# Patient Record
Sex: FEMALE | Race: WHITE | NOT HISPANIC OR LATINO | URBAN - METROPOLITAN AREA
[De-identification: names, ages, dates, MRNs, and addresses within clinical notes are randomized per-mention and may not be internally consistent; named-entity substitution may affect disease eponyms.]

---

## 2018-06-05 ENCOUNTER — OUTPATIENT (OUTPATIENT)
Dept: OUTPATIENT SERVICES | Facility: HOSPITAL | Age: 33
LOS: 1 days | Discharge: HOME | End: 2018-06-05

## 2018-06-05 ENCOUNTER — RESULT REVIEW (OUTPATIENT)
Age: 33
End: 2018-06-05

## 2018-06-05 DIAGNOSIS — A74.9 CHLAMYDIAL INFECTION, UNSPECIFIED: ICD-10-CM

## 2018-06-05 DIAGNOSIS — Z01.419 ENCOUNTER FOR GYNECOLOGICAL EXAMINATION (GENERAL) (ROUTINE) WITHOUT ABNORMAL FINDINGS: ICD-10-CM

## 2018-10-11 ENCOUNTER — OUTPATIENT (OUTPATIENT)
Dept: OUTPATIENT SERVICES | Facility: HOSPITAL | Age: 33
LOS: 1 days | Discharge: HOME | End: 2018-10-11

## 2018-10-11 DIAGNOSIS — Z34.00 ENCOUNTER FOR SUPERVISION OF NORMAL FIRST PREGNANCY, UNSPECIFIED TRIMESTER: ICD-10-CM

## 2018-10-19 ENCOUNTER — INPATIENT (INPATIENT)
Facility: HOSPITAL | Age: 33
LOS: 1 days | Discharge: HOME | End: 2018-10-21
Attending: OBSTETRICS & GYNECOLOGY | Admitting: OBSTETRICS & GYNECOLOGY

## 2018-10-19 VITALS
OXYGEN SATURATION: 97 % | TEMPERATURE: 98 F | RESPIRATION RATE: 16 BRPM | DIASTOLIC BLOOD PRESSURE: 76 MMHG | HEART RATE: 95 BPM | SYSTOLIC BLOOD PRESSURE: 126 MMHG

## 2018-10-19 LAB
ALBUMIN SERPL ELPH-MCNC: 4.6 G/DL — SIGNIFICANT CHANGE UP (ref 3.5–5.2)
ALP SERPL-CCNC: 64 U/L — SIGNIFICANT CHANGE UP (ref 30–115)
ALT FLD-CCNC: 26 U/L — SIGNIFICANT CHANGE UP (ref 0–41)
ANION GAP SERPL CALC-SCNC: 22 MMOL/L — HIGH (ref 7–14)
APPEARANCE UR: CLEAR — SIGNIFICANT CHANGE UP
AST SERPL-CCNC: 32 U/L — SIGNIFICANT CHANGE UP (ref 0–41)
BASOPHILS # BLD AUTO: 0.03 K/UL — SIGNIFICANT CHANGE UP (ref 0–0.2)
BASOPHILS NFR BLD AUTO: 0.2 % — SIGNIFICANT CHANGE UP (ref 0–1)
BILIRUB SERPL-MCNC: 0.4 MG/DL — SIGNIFICANT CHANGE UP (ref 0.2–1.2)
BILIRUB UR-MCNC: NEGATIVE — SIGNIFICANT CHANGE UP
BUN SERPL-MCNC: 12 MG/DL — SIGNIFICANT CHANGE UP (ref 10–20)
CALCIUM SERPL-MCNC: 10 MG/DL — SIGNIFICANT CHANGE UP (ref 8.5–10.1)
CHLORIDE SERPL-SCNC: 94 MMOL/L — LOW (ref 98–110)
CO2 SERPL-SCNC: 19 MMOL/L — SIGNIFICANT CHANGE UP (ref 17–32)
COLOR SPEC: YELLOW — SIGNIFICANT CHANGE UP
CREAT SERPL-MCNC: 0.6 MG/DL — LOW (ref 0.7–1.5)
DIFF PNL FLD: NEGATIVE — SIGNIFICANT CHANGE UP
EOSINOPHIL # BLD AUTO: 0.08 K/UL — SIGNIFICANT CHANGE UP (ref 0–0.7)
EOSINOPHIL NFR BLD AUTO: 0.6 % — SIGNIFICANT CHANGE UP (ref 0–8)
GLUCOSE SERPL-MCNC: 85 MG/DL — SIGNIFICANT CHANGE UP (ref 70–99)
GLUCOSE UR QL: >=1000
HCG SERPL-ACNC: HIGH MIU/ML
HCT VFR BLD CALC: 41.1 % — SIGNIFICANT CHANGE UP (ref 37–47)
HGB BLD-MCNC: 14.4 G/DL — SIGNIFICANT CHANGE UP (ref 12–16)
IMM GRANULOCYTES NFR BLD AUTO: 0.3 % — SIGNIFICANT CHANGE UP (ref 0.1–0.3)
KETONES UR-MCNC: 15
LACTATE SERPL-SCNC: 1.2 MMOL/L — SIGNIFICANT CHANGE UP (ref 0.5–2.2)
LEUKOCYTE ESTERASE UR-ACNC: NEGATIVE — SIGNIFICANT CHANGE UP
LYMPHOCYTES # BLD AUTO: 1.85 K/UL — SIGNIFICANT CHANGE UP (ref 1.2–3.4)
LYMPHOCYTES # BLD AUTO: 14.2 % — LOW (ref 20.5–51.1)
MCHC RBC-ENTMCNC: 31.6 PG — HIGH (ref 27–31)
MCHC RBC-ENTMCNC: 35 G/DL — SIGNIFICANT CHANGE UP (ref 32–37)
MCV RBC AUTO: 90.1 FL — SIGNIFICANT CHANGE UP (ref 81–99)
MONOCYTES # BLD AUTO: 0.67 K/UL — HIGH (ref 0.1–0.6)
MONOCYTES NFR BLD AUTO: 5.1 % — SIGNIFICANT CHANGE UP (ref 1.7–9.3)
NEUTROPHILS # BLD AUTO: 10.34 K/UL — HIGH (ref 1.4–6.5)
NEUTROPHILS NFR BLD AUTO: 79.6 % — HIGH (ref 42.2–75.2)
NITRITE UR-MCNC: NEGATIVE — SIGNIFICANT CHANGE UP
NRBC # BLD: 0 /100 WBCS — SIGNIFICANT CHANGE UP (ref 0–0)
PH UR: 6.5 — SIGNIFICANT CHANGE UP (ref 5–8)
PLATELET # BLD AUTO: 225 K/UL — SIGNIFICANT CHANGE UP (ref 130–400)
POTASSIUM SERPL-MCNC: 4.3 MMOL/L — SIGNIFICANT CHANGE UP (ref 3.5–5)
POTASSIUM SERPL-SCNC: 4.3 MMOL/L — SIGNIFICANT CHANGE UP (ref 3.5–5)
PROT SERPL-MCNC: 7.3 G/DL — SIGNIFICANT CHANGE UP (ref 6–8)
PROT UR-MCNC: NEGATIVE — SIGNIFICANT CHANGE UP
RBC # BLD: 4.56 M/UL — SIGNIFICANT CHANGE UP (ref 4.2–5.4)
RBC # FLD: 12.4 % — SIGNIFICANT CHANGE UP (ref 11.5–14.5)
SODIUM SERPL-SCNC: 135 MMOL/L — SIGNIFICANT CHANGE UP (ref 135–146)
SP GR SPEC: >=1.03 — SIGNIFICANT CHANGE UP (ref 1.01–1.03)
UROBILINOGEN FLD QL: 0.2 — SIGNIFICANT CHANGE UP (ref 0.2–0.2)
WBC # BLD: 13.01 K/UL — HIGH (ref 4.8–10.8)
WBC # FLD AUTO: 13.01 K/UL — HIGH (ref 4.8–10.8)

## 2018-10-19 RX ORDER — FAMOTIDINE 10 MG/ML
20 INJECTION INTRAVENOUS ONCE
Refills: 0 | Status: COMPLETED | OUTPATIENT
Start: 2018-10-19 | End: 2018-10-19

## 2018-10-19 RX ORDER — SODIUM CHLORIDE 9 MG/ML
1000 INJECTION INTRAMUSCULAR; INTRAVENOUS; SUBCUTANEOUS ONCE
Refills: 0 | Status: DISCONTINUED | OUTPATIENT
Start: 2018-10-19 | End: 2018-10-19

## 2018-10-19 RX ORDER — SODIUM CHLORIDE 9 MG/ML
1000 INJECTION, SOLUTION INTRAVENOUS
Refills: 0 | Status: DISCONTINUED | OUTPATIENT
Start: 2018-10-19 | End: 2018-10-21

## 2018-10-19 RX ORDER — SODIUM CHLORIDE 9 MG/ML
1000 INJECTION INTRAMUSCULAR; INTRAVENOUS; SUBCUTANEOUS ONCE
Refills: 0 | Status: COMPLETED | OUTPATIENT
Start: 2018-10-19 | End: 2018-10-19

## 2018-10-19 RX ORDER — FAMOTIDINE 10 MG/ML
20 INJECTION INTRAVENOUS DAILY
Refills: 0 | Status: DISCONTINUED | OUTPATIENT
Start: 2018-10-19 | End: 2018-10-21

## 2018-10-19 RX ADMIN — SODIUM CHLORIDE 1000 MILLILITER(S): 9 INJECTION INTRAMUSCULAR; INTRAVENOUS; SUBCUTANEOUS at 23:06

## 2018-10-19 RX ADMIN — SODIUM CHLORIDE 1000 MILLILITER(S): 9 INJECTION INTRAMUSCULAR; INTRAVENOUS; SUBCUTANEOUS at 17:09

## 2018-10-19 RX ADMIN — FAMOTIDINE 20 MILLIGRAM(S): 10 INJECTION INTRAVENOUS at 23:06

## 2018-10-19 RX ADMIN — SODIUM CHLORIDE 2000 MILLILITER(S): 9 INJECTION, SOLUTION INTRAVENOUS at 20:43

## 2018-10-19 NOTE — ED PROVIDER NOTE - ATTENDING CONTRIBUTION TO CARE
I personally evaluated the patient. I reviewed the Resident’s or Physician Assistant’s note (as assigned above), and agree with the findings and plan except as documented in my note.     33 female here for eval of persistent nausea and vomiting. Is  at 10 weeks by dates and has prior hyperemesis gravidarum. Reluctant to take antiemetics from prior experience. No other symptoms. Follows with Dr. Martinez and he is aware she is here.     PE: female in no distress. HEENT: conjunctiva pink. CHEST: normal work fo breathing. CV: pulses intact. ABD: soft, non rigid, no guarding. SKIN: no pallor no diaphoresis.    Impression: hyperemesis gravidarum    Plan: IV labs imaging supportive care and reevaluation

## 2018-10-19 NOTE — ED ADULT TRIAGE NOTE - CHIEF COMPLAINT QUOTE
"I'm 10 weeks and 1 day pregnant, I've been vomiting since I was 6 weeks pregnant, not I feel weak and dehydrated."

## 2018-10-19 NOTE — ED PROVIDER NOTE - OBJECTIVE STATEMENT
32 yo F with no pmhx,  currently 10 weeks and 1 day, presents for evaluation of nausea and vomiting, ongoing for a month, associated with abdominal cramping from the vomiting. No fever, no chills, no dysuria, no vaginal bleeding, no vaginal discharge, no back pain, no chest pain, no SOB. Patient states she is followed by Dr. Villalpando JARAD, who has been treated her with OTC VitB6 and doxylamine. Patient will not take zofran or reglan, her first born was born with a defect after taking zofran for the nausea she experienced during that pregnancy. Patient denies any other symptoms

## 2018-10-19 NOTE — ED ADULT NURSE NOTE - NSIMPLEMENTINTERV_GEN_ALL_ED
Implemented All Universal Safety Interventions:  [place] to call system. Call bell, personal items and telephone within reach. Instruct patient to call for assistance. Room bathroom lighting operational. Non-slip footwear when patient is off stretcher. Physically safe environment: no spills, clutter or unnecessary equipment. Stretcher in lowest position, wheels locked, appropriate side rails in place.

## 2018-10-19 NOTE — ED PROVIDER NOTE - MEDICAL DECISION MAKING DETAILS
33 female here for hyperemesis gravidarum intolerant of PO after several rounds of crystalloid, will admit to OB service. Case d/w Dr. Villalpando and family with plan explained.

## 2018-10-20 LAB
CULTURE RESULTS: SIGNIFICANT CHANGE UP
GLUCOSE BLDC GLUCOMTR-MCNC: 88 MG/DL — SIGNIFICANT CHANGE UP (ref 70–99)
SPECIMEN SOURCE: SIGNIFICANT CHANGE UP

## 2018-10-20 RX ORDER — METOCLOPRAMIDE HCL 10 MG
10 TABLET ORAL EVERY 6 HOURS
Refills: 0 | Status: DISCONTINUED | OUTPATIENT
Start: 2018-10-20 | End: 2018-10-20

## 2018-10-20 RX ORDER — SODIUM CHLORIDE 9 MG/ML
1000 INJECTION INTRAMUSCULAR; INTRAVENOUS; SUBCUTANEOUS
Refills: 0 | Status: DISCONTINUED | OUTPATIENT
Start: 2018-10-20 | End: 2018-10-21

## 2018-10-20 RX ORDER — DOXYLAMINE SUCCINATE AND PYRIDOXINE HYDROCHLORIDE, DELAYED RELEASE TABLETS 10 MG/10 MG 10; 10 MG/1; MG/1
1 TABLET, DELAYED RELEASE ORAL
Qty: 90 | Refills: 0
Start: 2018-10-20 | End: 2018-11-18

## 2018-10-20 RX ORDER — DOXYLAMINE SUCCINATE AND PYRIDOXINE HYDROCHLORIDE, DELAYED RELEASE TABLETS 10 MG/10 MG 10; 10 MG/1; MG/1
1 TABLET, DELAYED RELEASE ORAL
Refills: 0 | Status: DISCONTINUED | OUTPATIENT
Start: 2018-10-20 | End: 2018-10-21

## 2018-10-20 RX ORDER — SODIUM CHLORIDE 9 MG/ML
1000 INJECTION, SOLUTION INTRAVENOUS
Refills: 0 | Status: DISCONTINUED | OUTPATIENT
Start: 2018-10-20 | End: 2018-10-21

## 2018-10-20 RX ORDER — METOCLOPRAMIDE HCL 10 MG
10 TABLET ORAL EVERY 6 HOURS
Refills: 0 | Status: DISCONTINUED | OUTPATIENT
Start: 2018-10-20 | End: 2018-10-21

## 2018-10-20 RX ADMIN — FAMOTIDINE 104 MILLIGRAM(S): 10 INJECTION INTRAVENOUS at 12:35

## 2018-10-20 RX ADMIN — Medication 10 MILLIGRAM(S): at 18:30

## 2018-10-20 RX ADMIN — SODIUM CHLORIDE 125 MILLILITER(S): 9 INJECTION INTRAMUSCULAR; INTRAVENOUS; SUBCUTANEOUS at 15:50

## 2018-10-20 RX ADMIN — SODIUM CHLORIDE 125 MILLILITER(S): 9 INJECTION INTRAMUSCULAR; INTRAVENOUS; SUBCUTANEOUS at 14:23

## 2018-10-20 RX ADMIN — SODIUM CHLORIDE 125 MILLILITER(S): 9 INJECTION, SOLUTION INTRAVENOUS at 18:31

## 2018-10-20 RX ADMIN — SODIUM CHLORIDE 125 MILLILITER(S): 9 INJECTION INTRAMUSCULAR; INTRAVENOUS; SUBCUTANEOUS at 18:14

## 2018-10-20 RX ADMIN — DOXYLAMINE SUCCINATE AND PYRIDOXINE HYDROCHLORIDE, DELAYED RELEASE TABLETS 10 MG/10 MG 1 TABLET(S): 10; 10 TABLET, DELAYED RELEASE ORAL at 21:57

## 2018-10-20 RX ADMIN — SODIUM CHLORIDE 125 MILLILITER(S): 9 INJECTION, SOLUTION INTRAVENOUS at 02:05

## 2018-10-20 NOTE — H&P ADULT - ASSESSMENT
32 yo  at 10w1d GA by LMP of 8/10/18 with EDC of 18 with hyperemesis gravidarum  -Offered multiple antiemetics, pt declined  -IV fluids with pyridoxine and multivitamin  -IV pepcid QD  -regular diet (clear liquid diet ordered to prevent nausea triggers from smelling food), pt may have regular food on demand  -will re-evaluate in AM    DIscussed with Dr. Villalpando and Dr. Woods

## 2018-10-20 NOTE — H&P ADULT - NSHPPHYSICALEXAM_GEN_ALL_CORE
General Appearance - AAOx3, NAD  Heart - S1S2 regular rate and rhythm  Lung - CTA Bilaterally  Abdomen - Soft, nontender, nondistended, no rebound, no rigidity, no guarding, bowel sounds present Vital Signs Last 24 Hrs  T(F): 97.7 (19 Oct 2018 23:50), Max: 97.7 (19 Oct 2018 19:22)  HR: 79 (19 Oct 2018 23:50) (72 - 95)  BP: 120/56 (19 Oct 2018 23:50) (117/68 - 126/83)  RR: 18 (19 Oct 2018 23:50) (16 - 18)    General Appearance - AAOx3, NAD  Heart - S1S2 regular rate and rhythm  Lung - CTA Bilaterally  Abdomen - Soft, nontender, nondistended, no rebound, no rigidity, no guarding, bowel sounds present

## 2018-10-20 NOTE — H&P ADULT - NSHPLABSRESULTS_GEN_ALL_CORE
14.4   13.01 )-----------( 225      ( 19 Oct 2018 16:09 )             41.1     10-19    135  |  94<L>  |  12  ----------------------------<  85  4.3   |  19  |  0.6<L>    Ca    10.0      19 Oct 2018 16:09    TPro  7.3  /  Alb  4.6  /  TBili  0.4  /  DBili  x   /  AST  32  /  ALT  26  /  AlkPhos  64  10-19    Lactate, Blood (10.19.18 @ 16:09)    Lactate, Blood: 1.2 mmol/L    Urinalysis Basic - ( 19 Oct 2018 16:09 )    Color: Yellow / Appearance: Clear / SG: >=1.030 / pH: x  Gluc: x / Ketone: 15  / Bili: Negative / Urobili: 0.2   Blood: x / Protein: Negative / Nitrite: Negative   Leuk Esterase: Negative / RBC: x / WBC x   Sq Epi: x / Non Sq Epi: x / Bacteria: x    CAPILLARY BLOOD GLUCOSE      POCT Blood Glucose.: 88 mg/dL (20 Oct 2018 00:49) 14.4   13.01 )-----------( 225      ( 19 Oct 2018 16:09 )             41.1     10-19    135  |  94<L>  |  12  ----------------------------<  85  4.3   |  19  |  0.6<L>    Ca    10.0      19 Oct 2018 16:09    TPro  7.3  /  Alb  4.6  /  TBili  0.4  /  DBili  x   /  AST  32  /  ALT  26  /  AlkPhos  64  10-19    Lactate, Blood (10.19.18 @ 16:09)    Lactate, Blood: 1.2 mmol/L    Urinalysis Basic - ( 19 Oct 2018 16:09 )    Color: Yellow / Appearance: Clear / SG: >=1.030 / pH: x  Gluc: x / Ketone: 15  / Bili: Negative / Urobili: 0.2   Blood: x / Protein: Negative / Nitrite: Negative   Leuk Esterase: Negative / RBC: x / WBC x   Sq Epi: x / Non Sq Epi: x / Bacteria: x    CAPILLARY BLOOD GLUCOSE      POCT Blood Glucose.: 88 mg/dL (20 Oct 2018 00:49)    Bedside sono: FHR visualized

## 2018-10-20 NOTE — H&P ADULT - HISTORY OF PRESENT ILLNESS
Chief Complaint: Vomiting    HPI: 32 yo  at 10w1d GA by LMP of 8/10/18 with EDC of 18 with known hyperemesis gravidarum presents with nausea and vomiting many times today. She has been taking diclegis for the past week with partial relief. She last held down a full meal yesterday (10/18) afternoon. The only liquid that she tolerates is milk, she vomits even with water. She has persistent heartburn that is relived by antacids or milk. 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 She is worried that her use of antinausea medication in her previous pregnancy caused this congenital defect, so she refuses most antinausea medications. She is comfortable taking diclegis and jaime only. She felt some mild weakness that improved with fluids given by ED team. She currently denies dizziness, weakness, fevers, chills, shortness of breath, chest pain, nausea, vomiting, dysuria, hematuria, diarrhea, or constipation.    Ob/Gyn History:  ,  c/s for craniosynostosis also complicated by hyperemesis gravidarum requiring home zofran pump, SAB x2               LMP -   8/10/18                Cycle Length - regular, monthly  Denies history of ovarian cysts, uterine fibroids, abnormal paps, or STIs  Last Pap Smear - several months ago, normal per pt

## 2018-10-20 NOTE — ED ADULT NURSE REASSESSMENT NOTE - NS ED NURSE REASSESS COMMENT FT1
Patient requesting food - order states patient is on clear liquid diet only. Patient sent  to get food regardless of order. Called resident at [phone] and he changed order and gave verbal that patient can eat. Patient has cookies at bedside. Awaiting IVF from pharmacy. Will monitor for comfort and safety.

## 2018-10-21 ENCOUNTER — TRANSCRIPTION ENCOUNTER (OUTPATIENT)
Age: 33
End: 2018-10-21

## 2018-10-21 VITALS
RESPIRATION RATE: 18 BRPM | TEMPERATURE: 98 F | SYSTOLIC BLOOD PRESSURE: 111 MMHG | DIASTOLIC BLOOD PRESSURE: 51 MMHG | HEART RATE: 87 BPM

## 2018-10-21 RX ORDER — FAMOTIDINE 10 MG/ML
20 INJECTION INTRAVENOUS DAILY
Refills: 0 | Status: DISCONTINUED | OUTPATIENT
Start: 2018-10-21 | End: 2018-10-21

## 2018-10-21 RX ADMIN — DOXYLAMINE SUCCINATE AND PYRIDOXINE HYDROCHLORIDE, DELAYED RELEASE TABLETS 10 MG/10 MG 1 TABLET(S): 10; 10 TABLET, DELAYED RELEASE ORAL at 07:45

## 2018-10-21 RX ADMIN — DOXYLAMINE SUCCINATE AND PYRIDOXINE HYDROCHLORIDE, DELAYED RELEASE TABLETS 10 MG/10 MG 1 TABLET(S): 10; 10 TABLET, DELAYED RELEASE ORAL at 14:32

## 2018-10-21 RX ADMIN — FAMOTIDINE 20 MILLIGRAM(S): 10 INJECTION INTRAVENOUS at 14:32

## 2018-10-21 RX ADMIN — SODIUM CHLORIDE 125 MILLILITER(S): 9 INJECTION INTRAMUSCULAR; INTRAVENOUS; SUBCUTANEOUS at 11:27

## 2018-10-21 NOTE — DISCHARGE NOTE ANTEPARTUM - CARE PLAN
Principal Discharge DX:	Hyperemesis gravidarum  Goal:	healthy patient  Assessment and plan of treatment:	follow up with your doctor as scheduled, take all medications at prescribed

## 2018-10-21 NOTE — DISCHARGE NOTE ANTEPARTUM - HOSPITAL COURSE
uncomplicated, patient d/c'd on hospital day one, patient requesting discharge, tolerating regular diet and PO fluids.

## 2018-10-21 NOTE — DISCHARGE NOTE ANTEPARTUM - CARE PROVIDER_API CALL
Juan Manuel To), Obstetrics and Gynecology  [address] [place] [address]  Phone: [phone]  Fax: [phone]

## 2018-10-21 NOTE — PROGRESS NOTE ADULT - SUBJECTIVE AND OBJECTIVE BOX
PGY2 progress note    Patient seen and examined at bedside. Reports only 1 episode of vomiting yesterday evening at 2000 after getting reglan IV. Currently feels nauseous but no other events of vomiting overnight and this morning. Tolerating small sips of water. Ate jello and chocolate yesterday which she did not vomit. Has some epigastric pain from acid reflux but otherwise no abdominal pain. Denies fever, chest pain, SOB, dysuria, diarrhea, vaginal bleeding/discharge.  Patient reports having a severe anxiety attack after receiving reglan yesterday. Does not want to get it again. Only agreeing to take diclegis at this time.    PE:  Vital Signs Last 24 Hrs  T(C): 36.7 (21 Oct 2018 05:24), Max: 37.1 (20 Oct 2018 18:52)  T(F): 98 (21 Oct 2018 05:24), Max: 98.7 (20 Oct 2018 18:52)  HR: 83 (21 Oct 2018 05:24) (71 - 104)  BP: 111/64 (21 Oct 2018 05:24) (104/75 - 113/68)  RR: 18 (21 Oct 2018 05:24) (18 - 20)  SpO2: 99% (20 Oct 2018 12:17) (99% - 99%)    GEN: NAD, AAOX3  CVS: RRR, normal S1/S2  lungs: CTAB  abd: soft, nontender, no r/g/r  ext: no calf tenderness or edema  SVE: deferred    meds:  MEDICATIONS  (STANDING):  dextrose 5% + sodium chloride 0.9%. 1000 milliLiter(s) (2000 mL/Hr) IV Continuous <Continuous>  doxylamine 10 mG/pyridoxine 10 mG DR Tablet 1 Tablet(s) Oral <User Schedule>  famotidine  IVPB 20 milliGRAM(s) IV Intermittent daily  sodium chloride 0.9% 1000 milliLiter(s) (125 mL/Hr) IV Continuous <Continuous>  sodium chloride 0.9%. 1000 milliLiter(s) (125 mL/Hr) IV Continuous <Continuous>    labs:                        14.4   13.01 )-----------( 225      ( 19 Oct 2018 16:09 )             41.1   10-19    135  |  94<L>  |  12  ----------------------------<  85  4.3   |  19  |  0.6<L>    Ca    10.0      19 Oct 2018 16:09    TPro  7.3  /  Alb  4.6  /  TBili  0.4  /  DBili  x   /  AST  32  /  ALT  26  /  AlkPhos  64  10-19    Ucx (final) [id number] Coagulase negative staph    TVUS:  Single live intrauterine pregnancy, with an estimated gestational age of   10 weeks and 3 days by crown-rump length. Fetal cardiac activity   demonstrated at 169 bpm. Small subchorionic hematoma measuring 1.5 x 1 x   0.7 cm, covering less than 20% of gestational sac circumference.
PGY 2 Note    Pt seen and examined at bedside, resting comfortably at this time. Denies N/V at this time, tolerated cookies overnight. Denies fevers, chills, headache, CP, SON, abd pain, N/V/D, dysuria, vaginal bleeding/discharge or cramping. Refusing anti-emetics.     PE  T(F): 97.6 (20 Oct 2018 08:13), Max: 97.7 (19 Oct 2018 19:22)  HR: 79 (20 Oct 2018 08:13) (72 - 95)  BP: 106/51 (20 Oct 2018 08:13) (106/51 - 126/83)  RR: 19 (20 Oct 2018 08:13) (16 - 19)  SpO2: 97% (19 Oct 2018 23:50) (97% - 98%)    Gen: NAD, AAOx3  Abd: soft, non tender, no R/G/R, no CVA or suprapubic tenderness  Pelvic - deferred  LE: no edema or tenderness    Labs:  none new    Meds:  2306: Pepcid 20mg

## 2018-10-21 NOTE — DISCHARGE NOTE ANTEPARTUM - PATIENT PORTAL LINK FT
You can access the [facility] Patient Portal, offered by [facility], by registering with the following website: [email or web address]

## 2018-10-21 NOTE — PROGRESS NOTE ADULT - ASSESSMENT
34 yo  at 10w1d GA, with hyperemesis gravidarum, refusing anti-emetics  -Offered multiple antiemetics, pt declined  -c/w IV fluids with pyridoxine and multivitamin  -IV pepcid QD  -regular diet   -If tolerating PO will plan for discharge later today.    Dr. Menon aware, Dr. Villalpando at bedside.
34 y/o  at 10w2d GA, with hyperemesis gravidarum, vomiting improved after reglan but refusing further antiemetics other than diclegis clinically and hemodynamically stable, HD2, doing well.   -c/w diclegis TID  - patient still refusing further antiemetics  -IV fluids  -IV pepcid QD  -regular diet     Dr. Montemayor aware. Dr. Villalpando to be made aware.

## 2018-10-27 DIAGNOSIS — O21.0 MILD HYPEREMESIS GRAVIDARUM: ICD-10-CM

## 2018-10-27 DIAGNOSIS — Z33.1 PREGNANT STATE, INCIDENTAL: ICD-10-CM

## 2018-10-27 DIAGNOSIS — Z3A.10 10 WEEKS GESTATION OF PREGNANCY: ICD-10-CM

## 2019-01-03 PROBLEM — Z00.00 ENCOUNTER FOR PREVENTIVE HEALTH EXAMINATION: Status: ACTIVE | Noted: 2019-01-03

## 2019-01-08 ENCOUNTER — APPOINTMENT (OUTPATIENT)
Dept: OBGYN | Facility: CLINIC | Age: 34
End: 2019-01-08

## 2019-01-08 VITALS
BODY MASS INDEX: 28.84 KG/M2 | RESPIRATION RATE: 20 BRPM | HEART RATE: 96 BPM | SYSTOLIC BLOOD PRESSURE: 103 MMHG | HEIGHT: 71 IN | DIASTOLIC BLOOD PRESSURE: 74 MMHG | WEIGHT: 206 LBS

## 2019-01-08 LAB
BILIRUB UR QL STRIP: NORMAL
GLUCOSE UR-MCNC: NORMAL
HGB UR QL STRIP.AUTO: NORMAL
KETONES UR-MCNC: NORMAL
NITRITE UR QL STRIP: NORMAL
PROT UR STRIP-MCNC: NORMAL

## 2019-02-19 ENCOUNTER — APPOINTMENT (OUTPATIENT)
Dept: OBGYN | Facility: CLINIC | Age: 34
End: 2019-02-19

## 2019-03-19 ENCOUNTER — APPOINTMENT (OUTPATIENT)
Dept: OBGYN | Facility: CLINIC | Age: 34
End: 2019-03-19

## 2019-03-25 ENCOUNTER — OUTPATIENT (OUTPATIENT)
Dept: OUTPATIENT SERVICES | Facility: HOSPITAL | Age: 34
LOS: 1 days | Discharge: HOME | End: 2019-03-25

## 2019-03-25 ENCOUNTER — LABORATORY RESULT (OUTPATIENT)
Age: 34
End: 2019-03-25

## 2019-03-25 ENCOUNTER — APPOINTMENT (OUTPATIENT)
Dept: ANTEPARTUM | Facility: CLINIC | Age: 34
End: 2019-03-25

## 2019-03-25 DIAGNOSIS — Z34.90 ENCOUNTER FOR SUPERVISION OF NORMAL PREGNANCY, UNSPECIFIED, UNSPECIFIED TRIMESTER: ICD-10-CM

## 2019-03-25 RX ORDER — HUMAN RHO(D) IMMUNE GLOBULIN 300 UG/1
1500 INJECTION, SOLUTION INTRAMUSCULAR
Refills: 0 | Status: COMPLETED | OUTPATIENT
Start: 2019-03-25

## 2019-03-25 RX ADMIN — HUMAN RHO(D) IMMUNE GLOBULIN 0 UNIT: 300 INJECTION, SOLUTION INTRAMUSCULAR at 00:00

## 2019-04-02 ENCOUNTER — APPOINTMENT (OUTPATIENT)
Dept: OBGYN | Facility: CLINIC | Age: 34
End: 2019-04-02

## 2019-04-10 ENCOUNTER — INPATIENT (INPATIENT)
Facility: HOSPITAL | Age: 34
LOS: 0 days | Discharge: ANOTHER HOSPITAL | End: 2019-04-11
Attending: THORACIC SURGERY (CARDIOTHORACIC VASCULAR SURGERY) | Admitting: THORACIC SURGERY (CARDIOTHORACIC VASCULAR SURGERY)
Payer: COMMERCIAL

## 2019-04-10 ENCOUNTER — RESULT REVIEW (OUTPATIENT)
Age: 34
End: 2019-04-10

## 2019-04-10 VITALS
SYSTOLIC BLOOD PRESSURE: 121 MMHG | DIASTOLIC BLOOD PRESSURE: 68 MMHG | TEMPERATURE: 96 F | OXYGEN SATURATION: 100 % | HEART RATE: 228 BPM | RESPIRATION RATE: 24 BRPM

## 2019-04-10 DIAGNOSIS — Z98.891 HISTORY OF UTERINE SCAR FROM PREVIOUS SURGERY: Chronic | ICD-10-CM

## 2019-04-10 LAB
ALBUMIN SERPL ELPH-MCNC: 3.1 G/DL — LOW (ref 3.5–5.2)
ALBUMIN SERPL ELPH-MCNC: 3.8 G/DL — SIGNIFICANT CHANGE UP (ref 3.5–5.2)
ALLERGY+IMMUNOLOGY DIAG STUDY NOTE: SIGNIFICANT CHANGE UP
ALP SERPL-CCNC: 124 U/L — HIGH (ref 30–115)
ALP SERPL-CCNC: 146 U/L — HIGH (ref 30–115)
ALT FLD-CCNC: 39 U/L — SIGNIFICANT CHANGE UP (ref 0–41)
ALT FLD-CCNC: 43 U/L — HIGH (ref 0–41)
ANION GAP SERPL CALC-SCNC: 15 MMOL/L — HIGH (ref 7–14)
ANION GAP SERPL CALC-SCNC: 16 MMOL/L — HIGH (ref 7–14)
ANION GAP SERPL CALC-SCNC: 16 MMOL/L — HIGH (ref 7–14)
APTT BLD: 18.8 SEC — CRITICAL LOW (ref 27–39.2)
AST SERPL-CCNC: 47 U/L — HIGH (ref 0–41)
AST SERPL-CCNC: 47 U/L — HIGH (ref 0–41)
BASOPHILS # BLD AUTO: 0.02 K/UL — SIGNIFICANT CHANGE UP (ref 0–0.2)
BASOPHILS NFR BLD AUTO: 0.1 % — SIGNIFICANT CHANGE UP (ref 0–1)
BILIRUB DIRECT SERPL-MCNC: 0.2 MG/DL — SIGNIFICANT CHANGE UP (ref 0–0.2)
BILIRUB INDIRECT FLD-MCNC: 0.2 MG/DL — SIGNIFICANT CHANGE UP (ref 0.2–1.2)
BILIRUB SERPL-MCNC: 0.3 MG/DL — SIGNIFICANT CHANGE UP (ref 0.2–1.2)
BILIRUB SERPL-MCNC: 0.4 MG/DL — SIGNIFICANT CHANGE UP (ref 0.2–1.2)
BLD GP AB SCN SERPL QL: ABNORMAL
BUN SERPL-MCNC: 10 MG/DL — SIGNIFICANT CHANGE UP (ref 10–20)
BUN SERPL-MCNC: 11 MG/DL — SIGNIFICANT CHANGE UP (ref 10–20)
BUN SERPL-MCNC: 11 MG/DL — SIGNIFICANT CHANGE UP (ref 10–20)
CALCIUM SERPL-MCNC: 7.5 MG/DL — LOW (ref 8.5–10.1)
CALCIUM SERPL-MCNC: 7.8 MG/DL — LOW (ref 8.5–10.1)
CALCIUM SERPL-MCNC: 9.4 MG/DL — SIGNIFICANT CHANGE UP (ref 8.5–10.1)
CHLORIDE SERPL-SCNC: 103 MMOL/L — SIGNIFICANT CHANGE UP (ref 98–110)
CHLORIDE SERPL-SCNC: 103 MMOL/L — SIGNIFICANT CHANGE UP (ref 98–110)
CHLORIDE SERPL-SCNC: 108 MMOL/L — SIGNIFICANT CHANGE UP (ref 98–110)
CK MB CFR SERPL CALC: 3.4 NG/ML — SIGNIFICANT CHANGE UP (ref 0.6–6.3)
CK SERPL-CCNC: 148 U/L — SIGNIFICANT CHANGE UP (ref 0–225)
CO2 SERPL-SCNC: 13 MMOL/L — LOW (ref 17–32)
CO2 SERPL-SCNC: 14 MMOL/L — LOW (ref 17–32)
CO2 SERPL-SCNC: 18 MMOL/L — SIGNIFICANT CHANGE UP (ref 17–32)
CREAT SERPL-MCNC: 0.5 MG/DL — LOW (ref 0.7–1.5)
CREAT SERPL-MCNC: 0.6 MG/DL — LOW (ref 0.7–1.5)
CREAT SERPL-MCNC: 0.7 MG/DL — SIGNIFICANT CHANGE UP (ref 0.7–1.5)
DIR ANTIGLOB POLYSPECIFIC INTERPRETATION: SIGNIFICANT CHANGE UP
EOSINOPHIL # BLD AUTO: 0.05 K/UL — SIGNIFICANT CHANGE UP (ref 0–0.7)
EOSINOPHIL NFR BLD AUTO: 0.4 % — SIGNIFICANT CHANGE UP (ref 0–8)
GAS PNL BLDA: SIGNIFICANT CHANGE UP
GAS PNL BLDA: SIGNIFICANT CHANGE UP
GLUCOSE SERPL-MCNC: 163 MG/DL — HIGH (ref 70–99)
GLUCOSE SERPL-MCNC: 175 MG/DL — HIGH (ref 70–99)
GLUCOSE SERPL-MCNC: 87 MG/DL — SIGNIFICANT CHANGE UP (ref 70–99)
HCG SERPL-ACNC: 4348 MIU/ML — HIGH
HCT VFR BLD CALC: 29.5 % — LOW (ref 37–47)
HCT VFR BLD CALC: 34.1 % — LOW (ref 37–47)
HCT VFR BLD CALC: 36.1 % — LOW (ref 37–47)
HGB BLD-MCNC: 10.4 G/DL — LOW (ref 12–16)
HGB BLD-MCNC: 10.6 G/DL — LOW (ref 12–16)
HGB BLD-MCNC: 8.5 G/DL — LOW (ref 12–16)
HIV 1 & 2 AB SERPL IA.RAPID: SIGNIFICANT CHANGE UP
IMM GRANULOCYTES NFR BLD AUTO: 0.8 % — HIGH (ref 0.1–0.3)
INR BLD: 1.03 RATIO — SIGNIFICANT CHANGE UP (ref 0.65–1.3)
LACTATE SERPL-SCNC: 2.2 MMOL/L — SIGNIFICANT CHANGE UP (ref 0.5–2.2)
LDH SERPL L TO P-CCNC: 263 — HIGH (ref 50–242)
LDH SERPL L TO P-CCNC: 279 — HIGH (ref 50–242)
LYMPHOCYTES # BLD AUTO: 17 % — LOW (ref 20.5–51.1)
LYMPHOCYTES # BLD AUTO: 2.34 K/UL — SIGNIFICANT CHANGE UP (ref 1.2–3.4)
MAGNESIUM SERPL-MCNC: 1.7 MG/DL — LOW (ref 1.8–2.4)
MAGNESIUM SERPL-MCNC: 1.9 MG/DL — SIGNIFICANT CHANGE UP (ref 1.8–2.4)
MCHC RBC-ENTMCNC: 27.4 PG — SIGNIFICANT CHANGE UP (ref 27–31)
MCHC RBC-ENTMCNC: 27.9 PG — SIGNIFICANT CHANGE UP (ref 27–31)
MCHC RBC-ENTMCNC: 28 PG — SIGNIFICANT CHANGE UP (ref 27–31)
MCHC RBC-ENTMCNC: 28.8 G/DL — LOW (ref 32–37)
MCHC RBC-ENTMCNC: 29.4 G/DL — LOW (ref 32–37)
MCHC RBC-ENTMCNC: 30.5 G/DL — LOW (ref 32–37)
MCV RBC AUTO: 91.7 FL — SIGNIFICANT CHANGE UP (ref 81–99)
MCV RBC AUTO: 95 FL — SIGNIFICANT CHANGE UP (ref 81–99)
MCV RBC AUTO: 95.2 FL — SIGNIFICANT CHANGE UP (ref 81–99)
MONOCYTES # BLD AUTO: 1.14 K/UL — HIGH (ref 0.1–0.6)
MONOCYTES NFR BLD AUTO: 8.3 % — SIGNIFICANT CHANGE UP (ref 1.7–9.3)
NEUTROPHILS # BLD AUTO: 10.1 K/UL — HIGH (ref 1.4–6.5)
NEUTROPHILS NFR BLD AUTO: 73.4 % — SIGNIFICANT CHANGE UP (ref 42.2–75.2)
NRBC # BLD: 0 /100 WBCS — SIGNIFICANT CHANGE UP (ref 0–0)
NRBC # BLD: 0 /100 WBCS — SIGNIFICANT CHANGE UP (ref 0–0)
NRBC # BLD: 1 /100 WBCS — HIGH (ref 0–0)
PHOSPHATE SERPL-MCNC: 5.2 MG/DL — HIGH (ref 2.1–4.9)
PLATELET # BLD AUTO: 190 K/UL — SIGNIFICANT CHANGE UP (ref 130–400)
PLATELET # BLD AUTO: 266 K/UL — SIGNIFICANT CHANGE UP (ref 130–400)
PLATELET # BLD AUTO: 268 K/UL — SIGNIFICANT CHANGE UP (ref 130–400)
POTASSIUM SERPL-MCNC: 4.3 MMOL/L — SIGNIFICANT CHANGE UP (ref 3.5–5)
POTASSIUM SERPL-MCNC: 5.6 MMOL/L — HIGH (ref 3.5–5)
POTASSIUM SERPL-MCNC: 6.8 MMOL/L — CRITICAL HIGH (ref 3.5–5)
POTASSIUM SERPL-SCNC: 4.3 MMOL/L — SIGNIFICANT CHANGE UP (ref 3.5–5)
POTASSIUM SERPL-SCNC: 5.6 MMOL/L — HIGH (ref 3.5–5)
POTASSIUM SERPL-SCNC: 6.8 MMOL/L — CRITICAL HIGH (ref 3.5–5)
PRENATAL SYPHILIS TEST: SIGNIFICANT CHANGE UP
PROT SERPL-MCNC: 5.1 G/DL — LOW (ref 6–8)
PROT SERPL-MCNC: 6.2 G/DL — SIGNIFICANT CHANGE UP (ref 6–8)
PROTHROM AB SERPL-ACNC: 11.8 SEC — SIGNIFICANT CHANGE UP (ref 9.95–12.87)
RBC # BLD: 3.1 M/UL — LOW (ref 4.2–5.4)
RBC # BLD: 3.72 M/UL — LOW (ref 4.2–5.4)
RBC # BLD: 3.8 M/UL — LOW (ref 4.2–5.4)
RBC # FLD: 14.6 % — HIGH (ref 11.5–14.5)
RBC # FLD: 14.6 % — HIGH (ref 11.5–14.5)
RBC # FLD: 14.7 % — HIGH (ref 11.5–14.5)
SODIUM SERPL-SCNC: 132 MMOL/L — LOW (ref 135–146)
SODIUM SERPL-SCNC: 137 MMOL/L — SIGNIFICANT CHANGE UP (ref 135–146)
SODIUM SERPL-SCNC: 137 MMOL/L — SIGNIFICANT CHANGE UP (ref 135–146)
TROPONIN T SERPL-MCNC: <0.01 NG/ML — SIGNIFICANT CHANGE UP
TROPONIN T SERPL-MCNC: <0.01 NG/ML — SIGNIFICANT CHANGE UP
TYPE + AB SCN PNL BLD: SIGNIFICANT CHANGE UP
URATE SERPL-MCNC: 4 MG/DL — SIGNIFICANT CHANGE UP (ref 2.5–7)
URATE SERPL-MCNC: 4.4 MG/DL — SIGNIFICANT CHANGE UP (ref 2.5–7)
WBC # BLD: 13.76 K/UL — HIGH (ref 4.8–10.8)
WBC # BLD: 16.2 K/UL — HIGH (ref 4.8–10.8)
WBC # BLD: 21.89 K/UL — HIGH (ref 4.8–10.8)
WBC # FLD AUTO: 13.76 K/UL — HIGH (ref 4.8–10.8)
WBC # FLD AUTO: 16.2 K/UL — HIGH (ref 4.8–10.8)
WBC # FLD AUTO: 21.89 K/UL — HIGH (ref 4.8–10.8)

## 2019-04-10 PROCEDURE — 99223 1ST HOSP IP/OBS HIGH 75: CPT

## 2019-04-10 PROCEDURE — 99292 CRITICAL CARE ADDL 30 MIN: CPT

## 2019-04-10 PROCEDURE — 93010 ELECTROCARDIOGRAM REPORT: CPT | Mod: 76

## 2019-04-10 PROCEDURE — 93970 EXTREMITY STUDY: CPT | Mod: 26

## 2019-04-10 PROCEDURE — 99291 CRITICAL CARE FIRST HOUR: CPT

## 2019-04-10 PROCEDURE — 88307 TISSUE EXAM BY PATHOLOGIST: CPT | Mod: 26

## 2019-04-10 RX ORDER — METOPROLOL TARTRATE 50 MG
5 TABLET ORAL ONCE
Refills: 0 | Status: COMPLETED | OUTPATIENT
Start: 2019-04-10 | End: 2019-04-10

## 2019-04-10 RX ORDER — CALCIUM GLUCONATE 100 MG/ML
1 VIAL (ML) INTRAVENOUS ONCE
Refills: 0 | Status: COMPLETED | OUTPATIENT
Start: 2019-04-10 | End: 2019-04-10

## 2019-04-10 RX ORDER — FENTANYL CITRATE 50 UG/ML
25 INJECTION INTRAVENOUS ONCE
Refills: 0 | Status: DISCONTINUED | OUTPATIENT
Start: 2019-04-10 | End: 2019-04-10

## 2019-04-10 RX ORDER — PROPOFOL 10 MG/ML
10 INJECTION, EMULSION INTRAVENOUS ONCE
Refills: 0 | Status: COMPLETED | OUTPATIENT
Start: 2019-04-10 | End: 2019-04-10

## 2019-04-10 RX ORDER — PHENYLEPHRINE HYDROCHLORIDE 10 MG/ML
0.5 INJECTION INTRAVENOUS
Qty: 40 | Refills: 0 | Status: DISCONTINUED | OUTPATIENT
Start: 2019-04-10 | End: 2019-04-10

## 2019-04-10 RX ORDER — ADENOSINE 3 MG/ML
18 INJECTION INTRAVENOUS ONCE
Refills: 0 | Status: COMPLETED | OUTPATIENT
Start: 2019-04-10 | End: 2019-04-10

## 2019-04-10 RX ORDER — DEXTROSE 50 % IN WATER 50 %
50 SYRINGE (ML) INTRAVENOUS ONCE
Refills: 0 | Status: COMPLETED | OUTPATIENT
Start: 2019-04-10 | End: 2019-04-10

## 2019-04-10 RX ORDER — PROPOFOL 10 MG/ML
5 INJECTION, EMULSION INTRAVENOUS ONCE
Refills: 0 | Status: COMPLETED | OUTPATIENT
Start: 2019-04-10 | End: 2019-04-10

## 2019-04-10 RX ORDER — PROPOFOL 10 MG/ML
200 INJECTION, EMULSION INTRAVENOUS ONCE
Refills: 0 | Status: COMPLETED | OUTPATIENT
Start: 2019-04-10 | End: 2019-04-10

## 2019-04-10 RX ORDER — SODIUM CHLORIDE 9 MG/ML
1000 INJECTION INTRAMUSCULAR; INTRAVENOUS; SUBCUTANEOUS ONCE
Refills: 0 | Status: COMPLETED | OUTPATIENT
Start: 2019-04-10 | End: 2019-04-10

## 2019-04-10 RX ORDER — PROPOFOL 10 MG/ML
1 INJECTION, EMULSION INTRAVENOUS ONCE
Refills: 0 | Status: DISCONTINUED | OUTPATIENT
Start: 2019-04-10 | End: 2019-04-10

## 2019-04-10 RX ORDER — SUCCINYLCHOLINE CHLORIDE 100 MG/5ML
100 SYRINGE (ML) INTRAVENOUS ONCE
Refills: 0 | Status: COMPLETED | OUTPATIENT
Start: 2019-04-10 | End: 2019-04-10

## 2019-04-10 RX ORDER — FENTANYL CITRATE 50 UG/ML
0.5 INJECTION INTRAVENOUS
Qty: 2500 | Refills: 0 | Status: DISCONTINUED | OUTPATIENT
Start: 2019-04-10 | End: 2019-04-11

## 2019-04-10 RX ORDER — FENTANYL CITRATE 50 UG/ML
50 INJECTION INTRAVENOUS ONCE
Refills: 0 | Status: DISCONTINUED | OUTPATIENT
Start: 2019-04-10 | End: 2019-04-10

## 2019-04-10 RX ORDER — PROPOFOL 10 MG/ML
40 INJECTION, EMULSION INTRAVENOUS ONCE
Refills: 0 | Status: COMPLETED | OUTPATIENT
Start: 2019-04-10 | End: 2019-04-10

## 2019-04-10 RX ORDER — FUROSEMIDE 40 MG
40 TABLET ORAL ONCE
Refills: 0 | Status: DISCONTINUED | OUTPATIENT
Start: 2019-04-10 | End: 2019-04-11

## 2019-04-10 RX ORDER — PHENYLEPHRINE HYDROCHLORIDE 10 MG/ML
0.1 INJECTION INTRAVENOUS
Qty: 40 | Refills: 0 | Status: DISCONTINUED | OUTPATIENT
Start: 2019-04-10 | End: 2019-04-10

## 2019-04-10 RX ORDER — PROPOFOL 10 MG/ML
50 INJECTION, EMULSION INTRAVENOUS ONCE
Refills: 0 | Status: COMPLETED | OUTPATIENT
Start: 2019-04-10 | End: 2019-04-10

## 2019-04-10 RX ORDER — INSULIN HUMAN 100 [IU]/ML
10 INJECTION, SOLUTION SUBCUTANEOUS ONCE
Refills: 0 | Status: COMPLETED | OUTPATIENT
Start: 2019-04-10 | End: 2019-04-10

## 2019-04-10 RX ORDER — DEXMEDETOMIDINE HYDROCHLORIDE IN 0.9% SODIUM CHLORIDE 4 UG/ML
0.5 INJECTION INTRAVENOUS
Qty: 200 | Refills: 0 | Status: DISCONTINUED | OUTPATIENT
Start: 2019-04-10 | End: 2019-04-11

## 2019-04-10 RX ORDER — SODIUM CHLORIDE 9 MG/ML
1000 INJECTION, SOLUTION INTRAVENOUS
Refills: 0 | Status: DISCONTINUED | OUTPATIENT
Start: 2019-04-10 | End: 2019-04-11

## 2019-04-10 RX ORDER — ADENOSINE 3 MG/ML
12 INJECTION INTRAVENOUS ONCE
Refills: 0 | Status: COMPLETED | OUTPATIENT
Start: 2019-04-10 | End: 2019-04-10

## 2019-04-10 RX ORDER — ADENOSINE 3 MG/ML
6 INJECTION INTRAVENOUS ONCE
Refills: 0 | Status: COMPLETED | OUTPATIENT
Start: 2019-04-10 | End: 2019-04-10

## 2019-04-10 RX ORDER — ETOMIDATE 2 MG/ML
15 INJECTION INTRAVENOUS ONCE
Refills: 0 | Status: COMPLETED | OUTPATIENT
Start: 2019-04-10 | End: 2019-04-10

## 2019-04-10 RX ORDER — PROPOFOL 10 MG/ML
30 INJECTION, EMULSION INTRAVENOUS ONCE
Refills: 0 | Status: COMPLETED | OUTPATIENT
Start: 2019-04-10 | End: 2019-04-10

## 2019-04-10 RX ORDER — PROPOFOL 10 MG/ML
700 INJECTION, EMULSION INTRAVENOUS ONCE
Refills: 0 | Status: DISCONTINUED | OUTPATIENT
Start: 2019-04-10 | End: 2019-04-10

## 2019-04-10 RX ORDER — PROPRANOLOL HCL 160 MG
1 CAPSULE, EXTENDED RELEASE 24HR ORAL
Refills: 0 | Status: DISCONTINUED | OUTPATIENT
Start: 2019-04-10 | End: 2019-04-10

## 2019-04-10 RX ORDER — CEFAZOLIN SODIUM 1 G
2000 VIAL (EA) INJECTION ONCE
Refills: 0 | Status: COMPLETED | OUTPATIENT
Start: 2019-04-10 | End: 2019-04-10

## 2019-04-10 RX ORDER — SODIUM CHLORIDE 9 MG/ML
500 INJECTION, SOLUTION INTRAVENOUS ONCE
Refills: 0 | Status: COMPLETED | OUTPATIENT
Start: 2019-04-10 | End: 2019-04-10

## 2019-04-10 RX ADMIN — Medication 1 MILLIGRAM(S): at 20:14

## 2019-04-10 RX ADMIN — ADENOSINE 6 MILLIGRAM(S): 3 INJECTION INTRAVENOUS at 15:47

## 2019-04-10 RX ADMIN — Medication 100 MILLIGRAM(S): at 17:52

## 2019-04-10 RX ADMIN — PROPOFOL 50 MILLIGRAM(S): 10 INJECTION, EMULSION INTRAVENOUS at 17:18

## 2019-04-10 RX ADMIN — SODIUM CHLORIDE 125 MILLILITER(S): 9 INJECTION, SOLUTION INTRAVENOUS at 22:00

## 2019-04-10 RX ADMIN — Medication 5 MILLIGRAM(S): at 23:00

## 2019-04-10 RX ADMIN — FENTANYL CITRATE 25 MICROGRAM(S): 50 INJECTION INTRAVENOUS at 22:20

## 2019-04-10 RX ADMIN — PROPOFOL 10 MILLIGRAM(S): 10 INJECTION, EMULSION INTRAVENOUS at 20:30

## 2019-04-10 RX ADMIN — ADENOSINE 12 MILLIGRAM(S): 3 INJECTION INTRAVENOUS at 15:58

## 2019-04-10 RX ADMIN — ADENOSINE 12 MILLIGRAM(S): 3 INJECTION INTRAVENOUS at 15:50

## 2019-04-10 RX ADMIN — PROPOFOL 40 MILLIGRAM(S): 10 INJECTION, EMULSION INTRAVENOUS at 17:10

## 2019-04-10 RX ADMIN — PROPOFOL 30 MILLIGRAM(S): 10 INJECTION, EMULSION INTRAVENOUS at 17:15

## 2019-04-10 RX ADMIN — Medication 200 GRAM(S): at 22:00

## 2019-04-10 RX ADMIN — ETOMIDATE 15 MILLIGRAM(S): 2 INJECTION INTRAVENOUS at 17:43

## 2019-04-10 RX ADMIN — Medication 5 MILLIGRAM(S): at 21:40

## 2019-04-10 RX ADMIN — SODIUM CHLORIDE 1000 MILLILITER(S): 9 INJECTION, SOLUTION INTRAVENOUS at 21:45

## 2019-04-10 RX ADMIN — ADENOSINE 18 MILLIGRAM(S): 3 INJECTION INTRAVENOUS at 16:30

## 2019-04-10 RX ADMIN — FENTANYL CITRATE 4 MICROGRAM(S)/KG/HR: 50 INJECTION INTRAVENOUS at 22:27

## 2019-04-10 RX ADMIN — DEXMEDETOMIDINE HYDROCHLORIDE IN 0.9% SODIUM CHLORIDE 10 MICROGRAM(S)/KG/HR: 4 INJECTION INTRAVENOUS at 20:15

## 2019-04-10 RX ADMIN — PROPOFOL 5 MILLIGRAM(S): 10 INJECTION, EMULSION INTRAVENOUS at 21:40

## 2019-04-10 RX ADMIN — PROPOFOL 40 MILLIGRAM(S): 10 INJECTION, EMULSION INTRAVENOUS at 17:14

## 2019-04-10 RX ADMIN — INSULIN HUMAN 10 UNIT(S): 100 INJECTION, SOLUTION SUBCUTANEOUS at 22:19

## 2019-04-10 RX ADMIN — Medication 5 MILLIGRAM(S): at 20:36

## 2019-04-10 RX ADMIN — Medication 50 MILLILITER(S): at 22:00

## 2019-04-10 RX ADMIN — Medication 100 MILLIGRAM(S): at 18:10

## 2019-04-10 RX ADMIN — SODIUM CHLORIDE 2000 MILLILITER(S): 9 INJECTION INTRAMUSCULAR; INTRAVENOUS; SUBCUTANEOUS at 16:00

## 2019-04-10 RX ADMIN — FENTANYL CITRATE 50 MICROGRAM(S): 50 INJECTION INTRAVENOUS at 23:30

## 2019-04-10 RX ADMIN — PROPOFOL 200 MILLIGRAM(S): 10 INJECTION, EMULSION INTRAVENOUS at 17:52

## 2019-04-10 NOTE — ED PROVIDER NOTE - PHYSICAL EXAMINATION
CONSTITUTIONAL: in mild distress  HEAD: Normocephalic; atraumatic.   EYES: PERRL; EOM intact. Conjunctiva normal B/L.   ENT: Normal pharynx with no tonsillar hypertrophy. MMM.  NECK: Supple; non-tender; no cervical lymphadenopathy.   CHEST: Normal chest excursion with respiration.   CARDIOVASCULAR: extremelty tachycardic, s1 s2 no murmurs noted   RESPIRATORY: Normal chest excursion with respiration; breath sounds clear and equal bilaterally; no wheezes, rhonchi, or rales.  GI/: gravid abdomen   EXT: no calf tenderness or swelling   SKIN: Normal for age and race; warm; dry; good turgor.  NEURO: A & O x 3; CN 2-12 intact. Grossly unremarkable.

## 2019-04-10 NOTE — CONSULT NOTE ADULT - ASSESSMENT
ASSESSMENT/PLAN: 33yFemale      Neurologic: Monitor for changes in mental status. Precedex. Pain control with Fentanyl pushes PRN.     Respiratory: Intubated on AC 40/5. ABG Q 2 hours. Last ABG 7.34/36/123. Lactate downtrending 1.9 from 3.6.     Cardiovascular: No dx. Patient became hypotensive SBP 60's, not responsive to fluid. Placed central line and a line. SBP came back up to 100's. Went into Afib 's. Not responsive to Lopressor or Cardizem. Bolused amio and started gtt. Ordered 3 sets of cardiac enzymes, expected to be elevated. Looking for a downward trend.     Gastrointestinal/Nutrition:    Genitourinary/Renal:    Hematologic:    Infectious Disease:    Endocrine:    Disposition: ASSESSMENT/PLAN: 33yFemale      Neurologic: Monitor for changes in mental status. Precedex. Pain control with Fentanyl pushes PRN.     Respiratory: Intubated on AC 40/5. ABG Q 2 hours. Last ABG 7.34/36/123. Lactate downtrending 1.9 from 3.6.     Cardiovascular: No dx. Patient became hypotensive SBP 60's, not responsive to fluid. Placed central line and a line. SBP came back up to 100's. Went into Afib 's. Not responsive to Lopressor or Cardizem. Bolused amio and started gtt. Ordered 3 sets of cardiac enzymes, expected to be elevated. Looking for a downward trend.     Gastrointestinal/Nutrition: NPO. OGT in place. LR @ 125. PPI.     Genitourinary/Renal: Jluis. Monitor I&Os. K 6.9 gave calcium, insulin, d50. Repeat k 4.2. Monitor lytes replete as needed.     Hematologic: SCD in place. Holding hep sub q for now. H/H stable.     Infectious Disease: No dx. Afebrile. WBC WNL    Endocrine: No dx. FS Q 6     Disposition: SICU ASSESSMENT/PLAN: 33yFemale      Neurologic: Monitor for changes in mental status. Precedex. Pain control with Fentanyl pushes PRN.      Respiratory: Intubated on AC 0/5. ABG Q 2 hours. Last ABG 7.34/36/123. Lactate downtrending 1.9 from 3.6.     Cardiovascular: No dx. Patient became hypotensive SBP 60's, not responsive to fluid. Placed central line and a line. SBP came back up to 100's. Went into Afib 's. Not responsive to Lopressor or Cardizem. Bolused amio and started gtt. Ordered 3 sets of cardiac enzymes, expected to be elevated. Looking for a downward trend.     Gastrointestinal/Nutrition: NPO. OGT in place. LR @ 125. PPI.     Genitourinary/Renal: Jluis. Monitor I&Os. K 6.9 gave calcium, insulin, d50. Repeat k 4.2. Monitor lytes replete as needed.     Hematologic: SCD in place. Holding hep sub q for now. H/H stable.     Infectious Disease: No dx. Afebrile. WBC WNL    Endocrine: No dx. FS Q 6     Disposition: SICU

## 2019-04-10 NOTE — PROGRESS NOTE ADULT - SUBJECTIVE AND OBJECTIVE BOX
Patient came to er c/o tachycardia   34 weeks gestation   No prior history of medical problems  Patient known to me from 2017 this pregnancy was following with another OB and transferred to me 33 weeks   reported uncomplicated pregnancy  upon admission svt up to 240 heart rate   no response to adenosine or cardioversion   Patient became hypotensive and unresponsive taken to OR   Upon my arrival uterine incision was made I scrubbed in after baby delivery who was crying and handed to peds   The remainder of the section was uneventful qbl 800ml uterus well contracted

## 2019-04-10 NOTE — BRIEF OPERATIVE NOTE - NSICDXBRIEFPROCEDURE_GEN_ALL_CORE_FT
PROCEDURES:  Repeat  section 10-Apr-2019 20:02:21  Jey Cuevas PROCEDURES:  Repeat low transverse  section 10-Apr-2019 20:30:11 emergent Jey Cuevas

## 2019-04-10 NOTE — CONSULT NOTE ADULT - SUBJECTIVE AND OBJECTIVE BOX
Date of Admission: 4/10/19    CHIEF COMPLAINT: shortness of breath    HISTORY OF PRESENT ILLNESS: 33yFemalhelga with PMH below presented to the hospital for shortness of breath for the last two days with her usual activity. She felt that her heart was racing. In the Ed prior to our arrival, patient had received 6,12,12 mg pushes of adenosine without relief and was given another 12 mg push of adenosine with EP present at the bedside. She was also given 1 mg IVP of propanolol while the patient was on tocometer x2 without relief in rhythm. The decision was made to attempt DCCV of her tachyarrythmia which subsequently failed x3 (monophasic 200J, biphasic 260J x2). She later became hypoxic and the patient was intubated and brought to the OB-OR for emergent . Cardiology was present in the room for intubation.     PAST MEDICAL & SURGICAL HISTORY:  Asthma: as child, no inhaler use&gt;10 years. Denies hospitalizations or intubations. No current inhaler.  History of low transverse  section    HEALTH ISSUES - PROBLEM Dx:        FAMILY HISTORY:  No pertinent family history in first degree relatives    None [ x]  Mother:   Father:   Siblings:     SOCIAL HISTORY:    [x ] Non-smoker  [ ] Smoker  [ ] Alcohol    Allergies    Allergy Status Unknown    Intolerances    	    REVIEW OF SYSTEMS:  CONSTITUTIONAL: No fever, weight loss, or fatigue  CARDIOLOGY: see HPI   RESPIRATORY: see HPI   NEUROLOGICAL: NO weakness, no focal deficits to report.  ENDOCRINOLOGICAL: no recent change in diabetic medications.   GI: no BRBPR, no N,V,diarrhea.    PSYCHIATRY: normal mood and affect  HEENT: no nasal discharge, no ecchymosis  SKIN: no ecchymosis, no breakdown  MUSCULOSKELETAL: Full range of motion x4.      PHYSICAL EXAM:  T(C): 35.6 (04-10-19 @ 18:46), Max: 35.6 (04-10-19 @ 15:27)  HR: 124 (04-10-19 @ 19:50) (124 - 228)  BP: 119/93 (04-10-19 @ 18:46) (74/56 - 121/94)  RR: 21 (04-10-19 @ 18:46) (20 - 24)  SpO2: 98% (04-10-19 @ 19:50) (98% - 100%)  Wt(kg): --  I&O's Summary    Daily     Daily Baby A: Weight (gm) Delivery: 3070 (10 Apr 2019 18:14)    General Appearance: normal for age gender and gestational age	  Cardiovascular: rapid and regular S1 S2, No JVD, No murmurs, No edema  Respiratory: Lungs clear to auscultation	  Psychiatry: A & O x 3, Mood & affect appropriate  Gastrointestinal: abdomen distended c/w 35th week of pregnancy. former c section scar.   Skin: No rashes, No ecchymoses, No cyanosis	  Neurologic: Non-focal  Musculoskeletal/ extremities: Normal range of motion, No clubbing, cyanosis or edema  Vascular: Peripheral pulses palpable 2+ bilaterally    LABS:	 	                          8.5    16.20 )-----------( 266      ( 10 Apr 2019 18:51 )             29.5     04-10    137  |  108  |  10  ----------------------------<  163<H>  5.6<H>   |  13<L>  |  0.5<L>    Ca    7.8<L>      10 Apr 2019 18:51  Mg     1.9     -10    TPro  5.1<L>  /  Alb  3.1<L>  /  TBili  0.3  /  DBili  x   /  AST  47<H>  /  ALT  39  /  AlkPhos  124<H>  04-10    CARDIAC MARKERS ( 10 Apr 2019 16:31 )  x     / <0.01 ng/mL / x     / x     / x              CARDIAC MARKERS:            TELEMETRY EVENTS: now in sinus tachycardia in 120s	    ECG: SVT at 240bpm. when given adenosine, suspected atrial tachycardia conducting at 1:1.  	  RADIOLOGY:   OTHER: 	    PREVIOUS DIAGNOSTIC TESTING:    [x ] Echocardiogram: bedside echo performed by myself in parasternal long, short, apical 4CH, 2CH and subcostal views. The following findings were made: EF roughly 50%by visual estimation, no regional wall motion abnormalities exist. The MItral, tricuspid and aortic valves were well visualized and were normal in structure. There was trace tricuspid regurgitation. The right ventricle is moderately enlarged. There is trivial pericardial effusion with no evidence that it is causing hemodynamic compromise.      [ ]  Catheterization:  [ ] Stress Test:  	  	    Home Medications:    MEDICATIONS  (STANDING):  calcium gluconate IVPB 1 Gram(s) IV Intermittent once  dexmedetomidine Infusion 0.5 MICROgram(s)/kG/Hr (10 mL/Hr) IV Continuous <Continuous>  furosemide   Injectable 40 milliGRAM(s) IV Push once  metoprolol tartrate Injectable 5 milliGRAM(s) IV Push once  propofol Injectable 10 milliGRAM(s) IV Push once    MEDICATIONS  (PRN): Date of Admission: 4/10/19    CHIEF COMPLAINT: shortness of breath    HISTORY OF PRESENT ILLNESS: 33yFemalhelga with PMH below presented to the hospital for shortness of breath for the last two days with her usual activity. She felt that her heart was racing. In the Ed prior to our arrival, patient had received 6,12,12 mg pushes of adenosine without relief and was given another 12 mg push of adenosine with EP present at the bedside. She was also given 1 mg IVP of propanolol while the patient was on tocometer x2 without relief in rhythm. The decision was made to attempt DCCV of her tachyarrythmia which subsequently failed x3 (monophasic 200J, biphasic 360J x2). She later became hypoxic and the patient was intubated and brought to the OB-OR for emergent . Cardiology was present in the room for intubation and in the OB-OR in the event of need for emergent management.     PAST MEDICAL & SURGICAL HISTORY:  Asthma: as child, no inhaler use&gt;10 years. Denies hospitalizations or intubations. No current inhaler.  History of low transverse  section    HEALTH ISSUES - PROBLEM Dx:        FAMILY HISTORY:  No pertinent family history in first degree relatives    None [ x]  Mother:   Father:   Siblings:     SOCIAL HISTORY:    [x ] Non-smoker  [ ] Smoker  [ ] Alcohol    Allergies    Allergy Status Unknown    Intolerances    	    REVIEW OF SYSTEMS:  CONSTITUTIONAL: No fever, weight loss, or fatigue  CARDIOLOGY: see HPI   RESPIRATORY: see HPI   NEUROLOGICAL: NO weakness, no focal deficits to report.  ENDOCRINOLOGICAL: no recent change in diabetic medications.   GI: no BRBPR, no N,V,diarrhea.    PSYCHIATRY: normal mood and affect  HEENT: no nasal discharge, no ecchymosis  SKIN: no ecchymosis, no breakdown  MUSCULOSKELETAL: Full range of motion x4.      PHYSICAL EXAM:  T(C): 35.6 (04-10-19 @ 18:46), Max: 35.6 (04-10-19 @ 15:27)  HR: 124 (04-10-19 @ 19:50) (124 - 228)  BP: 119/93 (04-10-19 @ 18:46) (74/56 - 121/94)  RR: 21 (04-10-19 @ 18:46) (20 - 24)  SpO2: 98% (04-10-19 @ 19:50) (98% - 100%)  Wt(kg): --  I&O's Summary    Daily     Daily Baby A: Weight (gm) Delivery: 3070 (10 Apr 2019 18:14)    General Appearance: normal for age gender and gestational age	  Cardiovascular: rapid and regular S1 S2, No JVD, No murmurs, No edema  Respiratory: Lungs clear to auscultation	  Psychiatry: A & O x 3, Mood & affect appropriate  Gastrointestinal: abdomen distended c/w 35th week of pregnancy. former c section scar.   Skin: No rashes, No ecchymoses, No cyanosis	  Neurologic: Non-focal  Musculoskeletal/ extremities: Normal range of motion, No clubbing, cyanosis or edema  Vascular: Peripheral pulses palpable 2+ bilaterally    LABS:	 	                          8.5    16.20 )-----------( 266      ( 10 Apr 2019 18:51 )             29.5     04-10    137  |  108  |  10  ----------------------------<  163<H>  5.6<H>   |  13<L>  |  0.5<L>    Ca    7.8<L>      10 Apr 2019 18:51  Mg     1.9     04-10    TPro  5.1<L>  /  Alb  3.1<L>  /  TBili  0.3  /  DBili  x   /  AST  47<H>  /  ALT  39  /  AlkPhos  124<H>  04-10    CARDIAC MARKERS ( 10 Apr 2019 16:31 )  x     / <0.01 ng/mL / x     / x     / x              CARDIAC MARKERS:            TELEMETRY EVENTS: now in sinus tachycardia in 120s	    ECG: SVT at 240bpm. when given adenosine, suspected atrial tachycardia conducting at 1:1.  	  RADIOLOGY:   OTHER: 	    PREVIOUS DIAGNOSTIC TESTING:    [x ] Echocardiogram: bedside echo performed by myself in parasternal long, short, apical 4CH, 2CH and subcostal views. The following findings were made: EF roughly 50%by visual estimation, no regional wall motion abnormalities exist. The MItral, tricuspid and aortic valves were well visualized and were normal in structure. There was trace tricuspid regurgitation. The right ventricle is moderately enlarged. There is trivial pericardial effusion with no evidence that it is causing hemodynamic compromise.      [ ]  Catheterization:  [ ] Stress Test:  	  	    Home Medications:    MEDICATIONS  (STANDING):  calcium gluconate IVPB 1 Gram(s) IV Intermittent once  dexmedetomidine Infusion 0.5 MICROgram(s)/kG/Hr (10 mL/Hr) IV Continuous <Continuous>  furosemide   Injectable 40 milliGRAM(s) IV Push once  metoprolol tartrate Injectable 5 milliGRAM(s) IV Push once  propofol Injectable 10 milliGRAM(s) IV Push once    MEDICATIONS  (PRN):

## 2019-04-10 NOTE — BRIEF OPERATIVE NOTE - COMMENTS
Patient presented to the emergency room c/o shortness of breath, found to be in SVT and was being managed by ED and cardiology teams, failed medical management of SVT with adenosine x4 (//18 mg) and propanolol x2, was administered propofol for sedation and failed cardioversion, was then administered etamidate and failed again cardioversion x2, became hypoxic and was intubated and transferred to labor and delivery for emergent  section. Fetal heart tracing demonstrated category 1 tracing with contractions every 2 minutes prior to cardioversion, but after third cardioversion attempt, fetus demonstrated category 2 tracing (minimal variability),   patient transferred to SICU Patient presented to the emergency room c/o shortness of breath, found to be in SVT and was being managed by ED and cardiology teams, failed medical management of SVT with adenosine x4 (//18 mg) and propanolol x2, was administered propofol for sedation and failed cardioversion, was then administered etamidate and failed again cardioversion x2, became hypoxic and was intubated and transferred to labor and delivery for emergent  section. Fetal heart tracing demonstrated category 1 tracing with contractions every 2 minutes prior to cardioversion, but after third cardioversion attempt, fetus demonstrated category 2 tracing (minimal variability),   patient transferred to SICU  dictation #[id number]

## 2019-04-10 NOTE — ED PROVIDER NOTE - ATTENDING CONTRIBUTION TO CARE
I personally evaluated the patient. I reviewed the Resident’s or Physician Assistant’s note (as assigned above), and agree with the findings and plan except as documented in my note.     33 female here for shortness of breath for the past 2-3 days, persistent and not improving, At 35 weeks by dates with normal pregnancy being followed by an OB Gyn at [facility], second pregnancy and other child is 8 years old without issues. This pregnancy complicated by hyperemesis gravidarum for which she was admitted here several weeks ago for. Denies other symptoms, states the SOB is persistent, unresolved, worse with certain positions, and not radiating. His band bedside to assist care, no illicit abuse or stimulants.     ROS otherwise unremarkable    PE: female in no distress. HEENT: non icteric normal mucosa. PERRLA. CV: pulses intact tachycardic and regular at 220-230. CHEST: normal work of breathing, CTA bilateral. ABD: gravid, non tender. EXT: no edema. NEURO: AAO 3 no focal deficits.     Impression: AVNRT, pregnancy    Plan: IV labs imaging supportive care and reevaluation, rate control, cardio / OB consults

## 2019-04-10 NOTE — CONSULT NOTE ADULT - SUBJECTIVE AND OBJECTIVE BOX
SICU Consultation Note  =====================================================  HPI:  34yo  at 34w5d dated by first trimester sonogram, who presented to ED secondary to SOB for the past 2 days. She reports the SOB was associated with nausea and vomiting, about 5x per day. She reports mild palpitations but denies chest pain. History of hyperemesis in previous pregnancies. In the ED, patient was in sinus tachycardia 's. Cardiology fellow was at bedside. in the ED, patient was given Adenosine 6, 12, 12 and 12 without converting. The decision was made to cardiovert the patient with mild sedation. Propofol and Etomatadex was given, cardioverted (200J monophasic and biphasic 360J x2) patient was not converted. It was decided by OBGYN attending and residents to proceed with an emergent c section. Patient was intubated in the ED, after patient converted and HR came down to 160's.     Patient was transferred to L&D OR for emergent . 800cc EBL. Patient remained normotensive intra op. HR came down to 115, sinus tachycardia. Patient was kept intubated. Given 6mg of versed and Nimbex for transfer to SICU. Upon arrival to the SICU patient HR was 120's sinus tachycardia, /70. ABG was obtained showed that the patient was acidotic and retaining CO2. Adjusted her vent settings. Patient O2 sat was 95%. Once patient started waking up precedex was started. Patient was maxed out on Precedex. Propofol 5x3 was given, patient had no response. Fentanyl gtt was started, patient responded well. Patients HR increased 140's Lopressor 5x1 given with good response.     Patient has a questionable history of hyperthyroidism. Lab value on previous OBGYN visit shows TSH of 0.06 with no levels of T3/T4. Obtaining thyroid function panhel. Cardiology concerned for thyroid storm and advised no CT with contrast to r/o PE. Patient is not stable enough currently for V/Q scan currently. Dr Eugene has low index of suspicion for PE       Home Medications: PNV  Allergies: none   PAST MEDICAL & SURGICAL HISTORY:  Asthma in childhood- last inhaler use >10 years ago. No hospitalizations intubations.   LTCS (10 Apr 2019 19:27)   33y female      Surgery Information  OR time:      EBL:       UO:             IV Fluids:       Blood Products:             PAST MEDICAL & SURGICAL HISTORY:  Asthma: as child, no inhaler use&gt;10 years. Denies hospitlizations or intubations. No current inhaler.  History of low transverse  section      Allergies    Allergy Status Unknown    Intolerances      SH:  Home Medications:    Advanced Directives: Full Code     ROS:  [ ] A ten-point review of systems was otherwise negative except as noted.  [ ] Due to altered mental status/intubation, subjective information were not able to be obtained from the patient. History was obtained, to the extent possible, from review of the chart and collateral sources of information.      CURRENT MEDICATIONS:   --------------------------------------------------------------------------------------  Neurologic Medications  dexmedetomidine Infusion 0.5 MICROgram(s)/kG/Hr IV Continuous <Continuous>  fentaNYL    Injectable 25 MICROGram(s) IV Push once  fentaNYL   Infusion 0.5 MICROgram(s)/kG/Hr IV Continuous <Continuous>    Respiratory Medications    Cardiovascular Medications  furosemide   Injectable 40 milliGRAM(s) IV Push once  metoprolol tartrate Injectable 5 milliGRAM(s) IV Push once    Gastrointestinal Medications  lactated ringers. 1000 milliLiter(s) IV Continuous <Continuous>    Genitourinary Medications    Hematologic/Oncologic Medications    Antimicrobial/Immunologic Medications    Endocrine/Metabolic Medications    Topical/Other Medications    --------------------------------------------------------------------------------------    Vital Signs Last 24 Hrs  T(C): 35.6 (10 Apr 2019 18:46), Max: 35.6 (10 Apr 2019 15:27)  T(F): 96.1 (10 Apr 2019 15:27), Max: 96.1 (10 Apr 2019 15:27)  HR: 124 (10 Apr 2019 19:50) (124 - 228)  BP: 119/93 (10 Apr 2019 18:46) (74/56 - 121/94)  BP(mean): --  RR: 21 (10 Apr 2019 18:46) (20 - 24)  SpO2: 98% (10 Apr 2019 19:50) (98% - 100%)  I&O's Detail    I&O's Summary      LABS:  ABG - ( 10 Apr 2019 20:28 )  pH, Arterial: 7.15  pH, Blood: x     /  pCO2: 48    /  pO2: 129   / HCO3: 17    / Base Excess: -11.7 /  SaO2: 97                                      10.6   21.89 )-----------( 190      ( 10 Apr 2019 20:55 )             36.1     04-10    132<L>  |  103  |  11  ----------------------------<  175<H>  6.8<HH>   |  14<L>  |  0.7    Ca    7.5<L>      10 Apr 2019 20:55  Phos  5.2     04-10  Mg     1.7     04-10    TPro  5.1<L>  /  Alb  3.1<L>  /  TBili  0.3  /  DBili  x   /  AST  47<H>  /  ALT  39  /  AlkPhos  124<H>  04-10    LIVER FUNCTIONS - ( 10 Apr 2019 18:51 )  Alb: 3.1 g/dL / Pro: 5.1 g/dL / ALK PHOS: 124 U/L / ALT: 39 U/L / AST: 47 U/L / GGT: x           PT/INR - ( 10 Apr 2019 20:55 )   PT: 11.80 sec;   INR: 1.03 ratio         PTT - ( 10 Apr 2019 20:55 )  PTT:18.8 sec  CARDIAC MARKERS ( 10 Apr 2019 20:55 )  x     / <0.01 ng/mL / 148 U/L / x     / 3.4 ng/mL  CARDIAC MARKERS ( 10 Apr 2019 16:31 )  x     / <0.01 ng/mL / x     / x     / x            EXAM:  General/Neuro  GCS:   Exam: Normal, NAD, alert, oriented x 3, no focal deficits. PERRLA  ***    Respiratory  Exam: Lungs clear to auscultation, Normal expansion/effort.  ***  [] Tracheostomy   [] Intubated  Mechanical Ventilation: Mode: AC/ CMV (Assist Control/ Continuous Mandatory Ventilation), RR (machine): 14, TV (machine): 450, FiO2: 50, PEEP: 5    Cardiovascular  Exam: S1, S2.  Regular rate and rhythm.   Cardiac Rhythm: Normal Sinus Rhythm  ECHO:     GI  Exam: Abdomen soft, Non-tender, Non-distended.  Gastrostomy / Jejunostomy tube in place.  Nasogastric tube in place.  Colostomy / Ileostomy.  ***  Wound:   ***  Current Diet:  NPO***    Extremities  Exam: Extremities warm, pink, well-perfused.   Peripheral edema    Derm:  Exam: Good skin turgor, no skin breakdown.      :   Exam: Bazzi catheter in place.     Tubes/Lines/Drains  ***  [x] Peripheral IV  [] Central Venous Line     	[] R	[] L	[] IJ	[] Fem	[] SC        Type:	    Date Placed:   [] Arterial Line		[] R	[] L	[] Fem	[] Rad	[] Ax	Date Placed:   [] PICC:         	[] Midline		[] Mediport           [] Urinary Catheter		Date Placed: SICU Consultation Note  =====================================================  HPI:  32yo  at 34w5d dated by first trimester sonogram, who presented to ED secondary to SOB for the past 2 days. She reports the SOB was associated with nausea and vomiting, about 5x per day. She reports mild palpitations but denies chest pain. History of hyperemesis in previous pregnancies. In the ED, patient was in sinus tachycardia 's. Cardiology fellow was at bedside. in the ED, patient was given Adenosine 6, 12, 12 and 12 without converting. The decision was made to cardiovert the patient with mild sedation. Propofol and Etomatadex was given, cardioverted (200J monophasic and biphasic 360J x2) patient was not converted. It was decided by OBGYN attending and residents to proceed with an emergent c section. Patient was intubated in the ED, after patient converted and HR came down to 160's.     Patient was transferred to L&D OR for emergent . 800cc EBL. Patient remained normotensive intra op. HR came down to 115, sinus tachycardia. Patient was kept intubated. Given 6mg of versed and Nimbex for transfer to SICU. Upon arrival to the SICU patient HR was 120's sinus tachycardia, /70. ABG was obtained showed that the patient was acidotic and retaining CO2. Adjusted her vent settings. Patient O2 sat was 95%. Once patient started waking up precedex was started. Patient was maxed out on Precedex. Propofol 5x3 was given, patient had no response. Fentanyl gtt was started, patient responded well. Patients HR increased 140's Lopressor 5x1 given with good response.     Patient has a questionable history of hyperthyroidism. Lab value on previous OBGYN visit shows TSH of 0.06 with no levels of T3/T4. Obtaining thyroid function panhel. Cardiology concerned for thyroid storm and advised no CT with contrast to r/o PE. Patient is not stable enough currently for V/Q scan currently. Dr Eugene has low index of suspicion for PE.     Placing central line and A line. Patient will be admitted to SICU for hemodynamic monitoring.        Home Medications: PNV  Allergies: none   PAST MEDICAL & SURGICAL HISTORY:  Asthma in childhood- last inhaler use >10 years ago. No hospitalizations intubations.   LTCS (10 Apr 2019 19:27)   33y female      Surgery Information  OR time:      EBL:       UO:             IV Fluids:       Blood Products:             PAST MEDICAL & SURGICAL HISTORY:  Asthma: as child, no inhaler use&gt;10 years. Denies hospitalizations or intubations. No current inhaler.  History of low transverse  section      Allergies    Allergy Status Unknown    Intolerances      SH:  Home Medications:    Advanced Directives: Full Code     ROS:  [x] A ten-point review of systems was otherwise negative except as noted.  [ ] Due to altered mental status/intubation, subjective information were not able to be obtained from the patient. History was obtained, to the extent possible, from review of the chart and collateral sources of information.      CURRENT MEDICATIONS:   --------------------------------------------------------------------------------------  Neurologic Medications  dexmedetomidine Infusion 0.5 MICROgram(s)/kG/Hr IV Continuous <Continuous>  fentaNYL    Injectable 25 MICROGram(s) IV Push once  fentaNYL   Infusion 0.5 MICROgram(s)/kG/Hr IV Continuous <Continuous>    Respiratory Medications    Cardiovascular Medications  furosemide   Injectable 40 milliGRAM(s) IV Push once  metoprolol tartrate Injectable 5 milliGRAM(s) IV Push once    Gastrointestinal Medications  lactated ringers. 1000 milliLiter(s) IV Continuous <Continuous>    Genitourinary Medications    Hematologic/Oncologic Medications    Antimicrobial/Immunologic Medications    Endocrine/Metabolic Medications    Topical/Other Medications    --------------------------------------------------------------------------------------    Vital Signs Last 24 Hrs  T(C): 35.6 (10 Apr 2019 18:46), Max: 35.6 (10 Apr 2019 15:27)  T(F): 96.1 (10 Apr 2019 15:27), Max: 96.1 (10 Apr 2019 15:27)  HR: 124 (10 Apr 2019 19:50) (124 - 228)  BP: 119/93 (10 Apr 2019 18:46) (74/56 - 121/94)  BP(mean): --  RR: 21 (10 Apr 2019 18:46) (20 - 24)  SpO2: 98% (10 Apr 2019 19:50) (98% - 100%)  I&O's Detail    I&O's Summary      LABS:  ABG - ( 10 Apr 2019 20:28 )  pH, Arterial: 7.15  pH, Blood: x     /  pCO2: 48    /  pO2: 129   / HCO3: 17    / Base Excess: -11.7 /  SaO2: 97                                      10.6   21.89 )-----------( 190      ( 10 Apr 2019 20:55 )             36.1     04-10    132<L>  |  103  |  11  ----------------------------<  175<H>  6.8<HH>   |  14<L>  |  0.7    Ca    7.5<L>      10 Apr 2019 20:55  Phos  5.2     04-10  Mg     1.7     04-10    TPro  5.1<L>  /  Alb  3.1<L>  /  TBili  0.3  /  DBili  x   /  AST  47<H>  /  ALT  39  /  AlkPhos  124<H>  04-10    LIVER FUNCTIONS - ( 10 Apr 2019 18:51 )  Alb: 3.1 g/dL / Pro: 5.1 g/dL / ALK PHOS: 124 U/L / ALT: 39 U/L / AST: 47 U/L / GGT: x           PT/INR - ( 10 Apr 2019 20:55 )   PT: 11.80 sec;   INR: 1.03 ratio         PTT - ( 10 Apr 2019 20:55 )  PTT:18.8 sec  CARDIAC MARKERS ( 10 Apr 2019 20:55 )  x     / <0.01 ng/mL / 148 U/L / x     / 3.4 ng/mL  CARDIAC MARKERS ( 10 Apr 2019 16:31 )  x     / <0.01 ng/mL / x     / x     / x            EXAM:  General/Neuro  GCS:   Exam: Normal, NAD, alert, oriented x 3, no focal deficits. PERRLA  ***    Respiratory  Exam: Lungs clear to auscultation, Normal expansion/effort.  ***  [] Tracheostomy   [] Intubated  Mechanical Ventilation: Mode: AC/ CMV (Assist Control/ Continuous Mandatory Ventilation), RR (machine): 14, TV (machine): 450, FiO2: 50, PEEP: 5    Cardiovascular  Exam: S1, S2.  Regular rate and rhythm.   Cardiac Rhythm: Normal Sinus Rhythm  ECHO:     GI  Exam: Abdomen soft, Non-tender, Non-distended.  Gastrostomy / Jejunostomy tube in place.  Nasogastric tube in place.  Colostomy / Ileostomy.  ***  Wound:   ***  Current Diet:  NPO***    Extremities  Exam: Extremities warm, pink, well-perfused.   Peripheral edema    Derm:  Exam: Good skin turgor, no skin breakdown.      :   Exam: Bazzi catheter in place.     Tubes/Lines/Drains  ***  [x] Peripheral IV  [] Central Venous Line     	[] R	[] L	[] IJ	[] Fem	[] SC        Type:	    Date Placed:   [] Arterial Line		[] R	[] L	[] Fem	[] Rad	[] Ax	Date Placed:   [] PICC:         	[] Midline		[] Mediport           [] Urinary Catheter		Date Placed: SICU Consultation Note  =====================================================  HPI:  34yo  at 34w5d dated by first trimester sonogram, who presented to ED secondary to SOB for the past 2 days. She reports the SOB was associated with nausea and vomiting, about 5x per day. She reports mild palpitations but denies chest pain. History of hyperemesis in previous pregnancies. In the ED, patient was in sinus tachycardia 's. Cardiology fellow was at bedside. in the ED, patient was given Adenosine 6, 12, 12 and 12 without converting. The decision was made to cardiovert the patient with mild sedation. Propofol and Etomatadex was given, cardioverted (200J monophasic and biphasic 360J x2) patient was not converted. It was decided by OBGYN attending and residents to proceed with an emergent c section. Patient was intubated in the ED, after patient converted and HR came down to 160's.     Patient was transferred to L&D OR for emergent . 800cc EBL. Patient remained normotensive intra op. HR came down to 115, sinus tachycardia. Patient was kept intubated. Given 6mg of versed and Nimbex for transfer to SICU. Upon arrival to the SICU patient HR was 120's sinus tachycardia, /70. ABG was obtained showed that the patient was acidotic and retaining CO2. Adjusted her vent settings. Patient O2 sat was 95%. Once patient started waking up precedex was started. Patient was maxed out on Precedex. Propofol 5x3 was given, patient had no response. Fentanyl gtt was started, patient responded well. Patients HR increased 140's Lopressor 5x1 given with good response.     Patient has a questionable history of hyperthyroidism. Lab value on previous OBGYN visit shows TSH of 0.06 with no levels of T3/T4. Obtaining thyroid function panhel. Cardiology concerned for thyroid storm and advised no CT with contrast to r/o PE. Patient is not stable enough currently for V/Q scan currently. Dr Eugene has low index of suspicion for PE.     Placing central line and A line. Patient will be admitted to SICU for hemodynamic monitoring.        Home Medications: PNV  Allergies: none   PAST MEDICAL & SURGICAL HISTORY:  Asthma in childhood- last inhaler use >10 years ago. No hospitalizations intubations.   LTCS (10 Apr 2019 19:27)        PAST MEDICAL & SURGICAL HISTORY:  Asthma: as child, no inhaler use&gt;10 years. Denies hospitalizations or intubations. No current inhaler.  History of low transverse  section      Allergies    Allergy Status Unknown    Intolerances      SH:  Home Medications:    Advanced Directives: Full Code     ROS:  [x] A ten-point review of systems was otherwise negative except as noted.  [ ] Due to altered mental status/intubation, subjective information were not able to be obtained from the patient. History was obtained, to the extent possible, from review of the chart and collateral sources of information.      CURRENT MEDICATIONS:   --------------------------------------------------------------------------------------  Neurologic Medications  dexmedetomidine Infusion 0.5 MICROgram(s)/kG/Hr IV Continuous <Continuous>  fentaNYL    Injectable 25 MICROGram(s) IV Push once  fentaNYL   Infusion 0.5 MICROgram(s)/kG/Hr IV Continuous <Continuous>    Respiratory Medications    Cardiovascular Medications  furosemide   Injectable 40 milliGRAM(s) IV Push once  metoprolol tartrate Injectable 5 milliGRAM(s) IV Push once    Gastrointestinal Medications  lactated ringers. 1000 milliLiter(s) IV Continuous <Continuous>    Genitourinary Medications    Hematologic/Oncologic Medications    Antimicrobial/Immunologic Medications    Endocrine/Metabolic Medications    Topical/Other Medications    --------------------------------------------------------------------------------------    Vital Signs Last 24 Hrs  T(C): 35.6 (10 Apr 2019 18:46), Max: 35.6 (10 Apr 2019 15:27)  T(F): 96.1 (10 Apr 2019 15:27), Max: 96.1 (10 Apr 2019 15:27)  HR: 124 (10 Apr 2019 19:50) (124 - 228)  BP: 119/93 (10 Apr 2019 18:46) (74/56 - 121/94)  BP(mean): --  RR: 21 (10 Apr 2019 18:46) (20 - 24)  SpO2: 98% (10 Apr 2019 19:50) (98% - 100%)  I&O's Detail    I&O's Summary      LABS:  ABG - ( 10 Apr 2019 20:28 )  pH, Arterial: 7.15  pH, Blood: x     /  pCO2: 48    /  pO2: 129   / HCO3: 17    / Base Excess: -11.7 /  SaO2: 97                                      10.6   21.89 )-----------( 190      ( 10 Apr 2019 20:55 )             36.1     04-10    132<L>  |  103  |  11  ----------------------------<  175<H>  6.8<HH>   |  14<L>  |  0.7    Ca    7.5<L>      10 Apr 2019 20:55  Phos  5.2     04-10  Mg     1.7     04-10    TPro  5.1<L>  /  Alb  3.1<L>  /  TBili  0.3  /  DBili  x   /  AST  47<H>  /  ALT  39  /  AlkPhos  124<H>  04-10    LIVER FUNCTIONS - ( 10 Apr 2019 18:51 )  Alb: 3.1 g/dL / Pro: 5.1 g/dL / ALK PHOS: 124 U/L / ALT: 39 U/L / AST: 47 U/L / GGT: x           PT/INR - ( 10 Apr 2019 20:55 )   PT: 11.80 sec;   INR: 1.03 ratio         PTT - ( 10 Apr 2019 20:55 )  PTT:18.8 sec  CARDIAC MARKERS ( 10 Apr 2019 20:55 )  x     / <0.01 ng/mL / 148 U/L / x     / 3.4 ng/mL  CARDIAC MARKERS ( 10 Apr 2019 16:31 )  x     / <0.01 ng/mL / x     / x     / x            EXAM:  General/Neuro  GCS:   Exam: Normal, NAD, alert, oriented x 3, no focal deficits. PERRLA  ***    Respiratory  Exam: Lungs clear to auscultation, Normal expansion/effort.  ***  [] Tracheostomy   [] Intubated  Mechanical Ventilation: Mode: AC/ CMV (Assist Control/ Continuous Mandatory Ventilation), RR (machine): 14, TV (machine): 450, FiO2: 50, PEEP: 5    Cardiovascular  Exam: S1, S2.  Regular rate and rhythm.   Cardiac Rhythm: Normal Sinus Rhythm  ECHO:     GI  Exam: Abdomen soft, Non-tender, Non-distended.  Gastrostomy / Jejunostomy tube in place.  Nasogastric tube in place.  Colostomy / Ileostomy.  ***  Wound:   ***  Current Diet:  NPO***    Extremities  Exam: Extremities warm, pink, well-perfused.   Peripheral edema    Derm:  Exam: Good skin turgor, no skin breakdown.      :   Exam: Bazzi catheter in place.     Tubes/Lines/Drains  ***  [x] Peripheral IV  [] Central Venous Line     	[] R	[] L	[] IJ	[] Fem	[] SC        Type:	    Date Placed:   [] Arterial Line		[] R	[] L	[] Fem	[] Rad	[] Ax	Date Placed:   [] PICC:         	[] Midline		[] Mediport           [] Urinary Catheter		Date Placed: SICU Consultation Note  =====================================================  HPI:  32yo  at 34w5d dated by first trimester sonogram, who presented to ED secondary to SOB for the past 2 days. She reports the SOB was associated with nausea and vomiting, about 5x per day. She reports mild palpitations but denies chest pain. History of hyperemesis in previous pregnancies. In the ED, patient was in sinus tachycardia 's. Cardiology fellow was at bedside. in the ED, patient was given Adenosine 6, 12, 12 and 12 without converting. The decision was made to cardiovert the patient with mild sedation. Propofol and Etomatadex was given, cardioverted (200J monophasic and biphasic 360J x2) patient was not converted. It was decided by OBGYN attending and residents to proceed with an emergent c section. Patient was intubated in the ED, after patient converted and HR came down to 160's.     Patient was transferred to L&D OR for emergent . 800cc EBL. Patient remained normotensive intra op. HR came down to 115, sinus tachycardia. Patient was kept intubated. Given 6mg of versed and Nimbex for transfer to SICU. Upon arrival to the SICU patient HR was 120's sinus tachycardia, /70. ABG was obtained showed that the patient was acidotic and retaining CO2. Adjusted her vent settings. Patient O2 sat was 95%. Once patient started waking up precedex was started. Patient was maxed out on Precedex. Propofol 5x3 was given, patient had no response. Fentanyl gtt was started, patient responded well. Patients HR increased 140's Lopressor 5x1 given with good response.     Patient has a questionable history of hyperthyroidism. Lab value on previous OBGYN visit shows TSH of 0.06 with no levels of T3/T4. Obtaining thyroid function panhel. Cardiology concerned for thyroid storm and advised no CT with contrast to r/o PE. Patient is not stable enough currently for V/Q scan currently. Dr Eugene has low index of suspicion for PE.     Placing central line and A line. Patient will be admitted to SICU for hemodynamic monitoring.        Home Medications: PNV  Allergies: none   PAST MEDICAL & SURGICAL HISTORY:  Asthma in childhood- last inhaler use >10 years ago. No hospitalizations intubations.   LTCS (10 Apr 2019 19:27)        PAST MEDICAL & SURGICAL HISTORY:  Asthma: as child, no inhaler use&gt;10 years. Denies hospitalizations or intubations. No current inhaler.  History of low transverse  section      Allergies    Allergy Status Unknown    Intolerances      SH:  Home Medications:    Advanced Directives: Full Code     ROS:  [x] A ten-point review of systems was otherwise negative except as noted.  [ ] Due to altered mental status/intubation, subjective information were not able to be obtained from the patient. History was obtained, to the extent possible, from review of the chart and collateral sources of information.      CURRENT MEDICATIONS:   --------------------------------------------------------------------------------------  Neurologic Medications  dexmedetomidine Infusion 0.5 MICROgram(s)/kG/Hr IV Continuous <Continuous>  fentaNYL    Injectable 25 MICROGram(s) IV Push once  fentaNYL   Infusion 0.5 MICROgram(s)/kG/Hr IV Continuous <Continuous>    Respiratory Medications    Cardiovascular Medications  furosemide   Injectable 40 milliGRAM(s) IV Push once  metoprolol tartrate Injectable 5 milliGRAM(s) IV Push once    Gastrointestinal Medications  lactated ringers. 1000 milliLiter(s) IV Continuous <Continuous>    Genitourinary Medications    Hematologic/Oncologic Medications    Antimicrobial/Immunologic Medications    Endocrine/Metabolic Medications    Topical/Other Medications    --------------------------------------------------------------------------------------    Vital Signs Last 24 Hrs  T(C): 35.6 (10 Apr 2019 18:46), Max: 35.6 (10 Apr 2019 15:27)  T(F): 96.1 (10 Apr 2019 15:27), Max: 96.1 (10 Apr 2019 15:27)  HR: 124 (10 Apr 2019 19:50) (124 - 228)  BP: 119/93 (10 Apr 2019 18:46) (74/56 - 121/94)  BP(mean): --  RR: 21 (10 Apr 2019 18:46) (20 - 24)  SpO2: 98% (10 Apr 2019 19:50) (98% - 100%)  I&O's Detail    I&O's Summary      LABS:  ABG - ( 10 Apr 2019 20:28 )  pH, Arterial: 7.15  pH, Blood: x     /  pCO2: 48    /  pO2: 129   / HCO3: 17    / Base Excess: -11.7 /  SaO2: 97                                      10.6   21.89 )-----------( 190      ( 10 Apr 2019 20:55 )             36.1     04-10    132<L>  |  103  |  11  ----------------------------<  175<H>  6.8<HH>   |  14<L>  |  0.7    Ca    7.5<L>      10 Apr 2019 20:55  Phos  5.2     04-10  Mg     1.7     04-10    TPro  5.1<L>  /  Alb  3.1<L>  /  TBili  0.3  /  DBili  x   /  AST  47<H>  /  ALT  39  /  AlkPhos  124<H>  04-10    LIVER FUNCTIONS - ( 10 Apr 2019 18:51 )  Alb: 3.1 g/dL / Pro: 5.1 g/dL / ALK PHOS: 124 U/L / ALT: 39 U/L / AST: 47 U/L / GGT: x           PT/INR - ( 10 Apr 2019 20:55 )   PT: 11.80 sec;   INR: 1.03 ratio         PTT - ( 10 Apr 2019 20:55 )  PTT:18.8 sec  CARDIAC MARKERS ( 10 Apr 2019 20:55 )  x     / <0.01 ng/mL / 148 U/L / x     / 3.4 ng/mL  CARDIAC MARKERS ( 10 Apr 2019 16:31 )  x     / <0.01 ng/mL / x     / x     / x            EXAM:  General/Neuro: Intubated. A&Ox3. Follows commands.     Respiratory  Exam: Intubated 50/5. Lungs clear to auscultation, Normal expansion/effort.    Mechanical Ventilation: Mode: AC/ CMV (Assist Control/ Continuous Mandatory Ventilation), RR (machine): 14, TV (machine): 450, FiO2: 50, PEEP: 5    Cardiovascular  Exam: irregular   Cardiac Rhythm: A fib     GI  Exam: Abdomen soft, Non-tender, Non-distended.  OGT in place.   Wound:   c section scar clean dry and intact   Current Diet:  NPO***    Extremities  Exam: Extremities warm, pink, well-perfused.   Peripheral edema    Derm:  Exam: Good skin turgor, no skin breakdown.      :   Exam: Bazzi catheter in place.     Tubes/Lines/Drains  ***  [x] Peripheral IV  [x] Central Venous Line     	[] R	[x] L	[x] IJ	[] Fem	[] SC        Type:	    Date Placed: 4/10   [x] Arterial Line		[x] R	[] L	[] Fem	[x] Rad	[] Ax	Date Placed: 4/10   [x] Urinary Catheter		Date Placed:

## 2019-04-10 NOTE — ED PROCEDURE NOTE - PROCEDURE ADDITIONAL DETAILS
patient given sequential doses of propofol in 20-30 mg doses q 3-5 minutes for total 120 mg due to low BP at time of procedure, with benefits of sedation outweighing risks.  No efficacy with transthoracic cardioversion at multiple joule settings and another attempt was made using etomidate 15 mg. Pt did not tolerate cardioversion well and required emergent intubation for stat  section. Family aware of situation and plan in real time.

## 2019-04-10 NOTE — PROGRESS NOTE ADULT - SUBJECTIVE AND OBJECTIVE BOX
Patient s/p repeat  section  currently intubated   heart rate 120s  O2 sat 97%  Plan to transfer SICU  Plan VQ scan   cxr  tft pending   extubation once patient stable

## 2019-04-10 NOTE — CONSULT NOTE ADULT - SUBJECTIVE AND OBJECTIVE BOX
Chief Complaint:    HPI: 32yo , at 34w5d dated by first trimester sonogram, who presented to ED secondary to SOB for the past 2 days. She reports the SOB was associated with nausea and vomiting, about 5x per day. She reports mild palpitations but denies chest pain. She denies Ctx, LOF, vaginal bleeding and reports +FM. Pregnancy complicated by hyperemesis gravidarum, treated with Zofran and Reglan until about 1 month ago. Denies other complications this pregnancy. Denies RUQ pain/ Epigastric pain, LE pain/ swelling, diarrhea, pain/difficulty with urination, fevers, chills.   RH negative s/p Rhogam this pregnancy. In the ED, she was found to be in SVT with a HR of 248, resistant to multiple doses of adenosine (6,12,12,12) and propanolol. In the ED, palpable contractions felt and visualized on the monitor. Patient currently denies contractions. For repeat  section.     Ob/Gyn History:  P1, LTCSX1 in  for cranial abnormalities. SABX2, no D+C.   Denies history of ovarian cysts, uterine fibroids, abnormal paps, or STIs    Denies the following: constitutional symptoms, visual symptoms, cardiovascular symptoms, respiratory symptoms, GI symptoms, musculoskeletal symptoms, skin symptoms, neurologic symptoms, hematologic symptoms, allergic symptoms, psychiatric symptoms  Except any pertinent positives listed.     Home Medications: PNV    Allergies: none     PAST MEDICAL & SURGICAL HISTORY:  Asthma in childhood- last inhaler use >10 years ago. No hospitalizations intubations.   No significant past surgical history    FAMILY HISTORY:  No pertinent family history in first degree relatives    SOCIAL HISTORY: Denies cigarette use, alcohol use, or illicit drug use    Vital Signs Last 24 Hrs  T(F): 96.1 (10 Apr 2019 15:27), Max: 96.1 (10 Apr 2019 15:27)  HR: 221 (10 Apr 2019 16:30) (221 - 228)  BP: 121/94 (10 Apr 2019 16:30) (101/76 - 121/94)  RR: 22 (10 Apr 2019 16:30) (22 - 24)    General Appearance - AAOx3, NAD  Heart - S1S2 regular rate and rhythm  Lung - CTA Bilaterally  Abdomen - Soft, nontender, nondistended, no rebound, no rigidity, no guarding, bowel sounds present/ Gravid, palpable contractions, moderate present.     FHR: 150bpm/ mod doreen/ accels+/ cat 1  TOCO q3min   SVE 1-2/50/-3, intact     Speculum exam: No bleeding or discharge noted. No pooling noted.     GYN/Pelvis:  Labia Majora - Normal  Labia Minora - Normal  Clitoris - Normal  Urethra - Normal  Vagina - Normal  Cervix - Normal; 1-2cm dilated. No bleeding or cervical discharge noted.     Uterus:  Size - Normal  Tenderness - None  Mass - None  Freely mobile    Adnexa:  Masses - None  Tenderness - None    LABS:      RADIOLOGY & ADDITIONAL STUDIES:

## 2019-04-10 NOTE — OB PROVIDER H&P - HISTORY OF PRESENT ILLNESS
34yo , at 34w5d dated by first trimester sonogram, who presented to ED secondary to SOB for the past 2 days. She reports the SOB was associated with nausea and vomiting, about 5x per day. She reports mild palpitations but denies chest pain. She denies Ctx, LOF, vaginal bleeding and reports +FM. Pregnancy complicated by hyperemesis gravidarum, treated with Zofran and Reglan until about 1 month ago. Denies other complications this pregnancy. Denies RUQ pain/ Epigastric pain, LE pain/ swelling, diarrhea, pain/difficulty with urination, fevers, chills. RH negative, s/p Rhogam this pregnancy. In the ED, palpable contractions felt and visualized on the monitor. Patient currently denies contractions. For repeat  section.   In the ED, she was found to be in SVT with a HR of 248, resistant to multiple doses of adenosine (6,12,12,12) and propanolol. Patient sedated with propofolx6 and Etomatadex1, and electrical cardioversion attempted x3 without success. She continued to have an elevated heart rate, was persistently hypotensive and her mental status started to decline. The decision was made to proceed with intubation and transfer her to +D with ED, cardiology, OB anesthesia and ObGyn team at bedside.     Denies the following: constitutional symptoms, visual symptoms, cardiovascular symptoms, respiratory symptoms, GI symptoms, musculoskeletal symptoms, skin symptoms, neurologic symptoms, hematologic symptoms, allergic symptoms, psychiatric symptoms  Except any pertinent positives listed.     Home Medications: PNV  Allergies: none   PAST MEDICAL & SURGICAL HISTORY:  Asthma in childhood- last inhaler use >10 years ago. No hospitalizations intubations.   LTCS

## 2019-04-10 NOTE — ED PROVIDER NOTE - CLINICAL SUMMARY MEDICAL DECISION MAKING FREE TEXT BOX
33 female here for symptomatic narrow complex tachydysrhythmia without hemodynamic compromise. 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 At 35 weeks by dates with normal IUP and outpatient followup.  HR 220s narrow complex and regular.  Had screening labs done with rate control attempts by adenosine 6-12-12 mg, without sustained efficacy.   Consults made to Cardiology and Ob for optimal Rx management. Both services presented to bedside in ED for advice in management, also had L&D staff in ED to perform fetal monitoring.  Was given rounds of additional parenteral Rx for cardioversion by Cardio/EP without improvement.  Plan made for procedural sedation for electrical cardioversion which was done in the ED multiple times and also did not provide restoration of normal sinus rhythm.  OB Anesthesiology was called to bedside for additional sedation attempt for cardioversion which was again unsuccessful.  Pt became critically ill during a subsequent cardioversion attempt and was promptly intubated for loss of airway reflexes.  Pt had low systolic BP during the sedation and was given several liters of crystalloid to improve BP.  Was intubated in ED by Anesthesiology and taken for emergent  section for concern of fetal safety by Ob.  Cardiology accompanied patient throughout her stay in ED and into OR suite. 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

## 2019-04-10 NOTE — BRIEF OPERATIVE NOTE - OPERATION/FINDINGS
emergeny  section  APGARs 6/8, male infant in vertex position, clear amniotic fluid, nuchal cord x2, weighing 3070 grams  Normal uterus, tubes, and ovaries

## 2019-04-10 NOTE — CONSULT NOTE ADULT - ASSESSMENT
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 Would strongly suggest to do so with VQ scan as administering iodine containing contrast could severely worsen Thyroid storm. Refractory Atrial Tachycardia  Likely Thyroid storm   Possible PE  - A tach broke with intubation but was refractory to adenosine 6,12,12,12 DCCV x3,propanolol 1mgIVP x2  - patient had low TSH on bloodwork obtained from prior to admission, given extreme tachycardia, would strongly consider ThyroidStorm  -obtain official 2d echo  - metoprolol tartrate 25 mg po q8h  - Rule out PE: given RV dilated on bedside echo, PE should be ruled out. Would strongly suggest to do so with VQ scan as administering iodine containing contrast could severely worsen Thyroid storm. Refractory Atrial Tachycardia at 250 bpm    -A tach refractory to adenosine 6,12,12,12 DCCV x3,propanolol 1mgIVP x2  -A tach broke with intubation  -patient had low TSH on bloodwork obtained from prior to admission, given extreme tachycardia, need to r/o hyperthyrodism  -r/o PE  -obtain official 2d echo  -metoprolol tartrate 25 mg po q8h  -Rule out PE: given RV dilated on bedside echo, PE should be ruled out. Would strongly suggest to do so with VQ scan as administering iodine containing contrast could severely worsen Thyroid storm.

## 2019-04-10 NOTE — OB PROVIDER H&P - ASSESSMENT
32yo , at 34w5d, with unstable tachycardia refractory to medications and cardioversion, s/p intubation in the ED  - after intubated patient was transferred to L&D, due to patient's instability decision made to proceed with emergent  section  - Will perform CT angio to rule out PE  - f/u TFTs to r/o hyperthyroidism    - f/u preeclamptic labs for initially elevated blood pressure  - EPL team at bedside, cardiac management per cardiology      Dr. Cuevas and Dr. Carrillo aware

## 2019-04-10 NOTE — OB PROVIDER H&P - NSHPPHYSICALEXAM_GEN_ALL_CORE
Vital Signs Last 24 Hrs  T(C): 35.6 (10 Apr 2019 18:46), Max: 35.6 (10 Apr 2019 15:27)  T(F): 96.1 (10 Apr 2019 15:27), Max: 96.1 (10 Apr 2019 15:27)  HR: 209 (10 Apr 2019 18:46) (181 - 228)  BP: 119/93 (10 Apr 2019 18:46) (74/56 - 121/94)  RR: 21 (10 Apr 2019 18:46) (20 - 24)  SpO2: 100% (10 Apr 2019 18:46) (100% - 100%)    FHR 150bpm/ mod doreen/ accels+  TOCO q1-2min  SVE 1-2/50/-3, intact     Speculum exam: No bleeding or discharge noted. No pooling noted.     GYN/Pelvis:  Labia Majora - Normal  Labia Minora - Normal  Clitoris - Normal  Urethra - Normal  Vagina - Normal  Cervix - Normal; 1-2cm dilated. No bleeding or cervical discharge noted.     Uterus:  Size - Normal  Tenderness - None  Mass - None  Freely mobile    Adnexa:  Masses - None  Tenderness - None

## 2019-04-10 NOTE — ED ADULT NURSE REASSESSMENT NOTE - NS ED NURSE REASSESS COMMENT FT1
patient came in to ED with shortness of breath, in SVT. meds given to break rhythm as ordered with all safety/resuscitation equipment at bedside. SVT persisted. cardiology fellows/attendings at bedside. OB team at bedside. fetal heart monitoring on patient for duration of procedure, monitored by OB team.  decision made to cardiovert patient. conscious sedation initiated, see MAR for med admin.  cardiologist attempted cardioversion with 200 jules monophasic at 1715pm, was unsuccessful, attempted again 2 times at 1745 with 360 jules biphasic, was again unsuccessful. decision made by ED attending, cardiologists, and OB team to intubate patient. patient intubated with size 6.5 ET tube, lip line 22 and brought to OR accompanied by RN and MD on cardiac monitor with defib.

## 2019-04-10 NOTE — PROCEDURE NOTE - ADDITIONAL PROCEDURE DETAILS
Failed DC cardioversion x 3 shocks with 2 different vectors  Patient was intubated and transferred to OR for emergent  due to hemodynamic instability

## 2019-04-10 NOTE — CONSULT NOTE ADULT - ASSESSMENT
34yo , at 34w5d, in SVT, refractory to medications, for cardioversion, rule out  labor  -EFM/ TOCO continuous   -f/u G/C, GBS cultures   -management per cardiology / ED  -to be transferred to L+D after cleared by cardiology     Dr. Cuevas ands Dr. Sarkar

## 2019-04-10 NOTE — BRIEF OPERATIVE NOTE - NSICDXBRIEFPREOP_GEN_ALL_CORE_FT
PRE-OP DIAGNOSIS:  Non-reassuring fetal heart rate or rhythm affecting management of mother 10-Apr-2019 20:02:10  Jey Cuevas  Hypoxia 10-Apr-2019 20:01:27  Jey Cuevas PRE-OP DIAGNOSIS:  Supraventricular tachycardia 10-Apr-2019 20:11:45  Jey Cuevas  Non-reassuring fetal heart rate or rhythm affecting management of mother 10-Apr-2019 20:02:10  Jey Cuevas  Hypoxia 10-Apr-2019 20:01:27  Jey Cuevas PRE-OP DIAGNOSIS:  Sustained supraventricular tachycardia 10-Apr-2019 20:29:01 refractory to cardioversion, hemodynamically unstable Jey Cuevas  Hypoxia 10-Apr-2019 20:28:27  Jey Cuevas

## 2019-04-10 NOTE — PRE-ANESTHESIA EVALUATION ADULT - NSANTHPMHFT_GEN_ALL_CORE
34wd5 IUP with PMH childhood asthma presents to ER with SOB,  atrial tachycardia with 's, Adenosine 6mg, 12mg, 12 mg, 12mg administered by ER, Propanolol 1mg Iv administered x2 doses.  Patient Cardioverted monophasic 200j x1, Biphasic 360jx2.  Intubated with 6.5 ETT and brought directly to L&D OR.    PSH: denies

## 2019-04-10 NOTE — OB PROVIDER H&P - NS_OBGYNHISTORY_OBGYN_ALL_OB_FT
P1, LTCSX1 in 2010 for craniosynostosis. SABX2, no D+C.   Denies history of ovarian cysts, uterine fibroids, abnormal paps, or STIs

## 2019-04-10 NOTE — PROGRESS NOTE ADULT - SUBJECTIVE AND OBJECTIVE BOX
Called to the ED trauma area by residents. 32 yo P1 prior uncomplicated c/s due to craniosynostosis came to ED complaining of SOB and palpitations. In ED found to be HR 240s. Upon my arrival, EFM was in place, 150 baseline. ED team, cardiology and OB anesthesia were at bedside. They had given pt multiple doses of adenosine, propanolol, propofol, etomodate, and cardioversion x3 without response. At my arrival BP 70s/40s, HR 180s. Pulses present, pt agitated. Decision made to intubate pt to protect airway and deliver pt via c/s. I spoke with the  to update him on situation, he agreed for her to be taken to the OR for a c/s. Explained to him procedure, risks and risks of prematurity at this GA. He understood. Once pt intubated, was transfered to OR in labor and delivery. pfannesteil skin incision, lower uterine transverse incision, baby delivered without complications, nuchal cord x2. Baby handed to awaiting NICU team. Cord gases and cord blood given to RN. Live, male infant delivered, 3070gr, APGAR 6/8. After delivery of baby, her OB took over the remaining of the procedure upon her arrival.

## 2019-04-10 NOTE — ED PROVIDER NOTE - OBJECTIVE STATEMENT
Patient is a 32 y/o female currently 35 weeks pregnant  LMP 2018, who presents c/o SOB x a few days. Non-productive cough. No chest pain. No recent travel, lower extremity edema, or calf tenderness.

## 2019-04-10 NOTE — ED ADULT NURSE REASSESSMENT NOTE - NS ED NURSE REASSESS COMMENT FT1
1mg propranolol IV admin by MD Hansen at 1640pm and another 1mg propranolol IV admin by MD Hansen at 1655pm. ED DANIEL Singh is unable to order med in [place]. as per pharmacy, DANIEL Singh is to write a paper order and fill out a non-formulary request. 1mg propranolol IV admin by MD Hansen at 1640pm and another 1mg propranolol IV admin by MD Hansen at 1655pm. ED DANIEL Singh is unable to order med in [place]. as per pharmacy, DANIEL Singh is to fill out a non-formulary request. meds charted on paper MAR which was put in chart.

## 2019-04-10 NOTE — ED ADULT NURSE NOTE - OBJECTIVE STATEMENT
patient states she is 35 weeks pregnant and has been SOB for 3 days. states her LMP was in August and is scheduled for  on May 10th. denies chest pain, admits to dizziness

## 2019-04-11 ENCOUNTER — INPATIENT (INPATIENT)
Facility: HOSPITAL | Age: 34
LOS: 16 days | Discharge: HOME | DRG: 776 | End: 2019-04-28
Attending: THORACIC SURGERY (CARDIOTHORACIC VASCULAR SURGERY) | Admitting: THORACIC SURGERY (CARDIOTHORACIC VASCULAR SURGERY)
Payer: COMMERCIAL

## 2019-04-11 ENCOUNTER — APPOINTMENT (OUTPATIENT)
Dept: CARDIOTHORACIC SURGERY | Facility: CLINIC | Age: 34
End: 2019-04-11

## 2019-04-11 VITALS — OXYGEN SATURATION: 100 % | HEART RATE: 118 BPM

## 2019-04-11 VITALS — OXYGEN SATURATION: 100 % | HEART RATE: 109 BPM

## 2019-04-11 DIAGNOSIS — Z92.81 PERSONAL HISTORY OF EXTRACORPOREAL MEMBRANE OXYGENATION (ECMO): ICD-10-CM

## 2019-04-11 DIAGNOSIS — R09.89 OTHER SPECIFIED SYMPTOMS AND SIGNS INVOLVING THE CIRCULATORY AND RESPIRATORY SYSTEMS: ICD-10-CM

## 2019-04-11 DIAGNOSIS — Z98.891 HISTORY OF UTERINE SCAR FROM PREVIOUS SURGERY: Chronic | ICD-10-CM

## 2019-04-11 LAB
ALBUMIN SERPL ELPH-MCNC: 2.2 G/DL — LOW (ref 3.5–5.2)
ALBUMIN SERPL ELPH-MCNC: 2.8 G/DL — LOW (ref 3.5–5.2)
ALP SERPL-CCNC: 100 U/L — SIGNIFICANT CHANGE UP (ref 30–115)
ALP SERPL-CCNC: 119 U/L — HIGH (ref 30–115)
ALT FLD-CCNC: 2048 U/L — HIGH (ref 0–41)
ALT FLD-CCNC: 2407 U/L — HIGH (ref 0–41)
ANION GAP SERPL CALC-SCNC: 12 MMOL/L — SIGNIFICANT CHANGE UP (ref 7–14)
ANION GAP SERPL CALC-SCNC: 17 MMOL/L — HIGH (ref 7–14)
ANION GAP SERPL CALC-SCNC: 17 MMOL/L — HIGH (ref 7–14)
ANION GAP SERPL CALC-SCNC: 20 MMOL/L — HIGH (ref 7–14)
APTT BLD: 28.8 SEC — SIGNIFICANT CHANGE UP (ref 27–39.2)
APTT BLD: 33 SEC — SIGNIFICANT CHANGE UP (ref 27–39.2)
APTT BLD: 37.9 SEC — SIGNIFICANT CHANGE UP (ref 27–39.2)
APTT BLD: 38.5 SEC — SIGNIFICANT CHANGE UP (ref 27–39.2)
AST SERPL-CCNC: 3689 U/L — HIGH (ref 0–41)
AST SERPL-CCNC: 5945 U/L — HIGH (ref 0–41)
BASE EXCESS BLDA CALC-SCNC: -10.2 MMOL/L — LOW (ref -2–2)
BASE EXCESS BLDA CALC-SCNC: -6.4 MMOL/L — LOW (ref -2–2)
BASE EXCESS BLDMV CALC-SCNC: -6 MMOL/L — LOW (ref -3–3)
BASOPHILS # BLD AUTO: 0.03 K/UL — SIGNIFICANT CHANGE UP (ref 0–0.2)
BASOPHILS # BLD AUTO: 0.04 K/UL — SIGNIFICANT CHANGE UP (ref 0–0.2)
BASOPHILS NFR BLD AUTO: 0.1 % — SIGNIFICANT CHANGE UP (ref 0–1)
BASOPHILS NFR BLD AUTO: 0.1 % — SIGNIFICANT CHANGE UP (ref 0–1)
BILIRUB DIRECT SERPL-MCNC: 0.8 MG/DL — HIGH (ref 0–0.2)
BILIRUB INDIRECT FLD-MCNC: 0.3 MG/DL — SIGNIFICANT CHANGE UP (ref 0.2–1.2)
BILIRUB SERPL-MCNC: 0.9 MG/DL — SIGNIFICANT CHANGE UP (ref 0.2–1.2)
BILIRUB SERPL-MCNC: 1.1 MG/DL — SIGNIFICANT CHANGE UP (ref 0.2–1.2)
BUN SERPL-MCNC: 14 MG/DL — SIGNIFICANT CHANGE UP (ref 10–20)
BUN SERPL-MCNC: 17 MG/DL — SIGNIFICANT CHANGE UP (ref 10–20)
BUN SERPL-MCNC: 23 MG/DL — HIGH (ref 10–20)
BUN SERPL-MCNC: 28 MG/DL — HIGH (ref 10–20)
CALCIUM SERPL-MCNC: 7.2 MG/DL — LOW (ref 8.5–10.1)
CALCIUM SERPL-MCNC: 7.2 MG/DL — LOW (ref 8.5–10.1)
CALCIUM SERPL-MCNC: 7.6 MG/DL — LOW (ref 8.5–10.1)
CALCIUM SERPL-MCNC: 7.7 MG/DL — LOW (ref 8.5–10.1)
CHLORIDE SERPL-SCNC: 106 MMOL/L — SIGNIFICANT CHANGE UP (ref 98–110)
CHLORIDE SERPL-SCNC: 106 MMOL/L — SIGNIFICANT CHANGE UP (ref 98–110)
CHLORIDE SERPL-SCNC: 107 MMOL/L — SIGNIFICANT CHANGE UP (ref 98–110)
CHLORIDE SERPL-SCNC: 108 MMOL/L — SIGNIFICANT CHANGE UP (ref 98–110)
CK MB CFR SERPL CALC: 2.5 NG/ML — SIGNIFICANT CHANGE UP (ref 0.6–6.3)
CK MB CFR SERPL CALC: 6 NG/ML — SIGNIFICANT CHANGE UP (ref 0.6–6.3)
CK SERPL-CCNC: 801 U/L — HIGH (ref 0–225)
CO2 BLDMV-SCNC: 20 MMOL/L — LOW (ref 21–29)
CO2 SERPL-SCNC: 13 MMOL/L — LOW (ref 17–32)
CO2 SERPL-SCNC: 15 MMOL/L — LOW (ref 17–32)
CO2 SERPL-SCNC: 17 MMOL/L — SIGNIFICANT CHANGE UP (ref 17–32)
CO2 SERPL-SCNC: 17 MMOL/L — SIGNIFICANT CHANGE UP (ref 17–32)
CREAT ?TM UR-MCNC: 49 MG/DL — SIGNIFICANT CHANGE UP
CREAT SERPL-MCNC: 0.8 MG/DL — SIGNIFICANT CHANGE UP (ref 0.7–1.5)
CREAT SERPL-MCNC: 1.2 MG/DL — SIGNIFICANT CHANGE UP (ref 0.7–1.5)
CREAT SERPL-MCNC: 1.6 MG/DL — HIGH (ref 0.7–1.5)
CREAT SERPL-MCNC: 2 MG/DL — HIGH (ref 0.7–1.5)
EOSINOPHIL # BLD AUTO: 0 K/UL — SIGNIFICANT CHANGE UP (ref 0–0.7)
EOSINOPHIL # BLD AUTO: 0 K/UL — SIGNIFICANT CHANGE UP (ref 0–0.7)
EOSINOPHIL NFR BLD AUTO: 0 % — SIGNIFICANT CHANGE UP (ref 0–8)
EOSINOPHIL NFR BLD AUTO: 0 % — SIGNIFICANT CHANGE UP (ref 0–8)
GAS PNL BLDA: SIGNIFICANT CHANGE UP
GAS PNL BLDMV: SIGNIFICANT CHANGE UP
GLUCOSE BLDC GLUCOMTR-MCNC: 95 MG/DL — SIGNIFICANT CHANGE UP (ref 70–99)
GLUCOSE SERPL-MCNC: 114 MG/DL — HIGH (ref 70–99)
GLUCOSE SERPL-MCNC: 66 MG/DL — LOW (ref 70–99)
GLUCOSE SERPL-MCNC: 70 MG/DL — SIGNIFICANT CHANGE UP (ref 70–99)
GLUCOSE SERPL-MCNC: 89 MG/DL — SIGNIFICANT CHANGE UP (ref 70–99)
HCO3 BLDA-SCNC: 18 MMOL/L — LOW (ref 21–29)
HCO3 BLDA-SCNC: 18 MMOL/L — LOW (ref 23–27)
HCO3 BLDMV-SCNC: 19 MMOL/L — LOW (ref 20–28)
HCT VFR BLD CALC: 25.4 % — LOW (ref 37–47)
HCT VFR BLD CALC: 27 % — LOW (ref 34.5–45)
HCT VFR BLD CALC: 31.9 % — LOW (ref 37–47)
HCT VFR BLD CALC: 32.9 % — LOW (ref 37–47)
HGB BLD-MCNC: 8 G/DL — LOW (ref 12–16)
HGB BLD-MCNC: 8.6 G/DL — LOW (ref 11.5–15.5)
HGB BLD-MCNC: 9.7 G/DL — LOW (ref 12–16)
HGB BLD-MCNC: 9.8 G/DL — LOW (ref 12–16)
HOROWITZ INDEX BLDA+IHG-RTO: 100 — SIGNIFICANT CHANGE UP
HOROWITZ INDEX BLDMV+IHG-RTO: 100 — SIGNIFICANT CHANGE UP
IMM GRANULOCYTES NFR BLD AUTO: 2 % — HIGH (ref 0.1–0.3)
IMM GRANULOCYTES NFR BLD AUTO: 2.6 % — HIGH (ref 0.1–0.3)
INR BLD: 1.17 RATIO — SIGNIFICANT CHANGE UP (ref 0.65–1.3)
INR BLD: 2.28 RATIO — HIGH (ref 0.65–1.3)
INR BLD: 2.32 RATIO — HIGH (ref 0.65–1.3)
INR BLD: 2.33 RATIO — HIGH (ref 0.65–1.3)
LYMPHOCYTES # BLD AUTO: 10 % — LOW (ref 20.5–51.1)
LYMPHOCYTES # BLD AUTO: 10.5 % — LOW (ref 20.5–51.1)
LYMPHOCYTES # BLD AUTO: 2.53 K/UL — SIGNIFICANT CHANGE UP (ref 1.2–3.4)
LYMPHOCYTES # BLD AUTO: 2.72 K/UL — SIGNIFICANT CHANGE UP (ref 1.2–3.4)
MAGNESIUM SERPL-MCNC: 1.6 MG/DL — LOW (ref 1.8–2.4)
MAGNESIUM SERPL-MCNC: 1.7 MG/DL — LOW (ref 1.8–2.4)
MAGNESIUM SERPL-MCNC: 1.8 MG/DL — SIGNIFICANT CHANGE UP (ref 1.8–2.4)
MCHC RBC-ENTMCNC: 27.7 PG — SIGNIFICANT CHANGE UP (ref 27–31)
MCHC RBC-ENTMCNC: 27.9 PG — SIGNIFICANT CHANGE UP (ref 27–31)
MCHC RBC-ENTMCNC: 28 PG — SIGNIFICANT CHANGE UP (ref 27–31)
MCHC RBC-ENTMCNC: 28.5 PG — SIGNIFICANT CHANGE UP (ref 27–34)
MCHC RBC-ENTMCNC: 29.5 G/DL — LOW (ref 32–37)
MCHC RBC-ENTMCNC: 30.7 G/DL — LOW (ref 32–37)
MCHC RBC-ENTMCNC: 31.5 G/DL — LOW (ref 32–37)
MCHC RBC-ENTMCNC: 31.9 GM/DL — LOW (ref 32–36)
MCV RBC AUTO: 88.8 FL — SIGNIFICANT CHANGE UP (ref 81–99)
MCV RBC AUTO: 89.2 FL — SIGNIFICANT CHANGE UP (ref 80–100)
MCV RBC AUTO: 90.9 FL — SIGNIFICANT CHANGE UP (ref 81–99)
MCV RBC AUTO: 94 FL — SIGNIFICANT CHANGE UP (ref 81–99)
MONOCYTES # BLD AUTO: 2.29 K/UL — HIGH (ref 0.1–0.6)
MONOCYTES # BLD AUTO: 2.33 K/UL — HIGH (ref 0.1–0.6)
MONOCYTES NFR BLD AUTO: 8.6 % — SIGNIFICANT CHANGE UP (ref 1.7–9.3)
MONOCYTES NFR BLD AUTO: 9.5 % — HIGH (ref 1.7–9.3)
NEUTROPHILS # BLD AUTO: 18.76 K/UL — HIGH (ref 1.4–6.5)
NEUTROPHILS # BLD AUTO: 21.41 K/UL — HIGH (ref 1.4–6.5)
NEUTROPHILS NFR BLD AUTO: 77.9 % — HIGH (ref 42.2–75.2)
NEUTROPHILS NFR BLD AUTO: 78.7 % — HIGH (ref 42.2–75.2)
NRBC # BLD: 1 /100 WBCS — HIGH (ref 0–0)
NRBC # BLD: 1 /100 WBCS — HIGH (ref 0–0)
NRBC # BLD: 2 /100 WBCS — HIGH (ref 0–0)
O2 CT VFR BLD CALC: 42 MMHG — SIGNIFICANT CHANGE UP (ref 30–65)
PCO2 BLDA: 33 MMHG — LOW (ref 38–42)
PCO2 BLDA: 53 MMHG — HIGH (ref 38–42)
PCO2 BLDMV: 38 MMHG — SIGNIFICANT CHANGE UP (ref 30–65)
PH BLDA: 7.15 — CRITICAL LOW (ref 7.38–7.42)
PH BLDA: 7.35 — LOW (ref 7.38–7.42)
PH BLDMV: 7.32 — SIGNIFICANT CHANGE UP (ref 7.32–7.45)
PHOSPHATE SERPL-MCNC: 7 MG/DL — HIGH (ref 2.1–4.9)
PHOSPHATE SERPL-MCNC: 7.4 MG/DL — HIGH (ref 2.1–4.9)
PLATELET # BLD AUTO: 136 K/UL — LOW (ref 150–400)
PLATELET # BLD AUTO: 142 K/UL — SIGNIFICANT CHANGE UP (ref 130–400)
PLATELET # BLD AUTO: 154 K/UL — SIGNIFICANT CHANGE UP (ref 130–400)
PLATELET # BLD AUTO: 266 K/UL — SIGNIFICANT CHANGE UP (ref 130–400)
PO2 BLDA: 70 MMHG — LOW (ref 78–95)
PO2 BLDA: 75 MMHG — LOW (ref 78–95)
POTASSIUM SERPL-MCNC: 5.4 MMOL/L — HIGH (ref 3.5–5)
POTASSIUM SERPL-MCNC: 5.9 MMOL/L — HIGH (ref 3.5–5)
POTASSIUM SERPL-MCNC: 6.4 MMOL/L — CRITICAL HIGH (ref 3.5–5)
POTASSIUM SERPL-MCNC: 8.4 MMOL/L — CRITICAL HIGH (ref 3.5–5)
POTASSIUM SERPL-SCNC: 5.4 MMOL/L — HIGH (ref 3.5–5)
POTASSIUM SERPL-SCNC: 5.9 MMOL/L — HIGH (ref 3.5–5)
POTASSIUM SERPL-SCNC: 6.4 MMOL/L — CRITICAL HIGH (ref 3.5–5)
POTASSIUM SERPL-SCNC: 8.4 MMOL/L — CRITICAL HIGH (ref 3.5–5)
PROT SERPL-MCNC: 3.7 G/DL — LOW (ref 6–8)
PROT SERPL-MCNC: 4.5 G/DL — LOW (ref 6–8)
PROTHROM AB SERPL-ACNC: 13.4 SEC — HIGH (ref 9.95–12.87)
PROTHROM AB SERPL-ACNC: 26 SEC — HIGH (ref 9.95–12.87)
PROTHROM AB SERPL-ACNC: 26.5 SEC — HIGH (ref 9.95–12.87)
PROTHROM AB SERPL-ACNC: 26.6 SEC — HIGH (ref 9.95–12.87)
RBC # BLD: 2.86 M/UL — LOW (ref 4.2–5.4)
RBC # BLD: 3.02 M/UL — LOW (ref 3.8–5.2)
RBC # BLD: 3.5 M/UL — LOW (ref 4.2–5.4)
RBC # BLD: 3.51 M/UL — LOW (ref 4.2–5.4)
RBC # FLD: 14.1 % — SIGNIFICANT CHANGE UP (ref 10.3–14.5)
RBC # FLD: 14.5 % — SIGNIFICANT CHANGE UP (ref 11.5–14.5)
RBC # FLD: 14.6 % — HIGH (ref 11.5–14.5)
RBC # FLD: 14.7 % — HIGH (ref 11.5–14.5)
SAO2 % BLDA: 88 % — LOW (ref 94–98)
SAO2 % BLDA: 94 % — SIGNIFICANT CHANGE UP (ref 92–96)
SAO2 % BLDMV: 67 % — SIGNIFICANT CHANGE UP (ref 60–90)
SODIUM SERPL-SCNC: 136 MMOL/L — SIGNIFICANT CHANGE UP (ref 135–146)
SODIUM SERPL-SCNC: 137 MMOL/L — SIGNIFICANT CHANGE UP (ref 135–146)
SODIUM SERPL-SCNC: 139 MMOL/L — SIGNIFICANT CHANGE UP (ref 135–146)
SODIUM SERPL-SCNC: 143 MMOL/L — SIGNIFICANT CHANGE UP (ref 135–146)
SODIUM UR-SCNC: 99 MMOL/L — SIGNIFICANT CHANGE UP
T4 AB SER-ACNC: 6.9 UG/DL — SIGNIFICANT CHANGE UP (ref 4.6–12)
TROPONIN T SERPL-MCNC: 0.03 NG/ML — CRITICAL HIGH
TROPONIN T SERPL-MCNC: <0.01 NG/ML — SIGNIFICANT CHANGE UP
TSH SERPL-MCNC: 0.79 UIU/ML — SIGNIFICANT CHANGE UP (ref 0.27–4.2)
WBC # BLD: 24.09 K/UL — HIGH (ref 4.8–10.8)
WBC # BLD: 25 K/UL — HIGH (ref 3.8–10.5)
WBC # BLD: 26.37 K/UL — HIGH (ref 4.8–10.8)
WBC # BLD: 27.21 K/UL — HIGH (ref 4.8–10.8)
WBC # FLD AUTO: 24.09 K/UL — HIGH (ref 4.8–10.8)
WBC # FLD AUTO: 25 K/UL — HIGH (ref 3.8–10.5)
WBC # FLD AUTO: 26.37 K/UL — HIGH (ref 4.8–10.8)
WBC # FLD AUTO: 27.21 K/UL — HIGH (ref 4.8–10.8)

## 2019-04-11 PROCEDURE — 99291 CRITICAL CARE FIRST HOUR: CPT | Mod: 25

## 2019-04-11 PROCEDURE — 99292 CRITICAL CARE ADDL 30 MIN: CPT | Mod: 25

## 2019-04-11 PROCEDURE — 71045 X-RAY EXAM CHEST 1 VIEW: CPT | Mod: 26,77

## 2019-04-11 PROCEDURE — 36620 INSERTION CATHETER ARTERY: CPT

## 2019-04-11 PROCEDURE — 93306 TTE W/DOPPLER COMPLETE: CPT | Mod: 26

## 2019-04-11 PROCEDURE — 71045 X-RAY EXAM CHEST 1 VIEW: CPT | Mod: 26

## 2019-04-11 PROCEDURE — 86077 PHYS BLOOD BANK SERV XMATCH: CPT

## 2019-04-11 PROCEDURE — 99221 1ST HOSP IP/OBS SF/LOW 40: CPT

## 2019-04-11 PROCEDURE — 36556 INSERT NON-TUNNEL CV CATH: CPT

## 2019-04-11 RX ORDER — IPRATROPIUM/ALBUTEROL SULFATE 18-103MCG
3 AEROSOL WITH ADAPTER (GRAM) INHALATION EVERY 6 HOURS
Refills: 0 | Status: DISCONTINUED | OUTPATIENT
Start: 2019-04-11 | End: 2019-04-11

## 2019-04-11 RX ORDER — DEXMEDETOMIDINE HYDROCHLORIDE IN 0.9% SODIUM CHLORIDE 4 UG/ML
0.5 INJECTION INTRAVENOUS
Qty: 200 | Refills: 0 | Status: DISCONTINUED | OUTPATIENT
Start: 2019-04-11 | End: 2019-04-14

## 2019-04-11 RX ORDER — MEPERIDINE HYDROCHLORIDE 50 MG/ML
25 INJECTION INTRAMUSCULAR; INTRAVENOUS; SUBCUTANEOUS ONCE
Refills: 0 | Status: DISCONTINUED | OUTPATIENT
Start: 2019-04-11 | End: 2019-04-11

## 2019-04-11 RX ORDER — ESMOLOL HCL 100MG/10ML
50 VIAL (ML) INTRAVENOUS
Qty: 2500 | Refills: 0 | Status: DISCONTINUED | OUTPATIENT
Start: 2019-04-11 | End: 2019-04-11

## 2019-04-11 RX ORDER — FENTANYL CITRATE 50 UG/ML
50 INJECTION INTRAVENOUS ONCE
Refills: 0 | Status: DISCONTINUED | OUTPATIENT
Start: 2019-04-11 | End: 2019-04-12

## 2019-04-11 RX ORDER — EPINEPHRINE 0.3 MG/.3ML
1 INJECTION INTRAMUSCULAR; SUBCUTANEOUS ONCE
Refills: 0 | Status: COMPLETED | OUTPATIENT
Start: 2019-04-11 | End: 2019-04-11

## 2019-04-11 RX ORDER — DILTIAZEM HCL 120 MG
10 CAPSULE, EXT RELEASE 24 HR ORAL ONCE
Refills: 0 | Status: COMPLETED | OUTPATIENT
Start: 2019-04-11 | End: 2019-04-11

## 2019-04-11 RX ORDER — AMIODARONE HYDROCHLORIDE 400 MG/1
150 TABLET ORAL ONCE
Refills: 0 | Status: COMPLETED | OUTPATIENT
Start: 2019-04-11 | End: 2019-04-11

## 2019-04-11 RX ORDER — PANTOPRAZOLE SODIUM 20 MG/1
40 TABLET, DELAYED RELEASE ORAL DAILY
Refills: 0 | Status: DISCONTINUED | OUTPATIENT
Start: 2019-04-11 | End: 2019-04-14

## 2019-04-11 RX ORDER — ALBUTEROL 90 UG/1
4 AEROSOL, METERED ORAL EVERY 6 HOURS
Refills: 0 | Status: DISCONTINUED | OUTPATIENT
Start: 2019-04-11 | End: 2019-04-11

## 2019-04-11 RX ORDER — DEXTROSE 50 % IN WATER 50 %
50 SYRINGE (ML) INTRAVENOUS
Refills: 0 | Status: DISCONTINUED | OUTPATIENT
Start: 2019-04-11 | End: 2019-04-11

## 2019-04-11 RX ORDER — DEXTROSE 50 % IN WATER 50 %
25 SYRINGE (ML) INTRAVENOUS
Refills: 0 | Status: DISCONTINUED | OUTPATIENT
Start: 2019-04-11 | End: 2019-04-16

## 2019-04-11 RX ORDER — GENTAMICIN SULFATE 40 MG/ML
80 VIAL (ML) INJECTION ONCE
Refills: 0 | Status: DISCONTINUED | OUTPATIENT
Start: 2019-04-11 | End: 2019-04-11

## 2019-04-11 RX ORDER — CHLORHEXIDINE GLUCONATE 213 G/1000ML
5 SOLUTION TOPICAL EVERY 4 HOURS
Refills: 0 | Status: DISCONTINUED | OUTPATIENT
Start: 2019-04-11 | End: 2019-04-14

## 2019-04-11 RX ORDER — DEXTROSE 50 % IN WATER 50 %
50 SYRINGE (ML) INTRAVENOUS
Refills: 0 | Status: DISCONTINUED | OUTPATIENT
Start: 2019-04-11 | End: 2019-04-16

## 2019-04-11 RX ORDER — SODIUM CHLORIDE 9 MG/ML
1000 INJECTION, SOLUTION INTRAVENOUS ONCE
Refills: 0 | Status: COMPLETED | OUTPATIENT
Start: 2019-04-11 | End: 2019-04-11

## 2019-04-11 RX ORDER — CEFAZOLIN SODIUM 1 G
1000 VIAL (EA) INJECTION EVERY 8 HOURS
Refills: 0 | Status: DISCONTINUED | OUTPATIENT
Start: 2019-04-11 | End: 2019-04-11

## 2019-04-11 RX ORDER — FENTANYL CITRATE 50 UG/ML
50 INJECTION INTRAVENOUS
Refills: 0 | Status: DISCONTINUED | OUTPATIENT
Start: 2019-04-11 | End: 2019-04-11

## 2019-04-11 RX ORDER — SODIUM BICARBONATE 1 MEQ/ML
50 SYRINGE (ML) INTRAVENOUS ONCE
Refills: 0 | Status: COMPLETED | OUTPATIENT
Start: 2019-04-11 | End: 2019-04-11

## 2019-04-11 RX ORDER — FENTANYL CITRATE 50 UG/ML
50 INJECTION INTRAVENOUS ONCE
Refills: 0 | Status: DISCONTINUED | OUTPATIENT
Start: 2019-04-11 | End: 2019-04-11

## 2019-04-11 RX ORDER — PANTOPRAZOLE SODIUM 20 MG/1
40 TABLET, DELAYED RELEASE ORAL DAILY
Refills: 0 | Status: DISCONTINUED | OUTPATIENT
Start: 2019-04-11 | End: 2019-04-11

## 2019-04-11 RX ORDER — VASOPRESSIN 20 [USP'U]/ML
0.07 INJECTION INTRAVENOUS
Qty: 50 | Refills: 0 | Status: DISCONTINUED | OUTPATIENT
Start: 2019-04-11 | End: 2019-04-14

## 2019-04-11 RX ORDER — CISATRACURIUM BESYLATE 2 MG/ML
10 INJECTION INTRAVENOUS ONCE
Refills: 0 | Status: COMPLETED | OUTPATIENT
Start: 2019-04-11 | End: 2019-04-11

## 2019-04-11 RX ORDER — ALBUTEROL 90 UG/1
1 AEROSOL, METERED ORAL EVERY 4 HOURS
Refills: 0 | Status: DISCONTINUED | OUTPATIENT
Start: 2019-04-11 | End: 2019-04-11

## 2019-04-11 RX ORDER — SODIUM CHLORIDE 9 MG/ML
500 INJECTION, SOLUTION INTRAVENOUS ONCE
Refills: 0 | Status: COMPLETED | OUTPATIENT
Start: 2019-04-11 | End: 2019-04-11

## 2019-04-11 RX ORDER — FUROSEMIDE 40 MG
40 TABLET ORAL ONCE
Refills: 0 | Status: COMPLETED | OUTPATIENT
Start: 2019-04-11 | End: 2019-04-11

## 2019-04-11 RX ORDER — CHLORHEXIDINE GLUCONATE 213 G/1000ML
5 SOLUTION TOPICAL EVERY 4 HOURS
Refills: 0 | Status: DISCONTINUED | OUTPATIENT
Start: 2019-04-11 | End: 2019-04-11

## 2019-04-11 RX ORDER — METOPROLOL TARTRATE 50 MG
10 TABLET ORAL ONCE
Refills: 0 | Status: COMPLETED | OUTPATIENT
Start: 2019-04-11 | End: 2019-04-11

## 2019-04-11 RX ORDER — PANTOPRAZOLE SODIUM 20 MG/1
80 TABLET, DELAYED RELEASE ORAL ONCE
Refills: 0 | Status: COMPLETED | OUTPATIENT
Start: 2019-04-11 | End: 2019-04-11

## 2019-04-11 RX ORDER — FAMOTIDINE 10 MG/ML
20 INJECTION INTRAVENOUS EVERY 12 HOURS
Refills: 0 | Status: DISCONTINUED | OUTPATIENT
Start: 2019-04-11 | End: 2019-04-11

## 2019-04-11 RX ORDER — SODIUM CHLORIDE 9 MG/ML
1000 INJECTION INTRAMUSCULAR; INTRAVENOUS; SUBCUTANEOUS
Refills: 0 | Status: DISCONTINUED | OUTPATIENT
Start: 2019-04-11 | End: 2019-04-11

## 2019-04-11 RX ORDER — AMIODARONE HYDROCHLORIDE 400 MG/1
1 TABLET ORAL
Qty: 900 | Refills: 0 | Status: DISCONTINUED | OUTPATIENT
Start: 2019-04-11 | End: 2019-04-11

## 2019-04-11 RX ORDER — MEPERIDINE HYDROCHLORIDE 50 MG/ML
25 INJECTION INTRAMUSCULAR; INTRAVENOUS; SUBCUTANEOUS ONCE
Refills: 0 | Status: DISCONTINUED | OUTPATIENT
Start: 2019-04-11 | End: 2019-04-14

## 2019-04-11 RX ORDER — DEXTROSE 50 % IN WATER 50 %
50 SYRINGE (ML) INTRAVENOUS ONCE
Refills: 0 | Status: COMPLETED | OUTPATIENT
Start: 2019-04-11 | End: 2019-04-11

## 2019-04-11 RX ORDER — HEPARIN SODIUM 5000 [USP'U]/ML
1000 INJECTION INTRAVENOUS; SUBCUTANEOUS ONCE
Refills: 0 | Status: COMPLETED | OUTPATIENT
Start: 2019-04-11 | End: 2019-04-11

## 2019-04-11 RX ORDER — INSULIN HUMAN 100 [IU]/ML
1 INJECTION, SOLUTION SUBCUTANEOUS
Qty: 100 | Refills: 0 | Status: DISCONTINUED | OUTPATIENT
Start: 2019-04-11 | End: 2019-04-14

## 2019-04-11 RX ORDER — PANTOPRAZOLE SODIUM 20 MG/1
40 TABLET, DELAYED RELEASE ORAL
Refills: 0 | Status: DISCONTINUED | OUTPATIENT
Start: 2019-04-12 | End: 2019-04-11

## 2019-04-11 RX ORDER — CALCIUM GLUCONATE 100 MG/ML
0.5 VIAL (ML) INTRAVENOUS ONCE
Refills: 0 | Status: COMPLETED | OUTPATIENT
Start: 2019-04-11 | End: 2019-04-11

## 2019-04-11 RX ORDER — CALCIUM GLUCONATE 100 MG/ML
1 VIAL (ML) INTRAVENOUS ONCE
Refills: 0 | Status: COMPLETED | OUTPATIENT
Start: 2019-04-11 | End: 2019-04-11

## 2019-04-11 RX ORDER — DEXTROSE 50 % IN WATER 50 %
25 SYRINGE (ML) INTRAVENOUS
Refills: 0 | Status: DISCONTINUED | OUTPATIENT
Start: 2019-04-11 | End: 2019-04-11

## 2019-04-11 RX ORDER — INSULIN HUMAN 100 [IU]/ML
10 INJECTION, SOLUTION SUBCUTANEOUS ONCE
Refills: 0 | Status: COMPLETED | OUTPATIENT
Start: 2019-04-11 | End: 2019-04-11

## 2019-04-11 RX ORDER — PANTOPRAZOLE SODIUM 20 MG/1
40 TABLET, DELAYED RELEASE ORAL
Refills: 0 | Status: DISCONTINUED | OUTPATIENT
Start: 2019-04-11 | End: 2019-04-11

## 2019-04-11 RX ORDER — INSULIN HUMAN 100 [IU]/ML
1 INJECTION, SOLUTION SUBCUTANEOUS
Qty: 100 | Refills: 0 | Status: DISCONTINUED | OUTPATIENT
Start: 2019-04-11 | End: 2019-04-11

## 2019-04-11 RX ORDER — PHENYLEPHRINE HYDROCHLORIDE 10 MG/ML
0.49 INJECTION INTRAVENOUS
Qty: 40 | Refills: 0 | Status: DISCONTINUED | OUTPATIENT
Start: 2019-04-11 | End: 2019-04-14

## 2019-04-11 RX ORDER — FENTANYL CITRATE 50 UG/ML
0.35 INJECTION INTRAVENOUS
Qty: 2500 | Refills: 0 | Status: DISCONTINUED | OUTPATIENT
Start: 2019-04-11 | End: 2019-04-11

## 2019-04-11 RX ORDER — CEFAZOLIN SODIUM 1 G
2000 VIAL (EA) INJECTION EVERY 8 HOURS
Refills: 0 | Status: COMPLETED | OUTPATIENT
Start: 2019-04-11 | End: 2019-04-11

## 2019-04-11 RX ORDER — PHENYLEPHRINE HYDROCHLORIDE 10 MG/ML
0.5 INJECTION INTRAVENOUS
Qty: 160 | Refills: 0 | Status: DISCONTINUED | OUTPATIENT
Start: 2019-04-11 | End: 2019-04-11

## 2019-04-11 RX ORDER — NOREPINEPHRINE BITARTRATE/D5W 8 MG/250ML
0.23 PLASTIC BAG, INJECTION (ML) INTRAVENOUS
Qty: 8 | Refills: 0 | Status: DISCONTINUED | OUTPATIENT
Start: 2019-04-11 | End: 2019-04-14

## 2019-04-11 RX ORDER — MAGNESIUM SULFATE 500 MG/ML
2 VIAL (ML) INJECTION ONCE
Refills: 0 | Status: COMPLETED | OUTPATIENT
Start: 2019-04-11 | End: 2019-04-11

## 2019-04-11 RX ORDER — SODIUM CHLORIDE 9 MG/ML
1000 INJECTION INTRAMUSCULAR; INTRAVENOUS; SUBCUTANEOUS
Refills: 0 | Status: DISCONTINUED | OUTPATIENT
Start: 2019-04-11 | End: 2019-04-16

## 2019-04-11 RX ORDER — ALBUTEROL 90 UG/1
1 AEROSOL, METERED ORAL EVERY 4 HOURS
Refills: 0 | Status: COMPLETED | OUTPATIENT
Start: 2019-04-11 | End: 2020-03-09

## 2019-04-11 RX ORDER — AMPICILLIN TRIHYDRATE 250 MG
2 CAPSULE ORAL EVERY 8 HOURS
Refills: 0 | Status: DISCONTINUED | OUTPATIENT
Start: 2019-04-11 | End: 2019-04-11

## 2019-04-11 RX ORDER — AMIODARONE HYDROCHLORIDE 400 MG/1
0.5 TABLET ORAL
Qty: 900 | Refills: 0 | Status: DISCONTINUED | OUTPATIENT
Start: 2019-04-11 | End: 2019-04-11

## 2019-04-11 RX ORDER — PANTOPRAZOLE SODIUM 20 MG/1
TABLET, DELAYED RELEASE ORAL
Refills: 0 | Status: DISCONTINUED | OUTPATIENT
Start: 2019-04-11 | End: 2019-04-11

## 2019-04-11 RX ORDER — MIDAZOLAM HYDROCHLORIDE 1 MG/ML
2 INJECTION, SOLUTION INTRAMUSCULAR; INTRAVENOUS ONCE
Refills: 0 | Status: DISCONTINUED | OUTPATIENT
Start: 2019-04-11 | End: 2019-04-11

## 2019-04-11 RX ORDER — DEXMEDETOMIDINE HYDROCHLORIDE IN 0.9% SODIUM CHLORIDE 4 UG/ML
0.5 INJECTION INTRAVENOUS
Qty: 200 | Refills: 0 | Status: DISCONTINUED | OUTPATIENT
Start: 2019-04-11 | End: 2019-04-11

## 2019-04-11 RX ORDER — IPRATROPIUM BROMIDE 0.2 MG/ML
4 SOLUTION, NON-ORAL INHALATION EVERY 6 HOURS
Refills: 0 | Status: DISCONTINUED | OUTPATIENT
Start: 2019-04-11 | End: 2019-04-11

## 2019-04-11 RX ORDER — HEPARIN SODIUM 5000 [USP'U]/ML
1000 INJECTION INTRAVENOUS; SUBCUTANEOUS
Qty: 25000 | Refills: 0 | Status: DISCONTINUED | OUTPATIENT
Start: 2019-04-11 | End: 2019-04-11

## 2019-04-11 RX ORDER — PROPOFOL 10 MG/ML
20 INJECTION, EMULSION INTRAVENOUS
Qty: 1000 | Refills: 0 | Status: DISCONTINUED | OUTPATIENT
Start: 2019-04-11 | End: 2019-04-11

## 2019-04-11 RX ORDER — NOREPINEPHRINE BITARTRATE/D5W 8 MG/250ML
0.05 PLASTIC BAG, INJECTION (ML) INTRAVENOUS
Qty: 16 | Refills: 0 | Status: DISCONTINUED | OUTPATIENT
Start: 2019-04-11 | End: 2019-04-11

## 2019-04-11 RX ORDER — SODIUM BICARBONATE 1 MEQ/ML
50 SYRINGE (ML) INTRAVENOUS
Refills: 0 | Status: COMPLETED | OUTPATIENT
Start: 2019-04-11 | End: 2019-04-11

## 2019-04-11 RX ORDER — PROPOFOL 10 MG/ML
25 INJECTION, EMULSION INTRAVENOUS
Qty: 1000 | Refills: 0 | Status: DISCONTINUED | OUTPATIENT
Start: 2019-04-11 | End: 2019-04-11

## 2019-04-11 RX ORDER — MILRINONE LACTATE 1 MG/ML
0.12 INJECTION, SOLUTION INTRAVENOUS
Qty: 20 | Refills: 0 | Status: DISCONTINUED | OUTPATIENT
Start: 2019-04-11 | End: 2019-04-11

## 2019-04-11 RX ADMIN — Medication 200 GRAM(S): at 10:00

## 2019-04-11 RX ADMIN — CISATRACURIUM BESYLATE 10 MILLIGRAM(S): 2 INJECTION INTRAVENOUS at 21:15

## 2019-04-11 RX ADMIN — HEPARIN SODIUM 10 UNIT(S)/HR: 5000 INJECTION INTRAVENOUS; SUBCUTANEOUS at 03:00

## 2019-04-11 RX ADMIN — PANTOPRAZOLE SODIUM 80 MILLIGRAM(S): 20 TABLET, DELAYED RELEASE ORAL at 20:44

## 2019-04-11 RX ADMIN — EPINEPHRINE 1 MILLIGRAM(S): 0.3 INJECTION INTRAMUSCULAR; SUBCUTANEOUS at 03:38

## 2019-04-11 RX ADMIN — Medication 49.16 MICROGRAM(S)/KG/MIN: at 23:30

## 2019-04-11 RX ADMIN — Medication 50 MILLILITER(S): at 06:50

## 2019-04-11 RX ADMIN — Medication 40 MILLIGRAM(S): at 07:50

## 2019-04-11 RX ADMIN — Medication 50 MILLIEQUIVALENT(S): at 09:30

## 2019-04-11 RX ADMIN — SODIUM CHLORIDE 3000 MILLILITER(S): 9 INJECTION, SOLUTION INTRAVENOUS at 03:45

## 2019-04-11 RX ADMIN — Medication 100 MILLIGRAM(S): at 05:54

## 2019-04-11 RX ADMIN — CISATRACURIUM BESYLATE 10 MILLIGRAM(S): 2 INJECTION INTRAVENOUS at 03:00

## 2019-04-11 RX ADMIN — Medication 200 GRAM(S): at 04:30

## 2019-04-11 RX ADMIN — Medication 10 MILLIGRAM(S): at 03:00

## 2019-04-11 RX ADMIN — Medication 200 GRAM(S): at 06:54

## 2019-04-11 RX ADMIN — Medication 600 GRAM(S): at 20:44

## 2019-04-11 RX ADMIN — DEXMEDETOMIDINE HYDROCHLORIDE IN 0.9% SODIUM CHLORIDE 10 MICROGRAM(S)/KG/HR: 4 INJECTION INTRAVENOUS at 10:00

## 2019-04-11 RX ADMIN — PANTOPRAZOLE SODIUM 40 MILLIGRAM(S): 20 TABLET, DELAYED RELEASE ORAL at 12:40

## 2019-04-11 RX ADMIN — AMIODARONE HYDROCHLORIDE 618 MILLIGRAM(S): 400 TABLET ORAL at 00:30

## 2019-04-11 RX ADMIN — PROPOFOL 17.07 MICROGRAM(S)/KG/MIN: 10 INJECTION, EMULSION INTRAVENOUS at 19:28

## 2019-04-11 RX ADMIN — Medication 216 GRAM(S): at 13:26

## 2019-04-11 RX ADMIN — FENTANYL CITRATE 50 MICROGRAM(S): 50 INJECTION INTRAVENOUS at 01:30

## 2019-04-11 RX ADMIN — Medication 10 MILLIGRAM(S): at 03:10

## 2019-04-11 RX ADMIN — SODIUM CHLORIDE 2000 MILLILITER(S): 9 INJECTION, SOLUTION INTRAVENOUS at 03:00

## 2019-04-11 RX ADMIN — Medication 10 MILLIGRAM(S): at 09:35

## 2019-04-11 RX ADMIN — Medication 40 MILLIGRAM(S): at 11:00

## 2019-04-11 RX ADMIN — Medication 5.33 MICROGRAM(S)/KG/MIN: at 02:00

## 2019-04-11 RX ADMIN — INSULIN HUMAN 10 UNIT(S): 100 INJECTION, SOLUTION SUBCUTANEOUS at 06:50

## 2019-04-11 RX ADMIN — AMIODARONE HYDROCHLORIDE 33.33 MG/MIN: 400 TABLET ORAL at 01:00

## 2019-04-11 RX ADMIN — MIDAZOLAM HYDROCHLORIDE 2 MILLIGRAM(S): 1 INJECTION, SOLUTION INTRAMUSCULAR; INTRAVENOUS at 01:30

## 2019-04-11 RX ADMIN — SODIUM CHLORIDE 20 MILLILITER(S): 9 INJECTION INTRAMUSCULAR; INTRAVENOUS; SUBCUTANEOUS at 19:29

## 2019-04-11 RX ADMIN — HEPARIN SODIUM 1000 UNIT(S): 5000 INJECTION INTRAVENOUS; SUBCUTANEOUS at 20:44

## 2019-04-11 RX ADMIN — Medication 50 MILLIEQUIVALENT(S): at 07:50

## 2019-04-11 RX ADMIN — PHENYLEPHRINE HYDROCHLORIDE 10.67 MICROGRAM(S)/KG/MIN: 10 INJECTION INTRAVENOUS at 03:10

## 2019-04-11 RX ADMIN — Medication 40 MILLIGRAM(S): at 08:20

## 2019-04-11 RX ADMIN — Medication 100 MILLIGRAM(S): at 13:25

## 2019-04-11 RX ADMIN — Medication 100 MILLIGRAM(S): at 04:54

## 2019-04-11 RX ADMIN — Medication 50 GRAM(S): at 20:29

## 2019-04-11 RX ADMIN — Medication 5 MILLIGRAM(S): at 01:44

## 2019-04-11 RX ADMIN — SODIUM CHLORIDE 1000 MILLILITER(S): 9 INJECTION, SOLUTION INTRAVENOUS at 04:30

## 2019-04-11 RX ADMIN — Medication 10 MILLIGRAM(S): at 09:30

## 2019-04-11 RX ADMIN — Medication 50 MILLIEQUIVALENT(S): at 08:54

## 2019-04-11 RX ADMIN — DEXMEDETOMIDINE HYDROCHLORIDE IN 0.9% SODIUM CHLORIDE 14.22 MICROGRAM(S)/KG/HR: 4 INJECTION INTRAVENOUS at 19:27

## 2019-04-11 NOTE — BRIEF OPERATIVE NOTE - NSICDXBRIEFPROCEDURE_GEN_ALL_CORE_FT
PROCEDURES:  Percutaneous insertion of cannula for extracorporeal membrane oxygenation (ECMO) in patient 6 years of age or older 11-Apr-2019 18:20:35  Arnulfo Frazier

## 2019-04-11 NOTE — CONSULT NOTE ADULT - SUBJECTIVE AND OBJECTIVE BOX
NEPHROLOGY CONSULTATION NOTE    Patient is a 33yFemale with PMH below presented to the hospital for shortness of breath for the last two days with her usual activity. She felt that her heart was racing. In the Ed prior to our arrival, patient had received 6,12,12 mg pushes of adenosine without relief and was given another 12 mg push of adenosine with EP present at the bedside. She was also given 1 mg IVP of propanolol while the patient was on tocometer x2 without relief in rhythm. The decision was made to attempt DCCV of her tachyarrythmia which subsequently failed x3 (monophasic 200J, biphasic 360J x2). She later became hypoxic and the patient was intubated and brought to the OB-OR for emergent . Cardiology was present in the room for intubation and in the OB-OR in the event of need for emergent management. (4/10/19)    Now pt is intubated, on vent, on 2 pressors, s/p emergent .    PAST MEDICAL & SURGICAL HISTORY:  Asthma: as child, no inhaler use&gt;10 years. Denies hospitlizations or intubations. No current inhaler.  History of low transverse  section    Allergies:  Allergy Status Unknown    Home Medications Reviewed  Hospital Medications:   MEDICATIONS  (STANDING):  amiodarone Infusion 0.5 mG/Min (16.667 mL/Hr) IV Continuous <Continuous>  dexmedetomidine Infusion 0.5 MICROgram(s)/kG/Hr (10 mL/Hr) IV Continuous <Continuous>  fentaNYL   Infusion 0.351 MICROgram(s)/kG/Hr (4 mL/Hr) IV Continuous <Continuous>  heparin  Infusion 1000 Unit(s)/Hr (10 mL/Hr) IV Continuous <Continuous>  milrinone Infusion 0.125 MICROgram(s)/kG/Min (4.268 mL/Hr) IV Continuous <Continuous>  norepinephrine Infusion 0.05 MICROgram(s)/kG/Min (5.334 mL/Hr) IV Continuous <Continuous>  pantoprazole  Injectable 40 milliGRAM(s) IV Push daily  phenylephrine    Infusion 0.5 MICROgram(s)/kG/Min (10.669 mL/Hr) IV Continuous <Continuous>  sodium bicarbonate  Injectable 50 milliEquivalent(s) IV Push every 5 minutes  sodium chloride 0.9%. 1000 milliLiter(s) (125 mL/Hr) IV Continuous <Continuous>      SOCIAL HISTORY:  Denies ETOH,Smoking,   FAMILY HISTORY:  No pertinent family history in first degree relatives        REVIEW OF SYSTEMS:    All other review of systems is negative unless indicated above.    VITALS:  T(F): 97.6 (19 @ 04:00), Max: 97.8 (19 @ 00:00)  HR: 106 (19 @ 08:15)  BP: 108/72 (19 @ 08:15)  RR: 28 (19 08:15)  SpO2: 99% (19 08:15)    04-10 @ 07:  -   07:00  --------------------------------------------------------  IN: 5845.2 mL / OUT: 1220 mL / NET: 4625.2 mL    :  -   08:31  --------------------------------------------------------  IN: 166.3 mL / OUT: 0 mL / NET: 166.3 mL      Height (cm): 165.1 (04-10 @ 20:00)  Weight (kg): 113.8 (04-10 @ 20:00)  BMI (kg/m2): 41.7 (04-10 @ 20:00)  BSA (m2): 2.18 (04-10 @ 20:00)    04-10-19 @ 07:01  -  19 07:00  --------------------------------------------------------  IN: 0 mL / OUT: 1220 mL / NET: -1220 mL    19 @ 07:  -  19 08:31  --------------------------------------------------------  IN: 0 mL / OUT: 0 mL / NET: 0 mL      I&O's Detail    10 Apr 2019 07:  -  2019 07:00  --------------------------------------------------------  IN:    amiodarone Infusion: 216.7 mL    dexmedetomidine Infusion: 511.2 mL    fentaNYL  Infusion: 5.7 mL    fentaNYL  Infusion: 39.8 mL    heparin Infusion: 50 mL    IV PiggyBack: 500 mL    Lactated Ringers IV Bolus: 2500 mL    lactated ringers.: 1250 mL    norepinephrine Infusion: 644 mL    phenylephrine   Infusion: 127.8 mL  Total IN: 5845.2 mL    OUT:    Indwelling Catheter - Urethral: 1220 mL  Total OUT: 1220 mL    Total NET: 4625.2 mL      2019 07:  -  2019 08:31  --------------------------------------------------------  IN:    amiodarone Infusion: 16.7 mL    dexmedetomidine Infusion: 42.6 mL    fentaNYL  Infusion: 5.7 mL    norepinephrine Infusion: 80 mL    phenylephrine   Infusion: 21.3 mL  Total IN: 166.3 mL    OUT:  Total OUT: 0 mL    Total NET: 166.3 mL        Creatine Kinase, Serum: 254 U/L (19 @ 02:00)  Creatine Kinase, Serum: 148 U/L (04-10-19 @ 20:55)      PHYSICAL EXAM:  Constitutional: Intubated on vent  Respiratory: diffuse b/l rhonchi  Cardiovascular: S1, S2, RRR, tachycardia  Gastrointestinal: BS+, soft, NT/ND  Extremities: 1+ peripheral edema  : + rodney.     Vascular Access:    LABS:      136  |  106  |  17  ----------------------------<  70  8.4<HH>   |  13<L>  |  1.2    Ca    7.6<L>      2019 06:07  Phos  7.0       Mg     1.6         TPro  5.1<L>  /  Alb  3.1<L>  /  TBili  0.3  /  DBili      /  AST  47<H>  /  ALT  39  /  AlkPhos  124<H>  04-10    Blood Gas Arterial, Lactate: 4.7: 450/20/100/+5. dr chiu notified all results and read back in person at  bedside at 0740 mmoL/L (19 @ 07:41)      Blood Gas Profile - Arterial (19 @ 07:41)    pH, Arterial: 7.19: 450/20/100/+5. dr chiu notified all results and read back in person at  bedside at 0740    pCO2, Arterial: 36: 450/20/100/+5. dr chiu notified all results and read back in person at  bedside at 0740 mmHg    pO2, Arterial: 158: 450/20/100/+5. dr chiu notified all results and read back in person at  bedside at 0740 mmHg    HCO3, Arterial: 14: 450/20/100/+5. dr chiu notified all results and read back in person at  bedside at 0740 mmoL/L    Base Excess, Arterial: -13.3: 450/20/100/+5. dr chiu notified all results and read back in person at  bedside at 0740 mmoL/L    Oxygen Saturation, Arterial: 98: 450/20/100/+5. dr chiu notified all results and read back in person at  bedside at 0740 %      Creatinine Trend: 1.2 <--, 0.8 <--, 0.7 <--, 0.5 <--, 0.6 <--                        9.7    26.37 )-----------( 266      ( 2019 02:00 )             32.9       Urine Studies:    Sodium, Random Urine: 99.0 mmoL/L ( @ 06:07)  Creatinine, Random Urine: 49 mg/dL ( @ 06:07)            RADIOLOGY & ADDITIONAL STUDIES:    < from: [facility] Duplex Lower Ext Vein Scan, Bilat (04.10.19 @ 21:10) >  Impression:    No evidence of deep venous thrombosis or superficial thrombophlebitis in   the bilateral lower extremities.    < end of copied text > NEPHROLOGY CONSULTATION NOTE    Pt is intubated, on vent. Hx taken from chart.  Patient is a 33yFemale with PMH below presented to the hospital for shortness of breath for the last two days with her usual activity. She felt that her heart was racing. In the Ed prior to our arrival, patient had received 6,12,12 mg pushes of adenosine without relief and was given another 12 mg push of adenosine with EP present at the bedside. She was also given 1 mg IVP of propanolol while the patient was on tocometer x2 without relief in rhythm. The decision was made to attempt DCCV of her tachyarrythmia which subsequently failed x3 (monophasic 200J, biphasic 360J x2). She later became hypoxic and the patient was intubated and brought to the OB-OR for emergent . Cardiology was present in the room for intubation and in the OB-OR in the event of need for emergent management. (4/10/19)    Now pt is intubated, on vent, on 2 pressors, s/p emergent .    PAST MEDICAL & SURGICAL HISTORY:  Asthma: as child, no inhaler use&gt;10 years. Denies hospitlizations or intubations. No current inhaler.  History of low transverse  section    Allergies:  Allergy Status Unknown    Home Medications Reviewed  Hospital Medications:   MEDICATIONS  (STANDING):  amiodarone Infusion 0.5 mG/Min (16.667 mL/Hr) IV Continuous <Continuous>  dexmedetomidine Infusion 0.5 MICROgram(s)/kG/Hr (10 mL/Hr) IV Continuous <Continuous>  fentaNYL   Infusion 0.351 MICROgram(s)/kG/Hr (4 mL/Hr) IV Continuous <Continuous>  heparin  Infusion 1000 Unit(s)/Hr (10 mL/Hr) IV Continuous <Continuous>  milrinone Infusion 0.125 MICROgram(s)/kG/Min (4.268 mL/Hr) IV Continuous <Continuous>  norepinephrine Infusion 0.05 MICROgram(s)/kG/Min (5.334 mL/Hr) IV Continuous <Continuous>  pantoprazole  Injectable 40 milliGRAM(s) IV Push daily  phenylephrine    Infusion 0.5 MICROgram(s)/kG/Min (10.669 mL/Hr) IV Continuous <Continuous>  sodium bicarbonate  Injectable 50 milliEquivalent(s) IV Push every 5 minutes  sodium chloride 0.9%. 1000 milliLiter(s) (125 mL/Hr) IV Continuous <Continuous>      SOCIAL HISTORY:  Denies ETOH,Smoking,   FAMILY HISTORY:  No pertinent family history in first degree relatives        REVIEW OF SYSTEMS:    All other review of systems is negative unless indicated above.    VITALS:  T(F): 97.6 (19 @ 04:00), Max: 97.8 (19 @ 00:00)  HR: 106 (19 08:15)  BP: 108/72 (19 08:15)  RR: 28 (19 08:15)  SpO2: 99% (19 08:15)    04-10 @ :  -   07:00  --------------------------------------------------------  IN: 5845.2 mL / OUT: 1220 mL / NET: 4625.2 mL    :  -   08:31  --------------------------------------------------------  IN: 166.3 mL / OUT: 0 mL / NET: 166.3 mL      Height (cm): 165.1 (04-10 @ 20:00)  Weight (kg): 113.8 (04-10 @ 20:00)  BMI (kg/m2): 41.7 (04-10 @ 20:00)  BSA (m2): 2.18 (04-10 @ 20:00)    04-10-19 @ 07:01  -  19 07:00  --------------------------------------------------------  IN: 0 mL / OUT: 1220 mL / NET: -1220 mL    19 @ 07:19 @ 08:31  --------------------------------------------------------  IN: 0 mL / OUT: 0 mL / NET: 0 mL      I&O's Detail    10 Apr 2019 07: 07:00  --------------------------------------------------------  IN:    amiodarone Infusion: 216.7 mL    dexmedetomidine Infusion: 511.2 mL    fentaNYL  Infusion: 5.7 mL    fentaNYL  Infusion: 39.8 mL    heparin Infusion: 50 mL    IV PiggyBack: 500 mL    Lactated Ringers IV Bolus: 2500 mL    lactated ringers.: 1250 mL    norepinephrine Infusion: 644 mL    phenylephrine   Infusion: 127.8 mL  Total IN: 5845.2 mL    OUT:    Indwelling Catheter - Urethral: 1220 mL  Total OUT: 1220 mL    Total NET: 4625.2 mL      2019 07:  -  2019 08:31  --------------------------------------------------------  IN:    amiodarone Infusion: 16.7 mL    dexmedetomidine Infusion: 42.6 mL    fentaNYL  Infusion: 5.7 mL    norepinephrine Infusion: 80 mL    phenylephrine   Infusion: 21.3 mL  Total IN: 166.3 mL    OUT:  Total OUT: 0 mL    Total NET: 166.3 mL        Creatine Kinase, Serum: 254 U/L (19 @ 02:00)  Creatine Kinase, Serum: 148 U/L (04-10-19 @ 20:55)      PHYSICAL EXAM:  Constitutional: Intubated on vent, Awake and alert.  Respiratory: diffuse b/l rhonchi  Cardiovascular: S1, S2, RRR, tachycardia  Gastrointestinal: BS+, soft, NT/ND  Extremities: 1+ peripheral edema  : + rodney.     Vascular Access:    LABS:      136  |  106  |  17  ----------------------------<  70  8.4<HH>   |  13<L>  |  1.2    Ca    7.6<L>      2019 06:07  Phos  7.0       Mg     1.6         TPro  5.1<L>  /  Alb  3.1<L>  /  TBili  0.3  /  DBili      /  AST  47<H>  /  ALT  39  /  AlkPhos  124<H>  04-10    Creatinine Trend: 1.2 <--, 0.8 <--, 0.7 <--, 0.5 <--, 0.6 <--                        9.7    26.37 )-----------( 266      ( 2019 02:00 )             32.9     Blood Gas Arterial, Lactate: 4.7: 450/20/100/+5. dr chiu notified all results and read back in person at  bedside at 0740 mmoL/L (19 @ 07:41)      Blood Gas Profile - Arterial (19 @ 07:41)    pH, Arterial: 7.19: 450/20/100/+5. dr chiu notified all results and read back in person at  bedside at 0740    pCO2, Arterial: 36: 450/20/100/+5. dr chiu notified all results and read back in person at  bedside at 0740 mmHg    pO2, Arterial: 158: 450/20/100/+5. dr chiu notified all results and read back in person at  bedside at 0740 mmHg    HCO3, Arterial: 14: 450/20/100/+5. dr chiu notified all results and read back in person at  bedside at 0740 mmoL/L    Base Excess, Arterial: -13.3: 450/20/100/+5. dr chiu notified all results and read back in person at  bedside at 0740 mmoL/L    Oxygen Saturation, Arterial: 98: 450/20/100/+5. dr chiu notified all results and read back in person at  bedside at 0740 %      Urine Studies:    Sodium, Random Urine: 99.0 mmoL/L ( @ 06:07)  Creatinine, Random Urine: 49 mg/dL ( @ 06:07)            RADIOLOGY & ADDITIONAL STUDIES:    < from: [facility] Duplex Lower Ext Vein Scan, Jovon (04.10.19 @ 21:10) >  Impression:    No evidence of deep venous thrombosis or superficial thrombophlebitis in   the bilateral lower extremities.    < end of copied text >

## 2019-04-11 NOTE — PATIENT PROFILE ADULT - ARE SIGNIFICANT INDICATORS COMPLETE.
17f no pmh here c/o 1.5 years dysuria and frequency. Also concerned that "something is coming out" from vaginal area for same time frame. Denies hematuria, fever, difficulty urinating. Has watery vaginal discharge at times. LMP 12/20, gets monthly. Denies intermenstrual bleeding. Never sexually active, denies insertion of any  foreign bodies. Has not sought medical attn for this issue at any point. Has not taken pain meds, last night took a [place] herbal supplement from her mother. No abd pain, n/v. No surg hx.   HEADSS-Feels safe at home; attends school regularly; enjoys going to c amp w friends; no drug use; never sexually active; no Si/HI or depression, though states ~1yr ago at onset of puberty cut herself, no thoughts since then. No

## 2019-04-11 NOTE — H&P ADULT - PROBLEM SELECTOR PLAN 2
--Renal consulted given hyperkalemia and requiring CVVHD at OSH, plan to start patient on CVVHD with 0 to 50ml fluid removal as BP tolerates.  --renally dose medications  --monitor electrolytes

## 2019-04-11 NOTE — CONSULT NOTE ADULT - ASSESSMENT
Patient with 34 week pregnancy, presented with SOB  and racing of heart. Found to have Patient with 34 week pregnancy, presented with SOB  and racing of heart. Found to have unstable SVT in ED. s/p emergent  and failed cardioversion. Now developed YAJAIRA and severe hyperkalemia  PMH Asthma    # YAJAIRA / severe hyperkalemia / Acidosis   - likely ATN due to hypoperfusion   - oliguric   - s/p Lasix 80 mg today   - severe hyperK noted. s/p IV calcium gluconate and Insulin plus dextrose   - on 2 pressors   - will start RRT immediately, will try CVVHD and repeat ABG with lytes in 2 hours, if no improvement with CVVHD will switch to HD   - ABG noted for high AG metabolic acidosis plus respiratory acidosis.   - Repeat ABG with lytes in 2 hours   - UDall is placed, consent taken for RRT. CVVHD orders placed.   - Strict I/O   - check UA. urine creatinine, urea   - BP noted, on pressors. kepp MAP > 65   - Tachycardia noted, followed by EP   - will follow Patient with 34 week pregnancy, presented with SOB  and racing of heart. Found to have unstable SVT in ED. s/p emergent  and failed cardioversion. Now developed YAJAIRA and severe hyperkalemia  PMH Asthma    # YAJAIRA / severe hyperkalemia / Acidosis   - likely ATN due to hypoperfusion   - oliguric   - s/p Lasix 80 mg today   - severe hyperK noted. s/p IV calcium gluconate and Insulin plus dextrose   - on 2 pressors   - will Hold on RRT for now / sp lasix non oliguric and K trending down    - ABG noted for high AG metabolic acidosis plus respiratory acidosis.   - Repeat ABG with lytes in 2 hours   - UDall is placed, consent taken for RRT. CVVHD orders placed.   - Strict I/O   - check UA. urine creatinine, urea   - BP noted, on pressors. kepp MAP > 65   - Tachycardia noted, followed by EP   - will follow closely around the clock and decide on HD as needed

## 2019-04-11 NOTE — H&P ADULT - NSICDXPASTMEDICALHX_GEN_ALL_CORE_FT
PAST MEDICAL HISTORY:  Asthma as child, no inhaler use>10 years. Denies hospitlizations or intubations. No current inhaler.

## 2019-04-11 NOTE — H&P ADULT - PROBLEM SELECTOR PLAN 3
--post-partum nurses to help with post  checks  --continue antibiotics ampicillin and clindamycin, will clarify with GYN for abx post

## 2019-04-11 NOTE — PROCEDURE NOTE - NSPOSTCAREGUIDE_GEN_A_CORE
Verbal/written post procedure instructions were given to patient/caregiver/Instructed patient/caregiver to follow-up with primary care physician/Instructed patient/caregiver regarding signs and symptoms of infection/Keep the cast/splint/dressing clean and dry/Care for catheter as per unit/ICU protocols

## 2019-04-11 NOTE — CONSULT NOTE ADULT - SUBJECTIVE AND OBJECTIVE BOX
HPI:  34yo , at 34w5d dated by first trimester sonogram, who presented to ED secondary to SOB for the past 2 days. She reports the SOB was associated with nausea and vomiting, about 5x per day. She reports mild palpitations but denies chest pain. She denies Ctx, LOF, vaginal bleeding and reports +FM. Pregnancy complicated by hyperemesis gravidarum, treated with Zofran and Reglan until about 1 month ago. Denies other complications this pregnancy. Denies RUQ pain/ Epigastric pain, LE pain/ swelling, diarrhea, pain/difficulty with urination, fevers, chills. RH negative, s/p Rhogam this pregnancy. In the ED, palpable contractions felt and visualized on the monitor. Patient currently denies contractions. For repeat  section.   In the ED, she was found to be in SVT with a HR of 248, resistant to multiple doses of adenosine (6,12,12,12) and propanolol. Patient sedated with propofolx6 and Etomatadex1, and electrical cardioversion attempted x3 without success. She continued to have an elevated heart rate, was persistently hypotensive and her mental status started to decline. The decision was made to proceed with intubation and transfer her to +D with ED, cardiology, OB anesthesia and ObGyn team at bedside.     Denies the following: constitutional symptoms, visual symptoms, cardiovascular symptoms, respiratory symptoms, GI symptoms, musculoskeletal symptoms, skin symptoms, neurologic symptoms, hematologic symptoms, allergic symptoms, psychiatric symptoms  Except any pertinent positives listed.     cardiology note:  pt is 33 yrs old at 34w5d , presented for tachycardia/palpitation  found to be tachycardic , ECG showed Atrial tachycardia vs atypical flutter with 1 to 1 conduction , failed adenosine and propranolol and DC cardioversion , pt was intubated and was taken emergently to OR for delivery. arrhythmia broke after delivery. pt was admitted to SICU for monitoring. remains intubated . over night pt was hypotensive , started on pressors , anuric , s/p IV lasix , hyperkalemic ,  with decrease Po2 , FIO2 and peep increased.      PAST MEDICAL & SURGICAL HISTORY  Asthma: as child, no inhaler use&gt;10 years. Denies hospitlizations or intubations. No current inhaler.  History of low transverse  section      FAMILY HISTORY:  FAMILY HISTORY:  No pertinent family history in first degree relatives      SOCIAL HISTORY:  []smoker  []Alcohol  []Drug    ALLERGIES:  Allergy Status Unknown      MEDICATIONS:  MEDICATIONS  (STANDING):  ALBUTerol/ipratropium for Nebulization 3 milliLiter(s) Nebulizer every 6 hours  ampicillin  IVPB 2 Gram(s) IV Intermittent every 8 hours  calcium gluconate IVPB 1 Gram(s) IV Intermittent once  clindamycin IVPB 600 milliGRAM(s) IV Intermittent every 8 hours  CRRT Treatment    <Continuous>  dexmedetomidine Infusion 0.5 MICROgram(s)/kG/Hr (10 mL/Hr) IV Continuous <Continuous>  esMOLOL  Infusion 50 MICROgram(s)/kG/Min (34.14 mL/Hr) IV Continuous <Continuous>  furosemide   Injectable 40 milliGRAM(s) IV Push once  heparin  Infusion 1000 Unit(s)/Hr (10 mL/Hr) IV Continuous <Continuous>  metoprolol tartrate Injectable 10 milliGRAM(s) IV Push once  metoprolol tartrate Injectable 10 milliGRAM(s) IV Push once  milrinone Infusion 0.125 MICROgram(s)/kG/Min (4.268 mL/Hr) IV Continuous <Continuous>  norepinephrine Infusion 0.05 MICROgram(s)/kG/Min (5.334 mL/Hr) IV Continuous <Continuous>  pantoprazole  Injectable 40 milliGRAM(s) IV Push daily  phenylephrine    Infusion 0.5 MICROgram(s)/kG/Min (10.669 mL/Hr) IV Continuous <Continuous>  propofol Infusion 20 MICROgram(s)/kG/Min (13.656 mL/Hr) IV Continuous <Continuous>  PureFlow Dialysate RFP-400 (K 2 / Ca 3) 5000 milliLiter(s) (2000 mL/Hr) CRRT <Continuous>  sodium bicarbonate  Injectable 50 milliEquivalent(s) IV Push every 5 minutes  sodium bicarbonate  Injectable 50 milliEquivalent(s) IV Push once    MEDICATIONS  (PRN):  fentaNYL    Injectable 50 MICROGram(s) IV Push every 2 hours PRN Severe Pain (7 - 10)      HOME MEDICATIONS:  Home Medications:      VITALS:   T(F): 97.6 ( @ 04:00), Max: 97.8 ( @ 00:00)  HR: 128 ( @ 09:00) (80 - 228)  BP: 87/57 ( @ 02:15) (43/23 - 136/84)  BP(mean): 48 ( @ 03:15) (23 - 111)  RR: 32 ( @ 09:00) (14 - 48)  SpO2: 100% ( @ 09:00) (89% - 100%)    I&O's Summary    10 Apr 2019 07:  -  2019 07:00  --------------------------------------------------------  IN: 5845.2 mL / OUT: 1220 mL / NET: 4625.2 mL    2019 07:  -  2019 10:03  --------------------------------------------------------  IN: 166.3 mL / OUT: 0 mL / NET: 166.3 mL        REVIEW OF SYSTEMS:  pt is intubated      PHYSICAL EXAM:  NEURO: patient is intubated , sedated  NECK:, + JVD  LUNGS: bilateral crackles  CARDIOVASCULAR: tachycardic , no obvious murmur  ABD: Soft, non-tender, non-distended, +BS  EXT: No CALI  cold extremities    LABS:                        9.7    26.37 )-----------( 266      ( 2019 02:00 )             32.9     04-11    136  |  106  |  17  ----------------------------<  70  8.4<HH>   |  13<L>  |  1.2    Ca    7.6<L>      2019 06:07  Phos  7.0     11  Mg     1.6     11    TPro  5.1<L>  /  Alb  3.1<L>  /  TBili  0.3  /  DBili  x   /  AST  47<H>  /  ALT  39  /  AlkPhos  124<H>  04-10    PT/INR - ( 2019 09:15 )   PT: 26.50 sec;   INR: 2.32 ratio         PTT - ( 2019 09:15 )  PTT:38.5 sec  Troponin T, Serum: <0.01 ng/mL (19 @ 02:00)  Creatine Kinase, Serum: 254 U/L <H> (19 @ 02:00)  Creatine Kinase, Serum: 148 U/L (04-10-19 @ 20:55)  Troponin T, Serum: <0.01 ng/mL (04-10-19 @ 20:55)  Troponin T, Serum: <0.01 ng/mL (04-10-19 @ 16:31)  Lactate, Blood: 2.2 mmol/L (04-10-19 @ 16:31)    CARDIAC MARKERS ( 2019 02:00 )  x     / <0.01 ng/mL / 254 U/L / x     / 2.5 ng/mL  CARDIAC MARKERS ( 10 Apr 2019 20:55 )  x     / <0.01 ng/mL / 148 U/L / x     / 3.4 ng/mL  CARDIAC MARKERS ( 10 Apr 2019 16:31 )  x     / <0.01 ng/mL / x     / x     / x          RADIOLOGY:  -CXR: < from: Xray Chest 1 View- PORTABLE-Urgent (19 @ 00:14) >  Impression:      Retrocardiac and left lower lobe opacity.    Bilateral diffuse interstitial opacities.     < end of copied text >    -TTE:    ECG:  atrial tachycardia vs atypical flutter at Atrial rate of 160 with 1 to 1 conduction

## 2019-04-11 NOTE — PROGRESS NOTE ADULT - ASSESSMENT
34yo P2 s/p uncomplicated c/s for atrial tachycardia, non responsive to adenosine x 4, Beta blockers, cardioversion x 2, responsive only to sedation and intubation with general anesthesia, now with new onset atrial fibrillation, on amio drip, hypotension on levophed and phenylephrine, rising lactate, hemodynamically unstable    r/o PE  unstable to do PE study at this time  agree with starting heparin for presumed diagnosis    r/o Thyroid storm  f/u TFTs    r/o cardiomyopathy  dilated right ventricle on bedside echo  recommend repeat    less likely that symptoms/signs of hypotension and tachycardia are due to acute blood loss, or complications of  section  -continue fluid resuscitation  -strict I/O    Postop care:  -ancef x 2 doses post op  -pain management  -obgyn to follow    Dr. Carrillo aware

## 2019-04-11 NOTE — PROGRESS NOTE ADULT - ASSESSMENT
32 yo P2 s/p attempted cardioversion followed by repeat emergent  section, now under ICU care, critical condition, on pressors, intubated, with minimal urine output and developing lactic acidosis, normal heart rate, POD1  - management per ICU  - appreciate cardiology recommendations, pending pulmonary recommendations  - rhogram to be adminsitered  - GYN team to follow    will inform Dr. Byrd 32 yo P2 s/p attempted cardioversion followed by repeat emergent  section, now under ICU care, critical condition, on pressors, intubated, with minimal urine output and developing lactic acidosis, normal heart rate, POD1  - management per ICU  - c/w heparin  - f/u placental pathology  - appreciate cardiology recommendations, pending pulmonary recommendations  - rhogram to be adminsitered  - GYN team to follow    will inform Dr. Byrd 32 yo P2 s/p attempted cardioversion followed by repeat emergent  section, now under ICU care, critical condition, new onset afib resolved with amiodarone, 	on pressors, intubated, with minimal urine output and developing lactic acidosis, normal heart rate, POD1  - management per ICU  - recommend postoperative antibiotics ancef for 24h due to emergent nature of c/section  - c/w heparin  - f/u placental pathology  - appreciate cardiology recommendations, pending pulmonary recommendations  - rhogram to be adminsitered  - GYN team to follow    will inform Dr. Byrd

## 2019-04-11 NOTE — PATIENT PROFILE ADULT - FLU SEASON?
"Pt notified Dr. Guillen recommend Sandostatin 20mg injections monthly.  Pt notified upon approval by insurance, Infusion staff will contact to schedule.  Pt states "I'll see if Dr. Ferreira may be able to do it in [place]".  Pt to contact clinic at later date.   "
----- Message from Nelida Nielsen sent at 7/19/2018  1:43 PM CDT -----  Contact: patient  GI- Patient called returning nurse's call. Call back number is [phone]  
Yes...

## 2019-04-11 NOTE — CONSULT NOTE ADULT - CONSULT REQUESTED DATE/TIME
11-Apr-2019 10:03
11-Apr-2019 08:30
10-Apr-2019 16:38
11-Apr-2019 11:06
10-Apr-2019 20:53
10-Apr-2019

## 2019-04-11 NOTE — PROGRESS NOTE ADULT - SUBJECTIVE AND OBJECTIVE BOX
Patient s/p rep  section secondary to maternal tachycardia and fetal distress   pos # 1    Patient remains intubated   heart rate fluctuating currently 100s bp 85/45 on pressors  urine output was decreased currently 15 cc last hour  dressing dry wound no signs of bleeding and fundus firm   on heparin for possible PE   Continue management per SICU protocol  no obstetrical intervention for now  Possible dx cardiomyopathy for close f/u with cardiology and ICU team

## 2019-04-11 NOTE — PROCEDURE NOTE - NSINFORMCONSENT_GEN_A_CORE
verbal consent/This was an emergent procedure.
This was an emergent procedure.
Benefits, risks, and possible complications of procedure explained to patient/caregiver who verbalized understanding and gave written consent.

## 2019-04-11 NOTE — H&P ADULT - ASSESSMENT
33y female HD#1,  34w5d dated by 1st trimester sonogram, presents to the ED on 4/10 with CC of SOB for past 2 days. Patient was sinus tachy to 200s, adenosine administered, no response, cardioverted with no resolution. OBGYN team proceeded with emergent . Patient intubated in ED and taken to L&D. EBL 800cc, no acute events intraop. SICU consult placed for hemodynamic monitoring and ventilator management. Course complicated by 's depsite medical therapy, hyperkalemia requiring CVVHD, cardiogenic shock with EF decreaseing from 50% to 30% down to 10% requiring [facility] ECMO placement and transferred to [facility] for further workup and management.

## 2019-04-11 NOTE — PHARMACOTHERAPY INTERVENTION NOTE - COMMENTS
d/w surgical team:  -amiodarone 0.5 mg/min x6 hrs-evaluate, usual dose 0.5 mg/min x18 hrs-adjusted for 18 hrs  -recommended ampicillin 2g IV q8h & clindamycin 600mg IV q8h  -Albuterol & Atrovent nebulizer-pt intubated, recommended change MDI 4 puffs q6h
Non-Formulary form was filled out

## 2019-04-11 NOTE — CONSULT NOTE ADULT - SUBJECTIVE AND OBJECTIVE BOX
Surgeon: /Toñito/ Massimo    Consult requesting by:    HISTORY OF PRESENT ILLNESS:  34yo , at 34w5d dated by first trimester sonogram, who presented to ED secondary to SOB for the past 2 days. She reports the SOB was associated with nausea and vomiting, about 5x per day. She reports mild palpitations but denies chest pain. She denies Ctx, LOF, vaginal bleeding and reports +FM. Pregnancy complicated by hyperemesis gravidarum, treated with Zofran and Reglan until about 1 month ago. Denies other complications this pregnancy. Denies RUQ pain/ Epigastric pain, LE pain/ swelling, diarrhea, pain/difficulty with urination, fevers, chills. RH negative, s/p Rhogam this pregnancy. In the ED, palpable contractions felt and visualized on the monitor. Patient currently denies contractions. For repeat  section.   In the ED, she was found to be in SVT with a HR of 248, resistant to multiple doses of adenosine (6,12,12,12) and propanolol. Patient sedated with propofolx6 and Etomatadex1, and electrical cardioversion attempted x3 without success. She continued to have an elevated heart rate, was persistently hypotensive and her mental status started to decline. The decision was made to proceed with intubation and transfer her to L+D with ED, cardiology, OB anesthesia and ObGyn team at bedside.   Denies the following: constitutional symptoms, visual symptoms, cardiovascular symptoms, respiratory symptoms, GI symptoms, musculoskeletal symptoms, skin symptoms, neurologic symptoms, hematologic symptoms, allergic symptoms, psychiatric symptoms  Except any pertinent positives listed.     CT surgery called for ECMO evaluation. Pt presented with atrial tachycardia failed multiple doses of adenosine, propanolol and cardioversion. Was intubated and brought to OR for emergency c section. After delievery, arrthymia broke. Pt was transferred to SICU intubated. She became hypotensive and was started on pressors. Bedside echo in ED showed EF50%, now EF 30%, milrinone started.     Home Medications: PNV  Allergies: none   PAST MEDICAL & SURGICAL HISTORY:  Asthma in childhood- last inhaler use >10 years ago. No hospitalizations intubations.   LTCS (10 Apr 2019 19:27)  33y Female    NYHA functional class    [ ] Class I (no limitation) [ ] Class II (slight limitation) [ ] Class III (marked limitation) [ ] Class IV (symptoms at rest)    PAST MEDICAL & SURGICAL HISTORY:  Asthma: as child, no inhaler use&gt;10 years. Denies hospitlizations or intubations. No current inhaler.  History of low transverse  section      MEDICATIONS  (STANDING):  ALBUTerol/ipratropium for Nebulization 3 milliLiter(s) Nebulizer every 6 hours  ampicillin  IVPB 2 Gram(s) IV Intermittent every 8 hours  clindamycin IVPB 600 milliGRAM(s) IV Intermittent every 8 hours  CRRT Treatment    <Continuous>  dexmedetomidine Infusion 0.5 MICROgram(s)/kG/Hr (10 mL/Hr) IV Continuous <Continuous>  esMOLOL  Infusion 50 MICROgram(s)/kG/Min (34.14 mL/Hr) IV Continuous <Continuous>  furosemide   Injectable 40 milliGRAM(s) IV Push once  heparin  Infusion 1000 Unit(s)/Hr (10 mL/Hr) IV Continuous <Continuous>  milrinone Infusion 0.125 MICROgram(s)/kG/Min (4.268 mL/Hr) IV Continuous <Continuous>  norepinephrine Infusion 0.05 MICROgram(s)/kG/Min (5.334 mL/Hr) IV Continuous <Continuous>  pantoprazole  Injectable 40 milliGRAM(s) IV Push daily  phenylephrine    Infusion 0.5 MICROgram(s)/kG/Min (10.669 mL/Hr) IV Continuous <Continuous>  propofol Infusion 20 MICROgram(s)/kG/Min (13.656 mL/Hr) IV Continuous <Continuous>  PureFlow Dialysate RFP-400 (K 2 / Ca 3) 5000 milliLiter(s) (2000 mL/Hr) CRRT <Continuous>  sodium bicarbonate  Injectable 50 milliEquivalent(s) IV Push every 5 minutes    MEDICATIONS  (PRN):  fentaNYL    Injectable 50 MICROGram(s) IV Push every 2 hours PRN Severe Pain (7 - 10)    Antiplatelet therapy:                           Last dose/amt:    Allergies    Allergy Status Unknown    Intolerances        SOCIAL HISTORY:  Smoker: [ ] Yes  [x ] No        PACK YEARS:                         WHEN QUIT?  ETOH use: [ ] Yes  [x ] No              FREQUENCY / QUANTITY:  Ilicit Drug use:  [ ] Yes  [x ] No  FAMILY HISTORY:  No pertinent family history in first degree relatives      Review of Systems  CONSTITUTIONAL:  Fevers[ ] chills[ ] sweats[ ] fatigue[ ] weight loss[ ] weight gain [ ]                                     NEGATIVE [X ]   NEURO:  parathesias[ ] seizures [ ]  syncope [ ]  confusion [ ]                                                                                NEGATIVE[ X]   EYES: glasses[ ]  blurry vision[ ]  discharge[ ] pain[ ] glaucoma [ ]                                                                          NEGATIVE[X ]   ENMT:  difficulty hearing [ ]  vertigo[ ]  dysphagia[ ] epistaxis[ ] recent dental work [ ]                                    NEGATIVE[ X]   CV:  chest pain[ ] palpitations[x ] MENDEZ [ ] diaphoresis [ ]                                                                                           NEGATIVE[ ]   RESPIRATORY:  wheezing[ ] SOB[x ] cough [ ] sputum[ ] hemoptysis[ ]                                                                  NEGATIVE[ ]   GI:  nausea[ ]  vommiting [ ]  diarrhea[ ] constipation [ ] melena [ ]                                                                      NEGATIVE[ X]   : hematuria[ ]  dysuria[ ] urgency[ ] incontinence[ ]                                                                                            NEGATIVE[ X]   MUSKULOSKELETAL:  arthritis[ ]  joint swelling [ ] muscle weakness [ ] Hx vein stripping [ ]                             NEGATIVE[X ]   SKIN/BREAST:  rash[ ] itching [ ]  hair loss[ ] masses[ ]                                                                                              NEGATIVE[ X]   PSYCH:  dementia [ ] depresion [ ] anxiety[ ]                                                                                                               NEGATIVE[X ]   HEME/LYMPH:  bruises easily[ ] enlarged lymph nodes[ ] tender lymph nodes[ ]                                               NEGATIVE[ X]   ENDOCRINE:  cold intolerance[ ] heat intolerance[ ] polydipsia[ ]                                                                          NEGATIVE[ X]     PHYSICAL EXAM  Vital Signs Last 24 Hrs  T(C): 36.4 (2019 04:00), Max: 36.6 (2019 00:00)  T(F): 97.6 (2019 04:00), Max: 97.8 (2019 00:00)  HR: 136 (2019 10:30) (80 - 228)  BP: 87/57 (2019 02:15) (43/23 - 136/84)  BP(mean): 48 (2019 03:15) (23 - 111)  RR: 25 (2019 10:30) (14 - 48)  SpO2: 97% (2019 10:30) (86% - 100%)    CONSTITUTIONAL:                                                                          WNL[x ]   Neuro: WNL[x ] Normal exam oriented to person/place & time with no focal motor or sensory  deficits. Other                     Eyes: WNL[ x]   Normal exam of conjunctiva & lids, pupils equally reactive. Other     ENT: WNL[x ]    Normal exam of nasal/oral mucosa with absence of cyanosis. Other  Neck: WNL[x ]  Normal exam of jugular veins, trachea & thyroid. Other  Chest: WNL[x ] Normal lung exam with good air movement absence of wheezes, rales, or rhonchi: Other                                                                                CV:  Auscultation: normal [x ] S3[ ] S4[ ] Irregular [ ] Rub[ ] Clicks[ ] + tachycardic  Murmurs none:[x ]systolic [ ]  diastolic [ ] holosystolic [ ]  Carotids: No Bruits[x ] Other                 Abdominal Aorta: normal [ ] nonpalpable[ ]Other                                                                                      GI:           WNL[ x] Normal exam of abdomen, liver & spleen with no noted masses or tenderness. Other                                                                                                        Extremities: WNL[x ] Normal no evidence of cyanosis or deformity Edema: none[ ]trace[ ]1+[ ]2+[ ]3+[ ]4+[ ]  Lower Extremity Pulses: Right[ ] Left[ ]Varicosities[ ]  SKIN :WNL[x ] Normal exam to inspection & palation. Other: c section dressing in place, c/d/i                                                          LABS:                        9.7    26.37 )-----------( 266      ( 2019 02:00 )             32.9     04-11    136  |  106  |  17  ----------------------------<  70  8.4<HH>   |  13<L>  |  1.2    Ca    7.6<L>      2019 06:07  Phos  7.0     04-11  Mg     1.6     04-11    TPro  5.1<L>  /  Alb  3.1<L>  /  TBili  0.3  /  DBili  x   /  AST  47<H>  /  ALT  39  /  AlkPhos  124<H>  04-10    PT/INR - ( 2019 09:15 )   PT: 26.50 sec;   INR: 2.32 ratio         PTT - ( 2019 09:15 )  PTT:38.5 sec    CARDIAC MARKERS ( 2019 02:00 )  x     / <0.01 ng/mL / 254 U/L / x     / 2.5 ng/mL  CARDIAC MARKERS ( 10 Apr 2019 20:55 )  x     / <0.01 ng/mL / 148 U/L / x     / 3.4 ng/mL  CARDIAC MARKERS ( 10 Apr 2019 16:31 )  x     / <0.01 ng/mL / x     / x     / x              Cardiac Cath: not done    TTE / BETTY: pending final report     Recommendation: (Procedures/Evaluations)  CT HEAD Nonn-Contrast:[  ]  CT Chest with /without contrast [ ]  Carotid Duplex :[ ]  KENTON/PVR: [ ]  PFT : Simple PFT [ ]  Full [ ]  Renal Consult [ ]  Pulmonary Consult: [ ]   Vascular Consult [ ]    Dental Consult [ ]   Hem-Onc Consult [ ]   GI Consult [ ]   Other Consultations :        Impression:    CAD [ ]  Valvular  disease [ ]   Aortic Disease [ ]   YAJAIRA: Yes[ ] No [ ]   CKD Stage I [ ] , Stage II [ ] , Stage III [ ], Stage IV [ ]   Anemia: Yes [ ], No [ ]  Diabetes :Yes [ ], No [ ]  Acute MI: Yes [ ], No [ ]   Heart Failure: Yes [x ] , No [ ] HFpEF [ ], HFrEF [x ]        Assessment/ Plan:    CT surgery called for ECMO evaluation. Pt presented with atrial tachycardia failed multiple doses of adenosine, propanolol and cardioversion. Was intubated and brought to OR for emergency c section. After delievery, arrthymia broke. Pt was transferred to SICU intubated. She became hypotensive and was started on pressors. Bedside echo in ED showed EF50%, now EF 30%, milrinone started. Surgeon: /Toñito/ Massimo    Consult requesting by: Talita Santos    HISTORY OF PRESENT ILLNESS:  34yo , at 34w5d dated by first trimester sonogram, who presented to ED secondary to SOB for the past 2 days. She reports the SOB was associated with nausea and vomiting, about 5x per day. She reports mild palpitations but denies chest pain. She denies Ctx, LOF, vaginal bleeding and reports +FM. Pregnancy complicated by hyperemesis gravidarum, treated with Zofran and Reglan until about 1 month ago. Denies other complications this pregnancy. Denies RUQ pain/ Epigastric pain, LE pain/ swelling, diarrhea, pain/difficulty with urination, fevers, chills. RH negative, s/p Rhogam this pregnancy. In the ED, palpable contractions felt and visualized on the monitor. Patient currently denies contractions. For repeat  section.   In the ED, she was found to be in SVT with a HR of 248, resistant to multiple doses of adenosine (6,12,12,12) and propanolol. Patient sedated with propofolx6 and Etomatadex1, and electrical cardioversion attempted x3 without success. She continued to have an elevated heart rate, was persistently hypotensive and her mental status started to decline. The decision was made to proceed with intubation and transfer her to L+D with ED, cardiology, OB anesthesia and ObGyn team at bedside.   Denies the following: constitutional symptoms, visual symptoms, cardiovascular symptoms, respiratory symptoms, GI symptoms, musculoskeletal symptoms, skin symptoms, neurologic symptoms, hematologic symptoms, allergic symptoms, psychiatric symptoms  Except any pertinent positives listed.     CT surgery called for ECMO evaluation. Pt presented with atrial tachycardia failed multiple doses of adenosine, propanolol and cardioversion. Was intubated and brought to OR for emergency c section. After delievery, arrthymia broke. Pt was transferred to SICU intubated. She became hypotensive and was started on pressors. Bedside echo in ED showed EF50%, now EF 30%, milrinone started.     Home Medications: PNV  Allergies: none   PAST MEDICAL & SURGICAL HISTORY:  Asthma in childhood- last inhaler use >10 years ago. No hospitalizations intubations.   LTCS (10 Apr 2019 19:27)  33y Female    NYHA functional class    [ ] Class I (no limitation) [ ] Class II (slight limitation) [ ] Class III (marked limitation) [ ] Class IV (symptoms at rest)    PAST MEDICAL & SURGICAL HISTORY:  Asthma: as child, no inhaler use&gt;10 years. Denies hospitlizations or intubations. No current inhaler.  History of low transverse  section      MEDICATIONS  (STANDING):  ALBUTerol/ipratropium for Nebulization 3 milliLiter(s) Nebulizer every 6 hours  ampicillin  IVPB 2 Gram(s) IV Intermittent every 8 hours  clindamycin IVPB 600 milliGRAM(s) IV Intermittent every 8 hours  CRRT Treatment    <Continuous>  dexmedetomidine Infusion 0.5 MICROgram(s)/kG/Hr (10 mL/Hr) IV Continuous <Continuous>  esMOLOL  Infusion 50 MICROgram(s)/kG/Min (34.14 mL/Hr) IV Continuous <Continuous>  furosemide   Injectable 40 milliGRAM(s) IV Push once  heparin  Infusion 1000 Unit(s)/Hr (10 mL/Hr) IV Continuous <Continuous>  milrinone Infusion 0.125 MICROgram(s)/kG/Min (4.268 mL/Hr) IV Continuous <Continuous>  norepinephrine Infusion 0.05 MICROgram(s)/kG/Min (5.334 mL/Hr) IV Continuous <Continuous>  pantoprazole  Injectable 40 milliGRAM(s) IV Push daily  phenylephrine    Infusion 0.5 MICROgram(s)/kG/Min (10.669 mL/Hr) IV Continuous <Continuous>  propofol Infusion 20 MICROgram(s)/kG/Min (13.656 mL/Hr) IV Continuous <Continuous>  PureFlow Dialysate RFP-400 (K 2 / Ca 3) 5000 milliLiter(s) (2000 mL/Hr) CRRT <Continuous>  sodium bicarbonate  Injectable 50 milliEquivalent(s) IV Push every 5 minutes    MEDICATIONS  (PRN):  fentaNYL    Injectable 50 MICROGram(s) IV Push every 2 hours PRN Severe Pain (7 - 10)    Antiplatelet therapy:                           Last dose/amt:    Allergies    Allergy Status Unknown    Intolerances        SOCIAL HISTORY:  Smoker: [ ] Yes  [x ] No        PACK YEARS:                         WHEN QUIT?  ETOH use: [ ] Yes  [x ] No              FREQUENCY / QUANTITY:  Ilicit Drug use:  [ ] Yes  [x ] No  FAMILY HISTORY:  No pertinent family history in first degree relatives      Review of Systems  CONSTITUTIONAL:  Fevers[ ] chills[ ] sweats[ ] fatigue[ ] weight loss[ ] weight gain [ ]                                     NEGATIVE [X ]   NEURO:  parathesias[ ] seizures [ ]  syncope [ ]  confusion [ ]                                                                                NEGATIVE[ X]   EYES: glasses[ ]  blurry vision[ ]  discharge[ ] pain[ ] glaucoma [ ]                                                                          NEGATIVE[X ]   ENMT:  difficulty hearing [ ]  vertigo[ ]  dysphagia[ ] epistaxis[ ] recent dental work [ ]                                    NEGATIVE[ X]   CV:  chest pain[ ] palpitations[x ] MENDEZ [ ] diaphoresis [ ]                                                                                           NEGATIVE[ ]   RESPIRATORY:  wheezing[ ] SOB[x ] cough [ ] sputum[ ] hemoptysis[ ]                                                                  NEGATIVE[ ]   GI:  nausea[ ]  vommiting [ ]  diarrhea[ ] constipation [ ] melena [ ]                                                                      NEGATIVE[ X]   : hematuria[ ]  dysuria[ ] urgency[ ] incontinence[ ]                                                                                            NEGATIVE[ X]   MUSKULOSKELETAL:  arthritis[ ]  joint swelling [ ] muscle weakness [ ] Hx vein stripping [ ]                             NEGATIVE[X ]   SKIN/BREAST:  rash[ ] itching [ ]  hair loss[ ] masses[ ]                                                                                              NEGATIVE[ X]   PSYCH:  dementia [ ] depresion [ ] anxiety[ ]                                                                                                               NEGATIVE[X ]   HEME/LYMPH:  bruises easily[ ] enlarged lymph nodes[ ] tender lymph nodes[ ]                                               NEGATIVE[ X]   ENDOCRINE:  cold intolerance[ ] heat intolerance[ ] polydipsia[ ]                                                                          NEGATIVE[ X]     PHYSICAL EXAM  Vital Signs Last 24 Hrs  T(C): 36.4 (2019 04:00), Max: 36.6 (2019 00:00)  T(F): 97.6 (2019 04:00), Max: 97.8 (2019 00:00)  HR: 136 (2019 10:30) (80 - 228)  BP: 87/57 (2019 02:15) (43/23 - 136/84)  BP(mean): 48 (2019 03:15) (23 - 111)  RR: 25 (2019 10:30) (14 - 48)  SpO2: 97% (2019 10:30) (86% - 100%)    CONSTITUTIONAL:                                                                          WNL[x ]   Neuro: WNL[x ] Normal exam oriented to person/place & time with no focal motor or sensory  deficits. Other                     Eyes: WNL[ x]   Normal exam of conjunctiva & lids, pupils equally reactive. Other     ENT: WNL[x ]    Normal exam of nasal/oral mucosa with absence of cyanosis. Other  Neck: WNL[x ]  Normal exam of jugular veins, trachea & thyroid. Other  Chest: WNL[x ] Normal lung exam with good air movement absence of wheezes, rales, or rhonchi: Other                                                                                CV:  Auscultation: normal [x ] S3[ ] S4[ ] Irregular [ ] Rub[ ] Clicks[ ] + tachycardic  Murmurs none:[x ]systolic [ ]  diastolic [ ] holosystolic [ ]  Carotids: No Bruits[x ] Other                 Abdominal Aorta: normal [ ] nonpalpable[ ]Other                                                                                      GI:           WNL[ x] Normal exam of abdomen, liver & spleen with no noted masses or tenderness. Other                                                                                                        Extremities: WNL[x ] Normal no evidence of cyanosis or deformity Edema: none[ ]trace[ ]1+[ ]2+[ ]3+[ ]4+[ ]  Lower Extremity Pulses: Right[ ] Left[ ]Varicosities[ ]  SKIN :WNL[x ] Normal exam to inspection & palation. Other: c section dressing in place, c/d/i                                                          LABS:                        9.7    26.37 )-----------( 266      ( 2019 02:00 )             32.9     04-11    136  |  106  |  17  ----------------------------<  70  8.4<HH>   |  13<L>  |  1.2    Ca    7.6<L>      2019 06:07  Phos  7.0     04-11  Mg     1.6     04-11    TPro  5.1<L>  /  Alb  3.1<L>  /  TBili  0.3  /  DBili  x   /  AST  47<H>  /  ALT  39  /  AlkPhos  124<H>  04-10    PT/INR - ( 2019 09:15 )   PT: 26.50 sec;   INR: 2.32 ratio         PTT - ( 2019 09:15 )  PTT:38.5 sec    CARDIAC MARKERS ( 2019 02:00 )  x     / <0.01 ng/mL / 254 U/L / x     / 2.5 ng/mL  CARDIAC MARKERS ( 10 Apr 2019 20:55 )  x     / <0.01 ng/mL / 148 U/L / x     / 3.4 ng/mL  CARDIAC MARKERS ( 10 Apr 2019 16:31 )  x     / <0.01 ng/mL / x     / x     / x              Cardiac Cath: not done    TTE / BETTY: pending final report     Recommendation: (Procedures/Evaluations)  CT HEAD Nonn-Contrast:[  ]  CT Chest with /without contrast [ ]  Carotid Duplex :[ ]  KENTON/PVR: [ ]  PFT : Simple PFT [ ]  Full [ ]  Renal Consult [ ]  Pulmonary Consult: [ ]   Vascular Consult [ ]    Dental Consult [ ]   Hem-Onc Consult [ ]   GI Consult [ ]   Other Consultations :        Impression:    CAD [ ]  Valvular  disease [ ]   Aortic Disease [ ]   YAJAIRA: Yes[ ] No [ ]   CKD Stage I [ ] , Stage II [ ] , Stage III [ ], Stage IV [ ]   Anemia: Yes [ ], No [ ]  Diabetes :Yes [ ], No [ ]  Acute MI: Yes [ ], No [ ]   Heart Failure: Yes [x ] , No [ ] HFpEF [ ], HFrEF [x ]        Assessment/ Plan:    CT surgery called for ECMO evaluation. Pt presented with atrial tachycardia failed multiple doses of adenosine, propanolol and cardioversion. Was intubated and brought to OR for emergency c section. After delievery, arrthymia broke. Pt was transferred to SICU intubated. She became hypotensive and was started on pressors. Bedside echo in ED showed EF50%, now EF 30%, milrinone started. Surgeon: /Toñito/ Massimo    Consult requesting by: Talita Santos    HISTORY OF PRESENT ILLNESS:  34yo , at 34w5d dated by first trimester sonogram, who presented to ED secondary to SOB for the past 2 days. She reports the SOB was associated with nausea and vomiting, about 5x per day. She reports mild palpitations but denies chest pain. She denies Ctx, LOF, vaginal bleeding and reports +FM. Pregnancy complicated by hyperemesis gravidarum, treated with Zofran and Reglan until about 1 month ago. Denies other complications this pregnancy. Denies RUQ pain/ Epigastric pain, LE pain/ swelling, diarrhea, pain/difficulty with urination, fevers, chills. RH negative, s/p Rhogam this pregnancy. In the ED, palpable contractions felt and visualized on the monitor. Patient currently denies contractions. For repeat  section.   In the ED, she was found to be in SVT with a HR of 248, resistant to multiple doses of adenosine (6,12,12,12) and propanolol. Patient sedated with propofolx6 and Etomatadex1, and electrical cardioversion attempted x3 without success. She continued to have an elevated heart rate, was persistently hypotensive and her mental status started to decline. The decision was made to proceed with intubation and transfer her to L+D with ED, cardiology, OB anesthesia and ObGyn team at bedside.   Denies the following: constitutional symptoms, visual symptoms, cardiovascular symptoms, respiratory symptoms, GI symptoms, musculoskeletal symptoms, skin symptoms, neurologic symptoms, hematologic symptoms, allergic symptoms, psychiatric symptoms  Except any pertinent positives listed.     CT surgery called for ECMO evaluation. Pt presented with atrial tachycardia failed multiple doses of adenosine, propanolol and cardioversion. Was intubated and brought to OR for emergency c section. After delievery, arrthymia broke. Pt was transferred to SICU intubated. She became hypotensive and was started on pressors. Bedside echo in ED showed EF50%, now EF 30%, milrinone started.     Home Medications: PNV  Allergies: none   PAST MEDICAL & SURGICAL HISTORY:  Asthma in childhood- last inhaler use >10 years ago. No hospitalizations intubations.   LTCS (10 Apr 2019 19:27)  33y Female    NYHA functional class    [ ] Class I (no limitation) [ ] Class II (slight limitation) [ ] Class III (marked limitation) [ ] Class IV (symptoms at rest)    PAST MEDICAL & SURGICAL HISTORY:  Asthma: as child, no inhaler use&gt;10 years. Denies hospitlizations or intubations. No current inhaler.  History of low transverse  section      MEDICATIONS  (STANDING):  ALBUTerol/ipratropium for Nebulization 3 milliLiter(s) Nebulizer every 6 hours  ampicillin  IVPB 2 Gram(s) IV Intermittent every 8 hours  clindamycin IVPB 600 milliGRAM(s) IV Intermittent every 8 hours  CRRT Treatment    <Continuous>  dexmedetomidine Infusion 0.5 MICROgram(s)/kG/Hr (10 mL/Hr) IV Continuous <Continuous>  esMOLOL  Infusion 50 MICROgram(s)/kG/Min (34.14 mL/Hr) IV Continuous <Continuous>  furosemide   Injectable 40 milliGRAM(s) IV Push once  heparin  Infusion 1000 Unit(s)/Hr (10 mL/Hr) IV Continuous <Continuous>  milrinone Infusion 0.125 MICROgram(s)/kG/Min (4.268 mL/Hr) IV Continuous <Continuous>  norepinephrine Infusion 0.05 MICROgram(s)/kG/Min (5.334 mL/Hr) IV Continuous <Continuous>  pantoprazole  Injectable 40 milliGRAM(s) IV Push daily  phenylephrine    Infusion 0.5 MICROgram(s)/kG/Min (10.669 mL/Hr) IV Continuous <Continuous>  propofol Infusion 20 MICROgram(s)/kG/Min (13.656 mL/Hr) IV Continuous <Continuous>  PureFlow Dialysate RFP-400 (K 2 / Ca 3) 5000 milliLiter(s) (2000 mL/Hr) CRRT <Continuous>  sodium bicarbonate  Injectable 50 milliEquivalent(s) IV Push every 5 minutes    MEDICATIONS  (PRN):  fentaNYL    Injectable 50 MICROGram(s) IV Push every 2 hours PRN Severe Pain (7 - 10)    Antiplatelet therapy:                           Last dose/amt:    Allergies    Allergy Status Unknown    Intolerances        SOCIAL HISTORY:  Smoker: [ ] Yes  [x ] No        PACK YEARS:                         WHEN QUIT?  ETOH use: [ ] Yes  [x ] No              FREQUENCY / QUANTITY:  Ilicit Drug use:  [ ] Yes  [x ] No  FAMILY HISTORY:  No pertinent family history in first degree relatives      Review of Systems  CONSTITUTIONAL:  Fevers[ ] chills[ ] sweats[ ] fatigue[ ] weight loss[ ] weight gain [ ]                                     NEGATIVE [X ]   NEURO:  parathesias[ ] seizures [ ]  syncope [ ]  confusion [ ]                                                                                NEGATIVE[ X]   EYES: glasses[ ]  blurry vision[ ]  discharge[ ] pain[ ] glaucoma [ ]                                                                          NEGATIVE[X ]   ENMT:  difficulty hearing [ ]  vertigo[ ]  dysphagia[ ] epistaxis[ ] recent dental work [ ]                                    NEGATIVE[ X]   CV:  chest pain[ ] palpitations[x ] MENDEZ [ ] diaphoresis [ ]                                                                                           NEGATIVE[ ]   RESPIRATORY:  wheezing[ ] SOB[x ] cough [ ] sputum[ ] hemoptysis[ ]                                                                  NEGATIVE[ ]   GI:  nausea[ ]  vommiting [ ]  diarrhea[ ] constipation [ ] melena [ ]                                                                      NEGATIVE[ X]   : hematuria[ ]  dysuria[ ] urgency[ ] incontinence[ ]                                                                                            NEGATIVE[ X]   MUSKULOSKELETAL:  arthritis[ ]  joint swelling [ ] muscle weakness [ ] Hx vein stripping [ ]                             NEGATIVE[X ]   SKIN/BREAST:  rash[ ] itching [ ]  hair loss[ ] masses[ ]                                                                                              NEGATIVE[ X]   PSYCH:  dementia [ ] depresion [ ] anxiety[ ]                                                                                                               NEGATIVE[X ]   HEME/LYMPH:  bruises easily[ ] enlarged lymph nodes[ ] tender lymph nodes[ ]                                               NEGATIVE[ X]   ENDOCRINE:  cold intolerance[ ] heat intolerance[ ] polydipsia[ ]                                                                          NEGATIVE[ X]     PHYSICAL EXAM  Vital Signs Last 24 Hrs  T(C): 36.4 (2019 04:00), Max: 36.6 (2019 00:00)  T(F): 97.6 (2019 04:00), Max: 97.8 (2019 00:00)  HR: 136 (2019 10:30) (80 - 228)  BP: 87/57 (2019 02:15) (43/23 - 136/84)  BP(mean): 48 (2019 03:15) (23 - 111)  RR: 25 (2019 10:30) (14 - 48)  SpO2: 97% (2019 10:30) (86% - 100%)    CONSTITUTIONAL:                                                                          WNL[x ]   Neuro: WNL[x ] Normal exam oriented to person/place & time with no focal motor or sensory  deficits. Other                     Eyes: WNL[ x]   Normal exam of conjunctiva & lids, pupils equally reactive. Other     ENT: WNL[x ]    Normal exam of nasal/oral mucosa with absence of cyanosis. Other  Neck: WNL[x ]  Normal exam of jugular veins, trachea & thyroid. Other  Chest: WNL[x ] Normal lung exam with good air movement absence of wheezes, rales, or rhonchi: Other                                                                                CV:  Auscultation: normal [x ] S3[ ] S4[ ] Irregular [ ] Rub[ ] Clicks[ ] + tachycardic  Murmurs none:[x ]systolic [ ]  diastolic [ ] holosystolic [ ]  Carotids: No Bruits[x ] Other                 Abdominal Aorta: normal [ ] nonpalpable[ ]Other                                                                                      GI:           WNL[ x] Normal exam of abdomen, liver & spleen with no noted masses or tenderness. Other                                                                                                        Extremities: WNL[x ] Normal no evidence of cyanosis or deformity Edema: none[ ]trace[ ]1+[ ]2+[ ]3+[ ]4+[ ]  Lower Extremity Pulses: Right[ ] Left[ ]Varicosities[ ]  SKIN :WNL[x ] Normal exam to inspection & palation. Other: c section dressing in place, c/d/i                                                          LABS:                        9.7    26.37 )-----------( 266      ( 2019 02:00 )             32.9     04-11    136  |  106  |  17  ----------------------------<  70  8.4<HH>   |  13<L>  |  1.2    Ca    7.6<L>      2019 06:07  Phos  7.0     04-11  Mg     1.6     04-11    TPro  5.1<L>  /  Alb  3.1<L>  /  TBili  0.3  /  DBili  x   /  AST  47<H>  /  ALT  39  /  AlkPhos  124<H>  04-10    PT/INR - ( 2019 09:15 )   PT: 26.50 sec;   INR: 2.32 ratio         PTT - ( 2019 09:15 )  PTT:38.5 sec    CARDIAC MARKERS ( 2019 02:00 )  x     / <0.01 ng/mL / 254 U/L / x     / 2.5 ng/mL  CARDIAC MARKERS ( 10 Apr 2019 20:55 )  x     / <0.01 ng/mL / 148 U/L / x     / 3.4 ng/mL  CARDIAC MARKERS ( 10 Apr 2019 16:31 )  x     / <0.01 ng/mL / x     / x     / x              Cardiac Cath: not done    TTE / BETTY: pending final report     Recommendation: (Procedures/Evaluations)  CT HEAD Nonn-Contrast:[  ]  CT Chest with /without contrast [ ]  Carotid Duplex :[ ]  KENTON/PVR: [ ]  PFT : Simple PFT [ ]  Full [ ]  Renal Consult [ ]  Pulmonary Consult: [ ]   Vascular Consult [ ]    Dental Consult [ ]   Hem-Onc Consult [ ]   GI Consult [ ]   Other Consultations :        Impression:    CAD [ ]  Valvular  disease [ ]   Aortic Disease [ ]   YAJAIRA: Yes[ ] No [ ]   CKD Stage I [ ] , Stage II [ ] , Stage III [ ], Stage IV [ ]   Anemia: Yes [ ], No [ ]  Diabetes :Yes [ ], No [ ]  Acute MI: Yes [ ], No [ ]   Heart Failure: Yes [x ] , No [ ] HFpEF [ ], HFrEF [x ]        Assessment/ Plan:    33 year-old female , at 34w5d gestation p/w atrial tachycardia failed multiple doses of adenosine, propanolol and cardioversion. Was intubated and brought to OR for emergency c section. After delivery arrhythmia broke. Pt was transferred to SICU intubated. She became hypotensive and was started on pressors, and esmolol gtt. Bedside echo in ED showed EF50%, now EF 30%, milrinone started.  Pt became anuric, a udall catheter was placed anticipating possible need for dialysis, however pt started responding to lasix iv  -Atrial Tachycardia induced Cardiomyopathy- CT surgery called for possible need for ECMO, however, pt seems to been improving, now making urine with lasix. pH improving now after sodium bicarb, as well as oxygenation, rate controlled with esmolol and weaning down levo  -Continue milrinone gtt  -give stat dose of solumedrol  -No surgical intervention at this time

## 2019-04-11 NOTE — PROCEDURE NOTE - NSINDICATIONS_GEN_A_CORE
unstable SVT at 228 bpm
critical illness/dialysis/CRRT
arterial puncture to obtain ABG's/blood sampling/cannulation purposes/critical patient/monitoring purposes

## 2019-04-11 NOTE — PROGRESS NOTE ADULT - SUBJECTIVE AND OBJECTIVE BOX
OB PGY4 Progress Note    POD #1    Subjective: pt seen and evaluated at bedside in the ICU s/p attempted cardioversion for SVT and  section (emergent, repeat), now under ICU care. Patient is presently intubated and was previously under paralytic Nimbex but now is alert and oriented, asking for tubing to be removed. Explained to patient that she is under ICU care and that her infant is doing well. Difficult for patient to communicate otherwise. She is presently under multiple IV drips for blood pressure management and MAP stability. Per the nursing staff she is making minimal urine and presently has lactic acidosis.     Physical exam:    Vital Signs Last 24 Hrs  T(F): 97.6 (2019 04:00), Max: 97.8 (2019 00:00)  HR: 98 (2019 05:45) (80 - 228)  BP: 87/57 (2019 02:15) (43/23 - 136/84)  RR: 27 (2019 05:45) (14 - 48)  SpO2: 100% (2019 05:45) (89% - 100%)    04-10-19 @ 07:01  -  19 @ 06:26  --------------------------------------------------------  IN: 0 mL / OUT: 1215 mL / NET: -1215 mL    Gen: intubated, alert and oriented  CVS: S1S2, RRR  Lungs: bilateral rales, diffuse on auscultation  Abdomen: Soft, nontender, no distension   Incision: dressing in place, clean and dry  Pelvic: no vaginal bleeding  Ext: No calf tenderness, SCDs in place    Diet: NPO    meds: (ADM OVERRIDE)   1 Each &lt;See Task&gt; (04-10-19 @ 20:26)    (ADM OVERRIDE)   1 Each &lt;See Task&gt; (04-10-19 @ 21:36)    (ADM OVERRIDE)   1 Each &lt;See Task&gt; (04-10-19 @ 20:52)    (ADM OVERRIDE)   1 Each &lt;See Task&gt; (19 @ 01:19)    (ADM OVERRIDE)   1 Each &lt;See Task&gt; (04-10-19 @ 21:53)    (ADM OVERRIDE)   1 Each &lt;See Task&gt; (04-10-19 @ 23:22)    (ADM OVERRIDE)   1 Each &lt;See Task&gt; (19 @ 00:13)    (ADM OVERRIDE)   1 Each &lt;See Task&gt; (19 @ 02:13)    (Floorstock)   2 Each &lt;See Task&gt; (04-10-19 @ 19:08)    (Floorstock)   1 Each &lt;See Task&gt; (04-10-19 @ 19:08)    (Floorstock)   1 Each &lt;See Task&gt; (04-10-19 @ 16:49)    adenosine Injectable (ADENOCARD)   6 milliGRAM(s) IV Push (04-10-19 @ 15:47)    adenosine Injectable (ADENOCARD)   12 milliGRAM(s) IV Push (04-10-19 @ 15:58)    adenosine Injectable (ADENOCARD)   12 milliGRAM(s) IV Push (04-10-19 @ 15:50)    adenosine Injectable (ADENOCARD)   18 milliGRAM(s) IV Push (04-10-19 @ 16:30)    amiodarone Infusion   33.333 mL/Hr IV Continuous (19 @ 00:12)    amiodarone IVPB   618 mL/Hr IV Intermittent (19 @ 00:30)    calcium gluconate IVPB   200 mL/Hr IV Intermittent (19 @ 04:30)    calcium gluconate IVPB   200 mL/Hr IV Intermittent (04-10-19 @ 22:00)    ceFAZolin   IVPB   100 mL/Hr IV Intermittent (04-10-19 @ 18:10)    ceFAZolin   IVPB   100 mL/Hr IV Intermittent (19 @ 05:54)   100 mL/Hr IV Intermittent (19 @ 04:54)    cisatracurium Injectable   10 milliGRAM(s) IV Push (19 @ 03:00)    dexmedetomidine Infusion   10 mL/Hr IV Continuous (04-10-19 @ 20:10)    dextrose 50% Injectable   50 milliLiter(s) IV Push (04-10-19 @ 22:00)    diltiazem Injectable   10 milliGRAM(s) IV Push (19 @ 03:00)    diltiazem Injectable   10 milliGRAM(s) IV Push (19 @ 03:10)    EPINEPHrine    Injectable   1 milliGRAM(s) IV Push (19 @ 03:38)    etomidate Injectable   15 milliGRAM(s) IV Push (04-10-19 @ 17:43)    fentaNYL    Injectable   25 MICROGram(s) IV Push (04-10-19 @ 22:20)    fentaNYL    Injectable   50 MICROGram(s) IV Push (19 @ 01:30)    fentaNYL    Injectable   50 MICROGram(s) IV Push (04-10-19 @ 23:30)    fentaNYL   Infusion   4 mL/Hr IV Continuous (04-10-19 @ 22:21)    heparin  Infusion   10 mL/Hr IV Continuous (19 @ 02:19)    insulin regular  human recombinant.   10 Unit(s) SubCutaneous (04-10-19 @ 22:19)    lactated ringers Bolus   1000 mL/Hr IV Bolus (04-10-19 @ 21:45)    lactated ringers Bolus   2000 mL/Hr IV Bolus (19 @ 03:00)    lactated ringers Bolus   1000 mL/Hr IV Bolus (19 @ 04:30)    lactated ringers Bolus   3000 mL/Hr IV Bolus (19 @ 03:45)    lactated ringers.   125 mL/Hr IV Continuous (04-10-19 @ 21:38)    metoprolol tartrate Injectable   5 milliGRAM(s) IV Push (04-10-19 @ 20:36)    metoprolol tartrate Injectable   5 milliGRAM(s) IV Push (04-10-19 @ 21:40)    metoprolol tartrate Injectable   5 milliGRAM(s) IV Push (04-10-19 @ 23:00)    metoprolol tartrate Injectable   5 milliGRAM(s) IV Push (19 @ 01:44)    midazolam Injectable   2 milliGRAM(s) IV Push (19 @ 01:30)    norepinephrine Infusion   5.334 mL/Hr IV Continuous (19 @ 01:21)    phenylephrine    Infusion   10.669 mL/Hr IV Continuous (19 @ 02:48)    propofol Injectable   200 milliGRAM(s) IV Push (04-10-19 @ 17:52)    propofol Injectable   10 milliGRAM(s) IV Push (04-10-19 @ 20:30)    propofol Injectable   5 milliGRAM(s) IV Push (04-10-19 @ 21:40)    propofol Injectable   40 milliGRAM(s) IV Push (04-10-19 @ 17:10)    propofol Injectable   40 milliGRAM(s) IV Push (04-10-19 @ 17:14)    propofol Injectable   30 milliGRAM(s) IV Push (04-10-19 @ 17:15)    propofol Injectable   50 milliGRAM(s) IV Push (04-10-19 @ 17:18)    propranolol Injectable   1 milliGRAM(s) IV Push (04-10-19 @ 20:14)    sodium chloride 0.9% Bolus   2000 mL/Hr IV Bolus (04-10-19 @ 16:00)    succinylcholine Injectable   100 milliGRAM(s) IV Push (04-10-19 @ 17:52)        LABS:                        9.7    26.37 )-----------( 266      ( 2019 02:00 )             32.9                         10.6   21.89 )-----------( 190      ( 10 Apr 2019 20:55 )             36.1                         8.5    16.20 )-----------( 266      ( 10 Apr 2019 18:51 )             29.5                         10.4   13.76 )-----------( 268      ( 10 Apr 2019 16:31 )             34.1     Magnesium, Serum: 1.6 mg/dL ( 02:00)  Magnesium, Serum: 1.7 mg/dL (04-10 @ 20:55)  Antibody Screen: POS (04-10 @ 18:50)  Magnesium, Serum: 1.9 mg/dL (04-10 @ 16:31)    19 @ 02:00      137  |  108  |  14  ----------------------------<  114<H>  5.9<H>   |  17  |  0.8    04-10-19 @ 20:55      132<L>  |  103  |  11  ----------------------------<  175<H>  6.8<HH>   |  14<L>  |  0.7    04-10-19 @ 18:51      137  |  108  |  10  ----------------------------<  163<H>  5.6<H>   |  13<L>  |  0.5<L>    04-10-19 @ 16:31      137  |  103  |  11  ----------------------------<  87  4.3   |  18  |  0.6<L>        Ca    7.7<L>      2019 02:00  Ca    7.5<L>      10 Apr 2019 20:55  Ca    7.8<L>      10 Apr 2019 18:51  Ca    9.4      10 Apr 2019 16:31  Phos  7.0<H>       Phos  5.2<H>     -10  Mg     1.6<L>       Mg     1.7<L>     04-10  Mg     1.9     04-10    TPro  5.1<L>  /  Alb  3.1<L>  /  TBili  0.3  /  DBili  x   /  AST  47<H>  /  ALT  39  /  AlkPhos  124<H>  04-10-19 @ 18:51  TPro  6.2  /  Alb  3.8  /  TBili  0.4  /  DBili  0.2  /  AST  47<H>  /  ALT  43<H>  /  AlkPhos  146<H>  04-10-19 @ 17:24 OB PGY4 Progress Note    POD #1    Subjective: pt seen and evaluated at bedside in the ICU s/p attempted cardioversion for SVT and  section (emergent, repeat), now under ICU care. Patient is presently intubated and was previously under paralytic Nimbex but now is alert and oriented, asking for tubing to be removed. Explained to patient that she is under ICU care and that her infant is doing well. Difficult for patient to communicate otherwise. She is presently under multiple IV drips for blood pressure management and MAP stability. Per the nursing staff she is making minimal urine and presently has lactic acidosis. She also had new onset A fib which resolved after giving amiodarone drip.     Physical exam:    Vital Signs Last 24 Hrs  T(F): 97.6 (2019 04:00), Max: 97.8 (2019 00:00)  HR: 98 (2019 05:45) (80 - 228)  BP: 87/57 (2019 02:15) (43/23 - 136/84)  RR: 27 (2019 05:45) (14 - 48)  SpO2: 100% (2019 05:45) (89% - 100%)    04-10-19 @ 07:01  -  19 @ 06:26  --------------------------------------------------------  IN: 0 mL / OUT: 1215 mL / NET: -1215 mL    Gen: intubated, alert and oriented  CVS: S1S2, RRR  Lungs: bilateral rales, diffuse on auscultation  Abdomen: Soft, nontender, no distension   Incision: dressing in place, clean and dry  Pelvic: no vaginal bleeding  Ext: No calf tenderness, SCDs in place    Diet: NPO    meds: (ADM OVERRIDE)   1 Each &lt;See Task&gt; (04-10-19 @ 20:26)    (ADM OVERRIDE)   1 Each &lt;See Task&gt; (04-10-19 @ 21:36)    (ADM OVERRIDE)   1 Each &lt;See Task&gt; (04-10-19 @ 20:52)    (ADM OVERRIDE)   1 Each &lt;See Task&gt; (19 @ 01:19)    (ADM OVERRIDE)   1 Each &lt;See Task&gt; (04-10-19 @ 21:53)    (ADM OVERRIDE)   1 Each &lt;See Task&gt; (04-10-19 @ 23:22)    (ADM OVERRIDE)   1 Each &lt;See Task&gt; (19 @ 00:13)    (ADM OVERRIDE)   1 Each &lt;See Task&gt; (19 @ 02:13)    (Floorstock)   2 Each &lt;See Task&gt; (04-10-19 @ 19:08)    (Floorstock)   1 Each &lt;See Task&gt; (04-10-19 @ 19:08)    (Floorstock)   1 Each &lt;See Task&gt; (04-10-19 @ 16:49)    adenosine Injectable (ADENOCARD)   6 milliGRAM(s) IV Push (04-10-19 @ 15:47)    adenosine Injectable (ADENOCARD)   12 milliGRAM(s) IV Push (04-10-19 @ 15:58)    adenosine Injectable (ADENOCARD)   12 milliGRAM(s) IV Push (04-10-19 @ 15:50)    adenosine Injectable (ADENOCARD)   18 milliGRAM(s) IV Push (04-10-19 @ 16:30)    amiodarone Infusion   33.333 mL/Hr IV Continuous (19 @ 00:12)    amiodarone IVPB   618 mL/Hr IV Intermittent (19 @ 00:30)    calcium gluconate IVPB   200 mL/Hr IV Intermittent (19 @ 04:30)    calcium gluconate IVPB   200 mL/Hr IV Intermittent (04-10-19 @ 22:00)    ceFAZolin   IVPB   100 mL/Hr IV Intermittent (04-10-19 @ 18:10)    ceFAZolin   IVPB   100 mL/Hr IV Intermittent (19 @ 05:54)   100 mL/Hr IV Intermittent (19 @ 04:54)    cisatracurium Injectable   10 milliGRAM(s) IV Push (19 @ 03:00)    dexmedetomidine Infusion   10 mL/Hr IV Continuous (04-10-19 @ 20:10)    dextrose 50% Injectable   50 milliLiter(s) IV Push (04-10-19 @ 22:00)    diltiazem Injectable   10 milliGRAM(s) IV Push (19 @ 03:00)    diltiazem Injectable   10 milliGRAM(s) IV Push (19 @ 03:10)    EPINEPHrine    Injectable   1 milliGRAM(s) IV Push (19 @ 03:38)    etomidate Injectable   15 milliGRAM(s) IV Push (04-10-19 @ 17:43)    fentaNYL    Injectable   25 MICROGram(s) IV Push (04-10-19 @ 22:20)    fentaNYL    Injectable   50 MICROGram(s) IV Push (19 @ 01:30)    fentaNYL    Injectable   50 MICROGram(s) IV Push (04-10-19 @ 23:30)    fentaNYL   Infusion   4 mL/Hr IV Continuous (04-10-19 @ 22:21)    heparin  Infusion   10 mL/Hr IV Continuous (19 02:19)    insulin regular  human recombinant.   10 Unit(s) SubCutaneous (04-10-19 @ 22:19)    lactated ringers Bolus   1000 mL/Hr IV Bolus (04-10-19 @ 21:45)    lactated ringers Bolus   2000 mL/Hr IV Bolus (19 @ 03:00)    lactated ringers Bolus   1000 mL/Hr IV Bolus (19 @ 04:30)    lactated ringers Bolus   3000 mL/Hr IV Bolus (19 @ 03:45)    lactated ringers.   125 mL/Hr IV Continuous (04-10-19 @ 21:38)    metoprolol tartrate Injectable   5 milliGRAM(s) IV Push (04-10-19 @ 20:36)    metoprolol tartrate Injectable   5 milliGRAM(s) IV Push (04-10-19 @ 21:40)    metoprolol tartrate Injectable   5 milliGRAM(s) IV Push (04-10-19 @ 23:00)    metoprolol tartrate Injectable   5 milliGRAM(s) IV Push (19 @ 01:44)    midazolam Injectable   2 milliGRAM(s) IV Push (19 01:30)    norepinephrine Infusion   5.334 mL/Hr IV Continuous (19 @ 01:21)    phenylephrine    Infusion   10.669 mL/Hr IV Continuous (19 02:48)    propofol Injectable   200 milliGRAM(s) IV Push (04-10-19 @ 17:52)    propofol Injectable   10 milliGRAM(s) IV Push (04-10-19 @ 20:30)    propofol Injectable   5 milliGRAM(s) IV Push (04-10-19 @ 21:40)    propofol Injectable   40 milliGRAM(s) IV Push (04-10-19 @ 17:10)    propofol Injectable   40 milliGRAM(s) IV Push (04-10-19 @ 17:14)    propofol Injectable   30 milliGRAM(s) IV Push (04-10-19 @ 17:15)    propofol Injectable   50 milliGRAM(s) IV Push (04-10-19 @ 17:18)    propranolol Injectable   1 milliGRAM(s) IV Push (04-10-19 @ 20:14)    sodium chloride 0.9% Bolus   2000 mL/Hr IV Bolus (04-10-19 @ 16:00)    succinylcholine Injectable   100 milliGRAM(s) IV Push (04-10-19 @ 17:52)        LABS:                        9.7    26.37 )-----------( 266      ( 2019 02:00 )             32.9                         10.6   21.89 )-----------( 190      ( 10 Apr 2019 20:55 )             36.1                         8.5    16.20 )-----------( 266      ( 10 Apr 2019 18:51 )             29.5                         10.4   13.76 )-----------( 268      ( 10 Apr 2019 16:31 )             34.1     Magnesium, Serum: 1.6 mg/dL ( 02:00)  Magnesium, Serum: 1.7 mg/dL (04-10 @ 20:55)  Antibody Screen: POS (04-10 @ 18:50)  Magnesium, Serum: 1.9 mg/dL (04-10 @ 16:31)    19 @ 02:00      137  |  108  |  14  ----------------------------<  114<H>  5.9<H>   |  17  |  0.8    04-10-19 @ 20:55      132<L>  |  103  |  11  ----------------------------<  175<H>  6.8<HH>   |  14<L>  |  0.7    04-10-19 @ 18:51      137  |  108  |  10  ----------------------------<  163<H>  5.6<H>   |  13<L>  |  0.5<L>    04-10-19 @ 16:31      137  |  103  |  11  ----------------------------<  87  4.3   |  18  |  0.6<L>        Ca    7.7<L>      2019 02:00  Ca    7.5<L>      10 Apr 2019 20:55  Ca    7.8<L>      10 Apr 2019 18:51  Ca    9.4      10 Apr 2019 16:31  Phos  7.0<H>     04-11  Phos  5.2<H>     04-10  Mg     1.6<L>     04-11  Mg     1.7<L>     04-10  Mg     1.9     04-10    TPro  5.1<L>  /  Alb  3.1<L>  /  TBili  0.3  /  DBili  x   /  AST  47<H>  /  ALT  39  /  AlkPhos  124<H>  04-10-19 @ 18:51  TPro  6.2  /  Alb  3.8  /  TBili  0.4  /  DBili  0.2  /  AST  47<H>  /  ALT  43<H>  /  AlkPhos  146<H>  04-10-19 @ 17:24

## 2019-04-11 NOTE — H&P ADULT - HISTORY OF PRESENT ILLNESS
34 yo  at 10w1d GA by LMP of 8/10/18 with EDC of 18 with known hyperemesis gravidarum presents with nausea and vomiting many times yesterday 4/10/19. She had been taking diclegis for the past week with partial relief. She last held down a full meal yesterday (10/18) afternoon. The only liquid that she tolerates is milk, she vomits even with water. She has persistent heartburn that is relived by antacids or milk. 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 She is worried that her use of antinausea medication in her previous pregnancy caused this congenital defect, so she refuses most antinausea medications. She is comfortable taking diclegis and jaime only. She felt some mild weakness that improved with fluids given by ED team. She currently denies dizziness, weakness, fevers, chills, shortness of breath, chest pain, nausea, vomiting, dysuria, hematuria, diarrhea, or constipation.    Ob/Gyn History:  ,  c/s for craniosynostosis also complicated by hyperemesis gravidarum requiring home zofran pump, SAB x2               LMP -   8/10/18                Cycle Length - regular, monthly  Denies history of ovarian cysts, uterine fibroids, abnormal paps, or STIs  Last Pap Smear - several months ago, normal per pt    HOSPITAL COURSE:  33y female HD#1,  34w5d dated by 1st trimester sonogram, presents to the ED on 4/10 with CC of SOB for past 2 days. Patient was sinus tachy to 200s, adenosine administered, no response, cardioverted with no resolution. OBGYN team proceeded with emergent . Patient intubated in ED and taken to L&D. EBL 800cc, no acute events intraop. SICU consult placed for hemodynamic monitoring and ventilator management.    SICU COURSE:  Patient was sedated with propofol and precedex, became acidotic and retaining. Cards concerned for thyroid storm and advised no CT with contrast to r/o PE, patient was not stable for V/Q scan. Central and arterial line placed overnight for HD monitoring. [place] placed in AM for possible hemodialysis vs CVVH.    During AM rounds on  patient became sinus tachy to 180s, multiple lopressor pushes given with minimal resolution.  Patient amio gtt d/c'd started on esmolol, milrinone with resolution of sinus tachy 120s. Patient weaned off shameka, levo titrating down. Nephro consult placed, recs CVVH for hyperkalemia, low UO. Cardiology, EPS, CT Surg, Interventional cardiology bedside for evaluation determined patient would benefit from ecmo/impella due to decreasing EF 10% from 50% in ED. VA ECMO placed (right SC arterial, right femoral venous) and transferred to [facility] for further workup and management.     On arrival to [facility] at 1130pm on 19, pt received on Levophed 0.23mcg/kg/min (50ml/hr), Vasopressin 0.06units/hr (4ml), Phenylephrine 0.5mcg/kg/min (21ml/hr), VA ECMO 3100RPM with 2.45LPM    HR: 107  CVP: 23  PAP: 41/26 (31)

## 2019-04-11 NOTE — CHART NOTE - NSCHARTNOTEFT_GEN_A_CORE
SICU Transfer Note    JOE BEDOLLA  33y (1985)  [id number]      Transfer from: SICU  Transfer to: Surgery-CT Surgery under Dr. Jonas Sibley    hospitals COURSE:  33y female HD#1,  34w5d dated by 1st trimester sonogram, presents to the ED on 4/10 with CC of SOB for past 2 days. Patient was sinus tachy to 200s, adenosine administered, no response, cardioverted with no resolution. OBGYN team proceeded with emergent . Patient intubated in ED and taken to L&D. EBL 800cc, no acute events intraop. SICU consult placed for hemodrynamic monitoring and ventilator management.    SICU COURSE:  Patient was sedated with propofol and precedex, became acidotic and retaining. Cards concerned for thyroid storm and advised no CT with contrast to r/o PE, patient was not stable for V/Q scan. Central and arterial line placed overnight for HD monitoring. [place] placed in AM for possible hemodialysis vs CVVH.    During AM rounds patient became sinus tachy to 180s, multiple lopressor pushes given with minimal resolution.  Patient amio gtt d/c'd started on esmolol, milrinone with resolution of sinus tachy 120s. Patient weaned off shameka, levo titrating down. Nephro consult placed, recs CVVH for hyperkalemia, low UO. Cardiology, EPS, CT Surg, Interventional cardiology bedside for evaluation determined patient would benefit from ecmo/impella due to decreasing EF 10% from 50% in ED.    Plan discussed with OBGYN, SICU, CT Surg, EPS, Cards, Nephro and  bedside.         PAST MEDICAL & SURGICAL HISTORY:  Asthma: as child, no inhaler use&gt;10 years. Denies hospitlizations or intubations. No current inhaler.  History of low transverse  section    Allergies: Unknown        MEDICATIONS  (STANDING):  ALBUTerol    90 MICROgram(s) HFA Inhaler 4 Puff(s) Inhalation every 6 hours  ampicillin  IVPB 2 Gram(s) IV Intermittent every 8 hours  clindamycin IVPB 600 milliGRAM(s) IV Intermittent every 8 hours  CRRT Treatment    <Continuous>  dexmedetomidine Infusion 0.5 MICROgram(s)/kG/Hr (10 mL/Hr) IV Continuous <Continuous>  esMOLOL  Infusion 50 MICROgram(s)/kG/Min (34.14 mL/Hr) IV Continuous <Continuous>  furosemide   Injectable 40 milliGRAM(s) IV Push once  ipratropium 17 MICROgram(s) HFA Inhaler 4 Puff(s) Inhalation every 6 hours  milrinone Infusion 0.125 MICROgram(s)/kG/Min (4.268 mL/Hr) IV Continuous <Continuous>  norepinephrine Infusion 0.05 MICROgram(s)/kG/Min (5.334 mL/Hr) IV Continuous <Continuous>  pantoprazole  Injectable 40 milliGRAM(s) IV Push daily  phenylephrine    Infusion 0.5 MICROgram(s)/kG/Min (10.669 mL/Hr) IV Continuous <Continuous>  propofol Infusion 20 MICROgram(s)/kG/Min (13.656 mL/Hr) IV Continuous <Continuous>  PureFlow Dialysate RFP-400 (K 2 / Ca 3) 5000 milliLiter(s) (2000 mL/Hr) CRRT <Continuous>    MEDICATIONS  (PRN):  fentaNYL    Injectable 50 MICROGram(s) IV Push every 2 hours PRN Severe Pain (7 - 10)      Vital Signs Last 24 Hrs  T(C): 37.8 (2019 12:00), Max: 37.8 (2019 12:00)  T(F): 100 (2019 12:00), Max: 100 (2019 12:00)  HR: 104 (2019 14:30) (80 - 228)  BP: 87/57 (2019 02:15) (43/23 - 136/84)  BP(mean): 48 (2019 03:15) (23 - 111)  RR: 23 (2019 14:30) (14 - 48)  SpO2: 99% (2019 14:30) (85% - 100%)  I&O's Summary    10 Apr 2019 07:01  -  2019 07:00  --------------------------------------------------------  IN: 5845.2 mL / OUT: 1220 mL / NET: 4625.2 mL    2019 07:01  -  2019 14:48  --------------------------------------------------------  IN: 1565.9 mL / OUT: 300 mL / NET: 1265.9 mL        LABS  LABS:  CAPILLARY BLOOD GLUCOSE      POCT Blood Glucose.: 95 mg/dL (2019 00:52)  POCT Blood Glucose.: 126 mg/dL (10 Apr 2019 17:54)                          9.8    27.21 )-----------( 154      ( 2019 12:22 )             31.9       04-11    139  |  107  |  23<H>  ----------------------------<  66<L>  6.4<HH>   |  15<L>  |  1.6<H>    Ca    7.2<L>      2019 12:22  Phos  7.4       Mg     1.8         TPro  4.5<L>  /  Alb  2.8<L>  /  TBili  1.1  /  DBili  0.8<H>  /  AST  3689<H>  /  ALT  2048<H>  /  AlkPhos  119<H>  11      PT/INR - ( 2019 12:22 )   PT: 26.00 sec;   INR: 2.28 ratio         PTT - ( 2019 12:22 )  PTT:33.0 sec  CARDIAC MARKERS ( 2019 12:22 )  x     / 0.03 ng/mL / 801 U/L / x     / 6.0 ng/mL  CARDIAC MARKERS ( 2019 02:00 )  x     / <0.01 ng/mL / 254 U/L / x     / 2.5 ng/mL  CARDIAC MARKERS ( 10 Apr 2019 20:55 )  x     / <0.01 ng/mL / 148 U/L / x     / 3.4 ng/mL  CARDIAC MARKERS ( 10 Apr 2019 16:31 )  x     / <0.01 ng/mL / x     / x     / x                    Assesment & Plan:    33yFemale s/p emergent       Neurologic: Alert and oriented, gets agitated, sedated with precedex gtt and propofol pushes.    Respiratory: Intubated on PS 60%/8 most recent ABG Blood Gas Profile at 1400 pH - Arterial (19 @ 14:03)    pH, Arterial: 7.35    pCO2, Arterial: 30    pO2, Arterial: 187    HCO3, Arterial: 17    Base Excess, Arterial: -7.8    Oxygen Saturation, Arterial: 99%    Cardiovascular: Post partum cardiomyopathy-patient currently on milrinone, esmolol, sinus tach in 120s. Weaned off shameka, titrate down levo.       No dx. Patient became hypotensive SBP 60's, not responsive to fluid. Placed central line and a line. SBP came back up to 100's. Went into Afib 's. Not responsive to Lopressor or Cardizem. Bolused amio and started gtt. Ordered 3 sets of cardiac enzymes, cardiac enzymes negative x3. On Levo and Phenylephrine. Gave Cardizem 20 x1. Milrinone     Gastrointestinal/Nutrition: NPO. OGT in place. LR @ 125. PPI.    Genitourinary/Renal: Bazzi. Monitor I&Os. Making minimal UOP. Follow up FeNa. Not responsive on NICOM. Bladder scan 100cc. Bazzi not kinked. K 6.9 gave calcium, insulin, d50. Repeat k 4.2. Repeat K 5.9 gave another 1g of calcium. Monitor lytes replete as needed. CVVH. LASIX 40    Hematologic: SCD in place. Started on heparin gtt. PTT goal 60-80. PTT for 8 AM. H/H stable.     Infectious Disease: No dx. Afebrile. WBC WNL    Endocrine: No dx. FS Q 6. Follow up thyroid panel     Disposition: SICU       Follow Up:        Signed out to:  Date:  Time: SICU Transfer Note    JOE BEDOLLA  33y (1985)  [id number]      Transfer from: SICU  Transfer to: Surgery-CT Surgery under Dr. Jonas Sibley    [facility] COURSE:  33y female HD#1,  34w5d dated by 1st trimester sonogram, presents to the ED on 4/10 with CC of SOB for past 2 days. Patient was sinus tachy to 200s, adenosine administered, no response, cardioverted with no resolution. OBGYN team proceeded with emergent . Patient intubated in ED and taken to L&D. EBL 800cc, no acute events intraop. SICU consult placed for hemodrynamic monitoring and ventilator management.    SICU COURSE:  Patient was sedated with propofol and precedex, became acidotic and retaining. Cards concerned for thyroid storm and advised no CT with contrast to r/o PE, patient was not stable for V/Q scan. Central and arterial line placed overnight for HD monitoring. [place] placed in AM for possible hemodialysis vs CVVH.    During AM rounds patient became sinus tachy to 180s, multiple lopressor pushes given with minimal resolution.  Patient amio gtt d/c'd started on esmolol, milrinone with resolution of sinus tachy 120s. Patient weaned off shameka, levo titrating down. Nephro consult placed, recs CVVH for hyperkalemia, low UO. Cardiology, EPS, CT Surg, Interventional cardiology bedside for evaluation determined patient would benefit from ecmo/impella due to decreasing EF 10% from 50% in ED.    Plan discussed with OBGYN, SICU, CT Surg, EPS, Cards, Nephro and  bedside.         PAST MEDICAL & SURGICAL HISTORY:  Asthma: as child, no inhaler use&gt;10 years. Denies hospitlizations or intubations. No current inhaler.  History of low transverse  section    Allergies: Unknown        MEDICATIONS  (STANDING):  ALBUTerol    90 MICROgram(s) HFA Inhaler 4 Puff(s) Inhalation every 6 hours  ampicillin  IVPB 2 Gram(s) IV Intermittent every 8 hours  clindamycin IVPB 600 milliGRAM(s) IV Intermittent every 8 hours  CRRT Treatment    <Continuous>  dexmedetomidine Infusion 0.5 MICROgram(s)/kG/Hr (10 mL/Hr) IV Continuous <Continuous>  esMOLOL  Infusion 50 MICROgram(s)/kG/Min (34.14 mL/Hr) IV Continuous <Continuous>  furosemide   Injectable 40 milliGRAM(s) IV Push once  ipratropium 17 MICROgram(s) HFA Inhaler 4 Puff(s) Inhalation every 6 hours  milrinone Infusion 0.125 MICROgram(s)/kG/Min (4.268 mL/Hr) IV Continuous <Continuous>  norepinephrine Infusion 0.05 MICROgram(s)/kG/Min (5.334 mL/Hr) IV Continuous <Continuous>  pantoprazole  Injectable 40 milliGRAM(s) IV Push daily  phenylephrine    Infusion 0.5 MICROgram(s)/kG/Min (10.669 mL/Hr) IV Continuous <Continuous>  propofol Infusion 20 MICROgram(s)/kG/Min (13.656 mL/Hr) IV Continuous <Continuous>  PureFlow Dialysate RFP-400 (K 2 / Ca 3) 5000 milliLiter(s) (2000 mL/Hr) CRRT <Continuous>    MEDICATIONS  (PRN):  fentaNYL    Injectable 50 MICROGram(s) IV Push every 2 hours PRN Severe Pain (7 - 10)      Vital Signs Last 24 Hrs  T(C): 37.8 (2019 12:00), Max: 37.8 (2019 12:00)  T(F): 100 (2019 12:00), Max: 100 (2019 12:00)  HR: 104 (2019 14:30) (80 - 228)  BP: 87/57 (2019 02:15) (43/23 - 136/84)  BP(mean): 48 (2019 03:15) (23 - 111)  RR: 23 (2019 14:30) (14 - 48)  SpO2: 99% (2019 14:30) (85% - 100%)  I&O's Summary    10 Apr 2019 07:01  -  2019 07:00  --------------------------------------------------------  IN: 5845.2 mL / OUT: 1220 mL / NET: 4625.2 mL    2019 07:01  -  2019 14:48  --------------------------------------------------------  IN: 1565.9 mL / OUT: 300 mL / NET: 1265.9 mL        LABS  LABS:  CAPILLARY BLOOD GLUCOSE      POCT Blood Glucose.: 95 mg/dL (2019 00:52)  POCT Blood Glucose.: 126 mg/dL (10 Apr 2019 17:54)                          9.8    27.21 )-----------( 154      ( 2019 12:22 )             31.9       04-11    139  |  107  |  23<H>  ----------------------------<  66<L>  6.4<HH>   |  15<L>  |  1.6<H>    Ca    7.2<L>      2019 12:22  Phos  7.4       Mg     1.8         TPro  4.5<L>  /  Alb  2.8<L>  /  TBili  1.1  /  DBili  0.8<H>  /  AST  3689<H>  /  ALT  2048<H>  /  AlkPhos  119<H>  11      PT/INR - ( 2019 12:22 )   PT: 26.00 sec;   INR: 2.28 ratio         PTT - ( 2019 12:22 )  PTT:33.0 sec  CARDIAC MARKERS ( 2019 12:22 )  x     / 0.03 ng/mL / 801 U/L / x     / 6.0 ng/mL  CARDIAC MARKERS ( 2019 02:00 )  x     / <0.01 ng/mL / 254 U/L / x     / 2.5 ng/mL  CARDIAC MARKERS ( 10 Apr 2019 20:55 )  x     / <0.01 ng/mL / 148 U/L / x     / 3.4 ng/mL  CARDIAC MARKERS ( 10 Apr 2019 16:31 )  x     / <0.01 ng/mL / x     / x     / x                    Assessment & Plan:    33yFemale s/p emergent , tachycardia, hypotension, sepsis vs PE      Neurologic: Alert and oriented, gets agitated, sedated with precedex gtt and propofol pushes.    Respiratory: Intubated on PS 60%/8 most recent ABG Blood Gas Profile at 1400 pH - Arterial (19 @ 14:03)    pH, Arterial: 7.35    pCO2, Arterial: 30    pO2, Arterial: 187    HCO3, Arterial: 17    Base Excess, Arterial: -7.8    Oxygen Saturation, Arterial: 99%    Cardiovascular: Post partum cardiomyopathy-patient currently on milrinone, esmolol, sinus tach in 120s. Weaned off shameka, titrate down levo.        No dx. Patient became hypotensive SBP 60's, not responsive to fluid. Placed central line and a line. SBP came back up to 100's. Went into Afib 's. Not responsive to Lopressor or Cardizem. Bolused amio and started gtt. Ordered 3 sets of cardiac enzymes, cardiac enzymes negative x3. On Levo and Phenylephrine. Gave Cardizem 20 x1. Milrinone     Gastrointestinal/Nutrition: NPO. OGT in place. LR @ 125. PPI.    Genitourinary/Renal: Bazzi. Monitor I&Os. Making minimal UOP. Follow up FeNa. Not responsive on NICOM. Bladder scan 100cc. Bazzi not kinked. K 6.9 gave calcium, insulin, d50. Repeat k 4.2. Repeat K 5.9 gave another 1g of calcium. Monitor lytes replete as needed. CVVH. LASIX 40    Hematologic: SCD in place. Started on heparin gtt. PTT goal 60-80. PTT for 8 AM. H/H stable.     Infectious Disease: No dx. Afebrile. WBC WNL    Endocrine: No dx. FS Q 6. Follow up thyroid panel     Disposition: SICU       Follow Up:        Signed out to:  Date:  Time: SICU Transfer Note    JOE BEDOLLA  33y (1985)  [id number]      Transfer from: SICU  Transfer to: Surgery-CT Surgery under Dr. Jonas Sibley    [facility] COURSE:  33y female HD#1,  34w5d dated by 1st trimester sonogram, presents to the ED on 4/10 with CC of SOB for past 2 days. Patient was sinus tachy to 200s, adenosine administered, no response, cardioverted with no resolution. OBGYN team proceeded with emergent . Patient intubated in ED and taken to L&D. EBL 800cc, no acute events intraop. SICU consult placed for hemodrynamic monitoring and ventilator management.    SICU COURSE:  Patient was sedated with propofol and precedex, became acidotic and retaining. Cards concerned for thyroid storm and advised no CT with contrast to r/o PE, patient was not stable for V/Q scan. Central and arterial line placed overnight for HD monitoring. [place] placed in AM for possible hemodialysis vs CVVH.    During AM rounds patient became sinus tachy to 180s, multiple lopressor pushes given with minimal resolution.  Patient amio gtt d/c'd started on esmolol, milrinone with resolution of sinus tachy 120s. Patient weaned off shameka, levo titrating down. Nephro consult placed, recs CVVH for hyperkalemia, low UO. Cardiology, EPS, CT Surg, Interventional cardiology bedside for evaluation determined patient would benefit from ecmo/impella due to decreasing EF 10% from 50% in ED.    Plan discussed with OBGYN, SICU, CT Surg, EPS, Cards, Nephro and  bedside.         PAST MEDICAL & SURGICAL HISTORY:  Asthma: as child, no inhaler use&gt;10 years. Denies hospitlizations or intubations. No current inhaler.  History of low transverse  section    Allergies: Unknown        MEDICATIONS  (STANDING):  ALBUTerol    90 MICROgram(s) HFA Inhaler 4 Puff(s) Inhalation every 6 hours  ampicillin  IVPB 2 Gram(s) IV Intermittent every 8 hours  clindamycin IVPB 600 milliGRAM(s) IV Intermittent every 8 hours  CRRT Treatment    <Continuous>  dexmedetomidine Infusion 0.5 MICROgram(s)/kG/Hr (10 mL/Hr) IV Continuous <Continuous>  esMOLOL  Infusion 50 MICROgram(s)/kG/Min (34.14 mL/Hr) IV Continuous <Continuous>  furosemide   Injectable 40 milliGRAM(s) IV Push once  ipratropium 17 MICROgram(s) HFA Inhaler 4 Puff(s) Inhalation every 6 hours  milrinone Infusion 0.125 MICROgram(s)/kG/Min (4.268 mL/Hr) IV Continuous <Continuous>  norepinephrine Infusion 0.05 MICROgram(s)/kG/Min (5.334 mL/Hr) IV Continuous <Continuous>  pantoprazole  Injectable 40 milliGRAM(s) IV Push daily  phenylephrine    Infusion 0.5 MICROgram(s)/kG/Min (10.669 mL/Hr) IV Continuous <Continuous>  propofol Infusion 20 MICROgram(s)/kG/Min (13.656 mL/Hr) IV Continuous <Continuous>  PureFlow Dialysate RFP-400 (K 2 / Ca 3) 5000 milliLiter(s) (2000 mL/Hr) CRRT <Continuous>    MEDICATIONS  (PRN):  fentaNYL    Injectable 50 MICROGram(s) IV Push every 2 hours PRN Severe Pain (7 - 10)      Vital Signs Last 24 Hrs  T(C): 37.8 (2019 12:00), Max: 37.8 (2019 12:00)  T(F): 100 (2019 12:00), Max: 100 (2019 12:00)  HR: 104 (2019 14:30) (80 - 228)  BP: 87/57 (2019 02:15) (43/23 - 136/84)  BP(mean): 48 (2019 03:15) (23 - 111)  RR: 23 (2019 14:30) (14 - 48)  SpO2: 99% (2019 14:30) (85% - 100%)  I&O's Summary    10 Apr 2019 07:01  -  2019 07:00  --------------------------------------------------------  IN: 5845.2 mL / OUT: 1220 mL / NET: 4625.2 mL    2019 07:01  -  2019 14:48  --------------------------------------------------------  IN: 1565.9 mL / OUT: 300 mL / NET: 1265.9 mL        LABS  LABS:  CAPILLARY BLOOD GLUCOSE      POCT Blood Glucose.: 95 mg/dL (2019 00:52)  POCT Blood Glucose.: 126 mg/dL (10 Apr 2019 17:54)                          9.8    27.21 )-----------( 154      ( 2019 12:22 )             31.9       04-11    139  |  107  |  23<H>  ----------------------------<  66<L>  6.4<HH>   |  15<L>  |  1.6<H>    Ca    7.2<L>      2019 12:22  Phos  7.4       Mg     1.8         TPro  4.5<L>  /  Alb  2.8<L>  /  TBili  1.1  /  DBili  0.8<H>  /  AST  3689<H>  /  ALT  2048<H>  /  AlkPhos  119<H>  11      PT/INR - ( 2019 12:22 )   PT: 26.00 sec;   INR: 2.28 ratio         PTT - ( 2019 12:22 )  PTT:33.0 sec  CARDIAC MARKERS ( 2019 12:22 )  x     / 0.03 ng/mL / 801 U/L / x     / 6.0 ng/mL  CARDIAC MARKERS ( 2019 02:00 )  x     / <0.01 ng/mL / 254 U/L / x     / 2.5 ng/mL  CARDIAC MARKERS ( 10 Apr 2019 20:55 )  x     / <0.01 ng/mL / 148 U/L / x     / 3.4 ng/mL  CARDIAC MARKERS ( 10 Apr 2019 16:31 )  x     / <0.01 ng/mL / x     / x     / x            Assessment & Plan:    33yFemale s/p emergent , tachycardia, hypotension, sepsis vs PE    Neurologic: Alert and oriented, gets agitated, sedated with precedex gtt and propofol pushes.    Respiratory: Intubated on PS 60%/8 most recent ABG Blood Gas Profile at 1400    pH, Arterial: 7.35    pCO2, Arterial: 30    pO2, Arterial: 187    HCO3, Arterial: 17    Base Excess, Arterial: -7.8    Oxygen Saturation, Arterial: 99%    Cardiovascular: Post partum cardiomyopathy-patient currently on milrinone, esmolol, sinus tach in 120s. Weaned off shameka, titrate down levo. CT Surg will accept patient  for placement of impella vs ECMO  Most recent echo shows a decrease in EF to 33%    Gastrointestinal/Nutrition: NPO. OGT in place. PPI    Genitourinary/Renal: Bazzi. Monitor I&Os. Making minimal UOP. Started on CVVH via [place] left femoral    Hematologic: SCD in place. Started on heparin gtt. PTT goal 60-80, d/c'd prior to transfer to CT ICU as per cards     Infectious Disease: No dx. Afebrile. WBC WNL    Endocrine: No dx. FS Q 6. TSH wnl    Disposition: CT ICU    CT consult placed, Dr. Sibley, Dr. Adams, Dr. Sanon were bedside evaluating the patient. The decision was made to transfer the patient to CT service under Dr. Sibley which he accepted. CVVH was initiated on patient prior to transfer. Dr. TRAVIS Duong and Dr. ADITYA Duong are aware of plan and were present for discussions. Patient  made aware of patients status and plan of care from SICU and CT ICU team.        Signed out to: Neel SANCHEZ  Date: 19  Time: 1730

## 2019-04-11 NOTE — PROCEDURE NOTE - NSPROCDETAILS_GEN_ALL_CORE
location identified, draped/prepped, sterile technique used, needle inserted/introduced/positive blood return obtained via catheter/connected to a pressurized flush line/sutured in place/hemostasis with direct pressure, dressing applied/Seldinger technique/all materials/supplies accounted for at end of procedure/ultrasound guidance
patient pre-sedated, synchronized cardioversion performed
guidewire recovered/lumen(s) aspirated and flushed/sterile dressing applied/sterile technique, catheter placed/ultrasound guidance

## 2019-04-11 NOTE — PROCEDURE NOTE - NSSITEPREP_SKIN_A_CORE
chlorhexidine
alcohol/chlorhexidine/Adherence to aseptic technique: hand hygiene prior to donning barriers (gown, gloves), don cap and mask, sterile drape over patient

## 2019-04-11 NOTE — PROGRESS NOTE ADULT - SUBJECTIVE AND OBJECTIVE BOX
PGY 3 note    Patient seen at bedside and evaluated.  Intubated. On sedation.  Afebrile.  No vaginal bleeding.  Episode of hypotension and tachycardia around 2200, pt then went into afib  no response with lopressor and cardizem, response with Amio bolus and drip  Increasing lactate possibly due to CO2 retention vs hypotension  Norepi, phenylephrine drip, s/p epi x1  Started on Heparin for possible PE as pt is not stable enough to leave unit    VS  ICU Vital Signs Last 24 Hrs  T(C): 36.3 (10 Apr 2019 20:00), Max: 36.3 (10 Apr 2019 20:00)  T(F): 97.3 (10 Apr 2019 20:00), Max: 97.3 (10 Apr 2019 20:00)  HR: 132 (11 Apr 2019 00:04) (124 - 228)  BP: 96/56 (10 Apr 2019 22:45) (74/56 - 136/84)  BP(mean): 76 (10 Apr 2019 22:45) (54 - 111)  ABP: --  ABP(mean): --  RR: 28 (10 Apr 2019 22:45) (20 - 48)  SpO2: 99% (11 Apr 2019 00:04) (93% - 100%)    BPs from [place] 70s/50s, MAP low 60s, HR 80s-90s    GEN: sedated and intubated on vent, OG tube  Heart: RRR  Lungs: CTAB, no rhonchi or rales  Abd: soft, nondistended, +BS, incision dressing c/d/i  SVE: minimal amount of blood on peripad  Ext: SCDs on, no calf edema    UO: 30 cc per hour x the last 3 hours    labs  @2055 21.89>10.6/36.1<190, coags 11.8/1.0/18.8, lactate 3.6, 132/6.8/103/14/11/0.7<175, Mg 1.7, phos 5.2, CKMB 3.4, trop <0.01    @ 2344 lactate 1.9    4/11 @0200 26.37>9.7/32.9<266, coags 13.4/1.17/28.8, lactate 3.3, 137/5.9/108/17/14/0.8<114, Mg 1.6, phos 7.0, CKMB 2.5, trop <0.01  ABG 7.15ph//O270/OTM539/88%/-10.2  4/11 @0340 lactate 4.0    imaging:  Bedside echo: right ventricular dilation  LE duplex:negative  Chest XRAY: done, not read    VQ scan(ordered): 4/10 PM pt not stable to go    Medications  amio drip  precedex  fentanyl  norepi drip  phenylephrine drip  heparin drip  ancef x 2 doses post op

## 2019-04-11 NOTE — H&P ADULT - NSHPPHYSICALEXAM_GEN_ALL_CORE
Intubated, nodded head yes to simple questions, VELÁSQUEZ spontaneously, pupils equal and reactive  CV:   Resp:  GI: Intubated, nodded head yes to simple questions, VELÁSQUEZ spontaneously, pupils equal and reactive  CV: S1S2, some pulsitility on [place], ECMO Right SC arterial site intact, Right femoral venous site intact, feet cool, + doppler pulses BL UE/LE, trace edema  Resp: decreased breath sounds throughout  Abd: soft, CSection site with staples, clean, dry, intact.   GYN: Fundus firm, below umbilicus with small amount of sangious vaginal output. GYN nurse to come for full assessment  : Bazzi from OSH with small amount yellow urine  Lines: Right IJ intro/PAC, Left IJ TLC, Left femoral HD cath all from OSH

## 2019-04-11 NOTE — CHART NOTE - NSCHARTNOTEFT_GEN_A_CORE
Dr. Sibley, Dr. Adams, Dr. Sanon were bedside evaluating the patient.  The decision was made to start the patient on ECMO vs Impella. They will transfer the patient to their service.  Currently we have initiated CVVH on the patient.  The family and gyn service are aware.  Dr. Duong was present for all decisions.

## 2019-04-11 NOTE — PATIENT PROFILE ADULT - FALL HARM RISK
Pt does not want pain medication at this time, aware it is available when wanted     Aristides Seen, RN  03/03/18 0186 surgery/other

## 2019-04-11 NOTE — CHART NOTE - NSCHARTNOTEFT_GEN_A_CORE
34 yo F s/p emergent  2/2 SVT unresponsive to meds/cardioversion now admitted with cardiogenic shock in ICU. Pt still in sinus tachycardia, likely compensatory. On esmolol drip with multiple pushes of lopressor given, HR with minimal response. Milrinone started and levo titrating down, shameka and amio d/c'ed. Pt remains hypotensive systolic in 80's despite pressors and IVF. CT surgery evaluated patient to start ECMO as EF dropped from 50% to 10% on recent echo, pending this recs. Pt initially responsive to Lasix but now oliguric with increased Cr at 1.6. [place] placed for possible HD.     Plan:  - Agree with CT surgery plan for ECMO at this time  - Cont ICU monitoring  - Agree with renal plan for CVVH at this time  - HR at this time likely compensatory and 2/2 other pathologies, cont current medication and recommend to titrate down on pressors if BP tolerates  - Cont to monitor thyroid panel for ?thyroid storm  - Cont Heparin at this time 32 yo F s/p emergent  2/2 SVT unresponsive to meds/cardioversion now admitted with cardiogenic shock in ICU. Pt still in sinus tachycardia, likely compensatory. On esmolol drip with multiple pushes of lopressor given, HR with minimal response. Milrinone started and levo titrating down, shameka and amio d/c'ed. Pt remains hypotensive systolic in 80's despite pressors and IVF. CT surgery evaluated patient to start ECMO as EF dropped from 50% to 10% on recent echo, pending this recs. Pt initially responsive to Lasix but now oliguric with increased Cr at 1.6. [place] placed for possible HD.     Plan:  - Agree with CT surgery plan for ECMO at this time  - Cont ICU monitoring  - Agree with renal plan for CVVH at this time  - HR at this time likely compensatory and 2/2 other pathologies, cont current medication and recommend to titrate down on pressors if BP tolerates  -Continue esmolol drip       Attending addendum:  Critically ill patient   cardiogenic shock  Acute cardiomyopathy  I discussed care with family, and Dr. Sanon/Toñito/  Agree with ECMO for hemodynamic support

## 2019-04-11 NOTE — PROGRESS NOTE ADULT - ATTENDING COMMENTS
Echo was performed and showed Ef 10% with RV and LV failure, sever TR mod MR.  - patient requires mechanical support  - CT surgery consult for possible ECMO insertion
Assessment and plan above were modified and discussed with residents, physician assistants, and nurses.  I have provided 105  min of Critical Care to this patient.

## 2019-04-11 NOTE — PROGRESS NOTE ADULT - SUBJECTIVE AND OBJECTIVE BOX
PGY3 Note    Called [facility] and signed out to obgyn chief resident Corry Schmidt who accepted the signout, aware that the patient will be admitted under Dr. Call with cardiac surgery.    Dr. Carrillo aware

## 2019-04-11 NOTE — PROGRESS NOTE ADULT - SUBJECTIVE AND OBJECTIVE BOX
JOE BEDOLLA  [id number]  33y Female    Indication for ICU admission: s/p emergent c section. Sinus tachy 200's in ED. ?PE     Admit Date:04-10-19  ICU Date 4/10/19  OR Date: 4/10/19     Allergy Status Unknown    PAST MEDICAL & SURGICAL HISTORY:  Asthma: as child, no inhaler use&gt;10 years. Denies hospitlizations or intubations. No current inhaler.  History of low transverse  section      32yo  at 34w5d dated by first trimester sonogram, who presented to ED secondary to SOB for the past 2 days. She reports the SOB was associated with nausea and vomiting, about 5x per day. She reports mild palpitations but denies chest pain. History of hyperemesis in previous pregnancies. In the ED, patient was in sinus tachycardia 's. Cardiology fellow was at bedside. in the ED, patient was given Adenosine 6, 12, 12 and 12 without converting. The decision was made to cardiovert the patient with mild sedation. Propofol and Etomatadex was given, cardioverted (200J monophasic and biphasic 360J x2) patient was not converted. It was decided by OBGYN attending and residents to proceed with an emergent c section. Patient was intubated in the ED, after patient converted and HR came down to 160's.     Patient was transferred to L&D OR for emergent . 800cc EBL. Patient remained normotensive intra op. HR came down to 115, sinus tachycardia. Patient was kept intubated. Given 6mg of versed and Nimbex for transfer to SICU. Upon arrival to the SICU patient HR was 120's sinus tachycardia, /70. ABG was obtained showed that the patient was acidotic and retaining CO2. Adjusted her vent settings. Patient O2 sat was 95%. Once patient started waking up precedex was started. Patient was maxed out on Precedex. Propofol 5x3 was given, patient had no response. Fentanyl gtt was started, patient responded well. Patients HR increased 140's Lopressor 5x1 given with good response.     Patient has a questionable history of hyperthyroidism. Lab value on previous OBGYN visit shows TSH of 0.06 with no levels of T3/T4. Obtaining thyroid function panhel. Cardiology concerned for thyroid storm and advised no CT with contrast to r/o PE. Patient is not stable enough currently for V/Q scan currently. Dr Eugene has low index of suspicion for PE.     Placing central line and A line. Patient will be admitted to SICU for hemodynamic monitoring.      24HRS EVENT:  Neurologic: Sedated with precedex and Fentanyl gtt. Gave Nimbex 10 IV push. Received versed 2 mg x1.     Respiratory: Intubated on /5. ABG Q 2 hours. Lactate uptrending 4.0. non responsive on NICOM     Cardiovascular: No dx. Patient became hypotensive SBP 60's, not responsive to fluid. Placed central line and a line. SBP came back up to 100's. Went into Afib 's. Not responsive to Lopressor or Cardizem. Bolused amio and started gtt. Ordered 3 sets of cardiac enzymes, cardiac enzymes negative x3. On Levo and Phenylephrine. Gave Cardizem 20 x1.     Gastrointestinal/Nutrition: NPO. OGT in place. LR @ 125. PPI.    Genitourinary/Renal: Bazzi. Monitor I&Os. Making minimal UOP. Follow up FeNa. Not responsive on NICOM. Bladder scan 100cc. Bazzi not kinked. K 6.9 gave calcium, insulin, d50. Repeat k 4.2. Repeat K 5.9 gave another 1g of calcium. Monitor lytes replete as needed.     Hematologic: SCD in place. Started on heparin gtt. PTT goal 60-80. PTT for 8 AM. H/H stable.     Infectious Disease: No dx. Afebrile. WBC WNL    Endocrine: No dx. FS Q 6     Disposition: SICU         DVT PTX: heparin gtt. SCD     GI PTX: PPI     ***Tubes/Lines/Drains  ***  Peripheral IV  Central Venous Line     	  Arterial Line		                   Urinary Catheter		Indication: Strict I&O    Date Placed:       REVIEW OF SYSTEMS    [ x] A ten-point review of systems was otherwise negative except as noted.  [ ] Due to altered mental status/intubation, subjective information were not able to be obtained from the patient. History was obtained, to the extent possible, from review of the chart and collateral sources of information. JOE BEDOLLA  [id number]  33y Female    Indication for ICU admission: s/p emergent c section. Sinus tachy 200's in ED. ?PE     Admit Date:04-10-19  ICU Date 4/10/19  OR Date: 4/10/19     Allergy Status Unknown    PAST MEDICAL & SURGICAL HISTORY:  Asthma: as child, no inhaler use&gt;10 years. Denies hospitlizations or intubations. No current inhaler.  History of low transverse  section      34yo  at 34w5d dated by first trimester sonogram, who presented to ED secondary to SOB for the past 2 days. She reports the SOB was associated with nausea and vomiting, about 5x per day. She reports mild palpitations but denies chest pain. History of hyperemesis in previous pregnancies. In the ED, patient was in sinus tachycardia 's. Cardiology fellow was at bedside. in the ED, patient was given Adenosine 6, 12, 12 and 12 without converting. The decision was made to cardiovert the patient with mild sedation. Propofol and Etomatadex was given, cardioverted (200J monophasic and biphasic 360J x2) patient was not converted. It was decided by OBGYN attending and residents to proceed with an emergent c section. Patient was intubated in the ED, after patient converted and HR came down to 160's.     Patient was transferred to L&D OR for emergent . 800cc EBL. Patient remained normotensive intra op. HR came down to 115, sinus tachycardia. Patient was kept intubated. Given 6mg of versed and Nimbex for transfer to SICU. Upon arrival to the SICU patient HR was 120's sinus tachycardia, /70. ABG was obtained showed that the patient was acidotic and retaining CO2. Adjusted her vent settings. Patient O2 sat was 95%. Once patient started waking up precedex was started. Patient was maxed out on Precedex. Propofol 5x3 was given, patient had no response. Fentanyl gtt was started, patient responded well. Patients HR increased 140's Lopressor 5x1 given with good response.     Patient has a questionable history of hyperthyroidism. Lab value on previous OBGYN visit shows TSH of 0.06 with no levels of T3/T4. Obtaining thyroid function panhel. Cardiology concerned for thyroid storm and advised no CT with contrast to r/o PE. Patient is not stable enough currently for V/Q scan currently. Dr Eugene has low index of suspicion for PE.     Placing central line and A line. Patient will be admitted to SICU for hemodynamic monitoring.      24HRS EVENT:  Neurologic: Sedated with precedex and Fentanyl gtt. Gave Nimbex 10 IV push. Received versed 2 mg x1.     Respiratory: Intubated on /5. ABG Q 2 hours. Lactate uptrending 4.0. non responsive on NICOM     Cardiovascular: No dx. Patient became hypotensive SBP 60's, not responsive to fluid. Placed central line and a line. SBP came back up to 100's. Went into Afib 's. Not responsive to Lopressor or Cardizem. Bolused amio and started gtt. Ordered 3 sets of cardiac enzymes, cardiac enzymes negative x3. On Levo and Phenylephrine. Gave Cardizem 20 x1.     Gastrointestinal/Nutrition: NPO. OGT in place. LR @ 125. PPI.    Genitourinary/Renal: Bazzi. Monitor I&Os. Making minimal UOP. Follow up FeNa. Not responsive on NICOM. Bladder scan 100cc. Bazzi not kinked. K 6.9 gave calcium, insulin, d50. Repeat k 4.2. Repeat K 5.9 gave another 1g of calcium. Monitor lytes replete as needed.     Hematologic: SCD in place. Started on heparin gtt. PTT goal 60-80. PTT for 8 AM. H/H stable.     Infectious Disease: No dx. Afebrile. WBC WNL    Endocrine: No dx. FS Q 6     Disposition: SICU         DVT PTX: heparin gtt. SCD     GI PTX: PPI     ***Tubes/Lines/Drains  ***  Peripheral IV  Central Venous Line     	  Arterial Line		                   Urinary Catheter		Indication: Strict I&O    Date Placed:       REVIEW OF SYSTEMS    [ x] A ten-point review of systems was otherwise negative except as noted.  [ ] Due to altered mental status/intubation, subjective information were not able to be obtained from the patient. History was obtained, to the extent possible, from review of the chart and collateral sources of information.    Daily Height in cm: 165.1 (10 Apr 2019 20:00)    Daily Weight in k.8 (10 Apr 2019 20:00)        CURRENT MEDS:  Neurologic Medications  dexmedetomidine Infusion 0.5 MICROgram(s)/kG/Hr IV Continuous <Continuous>  fentaNYL   Infusion 0.351 MICROgram(s)/kG/Hr IV Continuous <Continuous>    Respiratory Medications    Cardiovascular Medications  amiodarone Infusion 0.5 mG/Min IV Continuous <Continuous>  furosemide   Injectable 40 milliGRAM(s) IV Push once  furosemide   Injectable 40 milliGRAM(s) IV Push once  furosemide   Injectable 40 milliGRAM(s) IV Push once  milrinone Infusion 0.125 MICROgram(s)/kG/Min IV Continuous <Continuous>  norepinephrine Infusion 0.05 MICROgram(s)/kG/Min IV Continuous <Continuous>  phenylephrine    Infusion 0.5 MICROgram(s)/kG/Min IV Continuous <Continuous>    Gastrointestinal Medications  pantoprazole  Injectable 40 milliGRAM(s) IV Push daily  sodium bicarbonate  Injectable 50 milliEquivalent(s) IV Push every 5 minutes  sodium chloride 0.9%. 1000 milliLiter(s) IV Continuous <Continuous>    Genitourinary Medications    Hematologic/Oncologic Medications  heparin  Infusion 1000 Unit(s)/Hr IV Continuous <Continuous>    Antimicrobial/Immunologic Medications    Endocrine/Metabolic Medications    Topical/Other Medications      ICU Vital Signs Last 24 Hrs  T(C): 36.4 (2019 04:00), Max: 36.6 (2019 00:00)  T(F): 97.6 (2019 04:00), Max: 97.8 (2019 00:00)  HR: 106 (2019 08:15) (80 - 228)  BP: 87/57 (2019 02:15) (43/23 - 136/84)  BP(mean): 48 (2019 03:15) (23 - 111)  ABP: 108/72 (2019 08:15) (66/38 - 164/90)  ABP(mean): 84 (2019 08:15) (50 - 110)  RR: 28 (2019 08:15) (14 - 48)  SpO2: 99% (2019 08:15) (89% - 100%)      Adult Advanced Hemodynamics Last 24 Hrs  CVP(mm Hg): --  CVP(cm H2O): --  CO: --  CI: --  PA: --  PA(mean): --  PCWP: --  SVR: --  SVRI: --  PVR: --  PVRI: --    Mode: AC/ CMV (Assist Control/ Continuous Mandatory Ventilation)  RR (machine): 20  TV (machine): 450  FiO2: 100  PEEP: 5  ITime: 1  MAP: 11  PIP: 29    ABG - ( 2019 07:41 )  pH, Arterial: 7.19  pH, Blood: x     /  pCO2: 36    /  pO2: 158   / HCO3: 14    / Base Excess: -13.3 /  SaO2: 98                  I&O's Summary    10 Apr 2019 07:  -  2019 07:00  --------------------------------------------------------  IN: 5845.2 mL / OUT: 1220 mL / NET: 4625.2 mL    2019 07:  -  2019 08:37  --------------------------------------------------------  IN: 166.3 mL / OUT: 0 mL / NET: 166.3 mL      I&O's Detail    10 Apr 2019 07:  -  2019 07:00  --------------------------------------------------------  IN:    amiodarone Infusion: 216.7 mL    dexmedetomidine Infusion: 511.2 mL    fentaNYL  Infusion: 5.7 mL    fentaNYL  Infusion: 39.8 mL    heparin Infusion: 50 mL    IV PiggyBack: 500 mL    Lactated Ringers IV Bolus: 2500 mL    lactated ringers.: 1250 mL    norepinephrine Infusion: 644 mL    phenylephrine   Infusion: 127.8 mL  Total IN: 5845.2 mL    OUT:    Indwelling Catheter - Urethral: 1220 mL  Total OUT: 1220 mL    Total NET: 4625.2 mL      2019 07:  -  2019 08:37  --------------------------------------------------------  IN:    amiodarone Infusion: 16.7 mL    dexmedetomidine Infusion: 42.6 mL    fentaNYL  Infusion: 5.7 mL    norepinephrine Infusion: 80 mL    phenylephrine   Infusion: 21.3 mL  Total IN: 166.3 mL    OUT:  Total OUT: 0 mL    Total NET: 166.3 mL          PHYSICAL EXAM:    General/Neuro  RASS: 0            GCS: 11t     Deficits: alert & oriented x 3, no focal deficits  Pupils: PERRLA, EOMI    Lungs: clear to auscultation, Normal expansion/effort.     Cardiovascular : S1, S2.  Regular rate and rhythm.  Peripheral edema   Cardiac Rhythm: Normal Sinus Rhythm    GI: Abdomen soft, Non-tender, Non-distended.      Wound:  wound clean, dry and intact.     Extremities: Extremities warm, pink, well-perfused.     Derm: Good skin turgor, no skin breakdown.      : Bazzi catheter in place.    left IJ, left fem udall, right fem a-line      CXR:     LABS:  CAPILLARY BLOOD GLUCOSE      POCT Blood Glucose.: 95 mg/dL (2019 00:52)  POCT Blood Glucose.: 126 mg/dL (10 Apr 2019 17:54)                          9.7    26.37 )-----------( 266      ( 2019 02:00 )             32.9       04-11    136  |  106  |  17  ----------------------------<  70  8.4<HH>   |  13<L>  |  1.2    Ca    7.6<L>      2019 06:07  Phos  7.0     -11  Mg     1.6     -11    TPro  5.1<L>  /  Alb  3.1<L>  /  TBili  0.3  /  DBili  x   /  AST  47<H>  /  ALT  39  /  AlkPhos  124<H>  04-10      PT/INR - ( 2019 02:00 )   PT: 13.40 sec;   INR: 1.17 ratio         PTT - ( 2019 02:00 )  PTT:28.8 sec  CARDIAC MARKERS ( 2019 02:00 )  x     / <0.01 ng/mL / 254 U/L / x     / 2.5 ng/mL  CARDIAC MARKERS ( 10 Apr 2019 20:55 )  x     / <0.01 ng/mL / 148 U/L / x     / 3.4 ng/mL  CARDIAC MARKERS ( 10 Apr 2019 16:31 )  x     / <0.01 ng/mL / x     / x     / x

## 2019-04-11 NOTE — CONSULT NOTE ADULT - CONSULT REASON
A-tach 200's in ED adenosine x4 and cardioversion x3   Emergency    ?Thyroid Storm   ?PE   Hemodynamic monitoring
ECMO evaluation
hypotension
YAJAIRA  Hyperkalemia
SVT
SVT in pregnancy

## 2019-04-11 NOTE — PROGRESS NOTE ADULT - SUBJECTIVE AND OBJECTIVE BOX
SUBJ: awake, intubated and anxious. gesturing if she can take the tube out. Wants to know if she is going to be ok  Started on amiodarone overnight as per SICU team. Started on levophed and neosynephrine for hypotension by SICU team.       MEDICATIONS  (STANDING):  amiodarone Infusion 0.5 mG/Min (16.667 mL/Hr) IV Continuous <Continuous>  dexmedetomidine Infusion 0.5 MICROgram(s)/kG/Hr (10 mL/Hr) IV Continuous <Continuous>  fentaNYL   Infusion 0.351 MICROgram(s)/kG/Hr (4 mL/Hr) IV Continuous <Continuous>  furosemide   Injectable 40 milliGRAM(s) IV Push once  furosemide   Injectable 40 milliGRAM(s) IV Push once  furosemide   Injectable 40 milliGRAM(s) IV Push once  heparin  Infusion 1000 Unit(s)/Hr (10 mL/Hr) IV Continuous <Continuous>  milrinone Infusion 0.125 MICROgram(s)/kG/Min (4.268 mL/Hr) IV Continuous <Continuous>  norepinephrine Infusion 0.05 MICROgram(s)/kG/Min (5.334 mL/Hr) IV Continuous <Continuous>  pantoprazole  Injectable 40 milliGRAM(s) IV Push daily  phenylephrine    Infusion 0.5 MICROgram(s)/kG/Min (10.669 mL/Hr) IV Continuous <Continuous>  sodium bicarbonate  Injectable 50 milliEquivalent(s) IV Push every 5 minutes  sodium chloride 0.9%. 1000 milliLiter(s) (125 mL/Hr) IV Continuous <Continuous>    MEDICATIONS  (PRN):            Vital Signs Last 24 Hrs  T(C): 36.4 (11 Apr 2019 04:00), Max: 36.6 (11 Apr 2019 00:00)  T(F): 97.6 (11 Apr 2019 04:00), Max: 97.8 (11 Apr 2019 00:00)  HR: 106 (11 Apr 2019 08:15) (80 - 228)  BP: 87/57 (11 Apr 2019 02:15) (43/23 - 136/84)  BP(mean): 48 (11 Apr 2019 03:15) (23 - 111)  RR: 28 (11 Apr 2019 08:15) (14 - 48)  SpO2: 99% (11 Apr 2019 08:15) (89% - 100%)     REVIEW OF SYSTEMS:  unable to obtain secondary to patient condition      PHYSICAL EXAM:  · CONSTITUTIONAL:	intubated and sedated.   ·RESPIRATORY:  intubated. B/L crackles and rhonchi to mid lung  · CARDIOVASCULAR	rapid but regular   no rub  no murmur  normal PMI  · EXTREMITIES: No cyanosis, clubbing or edema  · VASCULAR: 	Equal and normal pulses (carotid, femoral, dorsalis pedis)  	  TELEMETRY: had episodes of sinus tachycardia and some episodes of sinus tach with PACs    ECG: none repeated in SICU. analysis is from 6 lead EKG seen on monitor. Patient with normal axis and sinus tachycardia in 100-110 range    TTE: 2nd bedside echocardiogram performed by myself. Analysis is done with A4Ch, A2Ch, parasternal long and short axis views. Newly found is an acute drop in contractility with anterior and anteroseptal hypokinesis, LV dilatation with new Ef of 30%  by visual estimation. Also newly found new mitral regurgitation (likely functional given LV dilatation). IVC engorged without respiratory variation. RV and RA are also dilated.     LABS:                        9.7    26.37 )-----------( 266      ( 11 Apr 2019 02:00 )             32.9     04-11    136  |  106  |  17  ----------------------------<  70  8.4<HH>   |  13<L>  |  1.2    Ca    7.6<L>      11 Apr 2019 06:07  Phos  7.0     04-11  Mg     1.6     04-11    TPro  5.1<L>  /  Alb  3.1<L>  /  TBili  0.3  /  DBili  x   /  AST  47<H>  /  ALT  39  /  AlkPhos  124<H>  04-10    CARDIAC MARKERS ( 11 Apr 2019 02:00 )  x     / <0.01 ng/mL / 254 U/L / x     / 2.5 ng/mL  CARDIAC MARKERS ( 10 Apr 2019 20:55 )  x     / <0.01 ng/mL / 148 U/L / x     / 3.4 ng/mL  CARDIAC MARKERS ( 10 Apr 2019 16:31 )  x     / <0.01 ng/mL / x     / x     / x          PT/INR - ( 11 Apr 2019 02:00 )   PT: 13.40 sec;   INR: 1.17 ratio         PTT - ( 11 Apr 2019 02:00 )  PTT:28.8 sec    I&O's Summary    10 Apr 2019 07:01  -  11 Apr 2019 07:00  --------------------------------------------------------  IN: 5845.2 mL / OUT: 1220 mL / NET: 4625.2 mL    11 Apr 2019 07:01 - 11 Apr 2019 08:43  --------------------------------------------------------  IN: 166.3 mL / OUT: 0 mL / NET: 166.3 mL      BNP  RADIOLOGY & ADDITIONAL STUDIES:    IMPRESSION AND PLAN:

## 2019-04-11 NOTE — H&P ADULT - PROBLEM SELECTOR PLAN 1
--Currently on [facility] ECMO, RPMs increased from 3100 to 4000rpm per MD Call  --Wean pressors as tolerated for MAP goal 70s  --consider adding inotropes  --ECG  --Full set up labs + blood and UA,  RSV swab  --consider repeating ECHO in AM  --CXR

## 2019-04-11 NOTE — CONSULT NOTE ADULT - ATTENDING COMMENTS
32yo female presented atrial tachycardia, palpitations, SOB X 2days  with N/V X5days on 34th week of her pregnancy.   Patient became unstable from RVR SVT and was taken for emergent C section after failed chemical (adenosine/propranolol) and DCCV.  Overnight, patient developed profound hypotension/hypoxia, on massive doses of pressors/inotropes, acidotic,on maximum vent support    CT surgery called for ECMO evaluation.   EF dropped from normal (50%) to 30% and most recent echo 10%  biventricular failure with mod TR  Initially responded to lasix, and now anuric  discussed with cardiology/EP  rate controlled with esmolol gtt  on levo @100cc  on Milr 0.5  oxygenation improved  acid/ base status BD of -7, lactate of 4  recommend right heart catheter for better hemodynamic evaluation  will hold off on ecmo/impella as patient has improved since this morning (off shameka, vent setting down)  d/w surgical ICU team
I was Physically Present for the key portions of the evaluation   I agree with the above History  , Physical examination Assessment and plan   I have Reviewed , Modified or appended where appropriate.  Please check A and P as above  1- YAJAIRA / hyperkalemia/ pregnancy (34 weeks ) sp C section / shock / Sinus tachycardia/ leukocystosis/ HAGMA acidosis/ lactic acidosis/ respiratory failure  sp lasix non oliguric now K trending down on repeat ABG   will follow BMP and K / acid base status closely and decide on RRT   EP/ cardio on case  follow TFT   will follow closely
Critically ill  Complex case  Atrial Tachycardia at 200-220 bpm  Adenosine induced AV block without termination of AT: P wave morphology on 6 leads consisted with Lyudmila AT  Adenosine x4 did not terminate tachycardia  Propranolol 1 mg x2 slowed AT to 190-200 bpm  Failed DCCV x3  AT terminated in OR under GA   Recommend :  -[place]  -r/o PE  -r/o hyperthyroidism  -r/o cardiomyoapthy  -MAnagment per ICU team
intubated and sedated   tachycardic   continue vent support   treat tachycardia / atrial fib '  will need w/u for PE   continue sicu care

## 2019-04-11 NOTE — PROGRESS NOTE ADULT - REASON FOR ADMISSION
34 weeks gestation admitted with tachycardia
34 weeks gestation SVT delivered
34 weeks gestation with svt unstable
s/p emergent c section   sinus tachy 200's in ED   hemodynamic monitoring

## 2019-04-11 NOTE — PROGRESS NOTE ADULT - ASSESSMENT
Assessment: 34 y/o F s/p emergent c section     Neurologic: Monitor for changes in mental status. Sedated with precedex and Fentanyl gtt. Gave Nimbex 10 IV push. Received versed 2 mg x1.     Respiratory: Intubated on /5. ABG Q 2 hours. Lactate uptrending 4.0. non responsive on NICOM. V/Q scan if stable???    Cardiovascular: No dx. Patient became hypotensive SBP 60's, not responsive to fluid. Placed central line and a line. SBP came back up to 100's. Went into Afib 's. Not responsive to Lopressor or Cardizem. Bolused amio and started gtt. Ordered 3 sets of cardiac enzymes, cardiac enzymes negative x3. On Levo and Phenylephrine. Gave Cardizem 20 x1.     Gastrointestinal/Nutrition: NPO. OGT in place. LR @ 125. PPI.    Genitourinary/Renal: Bazzi. Monitor I&Os. Making minimal UOP. Follow up FeNa. Not responsive on NICOM. Bladder scan 100cc. Bazzi not kinked. K 6.9 gave calcium, insulin, d50. Repeat k 4.2. Repeat K 5.9 gave another 1g of calcium. Monitor lytes replete as needed.     Hematologic: SCD in place. Started on heparin gtt. PTT goal 60-80. PTT for 8 AM. H/H stable.     Infectious Disease: No dx. Afebrile. WBC WNL    Endocrine: No dx. FS Q 6. Follow up thyroid panel     Disposition: SICU Assessment: 32 y/o F s/p emergent c section     Neurologic: Monitor for changes in mental status. Sedated with precedex and Fentanyl gtt. Gave Nimbex 10 IV push. Received versed 2 mg x1.     Respiratory: Intubated on /5. ABG Q 2 hours. Lactate uptrending 4.0. non responsive on NICOM. V/Q scan if stable???    Cardiovascular: No dx. Patient became hypotensive SBP 60's, not responsive to fluid. Placed central line and a line. SBP came back up to 100's. Went into Afib 's. Not responsive to Lopressor or Cardizem. Bolused amio and started gtt. Ordered 3 sets of cardiac enzymes, cardiac enzymes negative x3. On Levo and Phenylephrine. Gave Cardizem 20 x1. Milrinone     Gastrointestinal/Nutrition: NPO. OGT in place. LR @ 125. PPI.    Genitourinary/Renal: Bazzi. Monitor I&Os. Making minimal UOP. Follow up FeNa. Not responsive on NICOM. Bladder scan 100cc. Bazzi not kinked. K 6.9 gave calcium, insulin, d50. Repeat k 4.2. Repeat K 5.9 gave another 1g of calcium. Monitor lytes replete as needed. CVVH. LASIX 40    Hematologic: SCD in place. Started on heparin gtt. PTT goal 60-80. PTT for 8 AM. H/H stable.     Infectious Disease: No dx. Afebrile. WBC WNL    Endocrine: No dx. FS Q 6. Follow up thyroid panel     Disposition: SICU Assessment: 32 y/o F s/p emergent c section     Neurologic: Monitor for changes in mental status. Sedated with precedex and Fentanyl gtt. Gave Nimbex 10 IV push. Received versed 2 mg x1.     Respiratory: Intubated on /5. ABG Q 2 hours. Lactate uptrending 4.0. non responsive on NICOM. oxygenation improved from po2 85 on 100% fio2 and peep5 to 216 on peep 8 and fio2 60 with diuresis over the course of the day.     Cardiovascular: No dx. Patient became hypotensive SBP 60's, not responsive to fluid. Placed central line and a line. SBP came back up to 100's. Went into Afib 's. Not responsive to Lopressor or Cardizem. Bolused amio and started gtt. Ordered 3 sets of cardiac enzymes, cardiac enzymes negative x3. On Levo and Phenylephrine. Gave Cardizem 20 x1. Milrinone   Cardiogenic shock: bedside echo decreased ef and stroke volume -milrinone initiated. Patient developed SVT of 190 responded to lopressor 10 x2 and started esmolol for HR control to optimize sv. Bedside Echo showed dilated right and left ventricles. Started diuretics with 200 ml response in 1 hour which also helped with hyperkalemia (8->6-5.7). Patient was weaned off phenylephrine. Urinary output decreased and not responsive to lasix anymore. Plan was to initiate CVVHD. Cardiology and EPS service are concerned with patient's low ef and are planning on either left ventricular assist device or va echmo. will transfer care to cardiothoracic team.     Gastrointestinal/Nutrition: NPO. OGT in place. LR @ 125. PPI.    Genitourinary/Renal: Bazzi. Monitor I&Os. Making minimal UOP. Follow up FeNa. Not responsive on NICOM. Bladder scan 100cc. Bazzi not kinked. K 6.9 gave calcium, insulin, d50. Repeat k 4.2. Repeat K 5.9 gave another 1g of calcium. Monitor lytes replete as needed. CVVH. LASIX 40 +40 +40 -eventually patient was not responsive to lasix will need cvvhd for fluid removal     Hematologic: SCD in place. Started on heparin gtt. PTT goal 60-80. PTT for 8 AM. H/H stable.     Infectious Disease: No dx. Afebrile. WBC WNL    Endocrine: No dx. FS Q 6. Follow up thyroid panel     Disposition: SICU

## 2019-04-11 NOTE — PROGRESS NOTE ADULT - ASSESSMENT
Cardiogenic Shock  - wean off phenylephrine, start milrinone  - aggressive diuresis, nephrology consultation in light of hyperkalemia, metabolic acidosis and fludi overload with oliguria  - official 2d echo  - will discuss mechanical support for LV and transfer to transplant center.   Suspected Thyroid storm  - amiodarone can potentially worsen  - consider starting PTU, corticosteroids, BB once stable.   - awaiting TSH and thyroid panel. Cardiogenic Shock- likely stress induced  - wean off phenylephrine, start milrinone  - aggressive diuresis, nephrology consultation in light of hyperkalemia, metabolic acidosis and fluid overload with oliguria  - official 2d echo  - will discuss mechanical support for LV  Suspected Thyroid storm  - amiodarone can potentially worsen  - consider starting PTU, corticosteroids, BB once stable.   - awaiting TSH and thyroid panel. Cardiogenic Shock- likely from tachycardia Cm or post-pardum CM  - wean off phenylephrine, start milrinone  - aggressive diuresis, nephrology consultation in light of hyperkalemia, metabolic acidosis and fluid overload with oliguria  - official 2d echo  - will discuss mechanical support for LV  Suspected Thyroid storm  - amiodarone can potentially worsen  - consider starting PTU, corticosteroids, BB once stable.   - awaiting TSH and thyroid panel.

## 2019-04-12 DIAGNOSIS — I50.82 BIVENTRICULAR HEART FAILURE: ICD-10-CM

## 2019-04-12 DIAGNOSIS — J45.909 UNSPECIFIED ASTHMA, UNCOMPLICATED: ICD-10-CM

## 2019-04-12 DIAGNOSIS — O99.89 OTHER SPECIFIED DISEASES AND CONDITIONS COMPLICATING PREGNANCY, CHILDBIRTH AND THE PUERPERIUM: ICD-10-CM

## 2019-04-12 DIAGNOSIS — R57.0 CARDIOGENIC SHOCK: ICD-10-CM

## 2019-04-12 DIAGNOSIS — N17.9 ACUTE KIDNEY FAILURE, UNSPECIFIED: ICD-10-CM

## 2019-04-12 LAB
ALBUMIN SERPL ELPH-MCNC: 2 G/DL — LOW (ref 3.3–5)
ALBUMIN SERPL ELPH-MCNC: 2.3 G/DL — LOW (ref 3.3–5)
ALBUMIN SERPL ELPH-MCNC: 2.3 G/DL — LOW (ref 3.3–5)
ALBUMIN SERPL ELPH-MCNC: 2.4 G/DL — LOW (ref 3.3–5)
ALP SERPL-CCNC: 102 U/L — SIGNIFICANT CHANGE UP (ref 40–120)
ALP SERPL-CCNC: 104 U/L — SIGNIFICANT CHANGE UP (ref 40–120)
ALP SERPL-CCNC: 106 U/L — SIGNIFICANT CHANGE UP (ref 40–120)
ALP SERPL-CCNC: 108 U/L — SIGNIFICANT CHANGE UP (ref 40–120)
ALT FLD-CCNC: 1832 U/L — HIGH (ref 10–45)
ALT FLD-CCNC: 2089 U/L — HIGH (ref 10–45)
ALT FLD-CCNC: 2137 U/L — HIGH (ref 10–45)
ALT FLD-CCNC: 2408 U/L — HIGH (ref 10–45)
ANION GAP SERPL CALC-SCNC: 12 MMOL/L — SIGNIFICANT CHANGE UP (ref 5–17)
ANION GAP SERPL CALC-SCNC: 15 MMOL/L — SIGNIFICANT CHANGE UP (ref 5–17)
ANION GAP SERPL CALC-SCNC: 16 MMOL/L — SIGNIFICANT CHANGE UP (ref 5–17)
ANION GAP SERPL CALC-SCNC: 17 MMOL/L — SIGNIFICANT CHANGE UP (ref 5–17)
APPEARANCE UR: ABNORMAL
APTT BLD: 26.9 SEC — LOW (ref 27.5–36.3)
APTT BLD: 28.2 SEC — SIGNIFICANT CHANGE UP (ref 27.5–36.3)
APTT BLD: 29 SEC — SIGNIFICANT CHANGE UP (ref 27.5–36.3)
APTT BLD: 30.1 SEC — SIGNIFICANT CHANGE UP (ref 27.5–36.3)
APTT BLD: 32.1 SEC — SIGNIFICANT CHANGE UP (ref 27.5–36.3)
AST SERPL-CCNC: 6298 U/L — HIGH (ref 10–40)
AST SERPL-CCNC: 6988 U/L — HIGH (ref 10–40)
AST SERPL-CCNC: >7000 U/L — HIGH (ref 10–40)
AST SERPL-CCNC: >7000 U/L — HIGH (ref 10–40)
BASE EXCESS BLDA CALC-SCNC: -5.4 MMOL/L — LOW (ref -2–2)
BASE EXCESS BLDMV CALC-SCNC: -2.3 MMOL/L — SIGNIFICANT CHANGE UP (ref -3–3)
BASE EXCESS BLDMV CALC-SCNC: -2.8 MMOL/L — SIGNIFICANT CHANGE UP (ref -3–3)
BASE EXCESS BLDMV CALC-SCNC: -4.8 MMOL/L — LOW (ref -3–3)
BASE EXCESS BLDV CALC-SCNC: -6 MMOL/L — LOW (ref -2–2)
BASE EXCESS BLDV CALC-SCNC: -6 MMOL/L — LOW (ref -2–2)
BASOPHILS # BLD AUTO: 0 K/UL — SIGNIFICANT CHANGE UP (ref 0–0.2)
BILIRUB SERPL-MCNC: 1.1 MG/DL — SIGNIFICANT CHANGE UP (ref 0.2–1.2)
BILIRUB SERPL-MCNC: 1.2 MG/DL — SIGNIFICANT CHANGE UP (ref 0.2–1.2)
BILIRUB SERPL-MCNC: 1.2 MG/DL — SIGNIFICANT CHANGE UP (ref 0.2–1.2)
BILIRUB SERPL-MCNC: 1.5 MG/DL — HIGH (ref 0.2–1.2)
BILIRUB UR-MCNC: NEGATIVE — SIGNIFICANT CHANGE UP
BLD GP AB SCN SERPL QL: POSITIVE — SIGNIFICANT CHANGE UP
BUN SERPL-MCNC: 28 MG/DL — HIGH (ref 7–23)
BUN SERPL-MCNC: 33 MG/DL — HIGH (ref 7–23)
BUN SERPL-MCNC: 33 MG/DL — HIGH (ref 7–23)
BUN SERPL-MCNC: 35 MG/DL — HIGH (ref 7–23)
CALCIUM SERPL-MCNC: 7.3 MG/DL — LOW (ref 8.4–10.5)
CALCIUM SERPL-MCNC: 7.6 MG/DL — LOW (ref 8.4–10.5)
CALCIUM SERPL-MCNC: 7.8 MG/DL — LOW (ref 8.4–10.5)
CALCIUM SERPL-MCNC: 7.9 MG/DL — LOW (ref 8.4–10.5)
CHLORIDE SERPL-SCNC: 106 MMOL/L — SIGNIFICANT CHANGE UP (ref 96–108)
CHLORIDE SERPL-SCNC: 107 MMOL/L — SIGNIFICANT CHANGE UP (ref 96–108)
CO2 BLDA-SCNC: 19 MMOL/L — LOW (ref 22–30)
CO2 BLDMV-SCNC: 21 MMOL/L — SIGNIFICANT CHANGE UP (ref 21–29)
CO2 BLDMV-SCNC: 22 MMOL/L — SIGNIFICANT CHANGE UP (ref 21–29)
CO2 BLDMV-SCNC: 23 MMOL/L — SIGNIFICANT CHANGE UP (ref 21–29)
CO2 BLDV-SCNC: 20 MMOL/L — LOW (ref 22–30)
CO2 BLDV-SCNC: 20 MMOL/L — LOW (ref 22–30)
CO2 SERPL-SCNC: 17 MMOL/L — LOW (ref 22–31)
CO2 SERPL-SCNC: 18 MMOL/L — LOW (ref 22–31)
CO2 SERPL-SCNC: 18 MMOL/L — LOW (ref 22–31)
CO2 SERPL-SCNC: 21 MMOL/L — LOW (ref 22–31)
COLOR SPEC: YELLOW — SIGNIFICANT CHANGE UP
CREAT SERPL-MCNC: 2.43 MG/DL — HIGH (ref 0.5–1.3)
CREAT SERPL-MCNC: 2.5 MG/DL — HIGH (ref 0.5–1.3)
CREAT SERPL-MCNC: 2.64 MG/DL — HIGH (ref 0.5–1.3)
CREAT SERPL-MCNC: 2.73 MG/DL — HIGH (ref 0.5–1.3)
DIFF PNL FLD: ABNORMAL
EOSINOPHIL # BLD AUTO: 0.1 K/UL — SIGNIFICANT CHANGE UP (ref 0–0.5)
FLU A RESULT: SIGNIFICANT CHANGE UP
FLU A RESULT: SIGNIFICANT CHANGE UP
FLUAV AG NPH QL: SIGNIFICANT CHANGE UP
FLUBV AG NPH QL: SIGNIFICANT CHANGE UP
GAS PNL BLDA: SIGNIFICANT CHANGE UP
GAS PNL BLDMV: SIGNIFICANT CHANGE UP
GAS PNL BLDV: SIGNIFICANT CHANGE UP
GAS PNL BLDV: SIGNIFICANT CHANGE UP
GLUCOSE SERPL-MCNC: 104 MG/DL — HIGH (ref 70–99)
GLUCOSE SERPL-MCNC: 104 MG/DL — HIGH (ref 70–99)
GLUCOSE SERPL-MCNC: 114 MG/DL — HIGH (ref 70–99)
GLUCOSE SERPL-MCNC: 98 MG/DL — SIGNIFICANT CHANGE UP (ref 70–99)
GLUCOSE UR QL: NEGATIVE — SIGNIFICANT CHANGE UP
HCO3 BLDA-SCNC: 18 MMOL/L — LOW (ref 21–29)
HCO3 BLDMV-SCNC: 20 MMOL/L — SIGNIFICANT CHANGE UP (ref 20–28)
HCO3 BLDMV-SCNC: 22 MMOL/L — SIGNIFICANT CHANGE UP (ref 20–28)
HCO3 BLDMV-SCNC: 22 MMOL/L — SIGNIFICANT CHANGE UP (ref 20–28)
HCO3 BLDV-SCNC: 18 MMOL/L — LOW (ref 21–29)
HCO3 BLDV-SCNC: 18 MMOL/L — LOW (ref 21–29)
HCT VFR BLD CALC: 26.1 % — LOW (ref 34.5–45)
HGB BLD-MCNC: 8.4 G/DL — LOW (ref 11.5–15.5)
HOROWITZ INDEX BLDA+IHG-RTO: 100 — SIGNIFICANT CHANGE UP
HOROWITZ INDEX BLDMV+IHG-RTO: 100 — SIGNIFICANT CHANGE UP
HOROWITZ INDEX BLDV+IHG-RTO: 100 — SIGNIFICANT CHANGE UP
HOROWITZ INDEX BLDV+IHG-RTO: 100 — SIGNIFICANT CHANGE UP
INR BLD: 1.89 RATIO — HIGH (ref 0.88–1.16)
INR BLD: 1.94 RATIO — HIGH (ref 0.88–1.16)
INR BLD: 2.06 RATIO — HIGH (ref 0.88–1.16)
INR BLD: 2.11 RATIO — HIGH (ref 0.88–1.16)
KETONES UR-MCNC: NEGATIVE — SIGNIFICANT CHANGE UP
LDH SERPL L TO P-CCNC: >2250 U/L — HIGH (ref 50–242)
LEUKOCYTE ESTERASE UR-ACNC: NEGATIVE — SIGNIFICANT CHANGE UP
LYMPHOCYTES # BLD AUTO: 11 % — LOW (ref 13–44)
LYMPHOCYTES # BLD AUTO: 2 K/UL — SIGNIFICANT CHANGE UP (ref 1–3.3)
MAGNESIUM SERPL-MCNC: 1.8 MG/DL — SIGNIFICANT CHANGE UP (ref 1.6–2.6)
MCHC RBC-ENTMCNC: 28.7 PG — SIGNIFICANT CHANGE UP (ref 27–34)
MCHC RBC-ENTMCNC: 32 GM/DL — SIGNIFICANT CHANGE UP (ref 32–36)
MCV RBC AUTO: 89.8 FL — SIGNIFICANT CHANGE UP (ref 80–100)
MONOCYTES # BLD AUTO: 2.1 K/UL — HIGH (ref 0–0.9)
MONOCYTES NFR BLD AUTO: 7 % — SIGNIFICANT CHANGE UP (ref 2–14)
MRSA PCR RESULT.: SIGNIFICANT CHANGE UP
NEUTROPHILS # BLD AUTO: 20.8 K/UL — HIGH (ref 1.8–7.4)
NEUTROPHILS NFR BLD AUTO: 82 % — HIGH (ref 43–77)
NITRITE UR-MCNC: NEGATIVE — SIGNIFICANT CHANGE UP
O2 CT VFR BLD CALC: 45 MMHG — SIGNIFICANT CHANGE UP (ref 30–65)
O2 CT VFR BLD CALC: 50 MMHG — SIGNIFICANT CHANGE UP (ref 30–65)
O2 CT VFR BLD CALC: 53 MMHG — SIGNIFICANT CHANGE UP (ref 30–65)
PCO2 BLDA: 30 MMHG — LOW (ref 32–46)
PCO2 BLDMV: 35 MMHG — SIGNIFICANT CHANGE UP (ref 30–65)
PCO2 BLDMV: 36 MMHG — SIGNIFICANT CHANGE UP (ref 30–65)
PCO2 BLDMV: 38 MMHG — SIGNIFICANT CHANGE UP (ref 30–65)
PCO2 BLDV: 35 MMHG — SIGNIFICANT CHANGE UP (ref 35–50)
PCO2 BLDV: 35 MMHG — SIGNIFICANT CHANGE UP (ref 35–50)
PH BLDA: 7.4 — SIGNIFICANT CHANGE UP (ref 7.35–7.45)
PH BLDMV: 7.36 — SIGNIFICANT CHANGE UP (ref 7.32–7.45)
PH BLDMV: 7.37 — SIGNIFICANT CHANGE UP (ref 7.32–7.45)
PH BLDMV: 7.41 — SIGNIFICANT CHANGE UP (ref 7.32–7.45)
PH BLDV: 7.35 — SIGNIFICANT CHANGE UP (ref 7.35–7.45)
PH BLDV: 7.35 — SIGNIFICANT CHANGE UP (ref 7.35–7.45)
PH UR: 6 — SIGNIFICANT CHANGE UP (ref 5–8)
PHOSPHATE SERPL-MCNC: 7.4 MG/DL — HIGH (ref 2.5–4.5)
PLATELET # BLD AUTO: 101 K/UL — LOW (ref 150–400)
PO2 BLDA: 495 MMHG — HIGH (ref 74–108)
PO2 BLDV: 65 MMHG — HIGH (ref 25–45)
PO2 BLDV: 65 MMHG — HIGH (ref 25–45)
POTASSIUM SERPL-MCNC: 4.1 MMOL/L — SIGNIFICANT CHANGE UP (ref 3.5–5.3)
POTASSIUM SERPL-MCNC: 4.9 MMOL/L — SIGNIFICANT CHANGE UP (ref 3.5–5.3)
POTASSIUM SERPL-MCNC: 5.5 MMOL/L — HIGH (ref 3.5–5.3)
POTASSIUM SERPL-MCNC: 5.8 MMOL/L — HIGH (ref 3.5–5.3)
POTASSIUM SERPL-SCNC: 4.1 MMOL/L — SIGNIFICANT CHANGE UP (ref 3.5–5.3)
POTASSIUM SERPL-SCNC: 4.9 MMOL/L — SIGNIFICANT CHANGE UP (ref 3.5–5.3)
POTASSIUM SERPL-SCNC: 5.5 MMOL/L — HIGH (ref 3.5–5.3)
POTASSIUM SERPL-SCNC: 5.8 MMOL/L — HIGH (ref 3.5–5.3)
PROT SERPL-MCNC: 3.7 G/DL — LOW (ref 6–8.3)
PROT SERPL-MCNC: 3.8 G/DL — LOW (ref 6–8.3)
PROT SERPL-MCNC: 4.1 G/DL — LOW (ref 6–8.3)
PROT SERPL-MCNC: 4.3 G/DL — LOW (ref 6–8.3)
PROT UR-MCNC: ABNORMAL
PROTHROM AB SERPL-ACNC: 22.2 SEC — HIGH (ref 10–12.9)
PROTHROM AB SERPL-ACNC: 22.8 SEC — HIGH (ref 10–12.9)
PROTHROM AB SERPL-ACNC: 24 SEC — HIGH (ref 10–12.9)
PROTHROM AB SERPL-ACNC: 24.6 SEC — HIGH (ref 10–12.9)
RBC # BLD: 2.91 M/UL — LOW (ref 3.8–5.2)
RBC # FLD: 14.6 % — HIGH (ref 10.3–14.5)
RH IG SCN BLD-IMP: NEGATIVE — SIGNIFICANT CHANGE UP
RH IG SCN BLD-IMP: NEGATIVE — SIGNIFICANT CHANGE UP
RSV RESULT: SIGNIFICANT CHANGE UP
RSV RNA RESP QL NAA+PROBE: SIGNIFICANT CHANGE UP
S AUREUS DNA NOSE QL NAA+PROBE: SIGNIFICANT CHANGE UP
SAO2 % BLDA: 100 % — HIGH (ref 92–96)
SAO2 % BLDMV: 77 % — SIGNIFICANT CHANGE UP (ref 60–90)
SAO2 % BLDMV: 80 % — SIGNIFICANT CHANGE UP (ref 60–90)
SAO2 % BLDMV: 83 % — SIGNIFICANT CHANGE UP (ref 60–90)
SAO2 % BLDV: 91 % — HIGH (ref 67–88)
SAO2 % BLDV: 91 % — HIGH (ref 67–88)
SODIUM SERPL-SCNC: 139 MMOL/L — SIGNIFICANT CHANGE UP (ref 135–145)
SODIUM SERPL-SCNC: 140 MMOL/L — SIGNIFICANT CHANGE UP (ref 135–145)
SODIUM SERPL-SCNC: 141 MMOL/L — SIGNIFICANT CHANGE UP (ref 135–145)
SODIUM SERPL-SCNC: 141 MMOL/L — SIGNIFICANT CHANGE UP (ref 135–145)
SP GR SPEC: 1.01 — SIGNIFICANT CHANGE UP (ref 1.01–1.02)
SURGICAL PATHOLOGY STUDY: SIGNIFICANT CHANGE UP
UROBILINOGEN FLD QL: NEGATIVE — SIGNIFICANT CHANGE UP
VANCOMYCIN TROUGH SERPL-MCNC: 6.8 UG/ML — LOW (ref 10–20)
WBC # BLD: 20.8 K/UL — HIGH (ref 3.8–10.5)
WBC # FLD AUTO: 20.8 K/UL — HIGH (ref 3.8–10.5)

## 2019-04-12 PROCEDURE — 86077 PHYS BLOOD BANK SERV XMATCH: CPT

## 2019-04-12 PROCEDURE — 99291 CRITICAL CARE FIRST HOUR: CPT

## 2019-04-12 PROCEDURE — 71045 X-RAY EXAM CHEST 1 VIEW: CPT | Mod: 26

## 2019-04-12 PROCEDURE — 99223 1ST HOSP IP/OBS HIGH 75: CPT | Mod: GC

## 2019-04-12 PROCEDURE — 93010 ELECTROCARDIOGRAM REPORT: CPT

## 2019-04-12 PROCEDURE — 93306 TTE W/DOPPLER COMPLETE: CPT | Mod: 26

## 2019-04-12 PROCEDURE — 99292 CRITICAL CARE ADDL 30 MIN: CPT

## 2019-04-12 PROCEDURE — 99223 1ST HOSP IP/OBS HIGH 75: CPT | Mod: AI

## 2019-04-12 PROCEDURE — 33949 ECMO/ECLS DAILY MGMT ARTERY: CPT

## 2019-04-12 RX ORDER — VANCOMYCIN HCL 1 G
1000 VIAL (EA) INTRAVENOUS ONCE
Refills: 0 | Status: COMPLETED | OUTPATIENT
Start: 2019-04-12 | End: 2019-04-12

## 2019-04-12 RX ORDER — HEPARIN SODIUM 5000 [USP'U]/ML
500 INJECTION INTRAVENOUS; SUBCUTANEOUS
Qty: 25000 | Refills: 0 | Status: DISCONTINUED | OUTPATIENT
Start: 2019-04-12 | End: 2019-04-12

## 2019-04-12 RX ORDER — HYDROMORPHONE HYDROCHLORIDE 2 MG/ML
0.5 INJECTION INTRAMUSCULAR; INTRAVENOUS; SUBCUTANEOUS ONCE
Refills: 0 | Status: DISCONTINUED | OUTPATIENT
Start: 2019-04-12 | End: 2019-04-12

## 2019-04-12 RX ORDER — CALCIUM CHLORIDE
1000 POWDER (GRAM) MISCELLANEOUS ONCE
Refills: 0 | Status: COMPLETED | OUTPATIENT
Start: 2019-04-12 | End: 2019-04-12

## 2019-04-12 RX ORDER — FUROSEMIDE 40 MG
20 TABLET ORAL
Qty: 500 | Refills: 0 | Status: DISCONTINUED | OUTPATIENT
Start: 2019-04-12 | End: 2019-04-14

## 2019-04-12 RX ORDER — CEFEPIME 1 G/1
1000 INJECTION, POWDER, FOR SOLUTION INTRAMUSCULAR; INTRAVENOUS EVERY 8 HOURS
Refills: 0 | Status: DISCONTINUED | OUTPATIENT
Start: 2019-04-12 | End: 2019-04-14

## 2019-04-12 RX ORDER — HYDROMORPHONE HYDROCHLORIDE 2 MG/ML
0.5 INJECTION INTRAMUSCULAR; INTRAVENOUS; SUBCUTANEOUS
Refills: 0 | Status: DISCONTINUED | OUTPATIENT
Start: 2019-04-12 | End: 2019-04-16

## 2019-04-12 RX ORDER — HEPARIN SODIUM 5000 [USP'U]/ML
1100 INJECTION INTRAVENOUS; SUBCUTANEOUS
Qty: 25000 | Refills: 0 | Status: DISCONTINUED | OUTPATIENT
Start: 2019-04-12 | End: 2019-04-13

## 2019-04-12 RX ORDER — CEFEPIME 1 G/1
1000 INJECTION, POWDER, FOR SOLUTION INTRAMUSCULAR; INTRAVENOUS ONCE
Refills: 0 | Status: COMPLETED | OUTPATIENT
Start: 2019-04-12 | End: 2019-04-12

## 2019-04-12 RX ORDER — CEFEPIME 1 G/1
INJECTION, POWDER, FOR SOLUTION INTRAMUSCULAR; INTRAVENOUS
Refills: 0 | Status: DISCONTINUED | OUTPATIENT
Start: 2019-04-12 | End: 2019-04-14

## 2019-04-12 RX ORDER — SODIUM CHLORIDE 9 MG/ML
10 INJECTION INTRAMUSCULAR; INTRAVENOUS; SUBCUTANEOUS
Refills: 0 | Status: DISCONTINUED | OUTPATIENT
Start: 2019-04-12 | End: 2019-04-16

## 2019-04-12 RX ORDER — FUROSEMIDE 40 MG
60 TABLET ORAL ONCE
Refills: 0 | Status: COMPLETED | OUTPATIENT
Start: 2019-04-12 | End: 2019-04-12

## 2019-04-12 RX ORDER — POTASSIUM CHLORIDE 20 MEQ
10 PACKET (EA) ORAL
Refills: 0 | Status: COMPLETED | OUTPATIENT
Start: 2019-04-12 | End: 2019-04-12

## 2019-04-12 RX ADMIN — HYDROMORPHONE HYDROCHLORIDE 0.5 MILLIGRAM(S): 2 INJECTION INTRAMUSCULAR; INTRAVENOUS; SUBCUTANEOUS at 03:30

## 2019-04-12 RX ADMIN — CEFEPIME 100 MILLIGRAM(S): 1 INJECTION, POWDER, FOR SOLUTION INTRAMUSCULAR; INTRAVENOUS at 22:32

## 2019-04-12 RX ADMIN — PANTOPRAZOLE SODIUM 40 MILLIGRAM(S): 20 TABLET, DELAYED RELEASE ORAL at 11:50

## 2019-04-12 RX ADMIN — HYDROMORPHONE HYDROCHLORIDE 0.5 MILLIGRAM(S): 2 INJECTION INTRAMUSCULAR; INTRAVENOUS; SUBCUTANEOUS at 03:15

## 2019-04-12 RX ADMIN — DEXMEDETOMIDINE HYDROCHLORIDE IN 0.9% SODIUM CHLORIDE 14.25 MICROGRAM(S)/KG/HR: 4 INJECTION INTRAVENOUS at 00:00

## 2019-04-12 RX ADMIN — Medication 49.16 MICROGRAM(S)/KG/MIN: at 09:46

## 2019-04-12 RX ADMIN — CEFEPIME 100 MILLIGRAM(S): 1 INJECTION, POWDER, FOR SOLUTION INTRAMUSCULAR; INTRAVENOUS at 14:00

## 2019-04-12 RX ADMIN — CHLORHEXIDINE GLUCONATE 5 MILLILITER(S): 213 SOLUTION TOPICAL at 14:36

## 2019-04-12 RX ADMIN — CHLORHEXIDINE GLUCONATE 5 MILLILITER(S): 213 SOLUTION TOPICAL at 09:10

## 2019-04-12 RX ADMIN — CHLORHEXIDINE GLUCONATE 5 MILLILITER(S): 213 SOLUTION TOPICAL at 22:32

## 2019-04-12 RX ADMIN — CHLORHEXIDINE GLUCONATE 5 MILLILITER(S): 213 SOLUTION TOPICAL at 01:22

## 2019-04-12 RX ADMIN — CEFEPIME 100 MILLIGRAM(S): 1 INJECTION, POWDER, FOR SOLUTION INTRAMUSCULAR; INTRAVENOUS at 02:24

## 2019-04-12 RX ADMIN — HYDROMORPHONE HYDROCHLORIDE 0.5 MILLIGRAM(S): 2 INJECTION INTRAMUSCULAR; INTRAVENOUS; SUBCUTANEOUS at 01:21

## 2019-04-12 RX ADMIN — FENTANYL CITRATE 50 MICROGRAM(S): 50 INJECTION INTRAVENOUS at 00:45

## 2019-04-12 RX ADMIN — HEPARIN SODIUM 5 UNIT(S)/HR: 5000 INJECTION INTRAVENOUS; SUBCUTANEOUS at 05:43

## 2019-04-12 RX ADMIN — CHLORHEXIDINE GLUCONATE 5 MILLILITER(S): 213 SOLUTION TOPICAL at 05:42

## 2019-04-12 RX ADMIN — Medication 60 MILLIGRAM(S): at 11:10

## 2019-04-12 RX ADMIN — Medication 250 MILLIGRAM(S): at 16:37

## 2019-04-12 RX ADMIN — CHLORHEXIDINE GLUCONATE 5 MILLILITER(S): 213 SOLUTION TOPICAL at 21:10

## 2019-04-12 RX ADMIN — Medication 250 MILLIGRAM(S): at 03:16

## 2019-04-12 RX ADMIN — CEFEPIME 100 MILLIGRAM(S): 1 INJECTION, POWDER, FOR SOLUTION INTRAMUSCULAR; INTRAVENOUS at 05:42

## 2019-04-12 RX ADMIN — HYDROMORPHONE HYDROCHLORIDE 0.5 MILLIGRAM(S): 2 INJECTION INTRAMUSCULAR; INTRAVENOUS; SUBCUTANEOUS at 01:36

## 2019-04-12 RX ADMIN — HYDROMORPHONE HYDROCHLORIDE 0.5 MILLIGRAM(S): 2 INJECTION INTRAMUSCULAR; INTRAVENOUS; SUBCUTANEOUS at 23:29

## 2019-04-12 RX ADMIN — Medication 1000 MILLIGRAM(S): at 01:22

## 2019-04-12 RX ADMIN — Medication 50 MILLIEQUIVALENT(S): at 23:06

## 2019-04-12 RX ADMIN — Medication 50 MILLIEQUIVALENT(S): at 22:31

## 2019-04-12 RX ADMIN — Medication 1000 MILLIGRAM(S): at 03:33

## 2019-04-12 RX ADMIN — FENTANYL CITRATE 50 MICROGRAM(S): 50 INJECTION INTRAVENOUS at 01:00

## 2019-04-12 RX ADMIN — HYDROMORPHONE HYDROCHLORIDE 0.5 MILLIGRAM(S): 2 INJECTION INTRAMUSCULAR; INTRAVENOUS; SUBCUTANEOUS at 23:14

## 2019-04-12 NOTE — CONSULT NOTE ADULT - ATTENDING COMMENTS
32 yo lady with no past medical history 35 weeks pregnant transferred to [facility] after she presented with shortness of breath, SVT and acutely decompensated with severe cardiomyopathy, needing ECMO, s/p emergency . now in multiorgan failure and YAJAIRA. Initiated on CRRT at [facility]. Olig-anuric. initiated on CVVHDF last night.     able to communicate despite being intubated   Chest - rales bilaterally   CVS- RRR, no rubs   Ext - 1+ edema     YAJAIRA - ATN from shock/ acute cardiomyopathy. Anuric. Continue CVVHDF. All zero K bags. Chest Xray with persistent pulmonary edema. CVP 8. Fluid removal 50-100cc/hr net negative as tolerated. Can stop lasix drip at this time. We will follow closely. Please check Phos level daily. Cefepime per CVVHD dosing. rest per fellow note.

## 2019-04-12 NOTE — CONSULT NOTE ADULT - PROBLEM SELECTOR PROBLEM 1
delivery due to maternal disorder, delivered, current hospitalization
YAJAIRA (acute kidney injury)
Cardiogenic shock

## 2019-04-12 NOTE — PROGRESS NOTE ADULT - SUBJECTIVE AND OBJECTIVE BOX
CRITICAL CARE ATTENDING - CTICU    MEDICATIONS  (STANDING):  cefepime   IVPB 1000 milliGRAM(s) IV Intermittent every 8 hours  cefepime   IVPB      chlorhexidine 0.12% Liquid 5 milliLiter(s) Oral Mucosa every 4 hours  CRRT Treatment    <Continuous>  dexmedetomidine Infusion 0.5 MICROgram(s)/kG/Hr (14.25 mL/Hr) IV Continuous <Continuous>  dextrose 50% Injectable 50 milliLiter(s) IV Push every 15 minutes  dextrose 50% Injectable 25 milliLiter(s) IV Push every 15 minutes  furosemide Infusion 20 mG/Hr (10 mL/Hr) IV Continuous <Continuous>  heparin  Infusion 500 Unit(s)/Hr (8 mL/Hr) IV Continuous <Continuous>  insulin Infusion 1 Unit(s)/Hr (1 mL/Hr) IV Continuous <Continuous>  meperidine     Injectable 25 milliGRAM(s) IV Push once  norepinephrine Infusion 0.23 MICROgram(s)/kG/Min (49.163 mL/Hr) IV Continuous <Continuous>  pantoprazole  Injectable 40 milliGRAM(s) IV Push daily  phenylephrine    Infusion 0.491 MICROgram(s)/kG/Min (21 mL/Hr) IV Continuous <Continuous>  PrismaSATE Dialysate BK 0 / 3.5 5000 milliLiter(s) (1600 mL/Hr) CRRT <Continuous>  PrismaSOL Filtration BGK 0 / 2.5 5000 milliLiter(s) (1400 mL/Hr) CRRT <Continuous>  PrismaSOL Filtration BGK 0 / 2.5 5000 milliLiter(s) (200 mL/Hr) CRRT <Continuous>  sodium chloride 0.9%. 1000 milliLiter(s) (10 mL/Hr) IV Continuous <Continuous>  vasopressin Infusion 0.067 Unit(s)/Min (4 mL/Hr) IV Continuous <Continuous>                                    8.4    20.8  )-----------( 101      ( 12 Apr 2019 06:00 )             26.1       04-12    141  |  107  |  28<H>  ----------------------------<  104<H>  4.9   |  17<L>  |  2.43<H>    Ca    7.9<L>      12 Apr 2019 06:00  Phos  7.4     04-11  Mg     1.8     04-11    TPro  3.8<L>  /  Alb  2.0<L>  /  TBili  1.2  /  DBili  x   /  AST  >7000<H>  /  ALT  2089<H>  /  AlkPhos  104  04-12      PT/INR - ( 12 Apr 2019 06:00 )   PT: 24.0 sec;   INR: 2.06 ratio         PTT - ( 12 Apr 2019 06:00 )  PTT:26.9 sec    Mode: AC/ CMV (Assist Control/ Continuous Mandatory Ventilation)  RR (machine): 14  TV (machine): 400  FiO2: 50  PEEP: 5  ITime: 1  MAP: 10  PIP: 25      Daily Height in cm: 165 (12 Apr 2019 03:00)    Daily       04-11 @ 07:01 - 04-12 @ 07:00  --------------------------------------------------------  IN: 1210.5 mL / OUT: 952 mL / NET: 258.5 mL    04-12 @ 07:01 - 04-12 @ 09:57  --------------------------------------------------------  IN: 131.2 mL / OUT: 295 mL / NET: -163.8 mL        Critically Ill patient  : [ ] preoperative ,   [x ] post operative    Requires :  [x ] Arterial Line   [x ] Central Line  [x ] PA catheter  [ ] IABP  [ x] ECMO  [ ] LVAD  [x ] Ventilator  [ ] pacemaker [ ] Impella.    Bedside evaluation , monitoring , treatment of hemodynamics , fluids , IVP/ IVCD meds.        Diagnosis:     POD 1 VA ECMO    POD 1 Emergent C Section     Cardiogenic Shock     CHF- acute [x ]   chronic [ ]    systolic [x ]   diatolic [ ]          - Echo- EF -  10%           [ ] RV dysfunction          - Cxr-cardiomegally, edema          - Clinical-  [ x]inotropes   [ x]pressors   [ ]diuresis   [ ]IABP   [ x]ECMO   [ ]LVAD   [ x]Respiratory Failure    Hemodynamic lability,instability. Requires IVCD [ x] vasopressors [ x] inotropes  [ ] vasodilator  [ ]IVSS fluid  to maintain MAP, perfusion, C.I.     respiratory failure     Requires chest PT, pulmonary toilet, ambu bagging, suctioning to maintain SaO2,  patent airway and treat atelectasis.     Ventilator Management:  [x ]AC-rest    [x ]CPAP-PS Wean    [ ]Trach Collar     [ ]Extubate    [ ] T-Piece  [ ]peep>5     Difficult weaning process - multiple organ system involvement in critically ill patient     fluid overload     Obesity OHS / WHITNEY     Thrombocytopenia      Renal Failure - Acute Kidney Injury     IVCD anticoagulation with [ x] Heparin  [ ] Argatroban for ECMO    ECMO  Circuit                    -                     Discussed with CT surgeon, Physician's Assistant - Nurse Practitioner- Critical care medicine team.   Dicussed at  AM / PM rounds.   Chart, labs , films reviewed.    Total Time: 30 min

## 2019-04-12 NOTE — CONSULT NOTE ADULT - ATTENDING COMMENTS
This 34y/o   POD#2 s/p rLTCS at [place], who was transferred to the CT ICU 2nd to multiorgan failure, on ECMO, was seen at bedside with the above Resident and was discussed with me.  I have read and reviewed the above assessment and plan and I agree.    OB team to follow for postpartum/post-op c/s care.  Pt's incision is c/d/i with staples in place; staples should usually be removed usually 3-7 days post-op and will continue to check at this time.  Lochia wnl and bleeding precautions reviewed with the PA.  Although lactation might not occur 2nd to multiorgan failure, breast exams for engorgement recommended and to be avoided.    Thank you for the consult and please call if any issues or questions.    JOVANNA Martinez MD

## 2019-04-12 NOTE — CONSULT NOTE ADULT - ASSESSMENT
32 yo F , emergent  4/10 2/2 svt transferred to [facility] for HF management ?peripartum CM, currently intubated, on ECMO, CVVHD, pressors, shock liver.    UA neg  CXR- ?LLL pna vs atelectasis, pulm edema    - c/w vancomycin  - c/w cefepime  - follow up BCx x 2  - check sputum cx  - check RVP  - if pursuing pre-lvad/transplant eval- hepatitis serologies, MMR IgG, HSV IgG, varicella IgG, toxo IgG, EBV, CMV IgG, HIV screen, syphilis screen, quant TB, coxsackie Ab, echovirus Ab

## 2019-04-12 NOTE — CONSULT NOTE ADULT - ASSESSMENT
33  POD#2 from emergent rLTCS at [facility] for maternal condition, now with multi-system organ failure of unknown etiology on ECMO. Currently in critical condition under CT ICU care.

## 2019-04-12 NOTE — CONSULT NOTE ADULT - SUBJECTIVE AND OBJECTIVE BOX
HPI: 32 yo F , childhood asthma transferred to [facility] for HF management. Most hx from chart as pt intubated. Pt had n/v at the beginning of pregnancy, improved and then started to have n/v again ~3 days prior to admission. At that time also had cough. No rhinorrhea, sore throat, fever. Went to [facility] originally, found to have SVT 200s, intubated and had emergent  on 4/10. Was monitored in SICU, ECMO placed, CVVHD started. Pt then transferred to [facility] for further management. TTE on  w EF 35%.     Was given one dose of vanco  and is currently on cefepime.       PAST MEDICAL & SURGICAL HISTORY:  Asthma: as child, no inhaler use&gt;10 years. Denies hospitlizations or intubations. No current inhaler.  History of low transverse  section      Allergies    Allergy Status Unknown    Intolerances        ANTIMICROBIALS:  cefepime   IVPB 1000 every 8 hours  cefepime   IVPB        OTHER MEDS:  chlorhexidine 0.12% Liquid 5 milliLiter(s) Oral Mucosa every 4 hours  CRRT Treatment    <Continuous>  dexmedetomidine Infusion 0.5 MICROgram(s)/kG/Hr IV Continuous <Continuous>  dextrose 50% Injectable 50 milliLiter(s) IV Push every 15 minutes  dextrose 50% Injectable 25 milliLiter(s) IV Push every 15 minutes  furosemide Infusion 20 mG/Hr IV Continuous <Continuous>  heparin  Infusion 500 Unit(s)/Hr IV Continuous <Continuous>  HYDROmorphone  Injectable 0.5 milliGRAM(s) IV Push every 2 hours PRN  insulin Infusion 1 Unit(s)/Hr IV Continuous <Continuous>  meperidine     Injectable 25 milliGRAM(s) IV Push once  norepinephrine Infusion 0.23 MICROgram(s)/kG/Min IV Continuous <Continuous>  pantoprazole  Injectable 40 milliGRAM(s) IV Push daily  phenylephrine    Infusion 0.491 MICROgram(s)/kG/Min IV Continuous <Continuous>  PrismaSATE Dialysate BK 0 / 3.5 5000 milliLiter(s) CRRT <Continuous>  PrismaSOL Filtration BGK 0 / 2.5 5000 milliLiter(s) CRRT <Continuous>  PrismaSOL Filtration BGK 0 / 2.5 5000 milliLiter(s) CRRT <Continuous>  sodium chloride 0.9% lock flush 10 milliLiter(s) IV Push every 1 hour PRN  sodium chloride 0.9%. 1000 milliLiter(s) IV Continuous <Continuous>  vasopressin Infusion 0.067 Unit(s)/Min IV Continuous <Continuous>      SOCIAL HISTORY:  Marital Status:    Lives with:     FAMILY HISTORY:  unable to obtain      ROS:  Unobtainable because: intubated, states too tired to write out answers to questions    Physical Exam:    General:    awake, alert  Head: atraumatic, normocephalic  Eyes: normal sclera and conjunctiva  ENT:  intubated  Cardio:    regular S1,S2  Respiratory:   clear b/l, no wheezing, intubated  abd:    scar looks clean w no surrounding erythema/fluctuance/pus  Musculoskeletal : no joint swelling, no edema  Skin:    no rash  vascular: on ECMO  Neurologic:  awake, alert        Drug Dosing Weight  Height (cm): 165 (2019 03:00)  Weight (kg): 114 (2019 03:00)  BMI (kg/m2): 41.9 (2019 03:00)  BSA (m2): 2.18 (2019 03:00)    Vital Signs Last 24 Hrs  T(F): 96.4 (19 @ 11:00), Max: 100 (19 @ 12:00)    Vital Signs Last 24 Hrs  HR: 112 (19 @ 12:00) (79 - 138)  BP: --  RR: 25 (19 @ 12:00)  SpO2: 100% (19 @ 12:00) (98% - 100%)  Wt(kg): --                          8.4    20.8  )-----------( 101      ( 2019 06:00 )             26.1           141  |  107  |  28<H>  ----------------------------<  104<H>  4.9   |  17<L>  |  2.43<H>    Ca    7.9<L>      2019 06:00  Phos  7.4       Mg     1.8         TPro  3.8<L>  /  Alb  2.0<L>  /  TBili  1.2  /  DBili  x   /  AST  >7000<H>  /  ALT  2089<H>  /  AlkPhos  104  -      Urinalysis Basic - ( 2019 01:00 )    Color: Yellow / Appearance: Slightly Turbid / S.012 / pH: x  Gluc: x / Ketone: Negative  / Bili: Negative / Urobili: Negative   Blood: x / Protein: 30 mg/dL / Nitrite: Negative   Leuk Esterase: Negative / RBC: 15 /hpf / WBC 8 /HPF   Sq Epi: x / Non Sq Epi: 2 /hpf / Bacteria: Negative        MICROBIOLOGY:      RADIOLOGY:    cxr - The heart is enlarged. Pulmonary edema. Left lower lobe pneumonia and/or   atelectasis. Endotracheal tube is in good position. All other life   supporting devices are in good position and unchanged when compared to   previous study done 2019. HPI: 32 yo F , childhood asthma transferred to [facility] for HF management. Pt had n/v at the beginning of pregnancy, improved and then started to have n/v again ~3 days prior to admission. At that time also had cough. No rhinorrhea, sore throat, fever. Pt works as home care nurse; pt was sick w norovirus. Has traveled to [place], [place], [facility] in past. Never lived outside [place]. No pets. Has had all childhood vaccinations.    Went to [facility] originally for sob/n/v, found to have SVT 200s, intubated and had emergent  on 4/10. Was monitored in SICU, ECMO placed, CVVHD started. Pt then transferred to [facility] for further management. TTE on  w EF 35%.     Was given one dose of vanco  and is currently on cefepime.       PAST MEDICAL & SURGICAL HISTORY:  Asthma: as child, no inhaler use&gt;10 years. Denies hospitlizations or intubations. No current inhaler.  History of low transverse  section      Allergies    Allergy Status Unknown    Intolerances        ANTIMICROBIALS:  cefepime   IVPB 1000 every 8 hours  cefepime   IVPB        OTHER MEDS:  chlorhexidine 0.12% Liquid 5 milliLiter(s) Oral Mucosa every 4 hours  CRRT Treatment    <Continuous>  dexmedetomidine Infusion 0.5 MICROgram(s)/kG/Hr IV Continuous <Continuous>  dextrose 50% Injectable 50 milliLiter(s) IV Push every 15 minutes  dextrose 50% Injectable 25 milliLiter(s) IV Push every 15 minutes  furosemide Infusion 20 mG/Hr IV Continuous <Continuous>  heparin  Infusion 500 Unit(s)/Hr IV Continuous <Continuous>  HYDROmorphone  Injectable 0.5 milliGRAM(s) IV Push every 2 hours PRN  insulin Infusion 1 Unit(s)/Hr IV Continuous <Continuous>  meperidine     Injectable 25 milliGRAM(s) IV Push once  norepinephrine Infusion 0.23 MICROgram(s)/kG/Min IV Continuous <Continuous>  pantoprazole  Injectable 40 milliGRAM(s) IV Push daily  phenylephrine    Infusion 0.491 MICROgram(s)/kG/Min IV Continuous <Continuous>  PrismaSATE Dialysate BK 0 / 3.5 5000 milliLiter(s) CRRT <Continuous>  PrismaSOL Filtration BGK 0 / 2.5 5000 milliLiter(s) CRRT <Continuous>  PrismaSOL Filtration BGK 0 / 2.5 5000 milliLiter(s) CRRT <Continuous>  sodium chloride 0.9% lock flush 10 milliLiter(s) IV Push every 1 hour PRN  sodium chloride 0.9%. 1000 milliLiter(s) IV Continuous <Continuous>  vasopressin Infusion 0.067 Unit(s)/Min IV Continuous <Continuous>      SOCIAL HISTORY:  Marital Status:    Lives with:     FAMILY HISTORY:  unable to obtain      ROS:  Unobtainable because: intubated, states too tired to write out answers to questions    Physical Exam:    General:    awake, alert  Head: atraumatic, normocephalic  Eyes: normal sclera and conjunctiva  ENT:  intubated  Cardio:    regular S1,S2  Respiratory:   clear b/l, no wheezing, intubated  abd:    scar looks clean w no surrounding erythema/fluctuance/pus  Musculoskeletal : no joint swelling, no edema  Skin:    no rash  vascular: on ECMO  Neurologic:  awake, alert        Drug Dosing Weight  Height (cm): 165 (2019 03:00)  Weight (kg): 114 (2019 03:00)  BMI (kg/m2): 41.9 (2019 03:00)  BSA (m2): 2.18 (2019 03:00)    Vital Signs Last 24 Hrs  T(F): 96.4 (19 @ 11:00), Max: 100 (19 @ 12:00)    Vital Signs Last 24 Hrs  HR: 112 (19 @ 12:00) (79 - 138)  BP: --  RR: 25 (19 @ 12:00)  SpO2: 100% (19 @ 12:00) (98% - 100%)  Wt(kg): --                          8.4    20.8  )-----------( 101      ( 2019 06:00 )             26.1           141  |  107  |  28<H>  ----------------------------<  104<H>  4.9   |  17<L>  |  2.43<H>    Ca    7.9<L>      2019 06:00  Phos  7.4     04-11  Mg     1.8         TPro  3.8<L>  /  Alb  2.0<L>  /  TBili  1.2  /  DBili  x   /  AST  >7000<H>  /  ALT  2089<H>  /  AlkPhos  104  -      Urinalysis Basic - ( 2019 01:00 )    Color: Yellow / Appearance: Slightly Turbid / S.012 / pH: x  Gluc: x / Ketone: Negative  / Bili: Negative / Urobili: Negative   Blood: x / Protein: 30 mg/dL / Nitrite: Negative   Leuk Esterase: Negative / RBC: 15 /hpf / WBC 8 /HPF   Sq Epi: x / Non Sq Epi: 2 /hpf / Bacteria: Negative        MICROBIOLOGY:      RADIOLOGY:    cxr - The heart is enlarged. Pulmonary edema. Left lower lobe pneumonia and/or   atelectasis. Endotracheal tube is in good position. All other life   supporting devices are in good position and unchanged when compared to   previous study done 2019.

## 2019-04-12 NOTE — CONSULT NOTE ADULT - SUBJECTIVE AND OBJECTIVE BOX
OBGYN Consult     33  POD#2 from rLTCS () at [facility] on 4/10/19 presents as transfer for heart failure. She was delivered at 34w5d after presenting to ED with sinus tachycardia to 200s unresponsive to cardioversion. Postoperatively she was taken to SICU where she developed heart failure with EF 10% on POD#1. She was also started on CVVH for renal failure. ECMO started at [facility] and she was transferred to [facility] under care of CT ICU team. Per CT ICU team, etiology of heart failure is unknown at this time. Currently intubated, sedated, and on ECMO.     Ob/Gyn History:  ,  c/s for craniosynostosis also complicated by hyperemesis gravidarum requiring home zofran pump, SAB x2               LMP -   8/10/18                Cycle Length - regular, monthly  Denies history of ovarian cysts, uterine fibroids, abnormal paps, or STIs  Last Pap Smear - several months ago, normal per pt    PAST MEDICAL & SURGICAL HISTORY:  Asthma: as child, no inhaler use&gt;10 years. Denies hospitlizations or intubations. No current inhaler.  History of low transverse  section    Allergies    Allergy Status Unknown      MEDICATIONS  (STANDING):  cefepime   IVPB 1000 milliGRAM(s) IV Intermittent once  cefepime   IVPB 1000 milliGRAM(s) IV Intermittent every 8 hours  cefepime   IVPB      chlorhexidine 0.12% Liquid 5 milliLiter(s) Oral Mucosa every 4 hours  CRRT Treatment    <Continuous>  dexmedetomidine Infusion 0.5 MICROgram(s)/kG/Hr (14.25 mL/Hr) IV Continuous <Continuous>  dextrose 50% Injectable 50 milliLiter(s) IV Push every 15 minutes  dextrose 50% Injectable 25 milliLiter(s) IV Push every 15 minutes  insulin Infusion 1 Unit(s)/Hr (1 mL/Hr) IV Continuous <Continuous>  meperidine     Injectable 25 milliGRAM(s) IV Push once  norepinephrine Infusion 0.23 MICROgram(s)/kG/Min (49.163 mL/Hr) IV Continuous <Continuous>  pantoprazole  Injectable 40 milliGRAM(s) IV Push daily  phenylephrine    Infusion 0.491 MICROgram(s)/kG/Min (21 mL/Hr) IV Continuous <Continuous>  PrismaSATE Dialysate BK 0 / 3.5 5000 milliLiter(s) (1600 mL/Hr) CRRT <Continuous>  PrismaSOL Filtration BGK 0 / 2.5 5000 milliLiter(s) (1400 mL/Hr) CRRT <Continuous>  PrismaSOL Filtration BGK 0 / 2.5 5000 milliLiter(s) (200 mL/Hr) CRRT <Continuous>  sodium chloride 0.9%. 1000 milliLiter(s) (10 mL/Hr) IV Continuous <Continuous>  vancomycin  IVPB 1000 milliGRAM(s) IV Intermittent once  vasopressin Infusion 0.067 Unit(s)/Min (4 mL/Hr) IV Continuous <Continuous>    MEDICATIONS  (PRN):  sodium chloride 0.9% lock flush 10 milliLiter(s) IV Push every 1 hour PRN Pre/post blood products, medications, blood draw, and to maintain line patency    FAMILY HISTORY:  No pertinent family history in first degree relatives    SOCIAL HISTORY:    Name of GYN Physician:  Lorena        Vital Signs Last 24 Hrs  T(C): 36.1 (2019 23:15), Max: 37.8 (2019 12:00)  T(F): 97 (2019 23:15), Max: 100 (2019 12:00)  HR: 81 (2019 01:30) (80 - 168)  BP: 87/57 (2019 02:15) (87/57 - 97/63)  BP(mean): 48 (2019 03:15) (23 - 88)  RR: 15 (2019 01:30) (11 - 41)  SpO2: 100% (2019 01:30) (85% - 100%)    PHYSICAL EXAM:      Constitutional: intubated and sedated  Breast: soft, no signs of engorgement or milk production  Abd: soft, fundus firm, pfannenstiel incision with staples c/d/i  : Scant urine in rodney. Lochia WNL  Ext: venodynes in place        LABS:                        8.6    25.0  )-----------( 136      ( 2019 23:48 )             27.0     04-11    140  |  107  |  33<H>  ----------------------------<  98  5.8<H>   |  18<L>  |  2.64<H>    Ca    7.3<L>      2019 23:48  Phos  7.4     -  Mg     1.8     -    TPro  4.3<L>  /  Alb  2.4<L>  /  TBili  1.2  /  DBili  x   /  AST  >7000<H>  /  ALT  2408<H>  /  AlkPhos  108  04-11    PT/INR - ( 2019 23:48 )   PT: 24.6 sec;   INR: 2.11 ratio         PTT - ( 2019 18:17 )  PTT:37.9 sec  Urinalysis Basic - ( 2019 01:00 )    Color: Yellow / Appearance: Slightly Turbid / S.012 / pH: x  Gluc: x / Ketone: Negative  / Bili: Negative / Urobili: Negative   Blood: x / Protein: 30 mg/dL / Nitrite: Negative   Leuk Esterase: Negative / RBC: 15 /hpf / WBC 8 /HPF   Sq Epi: x / Non Sq Epi: 2 /hpf / Bacteria: Negative

## 2019-04-12 NOTE — CONSULT NOTE ADULT - ASSESSMENT
32 yo F with history of hyperemesis gravidarum  initially presented to [facility] for SOB. She was found in SVT and received adenosinex4 and failed cardioversion x2. Pt then emergently taken for  and post op went into shock. She was found to have reduced EF and placed on VA ECMO and CVVHD. Transferred to [facility] on  for further management. 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  She is currently on VA ECMO, CVVHD, pressors, and intubated. She is alert and awake.

## 2019-04-12 NOTE — CONSULT NOTE ADULT - SUBJECTIVE AND OBJECTIVE BOX
[facility] Division of Kidney Diseases & Hypertension  INITIAL CONSULT NOTE  [phone]--------------------------------------------------------------------------------  HPI: 33 year old female with no major past medical history who presented as a transfer from [facility] after patient presented with CC of SOB for past 2 days. Patient was sinus tachy to 200s, adenosine administered, no response, cardioverted with no resolution. OBGYN team proceeded with emergent . Patient intubated in ED and taken to L&D. EBL 800cc, no acute events intraop. SICU consult placed for hemodynamic monitoring and ventilator management.    Patient was sedated with propofol and precedex, became acidotic and retaining. Cards concerned for thyroid storm and advised no CT with contrast to r/o PE, patient was not stable for V/Q scan. Central and arterial line placed overnight for HD monitoring. [place] placed in AM for possible hemodialysis vs CVVH. During AM rounds on  patient became sinus tachy to 180s, multiple lopressor pushes given with minimal resolution.  Patient amio gtt d/c'd started on esmolol, milrinone with resolution of sinus tachy 120s. Patient weaned off shameka, levo titrating down. Nephro consult placed, recs CVVH for hyperkalemia, low UO. Cardiology, EPS, CT Surg, Interventional cardiology bedside for evaluation determined patient would benefit from ecmo/impella due to decreasing EF 10% from 50% in ED. VA ECMO placed (right SC arterial, right femoral venous) and transferred to [facility] for further workup and management.     On arrival to [facility] at 1130pm on 19, pt received on Levophed 0.23mcg/kg/min (50ml/hr), Vasopressin 0.06units/hr (4ml), Phenylephrine 0.5mcg/kg/min (21ml/hr), VA ECMO 3100RPM with 2.45LPM. Nephrology consulted for CRRT.    Patient seen and examined at bedside. Intubated and sedated, appears fluid overloaded. Urine output noted to be minimal. BP noted to be 80-90s.        PAST HISTORY  --------------------------------------------------------------------------------  PAST MEDICAL & SURGICAL HISTORY:  Asthma: as child, no inhaler use&gt;10 years. Denies hospitlizations or intubations. No current inhaler.  History of low transverse  section    FAMILY HISTORY:  No pertinent family history in first degree relatives    PAST SOCIAL HISTORY:    ALLERGIES & MEDICATIONS  --------------------------------------------------------------------------------  Allergies    Allergy Status Unknown    Intolerances      Standing Inpatient Medications  cefepime   IVPB 1000 milliGRAM(s) IV Intermittent every 8 hours  cefepime   IVPB      chlorhexidine 0.12% Liquid 5 milliLiter(s) Oral Mucosa every 4 hours  CRRT Treatment    <Continuous>  dexmedetomidine Infusion 0.5 MICROgram(s)/kG/Hr IV Continuous <Continuous>  dextrose 50% Injectable 50 milliLiter(s) IV Push every 15 minutes  dextrose 50% Injectable 25 milliLiter(s) IV Push every 15 minutes  heparin  Infusion 500 Unit(s)/Hr IV Continuous <Continuous>  insulin Infusion 1 Unit(s)/Hr IV Continuous <Continuous>  meperidine     Injectable 25 milliGRAM(s) IV Push once  norepinephrine Infusion 0.23 MICROgram(s)/kG/Min IV Continuous <Continuous>  pantoprazole  Injectable 40 milliGRAM(s) IV Push daily  phenylephrine    Infusion 0.491 MICROgram(s)/kG/Min IV Continuous <Continuous>  PrismaSATE Dialysate BK 0 / 3.5 5000 milliLiter(s) CRRT <Continuous>  PrismaSOL Filtration BGK 0 / 2.5 5000 milliLiter(s) CRRT <Continuous>  PrismaSOL Filtration BGK 0 / 2.5 5000 milliLiter(s) CRRT <Continuous>  sodium chloride 0.9%. 1000 milliLiter(s) IV Continuous <Continuous>  vasopressin Infusion 0.067 Unit(s)/Min IV Continuous <Continuous>    PRN Inpatient Medications  HYDROmorphone  Injectable 0.5 milliGRAM(s) IV Push every 2 hours PRN  sodium chloride 0.9% lock flush 10 milliLiter(s) IV Push every 1 hour PRN      REVIEW OF SYSTEMS: Unable to obtain 2/2 clinical condition.    All other systems were reviewed and are negative, except as noted.    VITALS/PHYSICAL EXAM  --------------------------------------------------------------------------------  T(C): 35.2 (19 @ 03:00), Max: 37.8 (19 @ 12:00)  HR: 122 (19 @ 05:30) (79 - 168)  BP: SBP: 80-90s  RR: 14 (19 @ 05:30) (9 - 36)  SpO2: 100% (19 05:30) (85% - 100%)  Height (cm): 165 (19 @ 03:00)  Weight (kg): 114 (19 03:00)  BMI (kg/m2): 41.9 (19 03:00)  BSA (m2): 2.18 (19 @ 03:00)      19 @ 07:01  -  19 @ 05:57  --------------------------------------------------------  IN: 1039.2 mL / OUT: 160 mL / NET: 879.2 mL      Physical Exam:  	Gen: Intubated and sedated  	HEENT: Anicteric  	Pulm: Coarse breath sounds  	CV:  Tachycardic  	Abd: +BS, soft   	Ext: 1/2+ edema of the LE  	Neuro: Sedated  	Skin: Warm  	Vascular: No cyanosis              Access: ECMO    LABS/STUDIES  --------------------------------------------------------------------------------              8.6    25.0  >-----------<  136      [19 23:48]              27.0     141  |  107  |  35  ----------------------------<  114      [19 03:05]  5.5   |  18  |  2.73        Ca     7.6     [19 03:05]      Mg     1.8     [19 23:48]      Phos  7.4     [19 23:48]    TPro  3.7  /  Alb  2.3  /  TBili  1.1  /  DBili  x   /  AST  6988  /  ALT  2137  /  AlkPhos  102  [19 03:05]    PT/INR: PT 24.6 , INR 2.11       [19 23:48]  PTT: 29.0       [19 23:48]    Uric acid 4.0      [04-10-19 @ 18:51]  Troponin 0.03      [19 12:22]        [19 12:22]  LDH >2250      [19 23:48]    Creatinine Trend:  SCr 2.73 [ 03:05]  SCr 2.64 [:48]  SCr 2.0 [ 18:17]  SCr 1.6 [:22]  SCr 1.2 [ 06:07]    Urinalysis - [19 01:00]      Color Yellow / Appearance Slightly Turbid / SG 1.012 / pH 6.0      Gluc Negative / Ketone Negative  / Bili Negative / Urobili Negative       Blood Small / Protein 30 mg/dL / Leuk Est Negative / Nitrite Negative      RBC 15 / WBC 8 / Hyaline 5 / Gran  / Sq Epi  / Non Sq Epi 2 / Bacteria Negative    Urine Creatinine 49      [19 @ 06:07]  Urine Sodium 99.0      [19 @ 06:07]    TSH 0.79      [04-10-19 @ 18:55]    HIV Nonreact      [04-10-19 @ 18:51]

## 2019-04-12 NOTE — CHART NOTE - NSCHARTNOTEFT_GEN_A_CORE
Patient seen at bedside with MFM attending Dr. Boyd. Patient is intubated but alert and communicative by writing and mouthing words. She is on ECMO and undergoing CVVH to remove excess fluid. Patient had a repeat TTE that showed an EF of 10-15%. Patient's labs are stable. Currently being evaluated for possible LVAD or Heart transplant by CTICU team.    Per patient, she was seen by lactation nurse and is not currently producing breast milk      33  POD#2 from emergent rLTCS at [facility] for maternal condition, now with cardiogenic shock and multi-system organ failure of unknown etiology on ECMO. Currently in critical condition under CT ICU care.     - OB to follow closely  - staples to be removed POD 5  - fundal checks q shift  - ice packs to breasts to prevent breast engorgement  - s/p rhogam due to rH negative blood type  - Please call for heavy vaginal bleeding or any obstetrical problems    Seen with Dr. Deb Angulo PGY-3  [id number] Patient seen at bedside with MFM attending Dr. Boyd. Patient is intubated but alert and communicative by writing and mouthing words. She is on ECMO and undergoing CVVH to remove excess fluid. Patient had a repeat TTE that showed an EF of 10-15%. Patient's labs are stable with values somewhat improving. Currently being evaluated for possible LVAD or Heart transplant by CTICU team.    Per patient, she was seen by lactation nurse and is not currently producing breast milk.      33  POD#2 from emergent rLTCS at [facility] for maternal condition, now with cardiogenic shock and multi-system organ failure of unknown etiology on ECMO. Currently in critical condition under CT ICU care.     - OB to follow closely  - staples to be removed POD 5  - fundal checks q shift  - ice packs to breasts to prevent breast engorgement  - s/p rhogam due to rH negative blood type  - Please call for heavy vaginal bleeding or any obstetrical problems    Seen with Dr. Deb Angulo PGY-3  [id number]      MFM Attending  I saw and evaluated Ms. Jerome and agree with resident note.   Please call for any obstetrical issues or questions.   We appreciate excellent CTICU care.    Carol Boyd MD

## 2019-04-12 NOTE — CONSULT NOTE ADULT - PROBLEM SELECTOR RECOMMENDATION 9
Patient with hemodynamically-mediated YAJAIRA 2/2 low perfusion and new heart failure. LIkeiy in ATN from decreased BP as well. Agree with starting CRRT. Will continue to monitor urine output, would not remove fluid for now as patient BP is borderline and is not currently anuric and any drop in BP may further progress ATN. Maintain MAP > 70. Avoid nephrotoxic medications. Renally dose all drugs.
-OBGYN will continue to follow closely  -fundus and incision checks at least q shift  -ice packs PRN for breast engorgement  -Rhogam ordered   seen and examined with Dr. Jesus Anaya PGY3
Pt likely with Peripartum cardiomyopathy.   C/w VA-ECMO support  - Check repeat TTE  - Will try to obtain collateral information from cardiologist at [facility].   - Continue to remove fluid with eventual goal to extubate.   - Based on TTE will decide on need for LV Venting with other support.

## 2019-04-12 NOTE — CONSULT NOTE ADULT - ASSESSMENT
A/P: 33 year old F pt with PMH noted below presented as a transfer 2/2 postpartum cardiogenic shock s/p ECMO, EP consulted for AT.    - hemodynamically stabilized on ECMO  - sinus tachy vs aflutter on telemetry  - no indication for DCCV or ablation at this time  - will discuss with Dr. Jamison, please call with further questions    Brien Dunn, #[id number]  EP Fellow A/P: 33 year old F pt with PMH noted below presented as a transfer 2/2 postpartum cardiogenic shock s/p ECMO, EP consulted for AT.    - hemodynamically stabilized on ECMO  - likely 2:1 sinus tach considering ventricular rate variation on telemetry  - no indication for DCCV or ablation at this time  - will sign off, please call with further questions    Brien Dunn, #[id number]  EP Fellow

## 2019-04-12 NOTE — PROGRESS NOTE ADULT - SUBJECTIVE AND OBJECTIVE BOX
Met several times with Mr. Jerome as well as other members of the patient's family for a total of 75 minutes to discuss consent for heart transplant and LVAD evaluations. They are understandably overwhelmed. Met as well with Ms. Jerome, who is aware of plan for evaluation and is in agreement to proceed. Discussed we would hope for recovery, but also need to be prepared in the event she would need a long term device or heart transplant.  Reviewed evaluation process including testing, labs, appointments, and consults with the transplant team.  Discussed surgical, medical, and psycho-social risks and benefits, selection process, UNOS waitlist information, and follow-up care.  All questions were answered.  Consent for increased risk donor was reviewed, will consider once Ms. Jerome is able to sign on her own and if transplant is needed.  Mr. Jerome signed both consents and was supplied copies of consents, patient handbook, UNOS " Questions and Answers for transplant candidates regarding multiple listing and wait time transfer", along with contact phone number if they have any additional questions or needs.  Mr. Jerome and family aware we are available should they or their family have any additional questions or concerns.

## 2019-04-12 NOTE — CONSULT NOTE ADULT - ATTENDING COMMENTS
33yrs. no prior medical history. G4, P2. Prior pregnancy required C section (large fetus?), This pregnancy 35 weeks. Hyperemesis gravidarum requiring a zofran pump. Of note thyroid panel was abnormal as outpatient. 2days ago SOB palpitations. ED at [facility]. . adenosine given but ultimately cardioversion which was not successful. Apparantly emergant  in persistant SVT. Post op in SICU. Intubated sedated, acidotic, hypotensive, YAJAIRA. Placed on VA ECMO (fem-axillary  cannulation, no venting). Notes report TTE showing ? LVEF 50% in ED. Post C section prior to placement on VA ECMO LVEF 10 with BiV failure.  TTE (preECMO): LA 4, LVEDD 5.5, LVEF 33, RV enlarged mod reduced. mild-mod MR, mod-sev TR.   Now: ventilated, awake and alert.   levo .02, vaso 4u/hr, heparin cefipime. vanco   CVVHD 50/hr/ I/) +285.  CVP 15, PA 20/17 23, PA 77  Aflutter , 100/68, no fevers.                        8.4    (25)20.8  )-----------( 101      ( 2019 06:00 )             26.1   04-12    141  |  107  |  28<H>  ----------------------------<  104<H>  4.9   |  17<L>  |  2.43<H>    Ca    7.9<L>      2019 06:00  Phos  7.4     04-11  Mg     1.8     04-11  TPro  3.8<L>  /  Alb  2.0<L>  /  TBili  1.2  /  DBili  x   /  AST  >7000<H>  /  ALT  2089<H>  /  AlkPhos  104  04-12  PT/INR - ( 2019 06:00 )   PT: 24.0 sec;   INR: 2.06 ratio     PTT - ( 2019 06:00 )  PTT:26.9 sec  Lactate 3.5, 5.0  CXR pulm edema 33yrs. no prior medical history. G4, P2. Prior pregnancy required C section (large fetus?), This pregnancy 35 weeks. Hyperemesis gravidarum requiring a zofran pump. Of note thyroid panel was abnormal as outpatient. 2days ago SOB palpitations. ED at [facility]. . adenosine given but ultimately cardioversion which was not successful. Apparantly emergant  in persistant SVT. Post op in SICU. Intubated sedated, acidotic, hypotensive, YAJAIRA. Placed on VA ECMO (fem-axillary  cannulation, no venting). Notes report TTE showing ? LVEF 50% in ED. Post C section prior to placement on VA ECMO LVEF 10 with BiV failure.  TTE (preECMO): LA 4, LVEDD 5.5, LVEF 33, RV enlarged mod reduced. mild-mod MR, mod-sev TR.   Now: ventilated, awake and alert.   levo .02, vaso 4u/hr, heparin cefipime. vanco   CVVHD 50/hr/ I/) +285.  CVP 15, PA 20/17 23, PA 77  Aflutter , 100/68, no fevers.                        8.4    (25)20.8  )-----------( 101      ( 2019 06:00 )             26.1   04-12    141  |  107  |  28<H>  ----------------------------<  104<H>  4.9   |  17<L>  |  2.43<H>    Ca    7.9<L>      2019 06:00  Phos  7.4     04-11  Mg     1.8     04-11  TPro  3.8<L>  /  Alb  2.0<L>  /  TBili  1.2  /  DBili  x   /  AST  >7000<H>  /  ALT  2089<H>  /  AlkPhos  104  04-12  PT/INR - ( 2019 06:00 )   PT: 24.0 sec;   INR: 2.06 ratio     PTT - ( 2019 06:00 )  PTT:26.9 sec  Lactate 3.5, 5.0  CXR pulm edema  33yrs acute SVT leading to emergant C section followed by hemodynamic demise.   If report of normal EF prior to C section is correct, this is unlikely peripartum cardiomyopathy and perhaps acute injury post C section.   Acute presentation suggests a high likelyhood of recovery.   Suggest:  Close attention to PA pressures in this patient without LV venting.   Encourage negative balance.  TTE today: r/o lv dilation and stasis.   EP to see.   Rell Vila

## 2019-04-12 NOTE — PROGRESS NOTE ADULT - SUBJECTIVE AND OBJECTIVE BOX
ECMO Day# 2    Vital Signs Last 24 Hrs  T(C): 36.1 (12 Apr 2019 09:00), Max: 37.8 (11 Apr 2019 12:00)  T(F): 97 (12 Apr 2019 09:00), Max: 100 (11 Apr 2019 12:00)  HR: 106 (12 Apr 2019 09:00) (79 - 168)  BP: --  BP(mean): --  RR: 17 (12 Apr 2019 09:00) (9 - 34)  SpO2: 99% (12 Apr 2019 07:53) (85% - 100%)      Adult Advanced Hemodynamics Last 24 Hrs  CVP(mm Hg): 15 (12 Apr 2019 09:00) (12 - 28)  CVP(cm H2O): --  CO: 3.4 (12 Apr 2019 07:00) (3.4 - 5.2)  CI: 1.5 (12 Apr 2019 07:00) (1.5 - 2.8)  PA: 33/23 (12 Apr 2019 09:00) (24/18 - 180/172)  PA(mean): 28 (12 Apr 2019 09:00) (20 - 173)  PCWP: --  SVR: 1339 (12 Apr 2019 07:00) (706 - 1461)  SVRI: 3036 (12 Apr 2019 07:00) (1531 - 3196)  PVR: --  PVRI: --    I&O's Summary    11 Apr 2019 07:01  -  12 Apr 2019 07:00  --------------------------------------------------------  IN: 1210.5 mL / OUT: 952 mL / NET: 258.5 mL    12 Apr 2019 07:01  -  12 Apr 2019 09:04  --------------------------------------------------------  IN: 60.1 mL / OUT: 242 mL / NET: -181.9 mL          ECMO SETTINGS:  Type:		[ ] Venovenous		[x] Venoarterial  Cannulation Site(s): Right Axillary artery & FV    Flow: 4.1L/min 	      RPM: 4300    ABG - ( 12 Apr 2019 08:47 )  pH: 7.42  /  pCO2: 32    /  pO2: 245   / HCO3: 21    / Base Excess: -2.9  /  SaO2: 100                 Oxygenator:	Sweep: 1.75    L/min	FiO2:    100%      VENTILATOR SETTINGS:     Mode: AC/ CMV (Assist Control/ Continuous Mandatory Ventilation)  RR (machine): 14  TV (machine): 400  FiO2: 50  PEEP: 5  ITime: 1  MAP: 10  PIP: 25        LABS:                          8.4    20.8  )-----------( 101      ( 12 Apr 2019 06:00 )             26.1                          04-12    141  |  107  |  28<H>  ----------------------------<  104<H>  4.9   |  17<L>  |  2.43<H>    Ca    7.9<L>      12 Apr 2019 06:00  Phos  7.4     04-11  Mg     1.8     04-11    TPro  3.8<L>  /  Alb  2.0<L>  /  TBili  1.2  /  DBili  x   /  AST  >7000<H>  /  ALT  2089<H>  /  AlkPhos  104  04-12      LIVER FUNCTIONS - ( 12 Apr 2019 06:00 )  Alb: 2.0 g/dL / Pro: 3.8 g/dL / ALK PHOS: 104 U/L / ALT: 2089 U/L / AST: >7000 U/L / GGT: x             PT/INR - ( 12 Apr 2019 06:00 )   PT: 24.0 sec;   INR: 2.06 ratio         PTT - ( 12 Apr 2019 06:00 )  PTT:26.9 sec          ACTIVE MEDS:    MEDICATIONS  (STANDING):  cefepime   IVPB 1000 milliGRAM(s) IV Intermittent every 8 hours  cefepime   IVPB      chlorhexidine 0.12% Liquid 5 milliLiter(s) Oral Mucosa every 4 hours  CRRT Treatment    <Continuous>  dexmedetomidine Infusion 0.5 MICROgram(s)/kG/Hr (14.25 mL/Hr) IV Continuous <Continuous>  dextrose 50% Injectable 50 milliLiter(s) IV Push every 15 minutes  dextrose 50% Injectable 25 milliLiter(s) IV Push every 15 minutes  furosemide Infusion 20 mG/Hr (10 mL/Hr) IV Continuous <Continuous>  heparin  Infusion 500 Unit(s)/Hr (8 mL/Hr) IV Continuous <Continuous>  insulin Infusion 1 Unit(s)/Hr (1 mL/Hr) IV Continuous <Continuous>  meperidine     Injectable 25 milliGRAM(s) IV Push once  norepinephrine Infusion 0.23 MICROgram(s)/kG/Min (49.163 mL/Hr) IV Continuous <Continuous>  pantoprazole  Injectable 40 milliGRAM(s) IV Push daily  phenylephrine    Infusion 0.491 MICROgram(s)/kG/Min (21 mL/Hr) IV Continuous <Continuous>  PrismaSATE Dialysate BK 0 / 3.5 5000 milliLiter(s) (1600 mL/Hr) CRRT <Continuous>  PrismaSOL Filtration BGK 0 / 2.5 5000 milliLiter(s) (1400 mL/Hr) CRRT <Continuous>  PrismaSOL Filtration BGK 0 / 2.5 5000 milliLiter(s) (200 mL/Hr) CRRT <Continuous>  sodium chloride 0.9%. 1000 milliLiter(s) (10 mL/Hr) IV Continuous <Continuous>  vasopressin Infusion 0.067 Unit(s)/Min (4 mL/Hr) IV Continuous <Continuous>        Assessment/Plan:  HD stable on VA ECMO support with increasing speeds up 4300RPM. Neuro intact, with some urine output while on CVVHD, weaning pressors as tolerated, FiO2 on ventilator down to 50%. Will continue fluid removal via CVVHD given pulmonary edema on CXR. Given low/normal PA diastolic pressures with preserved pulsatility, no indication at this time for LV venting strategy. Will consult EP for evaluation of therapeutic options for SVT. Continue current VA ECMO parameters and launch evaluation for heart transplant & LVAD as backup strategies in the event of failed native myocardial recovery.

## 2019-04-12 NOTE — CONSULT NOTE ADULT - ATTENDING COMMENTS
Patient seen and examined with Dr. Pino  I agree with her history exam and plans as noted above  Patient is a 32 yo woman who works as a home care nurse in [place], Likely acquired norovirus from a patient which lead to N/V and diarrhea, dehydration, SVT, emergent , noted to have EF5% on echo and required intubation, ECMO and CVVH and transfer to [facility] for further evaluation and an LVAD/OHTx work up    She is awake and alert on the vent and communicates verbally and by writing  left neck CVC  lungs- bs audible bilat  cor-tachy  Abd-  site with staples no erythema  Right groin and right chest with ECMO cannulas  left groin with CVVH catheter    Would send blood cultures x 2sets  CXR with possible LLL infiltrate- agree with Vancomycin and Cefepime as above    etiology of CHF:  Would send titers for echovirus, coxsackie virus, parvovirus B-19, Adenovirus, HHV-6  CMV IgG  EBV serology  Stool for GI-PCR    will follow with you  Discussed with CTU team    Fernandez Batista MD  [phone]  After 5pm/weekends [phone]

## 2019-04-12 NOTE — CONSULT NOTE ADULT - SUBJECTIVE AND OBJECTIVE BOX
Patient seen and evaluated at bedside    Chief Complaint:    HPI:  32 yo  at 10w1d GA by LMP of 8/10/18 with EDC of 18 with known hyperemesis gravidarum presents with nausea and vomiting many times yesterday 4/10/19. She had been taking diclegis for the past week with partial relief. She last held down a full meal yesterday (10/18) afternoon. The only liquid that she tolerates is milk, she vomits even with water. She has persistent heartburn that is relived by antacids or milk. 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 She is worried that her use of antinausea medication in her previous pregnancy caused this congenital defect, so she refuses most antinausea medications. She is comfortable taking diclegis and jaime only. She felt some mild weakness that improved with fluids given by ED team. She currently denies dizziness, weakness, fevers, chills, shortness of breath, chest pain, nausea, vomiting, dysuria, hematuria, diarrhea, or constipation.    Ob/Gyn History:  ,  c/s for craniosynostosis also complicated by hyperemesis gravidarum requiring home zofran pump, SAB x2               LMP -   8/10/18                Cycle Length - regular, monthly  Denies history of ovarian cysts, uterine fibroids, abnormal paps, or STIs  Last Pap Smear - several months ago, normal per pt    HOSPITAL COURSE:  33y female HD#1,  34w5d dated by 1st trimester sonogram, presents to the ED on 4/10 with CC of SOB for past 2 days. Patient was sinus tachy to 200s, adenosine administered, no response, cardioverted with no resolution. OBGYN team proceeded with emergent . Patient intubated in ED and taken to L&D. EBL 800cc, no acute events intraop. SICU consult placed for hemodynamic monitoring and ventilator management.    SICU COURSE:  Patient was sedated with propofol and precedex, became acidotic and retaining. Cards concerned for thyroid storm and advised no CT with contrast to r/o PE, patient was not stable for V/Q scan. Central and arterial line placed overnight for HD monitoring. [place] placed in AM for possible hemodialysis vs CVVH.    During AM rounds on  patient became sinus tachy to 180s, multiple lopressor pushes given with minimal resolution.  Patient amio gtt d/c'd started on esmolol, milrinone with resolution of sinus tachy 120s. Patient weaned off shameka, levo titrating down. Nephro consult placed, recs CVVH for hyperkalemia, low UO. Cardiology, EPS, CT Surg, Interventional cardiology bedside for evaluation determined patient would benefit from ecmo/impella due to decreasing EF 10% from 50% in ED. VA ECMO placed (right SC arterial, right femoral venous) and transferred to [facility] for further workup and management.     On arrival to [facility] at 1130pm on 19, pt received on Levophed 0.23mcg/kg/min (50ml/hr), Vasopressin 0.06units/hr (4ml), Phenylephrine 0.5mcg/kg/min (21ml/hr), VA ECMO 3100RPM with 2.45LPM    HR: 107  CVP: 23  PAP: 41/26 (31) (2019 23:39)      PMHx:   Asthma  No pertinent past medical history      PSHx:   History of low transverse  section  No significant past surgical history      Allergies:  Allergy Status Unknown      Home Meds:    Current Medications:   cefepime   IVPB 1000 milliGRAM(s) IV Intermittent every 8 hours  cefepime   IVPB      chlorhexidine 0.12% Liquid 5 milliLiter(s) Oral Mucosa every 4 hours  CRRT Treatment    <Continuous>  dexmedetomidine Infusion 0.5 MICROgram(s)/kG/Hr IV Continuous <Continuous>  dextrose 50% Injectable 50 milliLiter(s) IV Push every 15 minutes  dextrose 50% Injectable 25 milliLiter(s) IV Push every 15 minutes  furosemide Infusion 20 mG/Hr IV Continuous <Continuous>  heparin  Infusion 500 Unit(s)/Hr IV Continuous <Continuous>  HYDROmorphone  Injectable 0.5 milliGRAM(s) IV Push every 2 hours PRN  insulin Infusion 1 Unit(s)/Hr IV Continuous <Continuous>  meperidine     Injectable 25 milliGRAM(s) IV Push once  norepinephrine Infusion 0.23 MICROgram(s)/kG/Min IV Continuous <Continuous>  pantoprazole  Injectable 40 milliGRAM(s) IV Push daily  phenylephrine    Infusion 0.491 MICROgram(s)/kG/Min IV Continuous <Continuous>  PrismaSATE Dialysate BK 0 / 3.5 5000 milliLiter(s) CRRT <Continuous>  PrismaSOL Filtration BGK 0 / 2.5 5000 milliLiter(s) CRRT <Continuous>  PrismaSOL Filtration BGK 0 / 2.5 5000 milliLiter(s) CRRT <Continuous>  sodium chloride 0.9% lock flush 10 milliLiter(s) IV Push every 1 hour PRN  sodium chloride 0.9%. 1000 milliLiter(s) IV Continuous <Continuous>  vasopressin Infusion 0.067 Unit(s)/Min IV Continuous <Continuous>      FAMILY HISTORY:  No pertinent family history in first degree relatives      Social History:  Smoking History:  Alcohol Use:  Drug Use:    Review of Systems:  REVIEW OF SYSTEMS:  CONSTITUTIONAL: No weakness, fevers or chills  EYES/ENT: No visual changes;  No dysphagia  NECK: No pain or stiffness  RESPIRATORY: No cough, wheezing, hemoptysis; No shortness of breath  CARDIOVASCULAR: No chest pain or palpitations; No lower extremity edema  GASTROINTESTINAL: No abdominal or epigastric pain. No nausea, vomiting, or hematemesis; No diarrhea or constipation. No melena or hematochezia.  BACK: No back pain  GENITOURINARY: No dysuria, frequency or hematuria  NEUROLOGICAL: No numbness or weakness  SKIN: No itching, burning, rashes, or lesions   All other review of systems is negative unless indicated above.    Physical Exam:  T(F): 96.6 (04-12), Max: 100 (04-11)  HR: 105 (-12) (79 - 168)  BP: --  RR: 27 (-12)  SpO2: 99% (-12)  GENERAL: No acute distress, well-developed  HEAD:  Atraumatic, Normocephalic  ENT: EOMI, PERRLA, conjunctiva and sclera clear, Neck supple, No JVD, moist mucosa  CHEST/LUNG: Clear to auscultation bilaterally; No wheeze, equal breath sounds bilaterally   BACK: No spinal tenderness  HEART: Regular rate and rhythm; No murmurs, rubs, or gallops  ABDOMEN: Soft, Nontender, Nondistended; Bowel sounds present  EXTREMITIES:  No clubbing, cyanosis, or edema  PSYCH: Nl behavior, nl affect  NEUROLOGY: AAOx3, non-focal, cranial nerves intact  SKIN: Normal color, No rashes or lesions  LINES:    Cardiovascular Diagnostic Testing:    ECG: Personally reviewed:    Echo: Personally reviewed:  < from: Transthoracic Echocardiogram (19 @ 10:43) >  Summary:   1. Severely decreased global left ventricular systolic function.   2. LV Ejection Fraction by Lane's Method with a biplane EF of 33 %.   3. Moderately enlarged right ventricle.   4. Moderately reduced RV systolic function.   5. Trivial pericardial effusion.   6. Mild to moderate mitral valve regurgitation.   7. Moderate-severe tricuspid regurgitation.   8. Estimated pulmonary artery systolic pressure is 54.6 mmHg assuming a   right atrial pressure of 15 mmHg, which is consistent with moderate   pulmonary hypertension.    PHYSICIAN INTERPRETATION:  Left Ventricle: The left ventricular internal cavity size is normal. Left   ventricular wall thickness is normal. Global LV systolic function was   severely decreased. LV Ejection Fraction by Lane's Method with a   biplane EF of 33 %.  Right Ventricle: The right ventricular size is moderately enlarged. RV   systolic function is moderately reduced.  Left Atrium: Left atrial enlargement.  Right Atrium: Normal right atrial size.  Pericardium: Trivial pericardial effusion is present.  Mitral Valve: The mitral valve is normal in structure. No evidence of   mitral stenosis. Mild to moderate mitral valve regurgitation is seen.  Tricuspid Valve: Structurally normal tricuspid valve, with normal leaflet   excursion. The tricuspid valve is normal in structure. Moderate-severe   tricuspid regurgitation is visualized. Estimated pulmonary artery   systolic pressure is 54.6 mmHg assuming a right atrial pressure of 15   mmHg, which is consistent with moderate pulmonary hypertension.  Aortic Valve: The aortic valve is normal. No evidence of aortic stenosis.   No evidence of aortic valve regurgitation is seen.  Pulmonic Valve: Structurally normal pulmonic valve, with normal leaflet   excursion. The pulmonic valve is normal. Mild pulmonic valve   regurgitation.  Aorta: The aortic root and ascending aorta are structurally normal, with   no evidence of dilitation.  Pulmonary Artery: The main pulmonary artery is normal in size.       2D AND M-MODE MEASUREMENTS (normal ranges within parentheses):  Left                  Normal   Aorta/Left             Normal  Ventricle:                     Atrium:  IVSd (2D):  0.93 cm  (0.7-1.1) AoV Cusp       2.30  (1.5-2.6)  LVPWd (2D): 1.03 cm  (0.7-1.1) Separation:     cm  LVIDd (2D): 5.48 cm  (3.4-5.7) Left Atrium    3.99  (1.9-4.0)  LVIDs (2D): 4.90 cm            (Mmode):        cm  LV FS (2D):  10.6 %   (>25%)   Right  IVSd        1.07 cm  (0.7-1.1) Ventricle:  (Mmode):             RVd (2D):      2.96 cm  LVPWd       0.94 cm  (0.7-1.1)  (Mmode):  LVIDd       5.68 cm  (3.4-5.7)  (Mmode):  LVIDs       4.99 cm  (Mmode):  LV FS        12.0 %   (>25%)  (Mmode):  Relative      0.37    (<0.42)  Wall  Thickness  Rel. Wall   0.33    (<0.42)  Thickness  Mm  LV Mass      103.3  Index:        g/m²  Mmode    SPECTRAL DOPPLER ANALYSIS:  LV DIASTOLIC FUNCTION:  MV Peak E: 0.91 m/s Decel Time: 147 msec  MV Peak A: 0.22 m/s  E/A Ratio: 4.20    Aortic Valve:  AoV VMax:    0.85m/s AoV Area, Vmax: 2.31 cm² Vmax Indx: 1.05 cm²/m²  AoV Pk Grad: 2.9 mmHg    LVOT Vmax: 0.51 m/s  LVOT VTI:  0.08 m  LVOT Diam: 2.20 cm    Mitral Valve:  MV P1/2 Time: 42.58 msec  MV Area, PHT: 5.17 cm²    Tricuspid Valve and PA/RV Systolic Pressure: TR Max Velocity: 3.15 m/s RA   Pressure: 15 mmHg RVSP/PASP: 54.6 mmHg    < end of copied text >        Stress Testing:    Cath:    Imaging:    CXR: Personally reviewed    Labs: Personally reviewed                        8.4    20.8  )-----------( 101      ( 2019 06:00 )             26.1     04-12    141  |  107  |  28<H>  ----------------------------<  104<H>  4.9   |  17<L>  |  2.43<H>    Ca    7.9<L>      2019 06:00  Phos  7.4     04-11  Mg     1.8     04-11    TPro  3.8<L>  /  Alb  2.0<L>  /  TBili  1.2  /  DBili  x   /  AST  >7000<H>  /  ALT  2089<H>  /  AlkPhos  104  04-12    PT/INR - ( 2019 06:00 )   PT: 24.0 sec;   INR: 2.06 ratio         PTT - ( 2019 06:00 )  PTT:26.9 sec  CARDIAC MARKERS ( 2019 12:22 )  x     / 0.03 ng/mL / 801 U/L / x     / 6.0 ng/mL  CARDIAC MARKERS ( 2019 02:00 )  x     / <0.01 ng/mL / 254 U/L / x     / 2.5 ng/mL  CARDIAC MARKERS ( 10 Apr 2019 20:55 )  x     / <0.01 ng/mL / 148 U/L / x     / 3.4 ng/mL  CARDIAC MARKERS ( 10 Apr 2019 16:31 )  x     / <0.01 ng/mL / x     / x     / x                Thyroid Stimulating Hormone, Serum: 0.79 uIU/mL (04-10 @ 18:55)

## 2019-04-12 NOTE — CONSULT NOTE ADULT - SUBJECTIVE AND OBJECTIVE BOX
Patient seen and evaluated @   Reason for consult: AT    HPI: 33 year old F pt with PMH noted below presented as a transfer 2/2 postpartum cardiogenic shock s/p ECMO. EP consult was placed for 's at the outside hospital s/p adenisone and DCCV prior to emergent , followed by iv lopressor pushes, amio gtt, and esmolol gtt. Pt placed on ECMO and transferred on pressors (levo, vaso, and shameka). When seen in the CTU, pt persistently in tachyarrhythmia in 120's likely in slow Aflutter. Pt was awake/alert and following commands. Communication otherwise limited due to intubation.    Primary Service HPI:  32 yo  at 10w1d GA by LMP of 8/10/18 with EDC of 18 with known hyperemesis gravidarum presents with nausea and vomiting many times yesterday 4/10/19. She had been taking diclegis for the past week with partial relief. She last held down a full meal yesterday (10/18) afternoon. The only liquid that she tolerates is milk, she vomits even with water. She has persistent heartburn that is relived by antacids or milk. 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 She is worried that her use of antinausea medication in her previous pregnancy caused this congenital defect, so she refuses most antinausea medications. She is comfortable taking diclegis and jaime only. She felt some mild weakness that improved with fluids given by ED team. She currently denies dizziness, weakness, fevers, chills, shortness of breath, chest pain, nausea, vomiting, dysuria, hematuria, diarrhea, or constipation.    Ob/Gyn History:  ,  c/s for craniosynostosis also complicated by hyperemesis gravidarum requiring home zofran pump, SAB x2               LMP -   8/10/18                Cycle Length - regular, monthly  Denies history of ovarian cysts, uterine fibroids, abnormal paps, or STIs  Last Pap Smear - several months ago, normal per pt    HOSPITAL COURSE:  33y female HD#1,  34w5d dated by 1st trimester sonogram, presents to the ED on 4/10 with CC of SOB for past 2 days. Patient was sinus tachy to 200s, adenosine administered, no response, cardioverted with no resolution. OBGYN team proceeded with emergent . Patient intubated in ED and taken to L&D. EBL 800cc, no acute events intraop. SICU consult placed for hemodynamic monitoring and ventilator management.    SICU COURSE:  Patient was sedated with propofol and precedex, became acidotic and retaining. Cards concerned for thyroid storm and advised no CT with contrast to r/o PE, patient was not stable for V/Q scan. Central and arterial line placed overnight for HD monitoring. [place] placed in AM for possible hemodialysis vs CVVH.    During AM rounds on  patient became sinus tachy to 180s, multiple lopressor pushes given with minimal resolution.  Patient amio gtt d/c'd started on esmolol, milrinone with resolution of sinus tachy 120s. Patient weaned off shameka, levo titrating down. Nephro consult placed, recs CVVH for hyperkalemia, low UO. Cardiology, EPS, CT Surg, Interventional cardiology bedside for evaluation determined patient would benefit from ecmo/impella due to decreasing EF 10% from 50% in ED. VA ECMO placed (right SC arterial, right femoral venous) and transferred to [facility] for further workup and management.     On arrival to [facility] at 1130pm on 19, pt received on Levophed 0.23mcg/kg/min (50ml/hr), Vasopressin 0.06units/hr (4ml), Phenylephrine 0.5mcg/kg/min (21ml/hr), VA ECMO 3100RPM with 2.45LPM    HR: 107  CVP: 23  PAP: 41/26 (31) (2019 23:39)    PMH:   Asthma  No pertinent past medical history    PSH:   History of low transverse  section  No significant past surgical history    Medications:   cefepime   IVPB 1000 milliGRAM(s) IV Intermittent every 8 hours  cefepime   IVPB      chlorhexidine 0.12% Liquid 5 milliLiter(s) Oral Mucosa every 4 hours  CRRT Treatment    <Continuous>  dexmedetomidine Infusion 0.5 MICROgram(s)/kG/Hr IV Continuous <Continuous>  dextrose 50% Injectable 50 milliLiter(s) IV Push every 15 minutes  dextrose 50% Injectable 25 milliLiter(s) IV Push every 15 minutes  furosemide Infusion 20 mG/Hr IV Continuous <Continuous>  heparin  Infusion 500 Unit(s)/Hr IV Continuous <Continuous>  HYDROmorphone  Injectable 0.5 milliGRAM(s) IV Push every 2 hours PRN  insulin Infusion 1 Unit(s)/Hr IV Continuous <Continuous>  meperidine     Injectable 25 milliGRAM(s) IV Push once  norepinephrine Infusion 0.23 MICROgram(s)/kG/Min IV Continuous <Continuous>  pantoprazole  Injectable 40 milliGRAM(s) IV Push daily  phenylephrine    Infusion 0.491 MICROgram(s)/kG/Min IV Continuous <Continuous>  PrismaSATE Dialysate BK 0 / 3.5 5000 milliLiter(s) CRRT <Continuous>  PrismaSOL Filtration BGK 0 / 2.5 5000 milliLiter(s) CRRT <Continuous>  PrismaSOL Filtration BGK 0 / 2.5 5000 milliLiter(s) CRRT <Continuous>  sodium chloride 0.9% lock flush 10 milliLiter(s) IV Push every 1 hour PRN  sodium chloride 0.9%. 1000 milliLiter(s) IV Continuous <Continuous>  vasopressin Infusion 0.067 Unit(s)/Min IV Continuous <Continuous>    Allergies:  Allergy Status Unknown    FAMILY HISTORY:  No pertinent family history in first degree relatives    Social History:  Smoking:  Alcohol:  Drugs:    Review of Systems:  Constitutional: [ ] Fever [ ] Chills [ ] Fatigue [ ] Weight change   HEENT: [ ] Blurred vision [ ] Eye Pain [ ] Headache [ ] Runny nose [ ] Sore Throat   Respiratory: [ ] Cough [ ] Wheezing [ ] Shortness of breath  Cardiovascular: [ ] Chest Pain [ ] Palpitations [ ] MENDEZ [ ] PND [ ] Orthopnea  Gastrointestinal: [ ] Abdominal Pain [ ] Diarrhea [ ] Constipation [ ] Hemorrhoids [ ] Nausea [ ] Vomiting  Genitourinary: [ ] Nocturia [ ] Dysuria [ ] Incontinence  Extremities: [ ] Swelling [ ] Joint Pain  Neurologic: [ ] Focal deficit [ ] Paresthesias [ ] Syncope  Lymphatic: [ ] Swelling [ ] Lymphadenopathy   Skin: [ ] Rash [ ] Ecchymoses [ ] Wounds [ ] Lesions  Psychiatry: [ ] Depression [ ] Suicidal/Homicidal Ideation [ ] Anxiety [ ] Sleep Disturbances  [ ] 10 point review of systems is otherwise negative except as mentioned above            []Unable to obtain    Physical Exam:  T(C): 35.8 (19 @ 11:00), Max: 36.4 (19 @ 19:30)  HR: 117 (19 @ 12:45) (79 - 138)  BP: --  RR: 18 (19 @ 12:45) (9 - 39)  SpO2: 100% (19 @ 12:45) (98% - 100%)  Wt(kg): --     @ 07:01  -   @ 07:00  --------------------------------------------------------  IN: 1210.5 mL / OUT: 952 mL / NET: 258.5 mL     @ 07:01  -   @ 12:58  --------------------------------------------------------  IN: 336.5 mL / OUT: 625 mL / NET: -288.5 mL      Daily Height in cm: 165 (2019 03:00)    Daily     Appearance: NAD  Eyes: PERRL, EOMI  HENT: Normal oral mucosa, NC/AT  Cardiovascular: normal S1 and S2, RRR, no m/r/g, no edema, normal JVP  Procedural Access Site:  No hematoma, non-tender to palpation, 2+ pulses distally, no bruit, no ecchymosis  Respiratory: Clear to auscultation bilaterally  Gastrointestinal: Soft, non-tender, non-distended, BS+  Musculoskeletal: No clubbing, no joint deformity   Neurologic: Non-focal  Lymphatic: No lymphadenopathy  Psychiatry: AAOx3, mood & affect appropriate  Skin: No rashes, no ecchymoses, no cyanosis    Cardiovascular Diagnostic Testing:  ECG:    Echo:    Stress Testing:    Cath:    Interpretation of Telemetry:    Imaging:    Labs:                        8.4    20.8  )-----------( 101      ( 2019 06:00 )             26.1     04-12    139  |  106  |  33<H>  ----------------------------<  104<H>  4.1   |  21<L>  |  2.50<H>    Ca    7.8<L>      2019 12:00  Phos  7.4       Mg     1.8         TPro  4.1<L>  /  Alb  2.3<L>  /  TBili  1.5<H>  /  DBili  x   /  AST  6298<H>  /  ALT  1832<H>  /  AlkPhos  106      PT/INR - ( 2019 11:59 )   PT: 22.8 sec;   INR: 1.94 ratio         PTT - ( 2019 11:59 )  PTT:28.2 sec  CARDIAC MARKERS ( 2019 12:22 )  x     / 0.03 ng/mL / 801 U/L / x     / 6.0 ng/mL  CARDIAC MARKERS ( 2019 02:00 )  x     / <0.01 ng/mL / 254 U/L / x     / 2.5 ng/mL  CARDIAC MARKERS ( 10 Apr 2019 20:55 )  x     / <0.01 ng/mL / 148 U/L / x     / 3.4 ng/mL  CARDIAC MARKERS ( 10 Apr 2019 16:31 )  x     / <0.01 ng/mL / x     / x     / x                Thyroid Stimulating Hormone, Serum: 0.79 uIU/mL (04-10 @ 18:55)

## 2019-04-12 NOTE — CONSULT NOTE ADULT - ASSESSMENT
33 year old female with no major past medical history who presented as a transfer from [facility] after patient presented with CC of SOB for past 2 days. Patient was sinus tachy to 200s, adenosine administered, no response, cardioverted with no resolution. OBGYN team proceeded with emergent . Patient transferred to [facility] as she required ECMO. Nephrology consulted for CRRT.

## 2019-04-13 DIAGNOSIS — I47.1 SUPRAVENTRICULAR TACHYCARDIA: ICD-10-CM

## 2019-04-13 DIAGNOSIS — J96.01 ACUTE RESPIRATORY FAILURE WITH HYPOXIA: ICD-10-CM

## 2019-04-13 LAB
ALBUMIN SERPL ELPH-MCNC: 2 G/DL — LOW (ref 3.3–5)
ALP SERPL-CCNC: 104 U/L — SIGNIFICANT CHANGE UP (ref 40–120)
ALT FLD-CCNC: 1343 U/L — HIGH (ref 10–45)
ANION GAP SERPL CALC-SCNC: 13 MMOL/L — SIGNIFICANT CHANGE UP (ref 5–17)
APTT BLD: 31.3 SEC — SIGNIFICANT CHANGE UP (ref 27.5–36.3)
APTT BLD: 33.6 SEC — SIGNIFICANT CHANGE UP (ref 27.5–36.3)
APTT BLD: 36.9 SEC — HIGH (ref 27.5–36.3)
APTT BLD: 40.7 SEC — HIGH (ref 27.5–36.3)
APTT BLD: 48.7 SEC — HIGH (ref 27.5–36.3)
AST SERPL-CCNC: 4201 U/L — HIGH (ref 10–40)
BASE EXCESS BLDMV CALC-SCNC: 0.2 MMOL/L — SIGNIFICANT CHANGE UP (ref -3–3)
BASE EXCESS BLDMV CALC-SCNC: 1.8 MMOL/L — SIGNIFICANT CHANGE UP (ref -3–3)
BASE EXCESS BLDMV CALC-SCNC: 2.3 MMOL/L — SIGNIFICANT CHANGE UP (ref -3–3)
BILIRUB SERPL-MCNC: 1.7 MG/DL — HIGH (ref 0.2–1.2)
BUN SERPL-MCNC: 30 MG/DL — HIGH (ref 7–23)
CALCIUM SERPL-MCNC: 7.6 MG/DL — LOW (ref 8.4–10.5)
CHLORIDE SERPL-SCNC: 103 MMOL/L — SIGNIFICANT CHANGE UP (ref 96–108)
CK SERPL-CCNC: 611 U/L — HIGH (ref 25–170)
CO2 BLDMV-SCNC: 25 MMOL/L — SIGNIFICANT CHANGE UP (ref 21–29)
CO2 BLDMV-SCNC: 26 MMOL/L — SIGNIFICANT CHANGE UP (ref 21–29)
CO2 BLDMV-SCNC: 27 MMOL/L — SIGNIFICANT CHANGE UP (ref 21–29)
CO2 SERPL-SCNC: 23 MMOL/L — SIGNIFICANT CHANGE UP (ref 22–31)
CREAT SERPL-MCNC: 2.02 MG/DL — HIGH (ref 0.5–1.3)
FIBRINOGEN PPP-MCNC: 373 MG/DL — SIGNIFICANT CHANGE UP (ref 350–510)
GAS PNL BLDA: SIGNIFICANT CHANGE UP
GAS PNL BLDMV: SIGNIFICANT CHANGE UP
GLUCOSE SERPL-MCNC: 93 MG/DL — SIGNIFICANT CHANGE UP (ref 70–99)
GRAM STN FLD: SIGNIFICANT CHANGE UP
HAPTOGLOB SERPL-MCNC: <20 MG/DL — LOW (ref 34–200)
HBA1C BLD-MCNC: 5.5 % — SIGNIFICANT CHANGE UP (ref 4–5.6)
HCO3 BLDMV-SCNC: 24 MMOL/L — SIGNIFICANT CHANGE UP (ref 20–28)
HCO3 BLDMV-SCNC: 25 MMOL/L — SIGNIFICANT CHANGE UP (ref 20–28)
HCO3 BLDMV-SCNC: 26 MMOL/L — SIGNIFICANT CHANGE UP (ref 20–28)
HCT VFR BLD CALC: 22 % — LOW (ref 34.5–45)
HGB BLD-MCNC: 7.3 G/DL — LOW (ref 11.5–15.5)
HOROWITZ INDEX BLDMV+IHG-RTO: 100 — SIGNIFICANT CHANGE UP
HOROWITZ INDEX BLDMV+IHG-RTO: 40 — SIGNIFICANT CHANGE UP
HOROWITZ INDEX BLDMV+IHG-RTO: 50 — SIGNIFICANT CHANGE UP
INR BLD: 1.84 RATIO — HIGH (ref 0.88–1.16)
INR BLD: 1.85 RATIO — HIGH (ref 0.88–1.16)
LDH SERPL L TO P-CCNC: >2250 U/L — HIGH (ref 50–242)
MAGNESIUM SERPL-MCNC: 1.9 MG/DL — SIGNIFICANT CHANGE UP (ref 1.6–2.6)
MCHC RBC-ENTMCNC: 29.7 PG — SIGNIFICANT CHANGE UP (ref 27–34)
MCHC RBC-ENTMCNC: 33 GM/DL — SIGNIFICANT CHANGE UP (ref 32–36)
MCV RBC AUTO: 90 FL — SIGNIFICANT CHANGE UP (ref 80–100)
O2 CT VFR BLD CALC: 45 MMHG — SIGNIFICANT CHANGE UP (ref 30–65)
O2 CT VFR BLD CALC: 47 MMHG — SIGNIFICANT CHANGE UP (ref 30–65)
O2 CT VFR BLD CALC: 54 MMHG — SIGNIFICANT CHANGE UP (ref 30–65)
PCO2 BLDMV: 36 MMHG — SIGNIFICANT CHANGE UP (ref 30–65)
PCO2 BLDMV: 37 MMHG — SIGNIFICANT CHANGE UP (ref 30–65)
PCO2 BLDMV: 37 MMHG — SIGNIFICANT CHANGE UP (ref 30–65)
PF4 HEPARIN CMPLX AB SER-ACNC: NEGATIVE — SIGNIFICANT CHANGE UP
PF4 HEPARIN CMPLX AB SER-ACNC: NEGATIVE — SIGNIFICANT CHANGE UP
PF4 HEPARIN CMPLX AB SERPL QL IA: <0.6 U/ML — SIGNIFICANT CHANGE UP (ref 0–0.9)
PF4 HEPARIN CMPLX AB SERPL QL IA: <0.6 U/ML — SIGNIFICANT CHANGE UP (ref 0–0.9)
PH BLDMV: 7.43 — SIGNIFICANT CHANGE UP (ref 7.32–7.45)
PH BLDMV: 7.45 — SIGNIFICANT CHANGE UP (ref 7.32–7.45)
PH BLDMV: 7.46 — HIGH (ref 7.32–7.45)
PHOSPHATE SERPL-MCNC: 4.1 MG/DL — SIGNIFICANT CHANGE UP (ref 2.5–4.5)
PLATELET # BLD AUTO: 70 K/UL — LOW (ref 150–400)
POTASSIUM SERPL-MCNC: 3.8 MMOL/L — SIGNIFICANT CHANGE UP (ref 3.5–5.3)
POTASSIUM SERPL-SCNC: 3.8 MMOL/L — SIGNIFICANT CHANGE UP (ref 3.5–5.3)
PROT SERPL-MCNC: 3.7 G/DL — LOW (ref 6–8.3)
PROTHROM AB SERPL-ACNC: 21.5 SEC — HIGH (ref 10–12.9)
PROTHROM AB SERPL-ACNC: 21.6 SEC — HIGH (ref 10–12.9)
RBC # BLD: 2.45 M/UL — LOW (ref 3.8–5.2)
RBC # FLD: 14.7 % — HIGH (ref 10.3–14.5)
SAO2 % BLDMV: 79 % — SIGNIFICANT CHANGE UP (ref 60–90)
SAO2 % BLDMV: 80 % — SIGNIFICANT CHANGE UP (ref 60–90)
SAO2 % BLDMV: 88 % — SIGNIFICANT CHANGE UP (ref 60–90)
SODIUM SERPL-SCNC: 139 MMOL/L — SIGNIFICANT CHANGE UP (ref 135–145)
SPECIMEN SOURCE: SIGNIFICANT CHANGE UP
VANCOMYCIN TROUGH SERPL-MCNC: 8.2 UG/ML — LOW (ref 10–20)
WBC # BLD: 12.5 K/UL — HIGH (ref 3.8–10.5)
WBC # FLD AUTO: 12.5 K/UL — HIGH (ref 3.8–10.5)

## 2019-04-13 PROCEDURE — 99292 CRITICAL CARE ADDL 30 MIN: CPT

## 2019-04-13 PROCEDURE — 71045 X-RAY EXAM CHEST 1 VIEW: CPT | Mod: 26

## 2019-04-13 PROCEDURE — 33949 ECMO/ECLS DAILY MGMT ARTERY: CPT

## 2019-04-13 PROCEDURE — 93010 ELECTROCARDIOGRAM REPORT: CPT | Mod: 76

## 2019-04-13 PROCEDURE — 99291 CRITICAL CARE FIRST HOUR: CPT

## 2019-04-13 PROCEDURE — 99232 SBSQ HOSP IP/OBS MODERATE 35: CPT | Mod: GC

## 2019-04-13 PROCEDURE — 99233 SBSQ HOSP IP/OBS HIGH 50: CPT | Mod: GC

## 2019-04-13 RX ORDER — POTASSIUM CHLORIDE 20 MEQ
10 PACKET (EA) ORAL
Refills: 0 | Status: COMPLETED | OUTPATIENT
Start: 2019-04-13 | End: 2019-04-13

## 2019-04-13 RX ORDER — POTASSIUM CHLORIDE 20 MEQ
10 PACKET (EA) ORAL
Refills: 0 | Status: COMPLETED | OUTPATIENT
Start: 2019-04-13 | End: 2019-04-14

## 2019-04-13 RX ORDER — HEPARIN SODIUM 5000 [USP'U]/ML
1300 INJECTION INTRAVENOUS; SUBCUTANEOUS
Qty: 25000 | Refills: 0 | Status: DISCONTINUED | OUTPATIENT
Start: 2019-04-13 | End: 2019-04-16

## 2019-04-13 RX ORDER — AMIODARONE HYDROCHLORIDE 400 MG/1
150 TABLET ORAL ONCE
Refills: 0 | Status: COMPLETED | OUTPATIENT
Start: 2019-04-13 | End: 2019-04-13

## 2019-04-13 RX ORDER — VANCOMYCIN HCL 1 G
1000 VIAL (EA) INTRAVENOUS EVERY 12 HOURS
Refills: 0 | Status: DISCONTINUED | OUTPATIENT
Start: 2019-04-13 | End: 2019-04-14

## 2019-04-13 RX ORDER — CALCIUM GLUCONATE 100 MG/ML
1 VIAL (ML) INTRAVENOUS ONCE
Refills: 0 | Status: COMPLETED | OUTPATIENT
Start: 2019-04-13 | End: 2019-04-14

## 2019-04-13 RX ORDER — AMIODARONE HYDROCHLORIDE 400 MG/1
0.5 TABLET ORAL
Qty: 900 | Refills: 0 | Status: DISCONTINUED | OUTPATIENT
Start: 2019-04-13 | End: 2019-04-16

## 2019-04-13 RX ORDER — POTASSIUM CHLORIDE 20 MEQ
10 PACKET (EA) ORAL ONCE
Refills: 0 | Status: COMPLETED | OUTPATIENT
Start: 2019-04-13 | End: 2019-04-13

## 2019-04-13 RX ORDER — CALCIUM GLUCONATE 100 MG/ML
1 VIAL (ML) INTRAVENOUS ONCE
Refills: 0 | Status: COMPLETED | OUTPATIENT
Start: 2019-04-13 | End: 2019-04-13

## 2019-04-13 RX ORDER — DIGOXIN 250 MCG
0.5 TABLET ORAL ONCE
Refills: 0 | Status: COMPLETED | OUTPATIENT
Start: 2019-04-13 | End: 2019-04-13

## 2019-04-13 RX ORDER — DIGOXIN 250 MCG
0.25 TABLET ORAL ONCE
Refills: 0 | Status: COMPLETED | OUTPATIENT
Start: 2019-04-13 | End: 2019-04-13

## 2019-04-13 RX ORDER — MAGNESIUM SULFATE 500 MG/ML
2 VIAL (ML) INJECTION ONCE
Refills: 0 | Status: COMPLETED | OUTPATIENT
Start: 2019-04-13 | End: 2019-04-13

## 2019-04-13 RX ORDER — MAGNESIUM SULFATE 500 MG/ML
1 VIAL (ML) INJECTION ONCE
Refills: 0 | Status: COMPLETED | OUTPATIENT
Start: 2019-04-13 | End: 2019-04-13

## 2019-04-13 RX ORDER — ALBUMIN HUMAN 25 %
250 VIAL (ML) INTRAVENOUS ONCE
Refills: 0 | Status: COMPLETED | OUTPATIENT
Start: 2019-04-13 | End: 2019-04-13

## 2019-04-13 RX ORDER — HYDROMORPHONE HYDROCHLORIDE 2 MG/ML
0.5 INJECTION INTRAMUSCULAR; INTRAVENOUS; SUBCUTANEOUS ONCE
Refills: 0 | Status: DISCONTINUED | OUTPATIENT
Start: 2019-04-13 | End: 2019-04-13

## 2019-04-13 RX ADMIN — CHLORHEXIDINE GLUCONATE 5 MILLILITER(S): 213 SOLUTION TOPICAL at 18:10

## 2019-04-13 RX ADMIN — HYDROMORPHONE HYDROCHLORIDE 0.5 MILLIGRAM(S): 2 INJECTION INTRAMUSCULAR; INTRAVENOUS; SUBCUTANEOUS at 20:30

## 2019-04-13 RX ADMIN — HEPARIN SODIUM 11 UNIT(S)/HR: 5000 INJECTION INTRAVENOUS; SUBCUTANEOUS at 05:12

## 2019-04-13 RX ADMIN — PANTOPRAZOLE SODIUM 40 MILLIGRAM(S): 20 TABLET, DELAYED RELEASE ORAL at 13:13

## 2019-04-13 RX ADMIN — Medication 0.5 MILLIGRAM(S): at 03:33

## 2019-04-13 RX ADMIN — CHLORHEXIDINE GLUCONATE 5 MILLILITER(S): 213 SOLUTION TOPICAL at 13:14

## 2019-04-13 RX ADMIN — Medication 100 GRAM(S): at 16:00

## 2019-04-13 RX ADMIN — HYDROMORPHONE HYDROCHLORIDE 0.5 MILLIGRAM(S): 2 INJECTION INTRAMUSCULAR; INTRAVENOUS; SUBCUTANEOUS at 16:00

## 2019-04-13 RX ADMIN — HYDROMORPHONE HYDROCHLORIDE 0.5 MILLIGRAM(S): 2 INJECTION INTRAMUSCULAR; INTRAVENOUS; SUBCUTANEOUS at 21:50

## 2019-04-13 RX ADMIN — HEPARIN SODIUM 15 UNIT(S)/HR: 5000 INJECTION INTRAVENOUS; SUBCUTANEOUS at 23:30

## 2019-04-13 RX ADMIN — Medication 200 GRAM(S): at 20:48

## 2019-04-13 RX ADMIN — Medication 50 MILLIEQUIVALENT(S): at 03:23

## 2019-04-13 RX ADMIN — Medication 50 MILLIEQUIVALENT(S): at 15:30

## 2019-04-13 RX ADMIN — AMIODARONE HYDROCHLORIDE 600 MILLIGRAM(S): 400 TABLET ORAL at 20:11

## 2019-04-13 RX ADMIN — Medication 50 MILLIEQUIVALENT(S): at 09:00

## 2019-04-13 RX ADMIN — CHLORHEXIDINE GLUCONATE 5 MILLILITER(S): 213 SOLUTION TOPICAL at 02:54

## 2019-04-13 RX ADMIN — Medication 50 GRAM(S): at 02:54

## 2019-04-13 RX ADMIN — HYDROMORPHONE HYDROCHLORIDE 0.5 MILLIGRAM(S): 2 INJECTION INTRAMUSCULAR; INTRAVENOUS; SUBCUTANEOUS at 16:15

## 2019-04-13 RX ADMIN — HYDROMORPHONE HYDROCHLORIDE 0.5 MILLIGRAM(S): 2 INJECTION INTRAMUSCULAR; INTRAVENOUS; SUBCUTANEOUS at 20:45

## 2019-04-13 RX ADMIN — CEFEPIME 100 MILLIGRAM(S): 1 INJECTION, POWDER, FOR SOLUTION INTRAMUSCULAR; INTRAVENOUS at 22:01

## 2019-04-13 RX ADMIN — Medication 50 MILLIEQUIVALENT(S): at 00:50

## 2019-04-13 RX ADMIN — Medication 50 MILLIEQUIVALENT(S): at 02:53

## 2019-04-13 RX ADMIN — VASOPRESSIN 4 UNIT(S)/MIN: 20 INJECTION INTRAVENOUS at 05:34

## 2019-04-13 RX ADMIN — HYDROMORPHONE HYDROCHLORIDE 0.5 MILLIGRAM(S): 2 INJECTION INTRAMUSCULAR; INTRAVENOUS; SUBCUTANEOUS at 12:00

## 2019-04-13 RX ADMIN — AMIODARONE HYDROCHLORIDE 600 MILLIGRAM(S): 400 TABLET ORAL at 18:45

## 2019-04-13 RX ADMIN — CHLORHEXIDINE GLUCONATE 5 MILLILITER(S): 213 SOLUTION TOPICAL at 05:10

## 2019-04-13 RX ADMIN — Medication 50 MILLIEQUIVALENT(S): at 16:00

## 2019-04-13 RX ADMIN — Medication 50 MILLIEQUIVALENT(S): at 01:19

## 2019-04-13 RX ADMIN — HYDROMORPHONE HYDROCHLORIDE 0.5 MILLIGRAM(S): 2 INJECTION INTRAMUSCULAR; INTRAVENOUS; SUBCUTANEOUS at 22:05

## 2019-04-13 RX ADMIN — AMIODARONE HYDROCHLORIDE 600 MILLIGRAM(S): 400 TABLET ORAL at 03:43

## 2019-04-13 RX ADMIN — CEFEPIME 100 MILLIGRAM(S): 1 INJECTION, POWDER, FOR SOLUTION INTRAMUSCULAR; INTRAVENOUS at 13:14

## 2019-04-13 RX ADMIN — Medication 0.25 MILLIGRAM(S): at 17:05

## 2019-04-13 RX ADMIN — Medication 50 MILLIEQUIVALENT(S): at 21:00

## 2019-04-13 RX ADMIN — DEXMEDETOMIDINE HYDROCHLORIDE IN 0.9% SODIUM CHLORIDE 14.25 MICROGRAM(S)/KG/HR: 4 INJECTION INTRAVENOUS at 05:11

## 2019-04-13 RX ADMIN — CEFEPIME 100 MILLIGRAM(S): 1 INJECTION, POWDER, FOR SOLUTION INTRAMUSCULAR; INTRAVENOUS at 05:15

## 2019-04-13 RX ADMIN — Medication 50 MILLIEQUIVALENT(S): at 02:30

## 2019-04-13 RX ADMIN — HYDROMORPHONE HYDROCHLORIDE 0.5 MILLIGRAM(S): 2 INJECTION INTRAMUSCULAR; INTRAVENOUS; SUBCUTANEOUS at 12:15

## 2019-04-13 RX ADMIN — AMIODARONE HYDROCHLORIDE 33.33 MG/MIN: 400 TABLET ORAL at 22:02

## 2019-04-13 RX ADMIN — Medication 250 MILLIGRAM(S): at 18:10

## 2019-04-13 RX ADMIN — Medication 49.16 MICROGRAM(S)/KG/MIN: at 05:14

## 2019-04-13 RX ADMIN — Medication 500 MILLILITER(S): at 02:54

## 2019-04-13 RX ADMIN — CHLORHEXIDINE GLUCONATE 5 MILLILITER(S): 213 SOLUTION TOPICAL at 11:52

## 2019-04-13 RX ADMIN — AMIODARONE HYDROCHLORIDE 33.33 MG/MIN: 400 TABLET ORAL at 21:00

## 2019-04-13 RX ADMIN — Medication 50 MILLIEQUIVALENT(S): at 21:30

## 2019-04-13 RX ADMIN — Medication 50 MILLIEQUIVALENT(S): at 23:52

## 2019-04-13 RX ADMIN — AMIODARONE HYDROCHLORIDE 600 MILLIGRAM(S): 400 TABLET ORAL at 02:15

## 2019-04-13 RX ADMIN — Medication 1 MILLIGRAM(S): at 02:55

## 2019-04-13 RX ADMIN — Medication 10 MG/HR: at 05:13

## 2019-04-13 NOTE — PROGRESS NOTE ADULT - SUBJECTIVE AND OBJECTIVE BOX
[facility] DIVISION OF KIDNEY DISEASES AND HYPERTENSION -- FOLLOW UP NOTE  --------------------------------------------------------------------------------  Chief Complaint: YAJAIRA on CRRT    24 hour events/subjective:        PAST HISTORY  --------------------------------------------------------------------------------  No significant changes to PMH, PSH, FHx, SHx, unless otherwise noted    ALLERGIES & MEDICATIONS  --------------------------------------------------------------------------------  Allergies    Allergy Status Unknown    Intolerances      Standing Inpatient Medications  cefepime   IVPB 1000 milliGRAM(s) IV Intermittent every 8 hours  cefepime   IVPB      chlorhexidine 0.12% Liquid 5 milliLiter(s) Oral Mucosa every 4 hours  CRRT Treatment    <Continuous>  dexmedetomidine Infusion 0.5 MICROgram(s)/kG/Hr IV Continuous <Continuous>  dextrose 50% Injectable 50 milliLiter(s) IV Push every 15 minutes  dextrose 50% Injectable 25 milliLiter(s) IV Push every 15 minutes  furosemide Infusion 20 mG/Hr IV Continuous <Continuous>  heparin  Infusion 1300 Unit(s)/Hr IV Continuous <Continuous>  insulin Infusion 1 Unit(s)/Hr IV Continuous <Continuous>  meperidine     Injectable 25 milliGRAM(s) IV Push once  norepinephrine Infusion 0.23 MICROgram(s)/kG/Min IV Continuous <Continuous>  pantoprazole  Injectable 40 milliGRAM(s) IV Push daily  phenylephrine    Infusion 0.491 MICROgram(s)/kG/Min IV Continuous <Continuous>  PrismaSATE Dialysate BK 0 / 3.5 5000 milliLiter(s) CRRT <Continuous>  PrismaSOL Filtration BGK 0 / 2.5 5000 milliLiter(s) CRRT <Continuous>  PrismaSOL Filtration BGK 0 / 2.5 5000 milliLiter(s) CRRT <Continuous>  sodium chloride 0.9%. 1000 milliLiter(s) IV Continuous <Continuous>  vasopressin Infusion 0.067 Unit(s)/Min IV Continuous <Continuous>    PRN Inpatient Medications  HYDROmorphone  Injectable 0.5 milliGRAM(s) IV Push every 2 hours PRN  sodium chloride 0.9% lock flush 10 milliLiter(s) IV Push every 1 hour PRN      REVIEW OF SYSTEMS  --------------------------------------------------------------------------------  Gen: No weight changes, fatigue, fevers/chills, weakness  Skin: No rashes  Head/Eyes/Ears/Mouth: No headache; Normal hearing; Normal vision w/o blurriness; No sinus pain/discomfort, sore throat  Respiratory: No dyspnea, cough, wheezing, hemoptysis  CV: No chest pain, PND, orthopnea  GI: No abdominal pain, diarrhea, constipation, nausea, vomiting, melena, hematochezia  : No increased frequency, dysuria, hematuria, nocturia  MSK: No joint pain/swelling; no back pain; no edema  Neuro: No dizziness/lightheadedness, weakness, seizures, numbness, tingling  Heme: No easy bruising or bleeding  Endo: No heat/cold intolerance  Psych: No significant nervousness, anxiety, stress, depression    All other systems were reviewed and are negative, except as noted.    VITALS/PHYSICAL EXAM  --------------------------------------------------------------------------------  T(C): 35.4 (04-13-19 @ 04:00), Max: 36.1 (04-12-19 @ 09:00)  HR: 111 (04-13-19 @ 07:30) (89 - 139)  BP: --  RR: 14 (04-13-19 @ 07:30) (1 - 39)  SpO2: 99% (04-13-19 @ 07:30) (98% - 100%)  Wt(kg): --  Height (cm): 165 (04-12-19 @ 03:00)  Weight (kg): 114 (04-12-19 @ 03:00)  BMI (kg/m2): 41.9 (04-12-19 @ 03:00)  BSA (m2): 2.18 (04-12-19 @ 03:00)      04-12-19 @ 07:01  -  04-13-19 @ 07:00  --------------------------------------------------------  IN: 2211.2 mL / OUT: 4024 mL / NET: -1812.8 mL      Physical Exam:  	Gen: NAD, well-appearing  	HEENT: PERRL, supple neck, clear oropharynx  	Pulm: CTA B/L  	CV: RRR, S1S2; no rub  	Back: No spinal or CVA tenderness; no sacral edema  	Abd: +BS, soft, nontender/nondistended  	: No suprapubic tenderness  	UE: Warm, FROM, no clubbing, intact strength; no edema; no asterixis  	LE: Warm, FROM, no clubbing, intact strength; no edema  	Neuro: No focal deficits, intact gait  	Psych: Normal affect and mood  	Skin: Warm, without rashes  	Vascular access:    LABS/STUDIES  --------------------------------------------------------------------------------              7.3    12.5  >-----------<  70       [04-13-19 @ 01:59]              22.0     139  |  103  |  30  ----------------------------<  93      [04-13-19 @ 01:59]  3.8   |  23  |  2.02        Ca     7.6     [04-13-19 @ 01:59]      Mg     1.9     [04-13-19 @ 01:59]      Phos  4.1     [04-13-19 @ 01:59]    TPro  3.7  /  Alb  2.0  /  TBili  1.7  /  DBili  x   /  AST  4201  /  ALT  1343  /  AlkPhos  104  [04-13-19 @ 01:59]    PT/INR: PT 21.6 , INR 1.84       [04-13-19 @ 01:59]  PTT: 33.6       [04-13-19 @ 04:20]    Troponin 0.03      [04-11-19 @ 12:22]        [04-11-19 @ 12:22]  LDH >2250      [04-13-19 @ 01:59]    Creatinine Trend:  SCr 2.02 [04-13 @ 01:59]  SCr 2.50 [04-12 @ 12:00]  SCr 2.43 [04-12 @ 06:00]  SCr 2.73 [04-12 @ 03:05]  SCr 2.64 [04-11 @ 23:48]    Urinalysis - [04-12-19 @ 01:00]      Color Yellow / Appearance Slightly Turbid / SG 1.012 / pH 6.0      Gluc Negative / Ketone Negative  / Bili Negative / Urobili Negative       Blood Small / Protein 30 mg/dL / Leuk Est Negative / Nitrite Negative      RBC 15 / WBC 8 / Hyaline 5 / Gran  / Sq Epi  / Non Sq Epi 2 / Bacteria Negative    Urine Creatinine 49      [04-11-19 @ 06:07]  Urine Sodium 99.0      [04-11-19 @ 06:07]    TSH 0.79      [04-10-19 @ 18:55]    HIV Nonreact      [04-10-19 @ 18:51] [facility] DIVISION OF KIDNEY DISEASES AND HYPERTENSION -- FOLLOW UP NOTE  --------------------------------------------------------------------------------  Chief Complaint: YAJAIRA on CRRT    24 hour events/subjective:  Pt on ECMO/ CRRT. Tolerating 100 cc/hr UF.        PAST HISTORY  --------------------------------------------------------------------------------  No significant changes to PMH, PSH, FHx, SHx, unless otherwise noted    ALLERGIES & MEDICATIONS  --------------------------------------------------------------------------------  Allergies    Allergy Status Unknown    Intolerances      Standing Inpatient Medications  cefepime   IVPB 1000 milliGRAM(s) IV Intermittent every 8 hours  cefepime   IVPB      chlorhexidine 0.12% Liquid 5 milliLiter(s) Oral Mucosa every 4 hours  CRRT Treatment    <Continuous>  dexmedetomidine Infusion 0.5 MICROgram(s)/kG/Hr IV Continuous <Continuous>  dextrose 50% Injectable 50 milliLiter(s) IV Push every 15 minutes  dextrose 50% Injectable 25 milliLiter(s) IV Push every 15 minutes  furosemide Infusion 20 mG/Hr IV Continuous <Continuous>  heparin  Infusion 1300 Unit(s)/Hr IV Continuous <Continuous>  insulin Infusion 1 Unit(s)/Hr IV Continuous <Continuous>  meperidine     Injectable 25 milliGRAM(s) IV Push once  norepinephrine Infusion 0.23 MICROgram(s)/kG/Min IV Continuous <Continuous>  pantoprazole  Injectable 40 milliGRAM(s) IV Push daily  phenylephrine    Infusion 0.491 MICROgram(s)/kG/Min IV Continuous <Continuous>  PrismaSATE Dialysate BK 0 / 3.5 5000 milliLiter(s) CRRT <Continuous>  PrismaSOL Filtration BGK 0 / 2.5 5000 milliLiter(s) CRRT <Continuous>  PrismaSOL Filtration BGK 0 / 2.5 5000 milliLiter(s) CRRT <Continuous>  sodium chloride 0.9%. 1000 milliLiter(s) IV Continuous <Continuous>  vasopressin Infusion 0.067 Unit(s)/Min IV Continuous <Continuous>    PRN Inpatient Medications  HYDROmorphone  Injectable 0.5 milliGRAM(s) IV Push every 2 hours PRN  sodium chloride 0.9% lock flush 10 milliLiter(s) IV Push every 1 hour PRN      REVIEW OF SYSTEMS  --------------------------------------------------------------------------------  Gen: No weight changes, fatigue, fevers/chills, weakness  Skin: No rashes  Head/Eyes/Ears/Mouth: No headache; Normal hearing; Normal vision w/o blurriness; No sinus pain/discomfort, sore throat  Respiratory: No dyspnea, cough, wheezing, hemoptysis  CV: No chest pain, PND, orthopnea  GI: No abdominal pain, diarrhea, constipation, nausea, vomiting, melena, hematochezia  : No increased frequency, dysuria, hematuria, nocturia  MSK: No joint pain/swelling; no back pain; no edema  Neuro: No dizziness/lightheadedness, weakness, seizures, numbness, tingling  Heme: No easy bruising or bleeding  Endo: No heat/cold intolerance  Psych: No significant nervousness, anxiety, stress, depression    All other systems were reviewed and are negative, except as noted.    VITALS/PHYSICAL EXAM  --------------------------------------------------------------------------------  T(C): 35.4 (04-13-19 @ 04:00), Max: 36.1 (04-12-19 @ 09:00)  HR: 111 (04-13-19 @ 07:30) (89 - 139)  BP: --  RR: 14 (04-13-19 @ 07:30) (1 - 39)  SpO2: 99% (04-13-19 @ 07:30) (98% - 100%)  Wt(kg): --  Height (cm): 165 (04-12-19 @ 03:00)  Weight (kg): 114 (04-12-19 @ 03:00)  BMI (kg/m2): 41.9 (04-12-19 @ 03:00)  BSA (m2): 2.18 (04-12-19 @ 03:00)      04-12-19 @ 07:01  -  04-13-19 @ 07:00  --------------------------------------------------------  IN: 2211.2 mL / OUT: 4024 mL / NET: -1812.8 mL      Physical Exam:  	Gen: Intubated  	HEENT:ETT+  	Pulm: vent sounds+  	CV:  s1/s2  	Abd: +BS, soft   	Ext: b/l LE edema  	Neuro: awake  	Skin: Warm  	Vascular: No cyanosis              Access: ECMO	  LABS/STUDIES  --------------------------------------------------------------------------------              7.3    12.5  >-----------<  70       [04-13-19 @ 01:59]              22.0     139  |  103  |  30  ----------------------------<  93      [04-13-19 @ 01:59]  3.8   |  23  |  2.02        Ca     7.6     [04-13-19 @ 01:59]      Mg     1.9     [04-13-19 @ 01:59]      Phos  4.1     [04-13-19 @ 01:59]    TPro  3.7  /  Alb  2.0  /  TBili  1.7  /  DBili  x   /  AST  4201  /  ALT  1343  /  AlkPhos  104  [04-13-19 @ 01:59]    PT/INR: PT 21.6 , INR 1.84       [04-13-19 @ 01:59]  PTT: 33.6       [04-13-19 @ 04:20]    Troponin 0.03      [04-11-19 @ 12:22]        [04-11-19 @ 12:22]  LDH >2250      [04-13-19 @ 01:59]    Creatinine Trend:  SCr 2.02 [04-13 @ 01:59]  SCr 2.50 [04-12 @ 12:00]  SCr 2.43 [04-12 @ 06:00]  SCr 2.73 [04-12 @ 03:05]  SCr 2.64 [04-11 @ 23:48]    Urinalysis - [04-12-19 @ 01:00]      Color Yellow / Appearance Slightly Turbid / SG 1.012 / pH 6.0      Gluc Negative / Ketone Negative  / Bili Negative / Urobili Negative       Blood Small / Protein 30 mg/dL / Leuk Est Negative / Nitrite Negative      RBC 15 / WBC 8 / Hyaline 5 / Gran  / Sq Epi  / Non Sq Epi 2 / Bacteria Negative    Urine Creatinine 49      [04-11-19 @ 06:07]  Urine Sodium 99.0      [04-11-19 @ 06:07]    TSH 0.79      [04-10-19 @ 18:55]    HIV Nonreact      [04-10-19 @ 18:51]

## 2019-04-13 NOTE — PROGRESS NOTE ADULT - ATTENDING COMMENTS
Pt seen on CVVH  Potassium has been low - will change CVVH to all 4K  YAJAIRA - Pts UO is improving on lasix drip - good prognostic sign.

## 2019-04-13 NOTE — AIRWAY REMOVAL NOTE  ADULT & PEDS - ARTIFICAL AIRWAY REMOVAL COMMENTS
Written order for extubation verified. The patient was identified by full name and birth date compared to the identification band. Present during the procedure RN frestha

## 2019-04-13 NOTE — PROGRESS NOTE ADULT - SUBJECTIVE AND OBJECTIVE BOX
ECMO Day#3     Vital Signs Last 24 Hrs  T(C): 35.9 (13 Apr 2019 09:00), Max: 35.9 (13 Apr 2019 03:00)  T(F): 96.6 (13 Apr 2019 09:00), Max: 96.6 (13 Apr 2019 03:00)  HR: 117 (13 Apr 2019 11:30) (89 - 139)  BP: --  BP(mean): --  RR: 21 (13 Apr 2019 11:30) (1 - 34)  SpO2: 100% (13 Apr 2019 11:30) (99% - 100%)      Adult Advanced Hemodynamics Last 24 Hrs  CVP(mm Hg): 8 (13 Apr 2019 11:30) (1 - 13)  CVP(cm H2O): --  CO: --  CI: --  PA: 26/16 (13 Apr 2019 11:30) (16/9 - 32/17)  PA(mean): 22 (13 Apr 2019 11:30) (13 - 26)  PCWP: --  SVR: --  SVRI: --  PVR: --  PVRI: --    I&O's Summary    12 Apr 2019 07:01  -  13 Apr 2019 07:00  --------------------------------------------------------  IN: 2211.2 mL / OUT: 4024 mL / NET: -1812.8 mL    13 Apr 2019 07:01  -  13 Apr 2019 11:35  --------------------------------------------------------  IN: 180.9 mL / OUT: 363 mL / NET: -182.1 mL          ECMO SETTINGS:  Type:		[ ] Venovenous		[x] Venoarterial  Cannulation Site(s): Rt. axillary artery & Rt. femoral vein    Flow: 3.8L/min 	      RPM: 4000    ABG - ( 13 Apr 2019 11:27 )  pH: 7.48  /  pCO2: 35    /  pO2: 203   / HCO3: 26    / Base Excess: 2.6   /  SaO2: 100                 Oxygenator:	Sweep: 1    L/min	FiO2:    100%      VENTILATOR SETTINGS:     Mode: CPAP with PS  FiO2: 50  PEEP: 5  PS: 5  MAP: 7  PIP: 12        LABS:                          7.3    12.5  )-----------( 70       ( 13 Apr 2019 01:59 )             22.0                          04-13    139  |  103  |  30<H>  ----------------------------<  93  3.8   |  23  |  2.02<H>    Ca    7.6<L>      13 Apr 2019 01:59  Phos  4.1     04-13  Mg     1.9     04-13    TPro  3.7<L>  /  Alb  2.0<L>  /  TBili  1.7<H>  /  DBili  x   /  AST  4201<H>  /  ALT  1343<H>  /  AlkPhos  104  04-13      LIVER FUNCTIONS - ( 13 Apr 2019 01:59 )  Alb: 2.0 g/dL / Pro: 3.7 g/dL / ALK PHOS: 104 U/L / ALT: 1343 U/L / AST: 4201 U/L / GGT: x             PT/INR - ( 13 Apr 2019 01:59 )   PT: 21.6 sec;   INR: 1.84 ratio         PTT - ( 13 Apr 2019 04:20 )  PTT:33.6 sec      Culture - Sputum (collected 13 Apr 2019 00:45)  Source: .Sputum  Gram Stain (13 Apr 2019 02:26):    Numerous polymorphonuclear leukocytes per low power field    Rare Squamous epithelial cells per low power field    No organisms seen    Culture - Blood (collected 12 Apr 2019 02:42)  Source: .Blood  Preliminary Report (13 Apr 2019 03:01):    No growth to date.    Culture - Blood (collected 12 Apr 2019 02:42)  Source: .Blood  Preliminary Report (13 Apr 2019 03:01):    No growth to date.          ACTIVE MEDS:    MEDICATIONS  (STANDING):  cefepime   IVPB 1000 milliGRAM(s) IV Intermittent every 8 hours  cefepime   IVPB      chlorhexidine 0.12% Liquid 5 milliLiter(s) Oral Mucosa every 4 hours  CRRT Treatment    <Continuous>  dexmedetomidine Infusion 0.5 MICROgram(s)/kG/Hr (14.25 mL/Hr) IV Continuous <Continuous>  dextrose 50% Injectable 50 milliLiter(s) IV Push every 15 minutes  dextrose 50% Injectable 25 milliLiter(s) IV Push every 15 minutes  furosemide Infusion 20 mG/Hr (10 mL/Hr) IV Continuous <Continuous>  heparin  Infusion 1300 Unit(s)/Hr (13 mL/Hr) IV Continuous <Continuous>  insulin Infusion 1 Unit(s)/Hr (1 mL/Hr) IV Continuous <Continuous>  meperidine     Injectable 25 milliGRAM(s) IV Push once  norepinephrine Infusion 0.23 MICROgram(s)/kG/Min (49.163 mL/Hr) IV Continuous <Continuous>  pantoprazole  Injectable 40 milliGRAM(s) IV Push daily  phenylephrine    Infusion 0.491 MICROgram(s)/kG/Min (21 mL/Hr) IV Continuous <Continuous>  PrismaSATE Dialysate BGK 4 / 2.5 5000 milliLiter(s) (1600 mL/Hr) CRRT <Continuous>  PrismaSOL Filtration BGK 4 / 2.5 5000 milliLiter(s) (1400 mL/Hr) CRRT <Continuous>  PrismaSOL Filtration BGK 4 / 2.5 5000 milliLiter(s) (200 mL/Hr) CRRT <Continuous>  sodium chloride 0.9%. 1000 milliLiter(s) (10 mL/Hr) IV Continuous <Continuous>  vancomycin  IVPB 1000 milliGRAM(s) IV Intermittent every 12 hours  vasopressin Infusion 0.067 Unit(s)/Min (4 mL/Hr) IV Continuous <Continuous>        Assessment/Plan:  Remains HD stable on VA ECMO support. With fluid removal via CVVHD lung fields much improved on CXR and successfully extubated earlier this am. Also, improved pulsatility and BP, tolerating momentary bedside turndown of CentriMag RPMs to 1.5L/m of flow. Will aim for formal bedside ECMO weaning study on Monday am and continue to assess for full renal recovery. Heart transplant and LVAD evaluation in progress.

## 2019-04-13 NOTE — PROGRESS NOTE ADULT - ASSESSMENT
32 yo F with history of hyperemesis gravidarum  initially presented to [facility] for SOB. She was found in SVT and received adenosinex4 and failed cardioversion x2. Pt then emergently taken for  and post op went into shock. She was found to have reduced EF and placed on VA ECMO and CVVHD. Transferred to [facility] on  for further management. 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  She is currently on VA ECMO, CVVHD, pressors, and intubated. She is alert and awake.

## 2019-04-13 NOTE — PROGRESS NOTE ADULT - SUBJECTIVE AND OBJECTIVE BOX
Follow Up:  post partum cardiomyopathy pre LVAD/transplant eval    Interval History: pt still on CVVH, some hypothermia overnight    ROS:    Unobtainable because: pt intubated but denied, fever, chills or pain        Allergies  Allergy Status Unknown        ANTIMICROBIALS:  cefepime   IVPB 1000 every 8 hours  cefepime   IVPB    vancomycin  IVPB 1000 every 12 hours      OTHER MEDS:  chlorhexidine 0.12% Liquid 5 milliLiter(s) Oral Mucosa every 4 hours  CRRT Treatment    <Continuous>  dexmedetomidine Infusion 0.5 MICROgram(s)/kG/Hr IV Continuous <Continuous>  dextrose 50% Injectable 50 milliLiter(s) IV Push every 15 minutes  dextrose 50% Injectable 25 milliLiter(s) IV Push every 15 minutes  furosemide Infusion 20 mG/Hr IV Continuous <Continuous>  heparin  Infusion 1300 Unit(s)/Hr IV Continuous <Continuous>  HYDROmorphone  Injectable 0.5 milliGRAM(s) IV Push every 2 hours PRN  insulin Infusion 1 Unit(s)/Hr IV Continuous <Continuous>  meperidine     Injectable 25 milliGRAM(s) IV Push once  norepinephrine Infusion 0.23 MICROgram(s)/kG/Min IV Continuous <Continuous>  pantoprazole  Injectable 40 milliGRAM(s) IV Push daily  phenylephrine    Infusion 0.491 MICROgram(s)/kG/Min IV Continuous <Continuous>  PrismaSATE Dialysate BGK 4 / 2.5 5000 milliLiter(s) CRRT <Continuous>  PrismaSOL Filtration BGK 4 / 2.5 5000 milliLiter(s) CRRT <Continuous>  PrismaSOL Filtration BGK 4 / 2.5 5000 milliLiter(s) CRRT <Continuous>  sodium chloride 0.9% lock flush 10 milliLiter(s) IV Push every 1 hour PRN  sodium chloride 0.9%. 1000 milliLiter(s) IV Continuous <Continuous>  vasopressin Infusion 0.067 Unit(s)/Min IV Continuous <Continuous>      Vital Signs Last 24 Hrs  T(C): 35.9 (2019 09:00), Max: 36.1 (2019 10:00)  T(F): 96.6 (2019 09:00), Max: 97 (2019 10:00)  HR: 115 (2019 09:45) (89 - 139)  BP: --  BP(mean): --  RR: 21 (2019 09:45) (1 - 39)  SpO2: 100% (2019 09:45) (98% - 100%)    Physical Exam:  General:    NAD on vent  Head: atraumatic, normocephalic  Eye: normal sclera and conjunctiva  ENT:   ET tube,   no LAD,   neck supple  Cardio:    tachy regular S1, S2  Respiratory:    clear b/l,    no wheezing  abd:     soft,   BS +,   no tenderness  :   no CVAT,  no suprapubic tenderness,   Musculoskeletal:   generalized edema  vascular:  LIJ CVC, R,  [place], ECMO lines  Skin:    no rash  Neurologic:   awake and moving, answering questions                            7.3    12.5  )-----------( 70       ( 2019 01:59 )             22.0           139  |  103  |  30<H>  ----------------------------<  93  3.8   |  23  |  2.02<H>    Ca    7.6<L>      2019 01:59  Phos  4.1       Mg     1.9         TPro  3.7<L>  /  Alb  2.0<L>  /  TBili  1.7<H>  /  DBili  x   /  AST  4201<H>  /  ALT  1343<H>  /  AlkPhos  104        Urinalysis Basic - ( 2019 01:00 )    Color: Yellow / Appearance: Slightly Turbid / S.012 / pH: x  Gluc: x / Ketone: Negative  / Bili: Negative / Urobili: Negative   Blood: x / Protein: 30 mg/dL / Nitrite: Negative   Leuk Esterase: Negative / RBC: 15 /hpf / WBC 8 /HPF   Sq Epi: x / Non Sq Epi: 2 /hpf / Bacteria: Negative        MICROBIOLOGY:  Vancomycin Level, Trough: 6.8 ug/mL (19 @ 15:50)  v  .Sputum  19 --  --    Numerous polymorphonuclear leukocytes per low power field  Rare Squamous epithelial cells per low power field  No organisms seen      .Blood  19   No growth to date.  --  --                RADIOLOGY:  Images below reviewed personally  < from: Xray Chest 1 View- PORTABLE-Routine (19 @ 02:33) >  Impression:    The heart is enlarged. Left lower lobe pneumonia. Improving pulmonary   edema. All life supporting devices are in good position and unchanged   when compared to previous study done 2019.

## 2019-04-13 NOTE — PROGRESS NOTE ADULT - SUBJECTIVE AND OBJECTIVE BOX
Subjective:    Medications:  cefepime   IVPB 1000 milliGRAM(s) IV Intermittent every 8 hours  cefepime   IVPB      chlorhexidine 0.12% Liquid 5 milliLiter(s) Oral Mucosa every 4 hours  CRRT Treatment    <Continuous>  dexmedetomidine Infusion 0.5 MICROgram(s)/kG/Hr IV Continuous <Continuous>  dextrose 50% Injectable 50 milliLiter(s) IV Push every 15 minutes  dextrose 50% Injectable 25 milliLiter(s) IV Push every 15 minutes  furosemide Infusion 20 mG/Hr IV Continuous <Continuous>  heparin  Infusion 1300 Unit(s)/Hr IV Continuous <Continuous>  HYDROmorphone  Injectable 0.5 milliGRAM(s) IV Push every 2 hours PRN  insulin Infusion 1 Unit(s)/Hr IV Continuous <Continuous>  meperidine     Injectable 25 milliGRAM(s) IV Push once  norepinephrine Infusion 0.23 MICROgram(s)/kG/Min IV Continuous <Continuous>  pantoprazole  Injectable 40 milliGRAM(s) IV Push daily  phenylephrine    Infusion 0.491 MICROgram(s)/kG/Min IV Continuous <Continuous>  PrismaSATE Dialysate BK 0 / 3.5 5000 milliLiter(s) CRRT <Continuous>  PrismaSOL Filtration BGK 0 / 2.5 5000 milliLiter(s) CRRT <Continuous>  PrismaSOL Filtration BGK 0 / 2.5 5000 milliLiter(s) CRRT <Continuous>  sodium chloride 0.9% lock flush 10 milliLiter(s) IV Push every 1 hour PRN  sodium chloride 0.9%. 1000 milliLiter(s) IV Continuous <Continuous>  vasopressin Infusion 0.067 Unit(s)/Min IV Continuous <Continuous>    Vitals:  T(C): 35.4 (19 @ 04:00), Max: 36.1 (19 @ 09:00)  HR: 108 (19 @ 07:48) (89 - 139)  ABP: 121/84 (19 @ 07:30) (81/62 - 141/102)  ABP(mean): 96 (19 @ 07:30) (65 - 113)  RR: 14 (19 @ 07:30) (1 - 39)  SpO2: 100% (19 @ 07:48) (98% - 100%)  CO: 3.5 (19 @ 08:00) (3.5 - 3.5)  CI: 1.6 (19 @ 08:00) (1.6 - 1.6)  PA: 25/17 (19 @ 07:30) (16/9 - 180/172)  PA(mean): 21 (19 @ 07:30) (13 - 173)  SVR: 1415 (19 @ 08:00) (1415 - 1415)       Daily Weight in k.5 (2019 00:00)    Weight (kg): 114 ( @ 03:00)    I&O's Summary    2019 07:01  -  2019 07:00  --------------------------------------------------------  IN: 2211.2 mL / OUT: 4024 mL / NET: -1812.8 mL        Physical Exam:  Appearance: No Acute Distress  HEENT: JVP ___ cm H2O, no HJR  Cardiovascular: RRR, Normal S1 S2, No murmurs/rubs/gallops  Respiratory: Clear to auscultation bilaterally  Gastrointestinal: Soft, Non-tender, non-distended	  Skin: no skin lesions  Neurologic: Non-focal  Extremities: No LE edema, warm and well perfused  Psychiatry: A & O x 3, Mood & affect appropriate      Labs:                        7.3    12.5  )-----------( 70       ( 2019 01:59 )             22.0         139  |  103  |  30<H>  ----------------------------<  93  3.8   |  23  |  2.02<H>    Ca    7.6<L>      2019 01:59  Phos  4.1       Mg     1.9         TPro  3.7<L>  /  Alb  2.0<L>  /  TBili  1.7<H>  /  DBili  x   /  AST  4201<H>  /  ALT  1343<H>  /  AlkPhos  104        PT/INR - ( 2019 01:59 )   PT: 21.6 sec;   INR: 1.84 ratio         PTT - ( 2019 04:20 )  PTT:33.6 sec  CARDIAC MARKERS ( 2019 12:22 )  x     / 0.03 ng/mL / 801 U/L / x     / 6.0 ng/mL        Oxygen Saturation, Mixed: 80 % ( @ 01:45)  Oxygen Saturation, Mixed: 80 % ( @ 16:48)  Oxygen Saturation, Mixed: 77 % ( @ 08:47)  Oxygen Saturation, Mixed: 83 % ( @ 03:03)  Oxygen Saturation, Mixed: 67 % ( @ 23:40)    Lactate Dehydrogenase, Serum: >2250 U/L ( @ 01:59)  Lactate Dehydrogenase, Serum: >2250 U/L ( @ 23:48)  Lactate Dehydrogenase, Serum: 279 (04-10 @ 18:51)  Lactate Dehydrogenase, Serum: 263 (04-10 @ 17:24)    Lactate, Blood: 2.2 mmol/L (04-10 @ 16:31)        TELEMETRY:

## 2019-04-13 NOTE — PROGRESS NOTE ADULT - ASSESSMENT
32 yo lady with no past medical history 35 weeks pregnant transferred to [facility] after she presented with shortness of breath, SVT and acutely decompensated with severe cardiomyopathy, needing ECMO, s/p emergency . now in multiorgan failure and YAJAIRA.

## 2019-04-13 NOTE — PROGRESS NOTE ADULT - SUBJECTIVE AND OBJECTIVE BOX
CRITICAL CARE ATTENDING - CTICU    MEDICATIONS  (STANDING):  cefepime   IVPB 1000 milliGRAM(s) IV Intermittent every 8 hours  cefepime   IVPB      chlorhexidine 0.12% Liquid 5 milliLiter(s) Oral Mucosa every 4 hours  CRRT Treatment    <Continuous>  dexmedetomidine Infusion 0.5 MICROgram(s)/kG/Hr (14.25 mL/Hr) IV Continuous <Continuous>  dextrose 50% Injectable 50 milliLiter(s) IV Push every 15 minutes  dextrose 50% Injectable 25 milliLiter(s) IV Push every 15 minutes  furosemide Infusion 20 mG/Hr (10 mL/Hr) IV Continuous <Continuous>  heparin  Infusion 1300 Unit(s)/Hr (13 mL/Hr) IV Continuous <Continuous>  insulin Infusion 1 Unit(s)/Hr (1 mL/Hr) IV Continuous <Continuous>  meperidine     Injectable 25 milliGRAM(s) IV Push once  norepinephrine Infusion 0.23 MICROgram(s)/kG/Min (49.163 mL/Hr) IV Continuous <Continuous>  pantoprazole  Injectable 40 milliGRAM(s) IV Push daily  phenylephrine    Infusion 0.491 MICROgram(s)/kG/Min (21 mL/Hr) IV Continuous <Continuous>  PrismaSATE Dialysate BGK 4 / 2.5 5000 milliLiter(s) (1600 mL/Hr) CRRT <Continuous>  PrismaSOL Filtration BGK 4 / 2.5 5000 milliLiter(s) (1400 mL/Hr) CRRT <Continuous>  PrismaSOL Filtration BGK 4 / 2.5 5000 milliLiter(s) (200 mL/Hr) CRRT <Continuous>  sodium chloride 0.9%. 1000 milliLiter(s) (10 mL/Hr) IV Continuous <Continuous>  vancomycin  IVPB 1000 milliGRAM(s) IV Intermittent every 12 hours  vasopressin Infusion 0.067 Unit(s)/Min (4 mL/Hr) IV Continuous <Continuous>                                    7.3    12.5  )-----------( 70       ( 2019 01:59 )             22.0       04-13    139  |  103  |  30<H>  ----------------------------<  93  3.8   |  23  |  2.02<H>    Ca    7.6<L>      2019 01:59  Phos  4.1     04-13  Mg     1.9         TPro  3.7<L>  /  Alb  2.0<L>  /  TBili  1.7<H>  /  DBili  x   /  AST  4201<H>  /  ALT  1343<H>  /  AlkPhos  104        PT/INR - ( 2019 01:59 )   PT: 21.6 sec;   INR: 1.84 ratio         PTT - ( 2019 04:20 )  PTT:33.6 sec    Mode: CPAP with PS  FiO2: 50  PEEP: 5  PS: 5  MAP: 7  PIP: 12      Daily     Daily Weight in k.5 (2019 00:00)       @ 07:  -   @ 07:00  --------------------------------------------------------  IN: 2211.2 mL / OUT: 4024 mL / NET: -1812.8 mL     @ 07: @ 11:14  --------------------------------------------------------  IN: 180.9 mL / OUT: 363 mL / NET: -182.1 mL        Critically Ill patient  : [ ] preoperative ,   [x ] post operative    Requires :  [x ] Arterial Line   [x ] Central Line  [x ] PA catheter  [ ] IABP  [x ] ECMO  [ ] LVAD  [x ] Ventilator  [ ] pacemaker [ ] Impella.    Bedside evaluation , monitoring , treatment of hemodynamics , fluids , IVP/ IVCD meds.        Diagnosis:     POD 4/11 - VA ECMO    Cardiogenic Shock  ? Post partum cardiomyopathy ?    CHF- acute [x ]   chronic [ ]    systolic [x ]   diatolic [ ]          - Echo- EF - 10%            [x ] RV dysfunction          - Cxr-cardiomegally, edema          - Clinical-  [x ]inotropes   [x ]pressors   [ x]diuresis   [ ]IABP   [x ]ECMO   [ ]LVAD   [x ]Respiratory Failure                    -     s/p  C section    Hemodynamic lability,instability. Requires IVCD [x ] vasopressors [x ] inotropes  [ ] vasodilator  [ ]IVSS fluid  to maintain MAP, perfusion, C.I.     ECMO Circuit    Thrombocytopenia - r/o DIC / HIT      Renal Failure - Acute Kidney Injury     CVVHD      -100cc/hr    fluid overload     Obesity OHS / WHITNEY     respiratory failure     Requires chest PT, pulmonary toilet, ambu bagging, suctioning to maintain SaO2,  patent airway and treat atelectasis.     Ventilator Management:  [ x]AC-rest    [x ]CPAP-PS Wean    [ ]Trach Collar     [ x]Extubate    [ ] T-Piece  [ ]peep>5     Difficult weaning process - multiple organ system involvement in critically ill patient     h/o Asthma                            Discussed with CT surgeon, Physician's Assistant - Nurse Practitioner- Critical care medicine team.   Dicussed at  AM / PM rounds.   Chart, labs , films reviewed.    Total Time: 30 min

## 2019-04-13 NOTE — PROGRESS NOTE ADULT - SUBJECTIVE AND OBJECTIVE BOX
JOE BEDOLLA  MRN#:  [id number]    The patient is a 33y Female admitted at 34 weeks gestation with dysnea and nausea and vomiting, developed SVT,renal failure and subsequent cardiogenic shock requiring ECMO who was seen, evaluated, & examined with the CTICU staff on rounds, overnight and during the early morning hours with a multidisciplinary care plan formulated & implemented.  All available clinical, laboratory, radiographic, pharmacologic, and electrocardiographic data were reviewed & analyzed.      The patient was in the CTICU in critical condition secondary to persistent cardiopulmonary dysfunction, hemodynamically significant anemia, rapid atrial arrythmias, persistent cardiovascular dysfunction, acute renal failure, acidosis, & hyperglycemia.      Respiratory status required full ventilatory support, the following of ABG’s with A-line monitoring, continuous pulse oximetry monitoring, an IV Dexmedetomidine infusion for support & to evaluate for & prevent further decompensation secondary to persistent cardiopulmonary dysfunction.     Invasive hemodynamic monitoring with a pulmonary artery catheter & an A-line were required for the continuous central venous, pulmonary artery pressure and MAP/BP monitoring and intermittent CI/SV02 measurement to ensure adequate cardiovascular support and to evaluate for & help prevent decompensation while receiving intermittent volume expansion, external an IV Levophed drip, an IV Vasopressin drip, an IV Lasix drip, an IV Heparin drip, VA ECMO support secondary to hemodynamically significant anemia, cardiogenic shock/acute systolic heart failure, acute renal failure and rapid atrial fibrillation.    Metabolic stability, stress hyperglycemia, & infection prophylaxis required an IV regular Insulin drip & the following of serial glucose levels to help achieve & maintain euglycemia.      Patient required acute postoperative critical care management and I provided 35 minutes of non-continuous care to the patient.  Discussed at length with the CTICU staff and helped coordinate care.

## 2019-04-13 NOTE — CHART NOTE - NSCHARTNOTEFT_GEN_A_CORE
R3 OB     Pt seen and examined in CT ICU. Per RN, condition remains critical but stable at this time. Still intubated, on ECMO, on CVVH, and requiring pressors.   Mild breast engorgement noted on exam, pt expressed desire to RN as well as postpartum RN to breastfeed. No contraindications to pumping if patient desires to breastfeed. Will consult lactation in AM to show RN and patient how to pump.   Incision c/d/i with staples.  Jorgea WNL.   OB will continue to follow closely.     Leny Anaya PGY3

## 2019-04-13 NOTE — PROGRESS NOTE ADULT - ATTENDING COMMENTS
Extubated, awake alert orientated.  Some episodes of sinus tach overnight.   pulsatile each beat (yesterday 1/4)  on no pressors. lasix 20/hr, CVVHD -50/hr, I/O 24hrs -1812  ECMO 3.7L/min  , 114/76, CVP 2, PA 19/10 15,   04-13    139  |  103  |  30<H>  ----------------------------<  93  3.8   |  23  |  2.02<H>  Ca    7.6<L>      13 Apr 2019 01:59  Phos  4.1     04-13  Mg     1.9     04-13  TPro  3.7<L>  /  Alb  2.0<L>  /  TBili  1.7<H>  /  DBili  x   /  AST  4201<H>  /  ALT  1343<H>  /  AlkPhos  104  04-13                        7.3    12.5  )-----------( 70       ( 13 Apr 2019 01:59 )             22.0   Improved hemodynamics and lung function.   Plan:  Would decrease CVVHD rate to allow for increase urine output.   Could complete dig load for total .75mg  Antibiotics as per critical care  d/w Dr Call. Plan for formal ECMO wean monday with 30 mins clamping if tolerated.  Rell Vila Extubated, awake alert orientated.  Some episodes of sinus tach overnight.   pulsatile each beat (yesterday 1/4)  on no pressors. lasix 20/hr, CVVHD -50/hr, I/O 24hrs -1812  ECMO 3.7L/min  , 114/76, CVP 2, PA 19/10 15,   04-13    139  |  103  |  30<H>  ----------------------------<  93  3.8   |  23  |  2.02<H>  Ca    7.6<L>      13 Apr 2019 01:59  Phos  4.1     04-13  Mg     1.9     04-13  TPro  3.7<L>  /  Alb  2.0<L>  /  TBili  1.7<H>  /  DBili  x   /  AST  4201<H>  /  ALT  1343<H>  /  AlkPhos  104  04-13                        7.3    12.5  )-----------( 70       ( 13 Apr 2019 01:59 )             22.0   AST > 400O but recovering  LDH > 2200  Improved hemodynamics and lung function.   liver injury recovering  evidence for hemolysis.   Plan:  Would decrease CVVHD rate to allow for increase urine output.   Could complete dig load for total .75mg  Antibiotics as per critical care  d/w Dr Call. Plan for formal ECMO wean monday with 30 mins clamping if tolerated.  would check HIT  Rell Vila

## 2019-04-13 NOTE — PROGRESS NOTE ADULT - PROBLEM SELECTOR PLAN 1
YAJAIRA - ATN from shock/ acute cardiomyopathy. Anuric. Continue CVVHDF.. Chest Xray with persistent pulmonary edema. CVP 8. Fluid removal 50-100cc/hr net negative as tolerated. e We will follow closely. Please check Phos level daily. Cefepime per CVVHD dosing. YAJAIRA - ATN from shock/ acute cardiomyopathy. UOP ~635 on lasix gtt. Continue CVVHDF with fluid removal 50-100cc/hr net negative as tolerated. Change K+ baths to all 4.Monitor K+ and phos and replete prn.

## 2019-04-13 NOTE — PROGRESS NOTE ADULT - PROBLEM SELECTOR PLAN 1
C/w VA-ECMO support  - Check repeat TTE  - Will try to obtain collateral information from cardiologist at [place].   - Continue to remove fluid with eventual goal to extubate.   - Based on TTE will decide on need for LV Venting with other support.

## 2019-04-13 NOTE — PROGRESS NOTE ADULT - ASSESSMENT
33 f that was 34w pregnant p/w cough, SOB, N/V, found to have SVT to 200s with cardiogenic shock, s/p , transferred to SICU, multiorgan failure on ECMO, CVVH, LFTs in thousands due to shock liver  UA neg  CXR- ?LLL pna vs atelectasis, pulm edema  blood cx negative, sputum negative    cardiogenic shock due to ?post partum cardiomyopathy vs viral etiology  ?LLL pneumonia   hypothermia likely due to CVVH and ECMO    * RVP  * coxsackie Ab, echovirus Ab  * c/w vancomycin  * c/w cefepime  * if pursuing pre-lvad/transplant eval- hepatitis serologies, MMR IgG, HSV IgG, varicella IgG, toxo IgG, EBV, CMV IgG,  syphilis screen, quant TB

## 2019-04-14 LAB
ALBUMIN SERPL ELPH-MCNC: 2.2 G/DL — LOW (ref 3.3–5)
ALP SERPL-CCNC: 109 U/L — SIGNIFICANT CHANGE UP (ref 40–120)
ALT FLD-CCNC: 898 U/L — HIGH (ref 10–45)
ANION GAP SERPL CALC-SCNC: 9 MMOL/L — SIGNIFICANT CHANGE UP (ref 5–17)
APTT BLD: 60.6 SEC — HIGH (ref 27.5–36.3)
APTT BLD: 63.7 SEC — HIGH (ref 27.5–36.3)
APTT BLD: 68 SEC — HIGH (ref 27.5–36.3)
AST SERPL-CCNC: 1867 U/L — HIGH (ref 10–40)
BASE EXCESS BLDMV CALC-SCNC: 0.8 MMOL/L — SIGNIFICANT CHANGE UP (ref -3–3)
BASE EXCESS BLDMV CALC-SCNC: 2.6 MMOL/L — SIGNIFICANT CHANGE UP (ref -3–3)
BASE EXCESS BLDMV CALC-SCNC: 2.8 MMOL/L — SIGNIFICANT CHANGE UP (ref -3–3)
BILIRUB SERPL-MCNC: 2 MG/DL — HIGH (ref 0.2–1.2)
BUN SERPL-MCNC: 24 MG/DL — HIGH (ref 7–23)
CALCIUM SERPL-MCNC: 7.8 MG/DL — LOW (ref 8.4–10.5)
CHLORIDE SERPL-SCNC: 104 MMOL/L — SIGNIFICANT CHANGE UP (ref 96–108)
CO2 BLDMV-SCNC: 27 MMOL/L — SIGNIFICANT CHANGE UP (ref 21–29)
CO2 BLDMV-SCNC: 28 MMOL/L — SIGNIFICANT CHANGE UP (ref 21–29)
CO2 BLDMV-SCNC: 28 MMOL/L — SIGNIFICANT CHANGE UP (ref 21–29)
CO2 SERPL-SCNC: 25 MMOL/L — SIGNIFICANT CHANGE UP (ref 22–31)
CREAT ?TM UR-MCNC: 5 MG/DL — SIGNIFICANT CHANGE UP
CREAT SERPL-MCNC: 1.91 MG/DL — HIGH (ref 0.5–1.3)
CULTURE RESULTS: SIGNIFICANT CHANGE UP
GAS PNL BLDA: SIGNIFICANT CHANGE UP
GAS PNL BLDMV: SIGNIFICANT CHANGE UP
GLUCOSE SERPL-MCNC: 98 MG/DL — SIGNIFICANT CHANGE UP (ref 70–99)
HAPTOGLOB SERPL-MCNC: <20 MG/DL — LOW (ref 34–200)
HCO3 BLDMV-SCNC: 25 MMOL/L — SIGNIFICANT CHANGE UP (ref 20–28)
HCO3 BLDMV-SCNC: 26 MMOL/L — SIGNIFICANT CHANGE UP (ref 20–28)
HCO3 BLDMV-SCNC: 27 MMOL/L — SIGNIFICANT CHANGE UP (ref 20–28)
HCT VFR BLD CALC: 22 % — LOW (ref 34.5–45)
HGB BLD-MCNC: 7.2 G/DL — LOW (ref 11.5–15.5)
HOROWITZ INDEX BLDMV+IHG-RTO: 100 — SIGNIFICANT CHANGE UP
HOROWITZ INDEX BLDMV+IHG-RTO: 100 — SIGNIFICANT CHANGE UP
HOROWITZ INDEX BLDMV+IHG-RTO: 50 — SIGNIFICANT CHANGE UP
INR BLD: 1.49 RATIO — HIGH (ref 0.88–1.16)
LDH SERPL L TO P-CCNC: 1342 U/L — HIGH (ref 50–242)
MAGNESIUM SERPL-MCNC: 2.3 MG/DL — SIGNIFICANT CHANGE UP (ref 1.6–2.6)
MCHC RBC-ENTMCNC: 29.3 PG — SIGNIFICANT CHANGE UP (ref 27–34)
MCHC RBC-ENTMCNC: 32.6 GM/DL — SIGNIFICANT CHANGE UP (ref 32–36)
MCV RBC AUTO: 89.9 FL — SIGNIFICANT CHANGE UP (ref 80–100)
O2 CT VFR BLD CALC: 38 MMHG — SIGNIFICANT CHANGE UP (ref 30–65)
O2 CT VFR BLD CALC: 44 MMHG — SIGNIFICANT CHANGE UP (ref 30–65)
O2 CT VFR BLD CALC: 49 MMHG — SIGNIFICANT CHANGE UP (ref 30–65)
PCO2 BLDMV: 39 MMHG — SIGNIFICANT CHANGE UP (ref 30–65)
PCO2 BLDMV: 42 MMHG — SIGNIFICANT CHANGE UP (ref 30–65)
PCO2 BLDMV: 43 MMHG — SIGNIFICANT CHANGE UP (ref 30–65)
PCP SPEC-MCNC: SIGNIFICANT CHANGE UP
PH BLDMV: 7.39 — SIGNIFICANT CHANGE UP (ref 7.32–7.45)
PH BLDMV: 7.42 — SIGNIFICANT CHANGE UP (ref 7.32–7.45)
PH BLDMV: 7.44 — SIGNIFICANT CHANGE UP (ref 7.32–7.45)
PHOSPHATE SERPL-MCNC: 2 MG/DL — LOW (ref 2.5–4.5)
PLATELET # BLD AUTO: 71 K/UL — LOW (ref 150–400)
POTASSIUM SERPL-MCNC: 4.1 MMOL/L — SIGNIFICANT CHANGE UP (ref 3.5–5.3)
POTASSIUM SERPL-SCNC: 4.1 MMOL/L — SIGNIFICANT CHANGE UP (ref 3.5–5.3)
PROT ?TM UR-MCNC: 10 MG/DL — SIGNIFICANT CHANGE UP (ref 0–12)
PROT ?TM UR-MCNC: 12 MG/DL — SIGNIFICANT CHANGE UP (ref 0–12)
PROT SERPL-MCNC: 4 G/DL — LOW (ref 6–8.3)
PROT/CREAT UR-RTO: 2 RATIO — HIGH (ref 0–0.2)
PROTHROM AB SERPL-ACNC: 17.2 SEC — HIGH (ref 10–12.9)
RBC # BLD: 2.45 M/UL — LOW (ref 3.8–5.2)
RBC # FLD: 14.6 % — HIGH (ref 10.3–14.5)
SAO2 % BLDMV: 65 % — SIGNIFICANT CHANGE UP (ref 60–90)
SAO2 % BLDMV: 76 % — SIGNIFICANT CHANGE UP (ref 60–90)
SAO2 % BLDMV: 81 % — SIGNIFICANT CHANGE UP (ref 60–90)
SODIUM SERPL-SCNC: 138 MMOL/L — SIGNIFICANT CHANGE UP (ref 135–145)
SPECIMEN SOURCE: SIGNIFICANT CHANGE UP
VANCOMYCIN TROUGH SERPL-MCNC: 13.8 UG/ML — SIGNIFICANT CHANGE UP (ref 10–20)
WBC # BLD: 13.4 K/UL — HIGH (ref 3.8–10.5)
WBC # FLD AUTO: 13.4 K/UL — HIGH (ref 3.8–10.5)

## 2019-04-14 PROCEDURE — 93010 ELECTROCARDIOGRAM REPORT: CPT

## 2019-04-14 PROCEDURE — 99232 SBSQ HOSP IP/OBS MODERATE 35: CPT | Mod: GC

## 2019-04-14 PROCEDURE — 99291 CRITICAL CARE FIRST HOUR: CPT

## 2019-04-14 PROCEDURE — 33949 ECMO/ECLS DAILY MGMT ARTERY: CPT

## 2019-04-14 PROCEDURE — 71045 X-RAY EXAM CHEST 1 VIEW: CPT | Mod: 26

## 2019-04-14 RX ORDER — POTASSIUM CHLORIDE 20 MEQ
10 PACKET (EA) ORAL
Refills: 0 | Status: COMPLETED | OUTPATIENT
Start: 2019-04-14 | End: 2019-04-14

## 2019-04-14 RX ORDER — POLYETHYLENE GLYCOL 3350 17 G/17G
17 POWDER, FOR SOLUTION ORAL DAILY
Refills: 0 | Status: DISCONTINUED | OUTPATIENT
Start: 2019-04-14 | End: 2019-04-16

## 2019-04-14 RX ORDER — SIMETHICONE 80 MG/1
80 TABLET, CHEWABLE ORAL EVERY 4 HOURS
Refills: 0 | Status: DISCONTINUED | OUTPATIENT
Start: 2019-04-14 | End: 2019-04-16

## 2019-04-14 RX ORDER — OXYCODONE HYDROCHLORIDE 5 MG/1
5 TABLET ORAL EVERY 6 HOURS
Refills: 0 | Status: DISCONTINUED | OUTPATIENT
Start: 2019-04-14 | End: 2019-04-16

## 2019-04-14 RX ORDER — INSULIN LISPRO 100/ML
VIAL (ML) SUBCUTANEOUS
Refills: 0 | Status: DISCONTINUED | OUTPATIENT
Start: 2019-04-14 | End: 2019-04-16

## 2019-04-14 RX ORDER — ONDANSETRON 8 MG/1
4 TABLET, FILM COATED ORAL EVERY 4 HOURS
Refills: 0 | Status: DISCONTINUED | OUTPATIENT
Start: 2019-04-14 | End: 2019-04-16

## 2019-04-14 RX ORDER — INSULIN LISPRO 100/ML
VIAL (ML) SUBCUTANEOUS AT BEDTIME
Refills: 0 | Status: DISCONTINUED | OUTPATIENT
Start: 2019-04-14 | End: 2019-04-16

## 2019-04-14 RX ORDER — NICARDIPINE HYDROCHLORIDE 30 MG/1
5 CAPSULE, EXTENDED RELEASE ORAL
Qty: 40 | Refills: 0 | Status: DISCONTINUED | OUTPATIENT
Start: 2019-04-14 | End: 2019-04-16

## 2019-04-14 RX ORDER — ACETAMINOPHEN 500 MG
1000 TABLET ORAL ONCE
Refills: 0 | Status: COMPLETED | OUTPATIENT
Start: 2019-04-14 | End: 2019-04-14

## 2019-04-14 RX ORDER — DOCUSATE SODIUM 100 MG
100 CAPSULE ORAL THREE TIMES A DAY
Refills: 0 | Status: DISCONTINUED | OUTPATIENT
Start: 2019-04-14 | End: 2019-04-16

## 2019-04-14 RX ORDER — PANTOPRAZOLE SODIUM 20 MG/1
40 TABLET, DELAYED RELEASE ORAL
Refills: 0 | Status: DISCONTINUED | OUTPATIENT
Start: 2019-04-14 | End: 2019-04-16

## 2019-04-14 RX ADMIN — HYDROMORPHONE HYDROCHLORIDE 0.5 MILLIGRAM(S): 2 INJECTION INTRAMUSCULAR; INTRAVENOUS; SUBCUTANEOUS at 04:45

## 2019-04-14 RX ADMIN — NICARDIPINE HYDROCHLORIDE 25 MG/HR: 30 CAPSULE, EXTENDED RELEASE ORAL at 23:00

## 2019-04-14 RX ADMIN — ONDANSETRON 4 MILLIGRAM(S): 8 TABLET, FILM COATED ORAL at 16:25

## 2019-04-14 RX ADMIN — Medication 400 MILLIGRAM(S): at 12:30

## 2019-04-14 RX ADMIN — CHLORHEXIDINE GLUCONATE 5 MILLILITER(S): 213 SOLUTION TOPICAL at 02:03

## 2019-04-14 RX ADMIN — PANTOPRAZOLE SODIUM 40 MILLIGRAM(S): 20 TABLET, DELAYED RELEASE ORAL at 12:00

## 2019-04-14 RX ADMIN — Medication 50 MILLIEQUIVALENT(S): at 03:07

## 2019-04-14 RX ADMIN — HEPARIN SODIUM 13.5 UNIT(S)/HR: 5000 INJECTION INTRAVENOUS; SUBCUTANEOUS at 19:00

## 2019-04-14 RX ADMIN — Medication 50 MILLIEQUIVALENT(S): at 02:02

## 2019-04-14 RX ADMIN — PANTOPRAZOLE SODIUM 40 MILLIGRAM(S): 20 TABLET, DELAYED RELEASE ORAL at 12:08

## 2019-04-14 RX ADMIN — HYDROMORPHONE HYDROCHLORIDE 0.5 MILLIGRAM(S): 2 INJECTION INTRAMUSCULAR; INTRAVENOUS; SUBCUTANEOUS at 09:31

## 2019-04-14 RX ADMIN — HYDROMORPHONE HYDROCHLORIDE 0.5 MILLIGRAM(S): 2 INJECTION INTRAMUSCULAR; INTRAVENOUS; SUBCUTANEOUS at 09:15

## 2019-04-14 RX ADMIN — HYDROMORPHONE HYDROCHLORIDE 0.5 MILLIGRAM(S): 2 INJECTION INTRAMUSCULAR; INTRAVENOUS; SUBCUTANEOUS at 23:08

## 2019-04-14 RX ADMIN — Medication 250 MILLIGRAM(S): at 06:46

## 2019-04-14 RX ADMIN — HYDROMORPHONE HYDROCHLORIDE 0.5 MILLIGRAM(S): 2 INJECTION INTRAMUSCULAR; INTRAVENOUS; SUBCUTANEOUS at 04:30

## 2019-04-14 RX ADMIN — HYDROMORPHONE HYDROCHLORIDE 0.5 MILLIGRAM(S): 2 INJECTION INTRAMUSCULAR; INTRAVENOUS; SUBCUTANEOUS at 02:30

## 2019-04-14 RX ADMIN — HYDROMORPHONE HYDROCHLORIDE 0.5 MILLIGRAM(S): 2 INJECTION INTRAMUSCULAR; INTRAVENOUS; SUBCUTANEOUS at 16:48

## 2019-04-14 RX ADMIN — Medication 200 GRAM(S): at 00:59

## 2019-04-14 RX ADMIN — SIMETHICONE 80 MILLIGRAM(S): 80 TABLET, CHEWABLE ORAL at 21:37

## 2019-04-14 RX ADMIN — HYDROMORPHONE HYDROCHLORIDE 0.5 MILLIGRAM(S): 2 INJECTION INTRAMUSCULAR; INTRAVENOUS; SUBCUTANEOUS at 17:03

## 2019-04-14 RX ADMIN — Medication 62.5 MILLIMOLE(S): at 06:42

## 2019-04-14 RX ADMIN — Medication 50 MILLIEQUIVALENT(S): at 00:40

## 2019-04-14 RX ADMIN — ONDANSETRON 4 MILLIGRAM(S): 8 TABLET, FILM COATED ORAL at 22:20

## 2019-04-14 RX ADMIN — Medication 1000 MILLIGRAM(S): at 12:45

## 2019-04-14 RX ADMIN — CEFEPIME 100 MILLIGRAM(S): 1 INJECTION, POWDER, FOR SOLUTION INTRAMUSCULAR; INTRAVENOUS at 05:27

## 2019-04-14 RX ADMIN — Medication 100 MILLIGRAM(S): at 21:37

## 2019-04-14 RX ADMIN — HYDROMORPHONE HYDROCHLORIDE 0.5 MILLIGRAM(S): 2 INJECTION INTRAMUSCULAR; INTRAVENOUS; SUBCUTANEOUS at 22:53

## 2019-04-14 RX ADMIN — Medication 100 MILLIGRAM(S): at 14:06

## 2019-04-14 RX ADMIN — HYDROMORPHONE HYDROCHLORIDE 0.5 MILLIGRAM(S): 2 INJECTION INTRAMUSCULAR; INTRAVENOUS; SUBCUTANEOUS at 02:07

## 2019-04-14 NOTE — PROGRESS NOTE ADULT - SUBJECTIVE AND OBJECTIVE BOX
[facility] DIVISION OF KIDNEY DISEASES AND HYPERTENSION -- FOLLOW UP NOTE  --------------------------------------------------------------------------------  Chief Complaint: YAJAIRA    24 hour events/subjective:        PAST HISTORY  --------------------------------------------------------------------------------  No significant changes to PMH, PSH, FHx, SHx, unless otherwise noted    ALLERGIES & MEDICATIONS  --------------------------------------------------------------------------------  Allergies    Allergy Status Unknown    Intolerances      Standing Inpatient Medications  amiodarone Infusion 0.5 mG/Min IV Continuous <Continuous>  cefepime   IVPB 1000 milliGRAM(s) IV Intermittent every 8 hours  cefepime   IVPB      chlorhexidine 0.12% Liquid 5 milliLiter(s) Oral Mucosa every 4 hours  CRRT Treatment    <Continuous>  dextrose 50% Injectable 50 milliLiter(s) IV Push every 15 minutes  dextrose 50% Injectable 25 milliLiter(s) IV Push every 15 minutes  heparin  Infusion 1300 Unit(s)/Hr IV Continuous <Continuous>  insulin lispro (HumaLOG) corrective regimen sliding scale   SubCutaneous three times a day before meals  insulin lispro (HumaLOG) corrective regimen sliding scale   SubCutaneous at bedtime  pantoprazole  Injectable 40 milliGRAM(s) IV Push daily  PrismaSATE Dialysate BGK 4 / 2.5 5000 milliLiter(s) CRRT <Continuous>  PrismaSOL Filtration BGK 4 / 2.5 5000 milliLiter(s) CRRT <Continuous>  PrismaSOL Filtration BGK 4 / 2.5 5000 milliLiter(s) CRRT <Continuous>  sodium chloride 0.9%. 1000 milliLiter(s) IV Continuous <Continuous>  vancomycin  IVPB 1000 milliGRAM(s) IV Intermittent every 12 hours    PRN Inpatient Medications  HYDROmorphone  Injectable 0.5 milliGRAM(s) IV Push every 2 hours PRN  sodium chloride 0.9% lock flush 10 milliLiter(s) IV Push every 1 hour PRN      REVIEW OF SYSTEMS  --------------------------------------------------------------------------------      VITALS/PHYSICAL EXAM  --------------------------------------------------------------------------------  T(C): 36.2 (04-14-19 @ 08:00), Max: 36.8 (04-13-19 @ 20:00)  HR: 144 (04-14-19 @ 08:00) (112 - 175)  BP: --  RR: 9 (04-14-19 @ 08:00) (9 - 28)  SpO2: 100% (04-14-19 @ 08:00) (96% - 100%)  Wt(kg): --        04-13-19 @ 07:01  -  04-14-19 @ 07:00  --------------------------------------------------------  IN: 2240.3 mL / OUT: 3164 mL / NET: -923.7 mL    04-14-19 @ 07:01  -  04-14-19 @ 08:27  --------------------------------------------------------  IN: 98.7 mL / OUT: 188 mL / NET: -89.3 mL      Physical Exam:  	Gen: Intubated  	HEENT:ETT+  	Pulm: vent sounds+  	CV:  s1/s2  	Abd: +BS, soft   	Ext: b/l LE edema  	Neuro: awake  	Skin: Warm  	Vascular: No cyanosis              Access: ECMO	    LABS/STUDIES  --------------------------------------------------------------------------------              7.2    13.4  >-----------<  71       [04-14-19 @ 01:58]              22.0     138  |  104  |  24  ----------------------------<  98      [04-14-19 @ 01:58]  4.1   |  25  |  1.91        Ca     7.8     [04-14-19 @ 01:58]      Mg     2.3     [04-14-19 @ 01:58]      Phos  2.0     [04-14-19 @ 01:58]    TPro  4.0  /  Alb  2.2  /  TBili  2.0  /  DBili  x   /  AST  1867  /  ALT  898  /  AlkPhos  109  [04-14-19 @ 01:58]    PT/INR: PT 17.2 , INR 1.49       [04-14-19 @ 05:12]  PTT: 63.7       [04-14-19 @ 05:12]          [04-13-19 @ 21:56]  LDH 1342      [04-14-19 @ 01:58]    Creatinine Trend:  SCr 1.91 [04-14 @ 01:58]  SCr 2.02 [04-13 @ 01:59]  SCr 2.50 [04-12 @ 12:00]  SCr 2.43 [04-12 @ 06:00]  SCr 2.73 [04-12 @ 03:05]    Urinalysis - [04-12-19 @ 01:00]      Color Yellow / Appearance Slightly Turbid / SG 1.012 / pH 6.0      Gluc Negative / Ketone Negative  / Bili Negative / Urobili Negative       Blood Small / Protein 30 mg/dL / Leuk Est Negative / Nitrite Negative      RBC 15 / WBC 8 / Hyaline 5 / Gran  / Sq Epi  / Non Sq Epi 2 / Bacteria Negative    Urine Creatinine 49      [04-11-19 @ 06:07]  Urine Sodium 99.0      [04-11-19 @ 06:07]    HbA1c 5.5      [04-13-19 @ 09:28]  TSH 0.79      [04-10-19 @ 18:55]    HIV Nonreact      [04-10-19 @ 18:51] [facility] DIVISION OF KIDNEY DISEASES AND HYPERTENSION -- FOLLOW UP NOTE  --------------------------------------------------------------------------------  Chief Complaint: YAJAIRA    24 hour events/subjective:  Pt extubated yesterday. Remains on ECMO /CRRT, kept net even.      PAST HISTORY  --------------------------------------------------------------------------------  No significant changes to PMH, PSH, FHx, SHx, unless otherwise noted    ALLERGIES & MEDICATIONS  --------------------------------------------------------------------------------  Allergies    Allergy Status Unknown    Intolerances      Standing Inpatient Medications  amiodarone Infusion 0.5 mG/Min IV Continuous <Continuous>  cefepime   IVPB 1000 milliGRAM(s) IV Intermittent every 8 hours  cefepime   IVPB      chlorhexidine 0.12% Liquid 5 milliLiter(s) Oral Mucosa every 4 hours  CRRT Treatment    <Continuous>  dextrose 50% Injectable 50 milliLiter(s) IV Push every 15 minutes  dextrose 50% Injectable 25 milliLiter(s) IV Push every 15 minutes  heparin  Infusion 1300 Unit(s)/Hr IV Continuous <Continuous>  insulin lispro (HumaLOG) corrective regimen sliding scale   SubCutaneous three times a day before meals  insulin lispro (HumaLOG) corrective regimen sliding scale   SubCutaneous at bedtime  pantoprazole  Injectable 40 milliGRAM(s) IV Push daily  PrismaSATE Dialysate BGK 4 / 2.5 5000 milliLiter(s) CRRT <Continuous>  PrismaSOL Filtration BGK 4 / 2.5 5000 milliLiter(s) CRRT <Continuous>  PrismaSOL Filtration BGK 4 / 2.5 5000 milliLiter(s) CRRT <Continuous>  sodium chloride 0.9%. 1000 milliLiter(s) IV Continuous <Continuous>  vancomycin  IVPB 1000 milliGRAM(s) IV Intermittent every 12 hours    PRN Inpatient Medications  HYDROmorphone  Injectable 0.5 milliGRAM(s) IV Push every 2 hours PRN  sodium chloride 0.9% lock flush 10 milliLiter(s) IV Push every 1 hour PRN      REVIEW OF SYSTEMS  --------------------------------------------------------------------------------  Gen: no fever, chills  Resp; no dyspnea  CVS; no cp  GI; no n/v/d  MSK; leg edema    VITALS/PHYSICAL EXAM  --------------------------------------------------------------------------------  T(C): 36.2 (04-14-19 @ 08:00), Max: 36.8 (04-13-19 @ 20:00)  HR: 144 (04-14-19 @ 08:00) (112 - 175)  BP: --  RR: 9 (04-14-19 @ 08:00) (9 - 28)  SpO2: 100% (04-14-19 @ 08:00) (96% - 100%)  Wt(kg): --        04-13-19 @ 07:01  -  04-14-19 @ 07:00  --------------------------------------------------------  IN: 2240.3 mL / OUT: 3164 mL / NET: -923.7 mL    04-14-19 @ 07:01  -  04-14-19 @ 08:27  --------------------------------------------------------  IN: 98.7 mL / OUT: 188 mL / NET: -89.3 mL      Physical Exam:  	Gen: Intubated  	HEENT: ETT+  	Pulm: vent sounds+  	CV:  s1/s2  	Abd: +BS, soft   	Ext: b/l LE edema  	Neuro: awake  	Skin: Warm  	Vascular: No cyanosis              Access: ECMO	    LABS/STUDIES  --------------------------------------------------------------------------------              7.2    13.4  >-----------<  71       [04-14-19 @ 01:58]              22.0     138  |  104  |  24  ----------------------------<  98      [04-14-19 @ 01:58]  4.1   |  25  |  1.91        Ca     7.8     [04-14-19 @ 01:58]      Mg     2.3     [04-14-19 @ 01:58]      Phos  2.0     [04-14-19 @ 01:58]    TPro  4.0  /  Alb  2.2  /  TBili  2.0  /  DBili  x   /  AST  1867  /  ALT  898  /  AlkPhos  109  [04-14-19 @ 01:58]    PT/INR: PT 17.2 , INR 1.49       [04-14-19 @ 05:12]  PTT: 63.7       [04-14-19 @ 05:12]          [04-13-19 @ 21:56]  LDH 1342      [04-14-19 @ 01:58]    Creatinine Trend:  SCr 1.91 [04-14 @ 01:58]  SCr 2.02 [04-13 @ 01:59]  SCr 2.50 [04-12 @ 12:00]  SCr 2.43 [04-12 @ 06:00]  SCr 2.73 [04-12 @ 03:05]    Urinalysis - [04-12-19 @ 01:00]      Color Yellow / Appearance Slightly Turbid / SG 1.012 / pH 6.0      Gluc Negative / Ketone Negative  / Bili Negative / Urobili Negative       Blood Small / Protein 30 mg/dL / Leuk Est Negative / Nitrite Negative      RBC 15 / WBC 8 / Hyaline 5 / Gran  / Sq Epi  / Non Sq Epi 2 / Bacteria Negative    Urine Creatinine 49      [04-11-19 @ 06:07]  Urine Sodium 99.0      [04-11-19 @ 06:07]    HbA1c 5.5      [04-13-19 @ 09:28]  TSH 0.79      [04-10-19 @ 18:55]    HIV Nonreact      [04-10-19 @ 18:51]

## 2019-04-14 NOTE — PROGRESS NOTE ADULT - PROBLEM SELECTOR PLAN 1
YAJAIRA - ATN from shock/ acute cardiomyopathy. UOP ~2070 ml. Continue CVVHDF with fluid removal 50-100cc/hr net negative as tolerated. Monitor K+ and phos and replete prn. Pt with YAJAIRA - ATN from shock/ acute cardiomyopathy. Pt off lasix, UOP ~2070 ml. Currently on CVVHDF, kept net even. Plan for ECMO wean off tomorrow. Recommend to hold CRRT for now and monitor UOP. Antibiotics dose adjustment to GFR <10 once CRRT stopped. Monitor electrolytes.

## 2019-04-14 NOTE — PROGRESS NOTE ADULT - ATTENDING COMMENTS
excellent progress.  awake, alert, interactive.   1:1 normal pulsatility during ECMO support.   antibiotics being discontinued  off all pressors.   Making large amounts of urine. requiring minimal supplementation from CVVHD which is being discontinued  I/O -2500.   CVP 6, PA 29/16 22, ECMO 3.9 litres, FiO2 100  . /80 103.  Exam: some swelling right arm but feels warm and NIRS sats right hand greater than left  04-14    138  |  104  |  24<H>  ----------------------------<  98  4.1   |  25  |  1.91<H>  Ca    7.8<L>      14 Apr 2019 01:58  Phos  2.0     04-14  Mg     2.3     04-14  TPro  4.0<L>  /  Alb  2.2<L>  /  TBili  2.0<H>  /  DBili  x   /  AST  1867<H>  /  ALT  898<H>  /  AlkPhos  109  04-14                        7.2    13.4  )-----------( 71       ( 14 Apr 2019 01:58 )             22.0   PT/INR - ( 14 Apr 2019 05:12 )   PT: 17.2 sec;   INR: 1.49 ratio       PTT - ( 14 Apr 2019 05:12 )  PTT:63.7 sec  Recovering end organ function. Evidence for return of contractility.   For full ECMO weaning protocol tomorrow.   Rell Vila

## 2019-04-14 NOTE — PROGRESS NOTE ADULT - SUBJECTIVE AND OBJECTIVE BOX
ECMO Day#4     Vital Signs Last 24 Hrs  T(C): 36.2 (14 Apr 2019 08:00), Max: 36.8 (13 Apr 2019 20:00)  T(F): 97.2 (14 Apr 2019 08:00), Max: 98.2 (13 Apr 2019 20:00)  HR: 141 (14 Apr 2019 10:00) (112 - 175)  BP: --  BP(mean): --  RR: 11 (14 Apr 2019 10:00) (9 - 30)  SpO2: 100% (14 Apr 2019 10:00) (96% - 100%)      Adult Advanced Hemodynamics Last 24 Hrs  CVP(mm Hg): 1 (14 Apr 2019 10:00) (0 - 14)  CVP(cm H2O): --  CO: --  CI: --  PA: 30/17 (14 Apr 2019 10:00) (18/8 - 35/23)  PA(mean): 22 (14 Apr 2019 10:00) (14 - 29)  PCWP: --  SVR: --  SVRI: --  PVR: --  PVRI: --    I&O's Summary    13 Apr 2019 07:01  -  14 Apr 2019 07:00  --------------------------------------------------------  IN: 2240.3 mL / OUT: 3164 mL / NET: -923.7 mL    14 Apr 2019 07:01  -  14 Apr 2019 10:15  --------------------------------------------------------  IN: 296.1 mL / OUT: 400 mL / NET: -103.9 mL          ECMO SETTINGS:  Type:		[ ] Venovenous		[x] Venoarterial  Cannulation Site(s): Rt. axillary artery & Rt. FV    Flow: 4.0 L/min 	      RPM: 4000    ABG - ( 14 Apr 2019 08:05 )  pH: 7.44  /  pCO2: 41    /  pO2: 138   / HCO3: 27    / Base Excess: 3.2   /  SaO2: 99                  Oxygenator:	Sweep: 1    L/min	FiO2:    100%      VENTILATOR SETTINGS:           LABS:                          7.2    13.4  )-----------( 71       ( 14 Apr 2019 01:58 )             22.0                          04-14    138  |  104  |  24<H>  ----------------------------<  98  4.1   |  25  |  1.91<H>    Ca    7.8<L>      14 Apr 2019 01:58  Phos  2.0     04-14  Mg     2.3     04-14    TPro  4.0<L>  /  Alb  2.2<L>  /  TBili  2.0<H>  /  DBili  x   /  AST  1867<H>  /  ALT  898<H>  /  AlkPhos  109  04-14      LIVER FUNCTIONS - ( 14 Apr 2019 01:58 )  Alb: 2.2 g/dL / Pro: 4.0 g/dL / ALK PHOS: 109 U/L / ALT: 898 U/L / AST: 1867 U/L / GGT: x             PT/INR - ( 14 Apr 2019 05:12 )   PT: 17.2 sec;   INR: 1.49 ratio         PTT - ( 14 Apr 2019 05:12 )  PTT:63.7 sec      Culture - Sputum (collected 13 Apr 2019 00:45)  Source: .Sputum  Gram Stain (13 Apr 2019 02:26):    Numerous polymorphonuclear leukocytes per low power field    Rare Squamous epithelial cells per low power field    No organisms seen  Preliminary Report (13 Apr 2019 19:21):    No growth to date.    Culture - Blood (collected 12 Apr 2019 21:04)  Source: .Blood  Preliminary Report (13 Apr 2019 22:01):    No growth to date.    Culture - Blood (collected 12 Apr 2019 21:04)  Source: .Blood  Preliminary Report (13 Apr 2019 22:01):    No growth to date.    Culture - Blood (collected 12 Apr 2019 02:42)  Source: .Blood  Preliminary Report (13 Apr 2019 03:01):    No growth to date.    Culture - Blood (collected 12 Apr 2019 02:42)  Source: .Blood  Preliminary Report (13 Apr 2019 03:01):    No growth to date.          ACTIVE MEDS:    MEDICATIONS  (STANDING):  amiodarone Infusion 0.5 mG/Min (16.667 mL/Hr) IV Continuous <Continuous>  cefepime   IVPB 1000 milliGRAM(s) IV Intermittent every 8 hours  cefepime   IVPB      CRRT Treatment    <Continuous>  dextrose 50% Injectable 50 milliLiter(s) IV Push every 15 minutes  dextrose 50% Injectable 25 milliLiter(s) IV Push every 15 minutes  heparin  Infusion 1300 Unit(s)/Hr (14.5 mL/Hr) IV Continuous <Continuous>  insulin lispro (HumaLOG) corrective regimen sliding scale   SubCutaneous three times a day before meals  insulin lispro (HumaLOG) corrective regimen sliding scale   SubCutaneous at bedtime  pantoprazole  Injectable 40 milliGRAM(s) IV Push daily  PrismaSATE Dialysate BGK 4 / 2.5 5000 milliLiter(s) (1600 mL/Hr) CRRT <Continuous>  PrismaSOL Filtration BGK 4 / 2.5 5000 milliLiter(s) (1400 mL/Hr) CRRT <Continuous>  PrismaSOL Filtration BGK 4 / 2.5 5000 milliLiter(s) (200 mL/Hr) CRRT <Continuous>  sodium chloride 0.9%. 1000 milliLiter(s) (10 mL/Hr) IV Continuous <Continuous>  vancomycin  IVPB 1000 milliGRAM(s) IV Intermittent every 12 hours        Assessment/Plan:  Remains HD stable on VA ECMO support, extubated, with continued UOP and improving BP. Will assess weaning potential in am with formal bedside ECMO clamp trial.

## 2019-04-14 NOTE — PROGRESS NOTE ADULT - ASSESSMENT
32 yo lady with no past medical history 35 weeks pregnant transferred to [facility] after she presented with shortness of breath, SVT and acutely decompensated with severe cardiomyopathy, needing ECMO, s/p emergency . now in multiorgan failure and YAJAIRA. 32 yo lady with no past medical history 35 weeks pregnant transferred to [facility] after she presented with shortness of breath, SVT and acutely decompensated with severe cardiomyopathy, needing ECMO, s/p emergency  now in multiorgan failure and YAJAIRA.

## 2019-04-14 NOTE — PROGRESS NOTE ADULT - SUBJECTIVE AND OBJECTIVE BOX
8pm-8:30pm    Remained critically ill on continuos ICU monitoring.      OBJECTIVE:  ICU Vital Signs Last 24 Hrs  T(C): 36.7 (2019 20:00), Max: 36.7 (2019 00:00)  T(F): 98.1 (2019 20:00), Max: 98.1 (2019 00:00)  HR: 121 (:) (121 - 160)  BP: --  BP(mean): --  ABP: 154/90 (2019 20:00) (100/55 - 178/82)  ABP(mean): 109 (:00) (73 - 109)  RR: 18 (:00) (9 - 30)  SpO2: 100% (:) (96% - 100%)         @ 07:  -   @ 07:00  --------------------------------------------------------  IN: 2240.3 mL / OUT: 3164 mL / NET: -923.7 mL     @ 07: @ 20:17  --------------------------------------------------------  IN: 818.6 mL / OUT: 1145 mL / NET: -326.4 mL      CAPILLARY BLOOD GLUCOSE          PHYSICAL EXAM:Daily     Daily Weight in k.6 (2019 00:00)    Neurology: A&Ox3, nonfocal, VELÁSQUEZ x 4  Eyes: PERRLA/ EOMI, Gross vision intact  ENT/Neck: Neck supple, trachea midline, No JVD, Gross hearing intact  Respiratory: B/L fine  rales,  CV: tachycardic  S1S2, no murmurs, rubs or gallops  Abdominal: Soft, NT, ND +BS, + C section incision  Extremities: No edema, + peripheral pulses  Skin: No Rashes, Hematoma, Ecchymosis  Incisions: R axillary Arterial cannula, R femoral vein cannulation  Tubes: [facility] MEDICATIONS:  MEDICATIONS  (STANDING):  amiodarone Infusion 0.5 mG/Min (16.667 mL/Hr) IV Continuous <Continuous>  dextrose 50% Injectable 50 milliLiter(s) IV Push every 15 minutes  dextrose 50% Injectable 25 milliLiter(s) IV Push every 15 minutes  docusate sodium 100 milliGRAM(s) Oral three times a day  heparin  Infusion 1300 Unit(s)/Hr (13.5 mL/Hr) IV Continuous <Continuous>  insulin lispro (HumaLOG) corrective regimen sliding scale   SubCutaneous three times a day before meals  insulin lispro (HumaLOG) corrective regimen sliding scale   SubCutaneous at bedtime  pantoprazole    Tablet 40 milliGRAM(s) Oral before breakfast  polyethylene glycol 3350 17 Gram(s) Oral daily  sodium chloride 0.9%. 1000 milliLiter(s) (10 mL/Hr) IV Continuous <Continuous>    MEDICATIONS  (PRN):  HYDROmorphone  Injectable 0.5 milliGRAM(s) IV Push every 2 hours PRN Moderate Pain (4 - 6)  ondansetron Injectable 4 milliGRAM(s) IV Push every 4 hours PRN Nausea and/or Vomiting  oxyCODONE    IR 5 milliGRAM(s) Oral every 6 hours PRN Moderate Pain (4 - 6)  sodium chloride 0.9% lock flush 10 milliLiter(s) IV Push every 1 hour PRN Pre/post blood products, medications, blood draw, and to maintain line patency      LABS:                        7.2    13.4  )-----------( 71       ( 2019 01:58 )             22.0         138  |  104  |  24<H>  ----------------------------<  98  4.1   |  25  |  1.91<H>    Ca    7.8<L>      2019 01:58  Phos  2.0       Mg     2.3         TPro  4.0<L>  /  Alb  2.2<L>  /  TBili  2.0<H>  /  DBili  x   /  AST  1867<H>  /  ALT  898<H>  /  AlkPhos  109      PT/INR - ( 2019 05:12 )   PT: 17.2 sec;   INR: 1.49 ratio         PTT - ( 2019 17:50 )  PTT:68.0 sec    Arterial Blood Gas:   @ 16:31  7.47/36/129/26/99/2.7  ABG lactate: --  Arterial Blood Gas:   @ 11:58  7.50/34/78/26/97/3.4  ABG lactate: --  Arterial Blood Gas:   @ 08:05  7.44/41/138/27/99/3.2  ABG lactate: --  Arterial Blood Gas:   @ 05:08  7.42/42/126/26/99/2.3  ABG lactate: --  Arterial Blood Gas:   @ 01:44  7.42/40/126/25/100/1.4  ABG lactate: --  Arterial Blood Gas:   @ 22:48  7.44/37/116/24/98/.7  ABG lactate: --  Arterial Blood Gas:   @ 20:21  7.51/30/138/24/100/1.5  ABG lactate: --  Arterial Blood Gas:   @ 16:26  7.51/33/201/26/100/2.9  ABG lactate: --  Arterial Blood Gas:   @ 11:27  7.48/35/203/26/100/2.6  ABG lactate: --  Arterial Blood Gas:   @ 10:35  7.50/33/221/25/100/2.5  ABG lactate: --  Arterial Blood Gas:   @ 09:22  7.49/34/294/26/100/2.4  ABG lactate: --  Arterial Blood Gas:   @ 08:20  7.50/33/296/25/100/2.2  ABG lactate: --  Arterial Blood Gas:   @ 05:57  7.47/34/238/24/100/1.2  ABG lactate: --  Arterial Blood Gas:   @ 04:06  7.52/30/301/24/100/1.2  ABG lactate: --  Arterial Blood Gas:   @ 01:45  7.47/33/267/24/100/.4  ABG lactate: --  Arterial Blood Gas:   @ 00:23  7.48/32/278/24/100/.9  ABG lactate: --  Arterial Blood Gas:   @ 21:45  7.47/34/260/25/100/1.3  ABG lactate: --      CARDIAC MARKERS ( 2019 21:56 )  x     / x     / 611 U/L / x     / x        LIVER FUNCTIONS - ( 2019 01:58 )  Alb: 2.2 g/dL / Pro: 4.0 g/dL / ALK PHOS: 109 U/L / ALT: 898 U/L / AST: 1867 U/L / GGT: x           MICROBIOLOGY:     RADIOLOGY:  X Reviewed and interpreted by me        I have spent 30 minutes providing critical care management to this patient. 8pm-8:30pm    Remained critically ill on continuos ICU monitoring.      OBJECTIVE:  ICU Vital Signs Last 24 Hrs  T(C): 36.7 (2019 20:00), Max: 36.7 (2019 00:00)  T(F): 98.1 (2019 20:00), Max: 98.1 (2019 00:00)  HR: 121 (:) (121 - 160)  BP: --  BP(mean): --  ABP: 154/90 (2019 20:00) (100/55 - 178/82)  ABP(mean): 109 (:00) (73 - 109)  RR: 18 (:00) (9 - 30)  SpO2: 100% (:) (96% - 100%)         @ 07:  -   @ 07:00  --------------------------------------------------------  IN: 2240.3 mL / OUT: 3164 mL / NET: -923.7 mL     @ 07: @ 20:17  --------------------------------------------------------  IN: 818.6 mL / OUT: 1145 mL / NET: -326.4 mL      CAPILLARY BLOOD GLUCOSE          PHYSICAL EXAM:Daily     Daily Weight in k.6 (2019 00:00)    Neurology: A&Ox3, nonfocal, VELÁSQUEZ x 4  Eyes: PERRLA/ EOMI, Gross vision intact  ENT/Neck: Neck supple, trachea midline, No JVD, Gross hearing intact  Respiratory: B/L fine  rales,  CV: tachycardic  S1S2, no murmurs, rubs or gallops  Abdominal: Soft, NT, ND +BS, + C section incision  Extremities: No edema, + peripheral pulses  Skin: No Rashes, Hematoma, Ecchymosis  Incisions: R axillary Arterial cannula, R femoral vein cannulation  Tubes: [facility] MEDICATIONS:  MEDICATIONS  (STANDING):  amiodarone Infusion 0.5 mG/Min (16.667 mL/Hr) IV Continuous <Continuous>  dextrose 50% Injectable 50 milliLiter(s) IV Push every 15 minutes  dextrose 50% Injectable 25 milliLiter(s) IV Push every 15 minutes  docusate sodium 100 milliGRAM(s) Oral three times a day  heparin  Infusion 1300 Unit(s)/Hr (13.5 mL/Hr) IV Continuous <Continuous>  insulin lispro (HumaLOG) corrective regimen sliding scale   SubCutaneous three times a day before meals  insulin lispro (HumaLOG) corrective regimen sliding scale   SubCutaneous at bedtime  pantoprazole    Tablet 40 milliGRAM(s) Oral before breakfast  polyethylene glycol 3350 17 Gram(s) Oral daily  sodium chloride 0.9%. 1000 milliLiter(s) (10 mL/Hr) IV Continuous <Continuous>    MEDICATIONS  (PRN):  HYDROmorphone  Injectable 0.5 milliGRAM(s) IV Push every 2 hours PRN Moderate Pain (4 - 6)  ondansetron Injectable 4 milliGRAM(s) IV Push every 4 hours PRN Nausea and/or Vomiting  oxyCODONE    IR 5 milliGRAM(s) Oral every 6 hours PRN Moderate Pain (4 - 6)  sodium chloride 0.9% lock flush 10 milliLiter(s) IV Push every 1 hour PRN Pre/post blood products, medications, blood draw, and to maintain line patency      LABS:                        7.2    13.4  )-----------( 71       ( 2019 01:58 )             22.0         138  |  104  |  24<H>  ----------------------------<  98  4.1   |  25  |  1.91<H>    Ca    7.8<L>      2019 01:58  Phos  2.0       Mg     2.3         TPro  4.0<L>  /  Alb  2.2<L>  /  TBili  2.0<H>  /  DBili  x   /  AST  1867<H>  /  ALT  898<H>  /  AlkPhos  109      PT/INR - ( 2019 05:12 )   PT: 17.2 sec;   INR: 1.49 ratio         PTT - ( 2019 17:50 )  PTT:68.0 sec    Arterial Blood Gas:   @ 16:31  7.47/36/129/26/99/2.7  ABG lactate: --  Arterial Blood Gas:   @ 11:58  7.50/34/78/26/97/3.4  ABG lactate: --  Arterial Blood Gas:   @ 08:05  7.44/41/138/27/99/3.2  ABG lactate: --  Arterial Blood Gas:   @ 05:08  7.42/42/126/26/99/2.3  ABG lactate: --  Arterial Blood Gas:   @ 01:44  7.42/40/126/25/100/1.4  ABG lactate: --  Arterial Blood Gas:   @ 22:48  7.44/37/116/24/98/.7  ABG lactate: --  Arterial Blood Gas:   @ 20:21  7.51/30/138/24/100/1.5  ABG lactate: --  Arterial Blood Gas:   @ 16:26  7.51/33/201/26/100/2.9  ABG lactate: --  Arterial Blood Gas:   @ 11:27  7.48/35/203/26/100/2.6  ABG lactate: --  Arterial Blood Gas:   @ 10:35  7.50/33/221/25/100/2.5  ABG lactate: --  Arterial Blood Gas:   @ 09:22  7.49/34/294/26/100/2.4  ABG lactate: --  Arterial Blood Gas:   @ 08:20  7.50/33/296/25/100/2.2  ABG lactate: --  Arterial Blood Gas:   @ 05:57  7.47/34/238/24/100/1.2  ABG lactate: --  Arterial Blood Gas:   @ 04:06  7.52/30/301/24/100/1.2  ABG lactate: --  Arterial Blood Gas:   @ 01:45  7.47/33/267/24/100/.4  ABG lactate: --  Arterial Blood Gas:   @ 00:23  7.48/32/278/24/100/.9  ABG lactate: --  Arterial Blood Gas:   @ 21:45  7.47/34/260/25/100/1.3  ABG lactate: --      CARDIAC MARKERS ( 2019 21:56 )  x     / x     / 611 U/L / x     / x        LIVER FUNCTIONS - ( 2019 01:58 )  Alb: 2.2 g/dL / Pro: 4.0 g/dL / ALK PHOS: 109 U/L / ALT: 898 U/L / AST: 1867 U/L / GGT: x           MICROBIOLOGY:     RADIOLOGY:  X Reviewed and interpreted by me

## 2019-04-14 NOTE — PROGRESS NOTE ADULT - SUBJECTIVE AND OBJECTIVE BOX
Subjective:    Medications:  amiodarone Infusion 0.5 mG/Min IV Continuous <Continuous>  cefepime   IVPB 1000 milliGRAM(s) IV Intermittent every 8 hours  cefepime   IVPB      chlorhexidine 0.12% Liquid 5 milliLiter(s) Oral Mucosa every 4 hours  CRRT Treatment    <Continuous>  dextrose 50% Injectable 50 milliLiter(s) IV Push every 15 minutes  dextrose 50% Injectable 25 milliLiter(s) IV Push every 15 minutes  heparin  Infusion 1300 Unit(s)/Hr IV Continuous <Continuous>  HYDROmorphone  Injectable 0.5 milliGRAM(s) IV Push every 2 hours PRN  insulin lispro (HumaLOG) corrective regimen sliding scale   SubCutaneous three times a day before meals  insulin lispro (HumaLOG) corrective regimen sliding scale   SubCutaneous at bedtime  pantoprazole  Injectable 40 milliGRAM(s) IV Push daily  PrismaSATE Dialysate BGK 4 / 2.5 5000 milliLiter(s) CRRT <Continuous>  PrismaSOL Filtration BGK 4 / 2.5 5000 milliLiter(s) CRRT <Continuous>  PrismaSOL Filtration BGK 4 / 2.5 5000 milliLiter(s) CRRT <Continuous>  sodium chloride 0.9% lock flush 10 milliLiter(s) IV Push every 1 hour PRN  sodium chloride 0.9%. 1000 milliLiter(s) IV Continuous <Continuous>  vancomycin  IVPB 1000 milliGRAM(s) IV Intermittent every 12 hours    Vitals:  T(C): 36.1 (19 @ 04:00), Max: 36.8 (19 @ 20:00)  HR: 137 (19 @ 07:00) (112 - 175)  ABP: 150/88 (19 @ 07:00) ( - 178/82)  ABP(mean): 107 (19 @ 07:00) (1 - 107)  RR: 14 (19 @ 07:00) (10 - 28)  SpO2: 100% (19 @ 07:00) (96% - 100%)  PA: 35/23 (19 @ 07:00) (188 - 35)  PA(mean): 28 (19 @ 07:00) (14 - 29)     Daily Weight in k.6 (2019 00:00)        I&O's Summary    2019 07:01  -  2019 07:00  --------------------------------------------------------  IN: 2240.3 mL / OUT: 3085 mL / NET: -844.7 mL        Physical Exam:  Appearance: No Acute Distress  HEENT: ET Tube in place   Cardiovascular: RRR, Normal S1 S2, No murmurs/rubs/gallops  Respiratory: Clear to auscultation bilaterally  Gastrointestinal: Soft, Non-tender, non-distended	  Skin: no skin lesions  Neurologic: Non-focal  Extremities: No LE edema, warm and well perfused  Psychiatry: Mood & affect appropriate      Labs:                        7.2    13.4  )-----------( 71       ( 2019 01:58 )             22.0         138  |  104  |  24<H>  ----------------------------<  98  4.1   |  25  |  1.91<H>    Ca    7.8<L>      2019 01:58  Phos  2.0       Mg     2.3         TPro  4.0<L>  /  Alb  2.2<L>  /  TBili  2.0<H>  /  DBili  x   /  AST  1867<H>  /  ALT  898<H>  /  AlkPhos  109        PT/INR - ( 2019 05:12 )   PT: 17.2 sec;   INR: 1.49 ratio         PTT - ( 2019 05:12 )  PTT:63.7 sec  CARDIAC MARKERS ( 2019 21:56 )  x     / x     / 611 U/L / x     / x            Oxygen Saturation, Mixed: 81 % ( @ 01:43)  Oxygen Saturation, Mixed: 79 % ( @ 10:35)  Oxygen Saturation, Mixed: 88 % ( @ 08:20)  Oxygen Saturation, Mixed: 80 % ( @ 01:45)  Oxygen Saturation, Mixed: 80 % ( @ 16:48)  Oxygen Saturation, Mixed: 77 % ( @ 08:47)  Oxygen Saturation, Mixed: 83 % ( @ 03:03)  Oxygen Saturation, Mixed: 67 % ( @ 23:40)    Lactate Dehydrogenase, Serum: 1342 U/L ( @ 01:58)  Lactate Dehydrogenase, Serum: >2250 U/L ( @ 01:59)  Lactate Dehydrogenase, Serum: >2250 U/L ( @ 23:48)

## 2019-04-14 NOTE — CHART NOTE - NSCHARTNOTEFT_GEN_A_CORE
Patient seen and assessed at bedside. Patient extubated, alert and talking. CVVH was stopped this afternoon at 12:30p. Per RN, plan is to wean patient off of ECMO tomorrow.     Not producing breast milk at this time. Patient has light lochia. Staples removed, incision c/d/i. Postpartum nurse at bedside for assistance at time of evaluation.     OB will continue to follow.     CHARISSE Angulo PGY-3  [id number]

## 2019-04-14 NOTE — PROGRESS NOTE ADULT - PROBLEM SELECTOR PLAN 1
C/w VA-ECMO support  - Check repeat TTE  - Will try to obtain collateral information from cardiologist at [place].   - Continue to remove fluid with eventual goal to extubate.   - Based on TTE will decide on need for LV Venting with other support.

## 2019-04-14 NOTE — PROGRESS NOTE ADULT - ASSESSMENT
34 yo F with history of hyperemesis gravidarum  initially presented to [facility] for SOB. She was found in SVT and received adenosinex4 and failed cardioversion x2. Pt then emergently taken for  and post op went into shock. She was found to have reduced EF and placed on VA ECMO and CVVHD. Transferred to [facility] on  for further management. 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  She is currently on VA ECMO, CVVHD, pressors, and intubated. She is alert and awake.

## 2019-04-14 NOTE — PROGRESS NOTE ADULT - ASSESSMENT
33 yr old female with postpartum cardiomyopathy, cardiogenic shock, Shock liver & kidney , SVT S/P VA ECMO D#4   Cont to improve hemodynamically on no inotropic & pressors support.  S/P Extubated with improving CXR  VA ECMO 4000, 4 LPM  Hep gtt  Negative bleeding   still c/o R arm discomfort  improving organ score, transaminitis & off CVVH    Plan for ECHO guided wean in am     I have spent 30 minutes providing critical care management to this patient.

## 2019-04-15 DIAGNOSIS — D64.9 ANEMIA, UNSPECIFIED: ICD-10-CM

## 2019-04-15 LAB
ALBUMIN SERPL ELPH-MCNC: 2.3 G/DL — LOW (ref 3.3–5)
ALP SERPL-CCNC: 133 U/L — HIGH (ref 40–120)
ALT FLD-CCNC: 627 U/L — HIGH (ref 10–45)
ANION GAP SERPL CALC-SCNC: 10 MMOL/L — SIGNIFICANT CHANGE UP (ref 5–17)
ANTIBODY ID 1_1: SIGNIFICANT CHANGE UP
APTT BLD: 53.2 SEC — HIGH (ref 27.5–36.3)
APTT BLD: 54.2 SEC — HIGH (ref 27.5–36.3)
APTT BLD: 59.4 SEC — HIGH (ref 27.5–36.3)
APTT BLD: 64.9 SEC — HIGH (ref 27.5–36.3)
AST SERPL-CCNC: 768 U/L — HIGH (ref 10–40)
BASE EXCESS BLDMV CALC-SCNC: -0.1 MMOL/L — SIGNIFICANT CHANGE UP (ref -3–3)
BASE EXCESS BLDMV CALC-SCNC: -0.8 MMOL/L — SIGNIFICANT CHANGE UP (ref -3–3)
BASE EXCESS BLDMV CALC-SCNC: -2.5 MMOL/L — SIGNIFICANT CHANGE UP (ref -3–3)
BASE EXCESS BLDMV CALC-SCNC: 0.1 MMOL/L — SIGNIFICANT CHANGE UP (ref -3–3)
BASE EXCESS BLDMV CALC-SCNC: 0.3 MMOL/L — SIGNIFICANT CHANGE UP (ref -3–3)
BASE EXCESS BLDMV CALC-SCNC: 0.5 MMOL/L — SIGNIFICANT CHANGE UP (ref -3–3)
BASE EXCESS BLDMV CALC-SCNC: 0.6 MMOL/L — SIGNIFICANT CHANGE UP (ref -3–3)
BASE EXCESS BLDMV CALC-SCNC: 0.8 MMOL/L — SIGNIFICANT CHANGE UP (ref -3–3)
BASE EXCESS BLDMV CALC-SCNC: 1.4 MMOL/L — SIGNIFICANT CHANGE UP (ref -3–3)
BILIRUB SERPL-MCNC: 2.4 MG/DL — HIGH (ref 0.2–1.2)
BLD GP AB SCN SERPL QL: POSITIVE — SIGNIFICANT CHANGE UP
BUN SERPL-MCNC: 28 MG/DL — HIGH (ref 7–23)
CALCIUM SERPL-MCNC: 7.8 MG/DL — LOW (ref 8.4–10.5)
CHLORIDE SERPL-SCNC: 101 MMOL/L — SIGNIFICANT CHANGE UP (ref 96–108)
CHOLEST SERPL-MCNC: 140 MG/DL — SIGNIFICANT CHANGE UP (ref 10–199)
CMV IGG FLD QL: <0.2 U/ML — SIGNIFICANT CHANGE UP
CMV IGG SERPL-IMP: NEGATIVE — SIGNIFICANT CHANGE UP
CO2 BLDMV-SCNC: 22 MMOL/L — SIGNIFICANT CHANGE UP (ref 21–29)
CO2 BLDMV-SCNC: 24 MMOL/L — SIGNIFICANT CHANGE UP (ref 21–29)
CO2 BLDMV-SCNC: 25 MMOL/L — SIGNIFICANT CHANGE UP (ref 21–29)
CO2 BLDMV-SCNC: 26 MMOL/L — SIGNIFICANT CHANGE UP (ref 21–29)
CO2 BLDMV-SCNC: 27 MMOL/L — SIGNIFICANT CHANGE UP (ref 21–29)
CO2 SERPL-SCNC: 24 MMOL/L — SIGNIFICANT CHANGE UP (ref 22–31)
CREAT SERPL-MCNC: 3 MG/DL — HIGH (ref 0.5–1.3)
CRP SERPL-MCNC: 6.39 MG/DL — HIGH (ref 0–0.4)
EBV EA AB SER IA-ACNC: <5 U/ML — SIGNIFICANT CHANGE UP
EBV EA AB TITR SER IF: POSITIVE
EBV EA IGG SER-ACNC: NEGATIVE — SIGNIFICANT CHANGE UP
EBV NA IGG SER IA-ACNC: 61.9 U/ML — HIGH
EBV PATRN SPEC IB-IMP: SIGNIFICANT CHANGE UP
EBV VCA IGG AVIDITY SER QL IA: POSITIVE
EBV VCA IGM SER IA-ACNC: 138 U/ML — HIGH
EBV VCA IGM SER IA-ACNC: <10 U/ML — SIGNIFICANT CHANGE UP
EBV VCA IGM TITR FLD: NEGATIVE — SIGNIFICANT CHANGE UP
ERYTHROCYTE [SEDIMENTATION RATE] IN BLOOD: 16 MM/HR — HIGH (ref 0–15)
FERRITIN SERPL-MCNC: 202 NG/ML — HIGH (ref 15–150)
GAS PNL BLDA: SIGNIFICANT CHANGE UP
GAS PNL BLDMV: SIGNIFICANT CHANGE UP
GLUCOSE BLDC GLUCOMTR-MCNC: 121 MG/DL — HIGH (ref 70–99)
GLUCOSE SERPL-MCNC: 104 MG/DL — HIGH (ref 70–99)
HBV CORE AB SER-ACNC: SIGNIFICANT CHANGE UP
HBV SURFACE AB SER-ACNC: SIGNIFICANT CHANGE UP
HBV SURFACE AG SER-ACNC: SIGNIFICANT CHANGE UP
HCG SERPL-ACNC: 207.5 MIU/ML — HIGH
HCO3 BLDMV-SCNC: 21 MMOL/L — SIGNIFICANT CHANGE UP (ref 20–28)
HCO3 BLDMV-SCNC: 23 MMOL/L — SIGNIFICANT CHANGE UP (ref 20–28)
HCO3 BLDMV-SCNC: 24 MMOL/L — SIGNIFICANT CHANGE UP (ref 20–28)
HCO3 BLDMV-SCNC: 25 MMOL/L — SIGNIFICANT CHANGE UP (ref 20–28)
HCO3 BLDMV-SCNC: 25 MMOL/L — SIGNIFICANT CHANGE UP (ref 20–28)
HCT VFR BLD CALC: 22.9 % — LOW (ref 34.5–45)
HCV AB S/CO SERPL IA: 0.06 S/CO — SIGNIFICANT CHANGE UP (ref 0–0.99)
HCV AB SERPL-IMP: SIGNIFICANT CHANGE UP
HDLC SERPL-MCNC: 31 MG/DL — LOW
HGB BLD-MCNC: 7.4 G/DL — LOW (ref 11.5–15.5)
HOROWITZ INDEX BLDMV+IHG-RTO: 100 — SIGNIFICANT CHANGE UP
HOROWITZ INDEX BLDMV+IHG-RTO: 21 — SIGNIFICANT CHANGE UP
HOROWITZ INDEX BLDMV+IHG-RTO: 21 — SIGNIFICANT CHANGE UP
HOROWITZ INDEX BLDMV+IHG-RTO: 28 — SIGNIFICANT CHANGE UP
HSV1 IGG SER-ACNC: <0.01 INDEX — SIGNIFICANT CHANGE UP
HSV1 IGG SERPL QL IA: NEGATIVE — SIGNIFICANT CHANGE UP
HSV2 IGG FLD-ACNC: 0.22 INDEX — SIGNIFICANT CHANGE UP
HSV2 IGG SERPL QL IA: NEGATIVE — SIGNIFICANT CHANGE UP
INR BLD: 1.34 RATIO — HIGH (ref 0.88–1.16)
IRON SATN MFR SERPL: 16 % — SIGNIFICANT CHANGE UP (ref 14–50)
IRON SATN MFR SERPL: 49 UG/DL — SIGNIFICANT CHANGE UP (ref 30–160)
LDH SERPL L TO P-CCNC: 778 U/L — HIGH (ref 50–242)
LIPID PNL WITH DIRECT LDL SERPL: 75 MG/DL — SIGNIFICANT CHANGE UP
MAGNESIUM SERPL-MCNC: 2.4 MG/DL — SIGNIFICANT CHANGE UP (ref 1.6–2.6)
MCHC RBC-ENTMCNC: 29 PG — SIGNIFICANT CHANGE UP (ref 27–34)
MCHC RBC-ENTMCNC: 32.6 GM/DL — SIGNIFICANT CHANGE UP (ref 32–36)
MCV RBC AUTO: 89 FL — SIGNIFICANT CHANGE UP (ref 80–100)
MEV IGG SER-ACNC: 30.4 AU/ML — SIGNIFICANT CHANGE UP
MEV IGG+IGM SER-IMP: POSITIVE — SIGNIFICANT CHANGE UP
MUV AB SER-ACNC: NEGATIVE — SIGNIFICANT CHANGE UP
MUV IGG FLD-ACNC: 5.3 AU/ML — SIGNIFICANT CHANGE UP
O2 CT VFR BLD CALC: 40 MMHG — SIGNIFICANT CHANGE UP (ref 30–65)
O2 CT VFR BLD CALC: 40 MMHG — SIGNIFICANT CHANGE UP (ref 30–65)
O2 CT VFR BLD CALC: 42 MMHG — SIGNIFICANT CHANGE UP (ref 30–65)
O2 CT VFR BLD CALC: 43 MMHG — SIGNIFICANT CHANGE UP (ref 30–65)
O2 CT VFR BLD CALC: 47 MMHG — SIGNIFICANT CHANGE UP (ref 30–65)
O2 CT VFR BLD CALC: 48 MMHG — SIGNIFICANT CHANGE UP (ref 30–65)
O2 CT VFR BLD CALC: 49 MMHG — SIGNIFICANT CHANGE UP (ref 30–65)
O2 CT VFR BLD CALC: 50 MMHG — SIGNIFICANT CHANGE UP (ref 30–65)
O2 CT VFR BLD CALC: 52 MMHG — SIGNIFICANT CHANGE UP (ref 30–65)
PCO2 BLDMV: 34 MMHG — SIGNIFICANT CHANGE UP (ref 30–65)
PCO2 BLDMV: 35 MMHG — SIGNIFICANT CHANGE UP (ref 30–65)
PCO2 BLDMV: 36 MMHG — SIGNIFICANT CHANGE UP (ref 30–65)
PCO2 BLDMV: 37 MMHG — SIGNIFICANT CHANGE UP (ref 30–65)
PCO2 BLDMV: 38 MMHG — SIGNIFICANT CHANGE UP (ref 30–65)
PCO2 BLDMV: 38 MMHG — SIGNIFICANT CHANGE UP (ref 30–65)
PCO2 BLDMV: 44 MMHG — SIGNIFICANT CHANGE UP (ref 30–65)
PH BLDMV: 7.38 — SIGNIFICANT CHANGE UP (ref 7.32–7.45)
PH BLDMV: 7.41 — SIGNIFICANT CHANGE UP (ref 7.32–7.45)
PH BLDMV: 7.42 — SIGNIFICANT CHANGE UP (ref 7.32–7.45)
PH BLDMV: 7.42 — SIGNIFICANT CHANGE UP (ref 7.32–7.45)
PH BLDMV: 7.43 — SIGNIFICANT CHANGE UP (ref 7.32–7.45)
PH BLDMV: 7.44 — SIGNIFICANT CHANGE UP (ref 7.32–7.45)
PHOSPHATE SERPL-MCNC: 3.5 MG/DL — SIGNIFICANT CHANGE UP (ref 2.5–4.5)
PLATELET # BLD AUTO: 72 K/UL — LOW (ref 150–400)
POTASSIUM SERPL-MCNC: 4.2 MMOL/L — SIGNIFICANT CHANGE UP (ref 3.5–5.3)
POTASSIUM SERPL-SCNC: 4.2 MMOL/L — SIGNIFICANT CHANGE UP (ref 3.5–5.3)
PROT SERPL-MCNC: 4.5 G/DL — LOW (ref 6–8.3)
PROTHROM AB SERPL-ACNC: 15.5 SEC — HIGH (ref 10–12.9)
RBC # BLD: 2.57 M/UL — LOW (ref 3.8–5.2)
RBC # FLD: 14.6 % — HIGH (ref 10.3–14.5)
RH IG SCN BLD-IMP: NEGATIVE — SIGNIFICANT CHANGE UP
SAO2 % BLDMV: 76 % — SIGNIFICANT CHANGE UP (ref 60–90)
SAO2 % BLDMV: 76 % — SIGNIFICANT CHANGE UP (ref 60–90)
SAO2 % BLDMV: 81 % — SIGNIFICANT CHANGE UP (ref 60–90)
SAO2 % BLDMV: 81 % — SIGNIFICANT CHANGE UP (ref 60–90)
SAO2 % BLDMV: 83 % — SIGNIFICANT CHANGE UP (ref 60–90)
SAO2 % BLDMV: 83 % — SIGNIFICANT CHANGE UP (ref 60–90)
SAO2 % BLDMV: 84 % — SIGNIFICANT CHANGE UP (ref 60–90)
SAO2 % BLDMV: 84 % — SIGNIFICANT CHANGE UP (ref 60–90)
SAO2 % BLDMV: 88 % — SIGNIFICANT CHANGE UP (ref 60–90)
SODIUM SERPL-SCNC: 135 MMOL/L — SIGNIFICANT CHANGE UP (ref 135–145)
T GONDII IGG SER QL: <3 IU/ML — SIGNIFICANT CHANGE UP
T GONDII IGG SER QL: NEGATIVE — SIGNIFICANT CHANGE UP
T3 SERPL-MCNC: 56 NG/DL — LOW (ref 80–200)
TIBC SERPL-MCNC: 312 UG/DL — SIGNIFICANT CHANGE UP (ref 220–430)
TOTAL CHOLESTEROL/HDL RATIO MEASUREMENT: 4.5 RATIO — SIGNIFICANT CHANGE UP (ref 3.3–7.1)
TRIGL SERPL-MCNC: 169 MG/DL — HIGH (ref 10–149)
UIBC SERPL-MCNC: 263 UG/DL — SIGNIFICANT CHANGE UP (ref 110–370)
VZV IGG FLD QL IA: 561.2 INDEX — SIGNIFICANT CHANGE UP
VZV IGG FLD QL IA: POSITIVE — SIGNIFICANT CHANGE UP
WBC # BLD: 15.1 K/UL — HIGH (ref 3.8–10.5)
WBC # FLD AUTO: 15.1 K/UL — HIGH (ref 3.8–10.5)

## 2019-04-15 PROCEDURE — 71045 X-RAY EXAM CHEST 1 VIEW: CPT | Mod: 26

## 2019-04-15 PROCEDURE — 86077 PHYS BLOOD BANK SERV XMATCH: CPT

## 2019-04-15 PROCEDURE — 99291 CRITICAL CARE FIRST HOUR: CPT

## 2019-04-15 PROCEDURE — 99232 SBSQ HOSP IP/OBS MODERATE 35: CPT | Mod: GC

## 2019-04-15 PROCEDURE — 93306 TTE W/DOPPLER COMPLETE: CPT | Mod: 26

## 2019-04-15 RX ORDER — HEPARIN SODIUM 5000 [USP'U]/ML
5000 INJECTION INTRAVENOUS; SUBCUTANEOUS ONCE
Refills: 0 | Status: DISCONTINUED | OUTPATIENT
Start: 2019-04-15 | End: 2019-04-15

## 2019-04-15 RX ORDER — HEPARIN SODIUM 5000 [USP'U]/ML
5000 INJECTION INTRAVENOUS; SUBCUTANEOUS ONCE
Refills: 0 | Status: COMPLETED | OUTPATIENT
Start: 2019-04-15 | End: 2019-04-15

## 2019-04-15 RX ORDER — DEXAMETHASONE 0.5 MG/5ML
4 ELIXIR ORAL ONCE
Refills: 0 | Status: COMPLETED | OUTPATIENT
Start: 2019-04-15 | End: 2019-04-15

## 2019-04-15 RX ORDER — DOBUTAMINE HCL 250MG/20ML
5 VIAL (ML) INTRAVENOUS
Qty: 500 | Refills: 0 | Status: DISCONTINUED | OUTPATIENT
Start: 2019-04-15 | End: 2019-04-15

## 2019-04-15 RX ADMIN — HYDROMORPHONE HYDROCHLORIDE 0.5 MILLIGRAM(S): 2 INJECTION INTRAMUSCULAR; INTRAVENOUS; SUBCUTANEOUS at 14:00

## 2019-04-15 RX ADMIN — NICARDIPINE HYDROCHLORIDE 25 MG/HR: 30 CAPSULE, EXTENDED RELEASE ORAL at 20:30

## 2019-04-15 RX ADMIN — HYDROMORPHONE HYDROCHLORIDE 0.5 MILLIGRAM(S): 2 INJECTION INTRAMUSCULAR; INTRAVENOUS; SUBCUTANEOUS at 14:15

## 2019-04-15 RX ADMIN — AMIODARONE HYDROCHLORIDE 16.67 MG/MIN: 400 TABLET ORAL at 09:09

## 2019-04-15 RX ADMIN — HYDROMORPHONE HYDROCHLORIDE 0.5 MILLIGRAM(S): 2 INJECTION INTRAMUSCULAR; INTRAVENOUS; SUBCUTANEOUS at 04:15

## 2019-04-15 RX ADMIN — HEPARIN SODIUM 13.5 UNIT(S)/HR: 5000 INJECTION INTRAVENOUS; SUBCUTANEOUS at 09:09

## 2019-04-15 RX ADMIN — HYDROMORPHONE HYDROCHLORIDE 0.5 MILLIGRAM(S): 2 INJECTION INTRAMUSCULAR; INTRAVENOUS; SUBCUTANEOUS at 17:15

## 2019-04-15 RX ADMIN — POLYETHYLENE GLYCOL 3350 17 GRAM(S): 17 POWDER, FOR SOLUTION ORAL at 12:57

## 2019-04-15 RX ADMIN — Medication 100 MILLIGRAM(S): at 12:56

## 2019-04-15 RX ADMIN — SIMETHICONE 80 MILLIGRAM(S): 80 TABLET, CHEWABLE ORAL at 17:31

## 2019-04-15 RX ADMIN — HEPARIN SODIUM 5000 UNIT(S): 5000 INJECTION INTRAVENOUS; SUBCUTANEOUS at 11:00

## 2019-04-15 RX ADMIN — HEPARIN SODIUM 13.5 UNIT(S)/HR: 5000 INJECTION INTRAVENOUS; SUBCUTANEOUS at 02:00

## 2019-04-15 RX ADMIN — Medication 100 MILLIGRAM(S): at 21:06

## 2019-04-15 RX ADMIN — HYDROMORPHONE HYDROCHLORIDE 0.5 MILLIGRAM(S): 2 INJECTION INTRAMUSCULAR; INTRAVENOUS; SUBCUTANEOUS at 04:00

## 2019-04-15 RX ADMIN — HYDROMORPHONE HYDROCHLORIDE 0.5 MILLIGRAM(S): 2 INJECTION INTRAMUSCULAR; INTRAVENOUS; SUBCUTANEOUS at 21:17

## 2019-04-15 RX ADMIN — Medication 4 MILLIGRAM(S): at 06:43

## 2019-04-15 RX ADMIN — HEPARIN SODIUM 5000 UNIT(S): 5000 INJECTION INTRAVENOUS; SUBCUTANEOUS at 11:30

## 2019-04-15 RX ADMIN — HYDROMORPHONE HYDROCHLORIDE 0.5 MILLIGRAM(S): 2 INJECTION INTRAMUSCULAR; INTRAVENOUS; SUBCUTANEOUS at 21:45

## 2019-04-15 RX ADMIN — HYDROMORPHONE HYDROCHLORIDE 0.5 MILLIGRAM(S): 2 INJECTION INTRAMUSCULAR; INTRAVENOUS; SUBCUTANEOUS at 17:30

## 2019-04-15 RX ADMIN — SODIUM CHLORIDE 10 MILLILITER(S): 9 INJECTION INTRAMUSCULAR; INTRAVENOUS; SUBCUTANEOUS at 14:26

## 2019-04-15 RX ADMIN — ONDANSETRON 4 MILLIGRAM(S): 8 TABLET, FILM COATED ORAL at 20:33

## 2019-04-15 RX ADMIN — NICARDIPINE HYDROCHLORIDE 25 MG/HR: 30 CAPSULE, EXTENDED RELEASE ORAL at 06:43

## 2019-04-15 RX ADMIN — ONDANSETRON 4 MILLIGRAM(S): 8 TABLET, FILM COATED ORAL at 05:40

## 2019-04-15 NOTE — DIETITIAN INITIAL EVALUATION ADULT. - PHYSICAL APPEARANCE
appears to be retaining fluids. Nutrition focused physical exam deferred at this time as Pt with volume overload, multiple IV lines and on ECMO/well nourished

## 2019-04-15 NOTE — PROVIDER CONTACT NOTE (OTHER) - SITUATION
Pt has VA ECMO, cannulated in R ax & R fem. Pt has been having R arm swelling pain & numbness. Pt complains of severe pain when R arm is touched.
Pt has VA ECMO--R arm pain for the last few days. swelling had improved yesterday but is now +3 edema & increasing pain

## 2019-04-15 NOTE — PROGRESS NOTE ADULT - ASSESSMENT
A/P: 33 year old F pt with PMH noted below presented as a transfer 2/2 postpartum cardiogenic shock s/p ECMO, EP consulted for AT.    - hemodynamically stabilized, weaning off ECMO per HF  - likely sinus tachycardia noted on EKG from 4/14 and telemetry  - no indication for DCCV or ablation at this time  - amiodarone started at 9PM on 4/13, wean off as tolerated    Brien Dunn, #[id number]  EP Fellow

## 2019-04-15 NOTE — PROGRESS NOTE ADULT - PROBLEM SELECTOR PLAN 1
Pt with YAJARIA - ATN from shock/ acute cardiomyopathy. Pt off lasix, UOP ~2.3 L. Currently off CVVHDF. Creatinine acutely clover to 3 from 2, however electrolytes and acid-base stable. Plan for ECMO wean off today. Antibiotics dose adjustment to GFR <10 once CRRT stopped. Monitor electrolytes.

## 2019-04-15 NOTE — DIETITIAN INITIAL EVALUATION ADULT. - ENERGY NEEDS
Ht: Wt: BMI: kg/m2 IBW: (+/-10%) %IBW  Pertinent information: 33y female,  34w5d, presented to ED with SOB, noted with tachycardia, s/p emergent C/S. Pt intubated, sedated, started on CVVHD for YAJAIRA, c/b cardiogenic shock, placed on VA ECMO and transferred to [facility]. LVAD and Heart transplant evaluation launched. extubated on , CVVHD held on . S/p ECMO wean today, plan for decannulation tomorrow.   +1 generalized and +2 right arm/hand Edema, Skin; no pressure injuries

## 2019-04-15 NOTE — PROGRESS NOTE ADULT - ASSESSMENT
34 yo lady with no past medical history 35 weeks pregnant transferred to [facility] after she presented with shortness of breath, SVT and acutely decompensated with severe cardiomyopathy, needing ECMO, s/p emergency  now in multiorgan failure and YAJAIRA.

## 2019-04-15 NOTE — PROGRESS NOTE ADULT - PROBLEM SELECTOR PLAN 2
Creatinine has fluctuated and is elevated today. However her urine output has greatly improved.   Her CVP is 2. Goal today is to keep even to slightly positive (500cc).

## 2019-04-15 NOTE — PROGRESS NOTE ADULT - PROBLEM SELECTOR PLAN 3
There is a component of baseline normocytic anemia with confounding hemolysis from ECMO circuit.   Transfuse 2U PRBCs with goal to keep Hgb > 7

## 2019-04-15 NOTE — DIETITIAN INITIAL EVALUATION ADULT. - FACTORS AFF FOOD INTAKE
Reports first 4-5months of pregnancy she suffered from hyperemesis and vomited multiple times daily then resolved. Pt noted new onset of diarrhea ~ 2 weeks ago./other (specify)

## 2019-04-15 NOTE — PROGRESS NOTE ADULT - SUBJECTIVE AND OBJECTIVE BOX
JOE BEDOLLA  MRN#:  [id number]    The patient is a 33y Female admitted at 34 weeks gestation with dysnea and nausea and vomiting, developed SVT,renal failure and subsequent cardiogenic shock requiring ECMO who was seen, evaluated, & examined with the CTICU staff on rounds and later in the evening with a multidisciplinary care plan formulated & implemented.  All available clinical, laboratory, radiographic, pharmacologic, and electrocardiographic data were reviewed & analyzed.      The patient was in the CTICU in critical condition secondary to persistent cardiopulmonary dysfunction, hemodynamically significant anemia, rapid atrial arrythmias, persistent cardiovascular dysfunction, acute renal insufficiency & hyperglycemia.      Respiratory status required supplemental oxygen, the following of ABG’s with A-line monitoring, continuous pulse oximetry monitoring, intermittent IV Dilaudid for support & to evaluate for & prevent further decompensation secondary to persistent cardiopulmonary dysfunction.     Invasive hemodynamic monitoring with a pulmonary artery catheter & an A-line were required for the continuous central venous, pulmonary artery pressure and MAP/BP monitoring and intermittent CI/SV02 measurement to ensure adequate cardiovascular support and to evaluate for & help prevent decompensation while receiving packed red blood cells, an IV Nicardipine drip, an IV Amiodarone drip, and an IV Heparin drip, VA ECMO support secondary to hemodynamically significant anemia, cardiogenic shock/acute systolic heart failure, acute renal insufficiency and paroxysmal atrial fibrillation. Patient has improved cardiac function and plan is for removal of ECMO 4/16.    Metabolic stability, stress hyperglycemia, & infection prophylaxis required a Lispro Insulin sliding scale & the following of serial glucose levels to help achieve & maintain euglycemia.      Patient required acute postoperative critical care management and I provided 35 minutes of non-continuous care to the patient.  Discussed at length with the CTICU staff and helped coordinate care.

## 2019-04-15 NOTE — DIETITIAN INITIAL EVALUATION ADULT. - NS AS NUTRI INTERV ED CONTENT
Encouraged adequate PO intake and hydration. Unclear if Pt will be breast pumping; will continue to follow up. As part of LVAD and Heart transplant education, RD reviewed Coumadin education, food safety and food and drug interactions related to immunosuppression mediations. Pt defers in depth diet education at this time. Pt aware RD remains available to monitor PO intake, wt, labs and diet education review/Other (specify)

## 2019-04-15 NOTE — PROGRESS NOTE ADULT - PROBLEM SELECTOR PLAN 1
C/w VA-ECMO support. She tolerated wean trial today. Will likely decrease ECMO speed and plan for wean/decanulation tomorrow   - Will try to obtain collateral information from cardiologist at [place].

## 2019-04-15 NOTE — PROGRESS NOTE ADULT - ATTENDING COMMENTS
Pt now extubated, awake and alert  Feels ok  Good UO - 2L  Moniroring UO  Cr increase today, will see if stabilizes here

## 2019-04-15 NOTE — PROGRESS NOTE ADULT - ASSESSMENT
33 f that was 34w pregnant p/w cough, SOB, N/V, found to have SVT to 200s with cardiogenic shock, s/p , transferred to SICU, multiorgan failure on ECMO, CVVH, LFTs in thousands due to shock liver  UA neg  CXR- ?LLL pna vs atelectasis, pulm edema  blood cx negative, sputum negative    cardiogenic shock due to ?post partum cardiomyopathy vs viral etiology  ?LLL pneumonia   Improving overall condition post extubation  Plan to discontinue ECMO in the morning  Antibiotics discontinued- will monitor    Fernandez Batista MD  [phone]  After 5pm/weekends [phone]

## 2019-04-15 NOTE — DIETITIAN INITIAL EVALUATION ADULT. - ORAL INTAKE PTA
Pt reports a good PO intake PTA. States she was having weird cravings and consuming foods such as eggs, sausages and annette cheese steaks. Snacking on cookies and brownies. Drinking water and peach Snapple. States prior to pregnancy, She was a "healthy eater" liked vegetables and often consumed salads./good

## 2019-04-15 NOTE — PROGRESS NOTE ADULT - PROBLEM SELECTOR PLAN 4
Secondary to pulmonary edema from cardiogenic shock in peripartum period.   She has been successfully extubated. Continue to monitor

## 2019-04-15 NOTE — PROGRESS NOTE ADULT - SUBJECTIVE AND OBJECTIVE BOX
Pt appears comfortable in bed. ECMO in place. Undergoing bedside ECMO wean protocol.     MEDICATIONS:  amiodarone Infusion 0.5 mG/Min IV Continuous <Continuous>  heparin  Infusion 1300 Unit(s)/Hr IV Continuous <Continuous>  niCARdipine Infusion 5 mG/Hr IV Continuous <Continuous>  HYDROmorphone  Injectable 0.5 milliGRAM(s) IV Push every 2 hours PRN  ondansetron Injectable 4 milliGRAM(s) IV Push every 4 hours PRN  oxyCODONE    IR 5 milliGRAM(s) Oral every 6 hours PRN  docusate sodium 100 milliGRAM(s) Oral three times a day  pantoprazole    Tablet 40 milliGRAM(s) Oral before breakfast  polyethylene glycol 3350 17 Gram(s) Oral daily  simethicone 80 milliGRAM(s) Chew every 4 hours PRN  dextrose 50% Injectable 50 milliLiter(s) IV Push every 15 minutes  dextrose 50% Injectable 25 milliLiter(s) IV Push every 15 minutes  insulin lispro (HumaLOG) corrective regimen sliding scale   SubCutaneous three times a day before meals  insulin lispro (HumaLOG) corrective regimen sliding scale   SubCutaneous at bedtime  sodium chloride 0.9% lock flush 10 milliLiter(s) IV Push every 1 hour PRN  sodium chloride 0.9%. 1000 milliLiter(s) IV Continuous <Continuous>    PHYSICAL EXAM:  T(C): 37 (04-15-19 @ 14:00), Max: 37.2 (04-15-19 @ 12:00)  HR: 130 (04-15-19 @ 14:30) (112 - 146)  BP: --  RR: 16 (04-15-19 @ 14:30) (5 - 34)  SpO2: 98% (04-15-19 @ 14:30) (94% - 100%)  Wt(kg): --  I&O's Summary    14 Apr 2019 07:01  -  15 Apr 2019 07:00  --------------------------------------------------------  IN: 1405.8 mL / OUT: 2375 mL / NET: -969.2 mL    15 Apr 2019 07:01  -  15 Apr 2019 14:58  --------------------------------------------------------  IN: 1121.6 mL / OUT: 840 mL / NET: 281.6 mL    Appearance: Normal	  HEENT:   Normal oral mucosa, PERRL, EOMI	  Lymphatic: No lymphadenopathy  Cardiovascular: Normal S1 S2, No JVD, No murmurs, No edema  Respiratory: Lungs clear to auscultation	  Psychiatry: A & O x 3, Mood & affect appropriate  Gastrointestinal:  Soft, Non-tender, + BS	  Skin: No rashes, No ecchymoses, No cyanosis	  Neurologic: Non-focal  Extremities: Normal range of motion, No clubbing, cyanosis or edema  Vascular: Peripheral pulses dopplerable bilaterally    LABS:	 	    CBC Full  -  ( 15 Apr 2019 01:34 )  WBC Count : 15.1 K/uL  Hemoglobin : 7.4 g/dL  Hematocrit : 22.9 %  Platelet Count - Automated : 72 K/uL  Mean Cell Volume : 89.0 fl  Mean Cell Hemoglobin : 29.0 pg  Mean Cell Hemoglobin Concentration : 32.6 gm/dL  Auto Neutrophil # : x  Auto Lymphocyte # : x  Auto Monocyte # : x  Auto Eosinophil # : x  Auto Basophil # : x  Auto Neutrophil % : x  Auto Lymphocyte % : x  Auto Monocyte % : x  Auto Eosinophil % : x  Auto Basophil % : x    04-15    135  |  101  |  28<H>  ----------------------------<  104<H>  4.2   |  24  |  3.00<H>  04-14    138  |  104  |  24<H>  ----------------------------<  98  4.1   |  25  |  1.91<H>    Ca    7.8<L>      15 Apr 2019 01:34  Ca    7.8<L>      14 Apr 2019 01:58  Phos  3.5     04-15  Phos  2.0     04-14  Mg     2.4     04-15  Mg     2.3     04-14    TPro  4.5<L>  /  Alb  2.3<L>  /  TBili  2.4<H>  /  DBili  x   /  AST  768<H>  /  ALT  627<H>  /  AlkPhos  133<H>  04-15  TPro  4.0<L>  /  Alb  2.2<L>  /  TBili  2.0<H>  /  DBili  x   /  AST  1867<H>  /  ALT  898<H>  /  AlkPhos  109  04-14

## 2019-04-15 NOTE — PROGRESS NOTE ADULT - SUBJECTIVE AND OBJECTIVE BOX
INFECTIOUS DISEASES FOLLOW UP-- Yanni Batista  [phone]    This is a follow up note for this  33yFemale with  History of extracorporeal membrane oxygenation treatment, extubated improving condition overall      ROS:  CONSTITUTIONAL:  awake alert conversant, c/o pain in right arm and  Allergies    No Known Allergies    Intolerances    Reglan (Other)      ANTIBIOTICS/RELEVANT:  antimicrobials    immunologic:    OTHER:  amiodarone Infusion 0.5 mG/Min IV Continuous <Continuous>  dextrose 50% Injectable 50 milliLiter(s) IV Push every 15 minutes  dextrose 50% Injectable 25 milliLiter(s) IV Push every 15 minutes  docusate sodium 100 milliGRAM(s) Oral three times a day  heparin  Infusion 1300 Unit(s)/Hr IV Continuous <Continuous>  HYDROmorphone  Injectable 0.5 milliGRAM(s) IV Push every 2 hours PRN  insulin lispro (HumaLOG) corrective regimen sliding scale   SubCutaneous three times a day before meals  insulin lispro (HumaLOG) corrective regimen sliding scale   SubCutaneous at bedtime  niCARdipine Infusion 5 mG/Hr IV Continuous <Continuous>  ondansetron Injectable 4 milliGRAM(s) IV Push every 4 hours PRN  oxyCODONE    IR 5 milliGRAM(s) Oral every 6 hours PRN  pantoprazole    Tablet 40 milliGRAM(s) Oral before breakfast  polyethylene glycol 3350 17 Gram(s) Oral daily  simethicone 80 milliGRAM(s) Chew every 4 hours PRN  sodium chloride 0.9% lock flush 10 milliLiter(s) IV Push every 1 hour PRN  sodium chloride 0.9%. 1000 milliLiter(s) IV Continuous <Continuous>      Objective:  Vital Signs Last 24 Hrs  T(C): 36.8 (15 Apr 2019 18:00), Max: 37.2 (15 Apr 2019 12:00)  T(F): 98.2 (15 Apr 2019 18:00), Max: 99 (15 Apr 2019 12:00)  HR: 128 (15 Apr 2019 18:00) (112 - 146)  BP: --  BP(mean): --  RR: 12 (15 Apr 2019 18:00) (5 - 34)  SpO2: 98% (15 Apr 2019 18:00) (94% - 100%)    PHYSICAL EXAM:  Constitutional:no acute distress, awake and alert  Eyes:DAVID, EOMI  Ear/Nose/Throat: no oral lesions, 	  Respiratory: clear BL   Cardiovascular: tachy  Gastrointestinal: distended,  site clean no erythema  Extremities:no e/e/c RUE with pain some swelling near elbow area  No Lymphadenopathy  IV sites not inflammed.    LABS:                        7.4    15.1  )-----------( 72       ( 15 Apr 2019 01:34 )             22.9     04-15    135  |  101  |  28<H>  ----------------------------<  104<H>  4.2   |  24  |  3.00<H>    Ca    7.8<L>      15 Apr 2019 01:34  Phos  3.5     -15  Mg     2.4     -15    TPro  4.5<L>  /  Alb  2.3<L>  /  TBili  2.4<H>  /  DBili  x   /  AST  768<H>  /  ALT  627<H>  /  AlkPhos  133<H>  04-15    PT/INR - ( 15 Apr 2019 01:34 )   PT: 15.5 sec;   INR: 1.34 ratio         PTT - ( 15 Apr 2019 14:10 )  PTT:64.9 sec      MICROBIOLOGY:            RECENT CULTURES:   @ 00:45  .Sputum  --  --  --    No growth at 48 hours  --   @ 21:04  .Blood  --  --  --    No growth to date.  --   @ 02:42  .Blood  --  --  --    No growth to date.  --      RADIOLOGY & ADDITIONAL STUDIES:    < from: Xray Chest 1 View- PORTABLE-Routine (04.15.19 @ 03:31) >  Impression:    The heart is enlarged. Small left pleural effusion. Left lower lobe   pneumonia and/or atelectasis. The right lung is clear. Life supporting   devices are in good position and unchanged when compared to previous   study done on 2019.    < end of copied text >

## 2019-04-15 NOTE — DIETITIAN INITIAL EVALUATION ADULT. - OTHER INFO
Pt seen for LVAD and Heart transplant evaluation. Pt seen, now a  s/p emergent c/s. Pt reports a good appetite in house, states everything tastes different. Pt denies to offer food preferences and denies PO supplements. Is ordered for ensure pudding x2 which she dislikes.  Only requesting strawberry jello. Pt states Prepregnancy weight was 175lbs (24.4 BMI), denies knowing how much she gained. Pt is currently 275lbs although likely inaccurate due to fluid retention. Pt denies any nutritional concerns prior to pregnancy and denies history of GDM. Pt was not taking prenatal multivitamin as it increased her nausea. Pt denies chewing/swallowing difficulty ,NKFA

## 2019-04-15 NOTE — PROVIDER CONTACT NOTE (OTHER) - ASSESSMENT
Pts R arm is +3 edema, warm, painful to touch & move, pulses easily dopplered. Pts VSS
Pt has strong dopplerable pulse in R radial artery. Pts R arm is warm, edematous & taut & weak. Pt complains of numbness in the arm but still feels touch`

## 2019-04-15 NOTE — CHART NOTE - NSCHARTNOTEFT_GEN_A_CORE
Patient seen with Dr. Thierry Guadarrama. Patient now extubated and off of CVVH. Cardiac ICU team is weaning patient off of ECMO, they are considering taking patient off of ECMO in the next day or two. Patient alert an communicative. Reports pelvic cramping. Light lochia. Minimal appetite and intermittent nausea. Patient denies flatus at this time. Pain well controlled. She reports R arm swelling that has improved since cardiac team has started to wean ECMO. Notes that the baby has been transferred to [facility] due to an arrythmia.      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 Liver enzymes and creatinine are improving. Patient continues to be critically ill but improving. Ob will continue to follow closely.     CHARISSE Angulo PGY-3

## 2019-04-15 NOTE — PROGRESS NOTE ADULT - ATTENDING COMMENTS
Doing well. Tolerated ECMO wean today. Appears hypovolemic and plan to receive some blood today. Tele reviewed and appears to be in sinus tach. Denies complaints. Labs reviewed BUN/Cr uptrending (last HD 4/14), albumin 2.2, AST/ALT improving.   - continue cardene gtt for -140   - goal CVP 6-8  - plan for ECMO decannulation possibly tomorrow  - BUN/Cr uptrending likely d/t impaired clearance; may require intermittent HD  - thrombocytopenia; HIT negative  - overall improving    Critical care time = 60 minutes

## 2019-04-15 NOTE — PROGRESS NOTE ADULT - SUBJECTIVE AND OBJECTIVE BOX
Cardiology Progress Note    Interval: Pt extubated over the weekend with plans for possible decannulation of ECMO today    Tele: Sinus tachy 120's with peak of 140's at 6:30AM    HPI:  32 yo  at 10w1d GA by LMP of 8/10/18 with EDC of 18 with known hyperemesis gravidarum presents with nausea and vomiting many times yesterday 4/10/19. She had been taking diclegis for the past week with partial relief. She last held down a full meal yesterday (10/18) afternoon. The only liquid that she tolerates is milk, she vomits even with water. She has persistent heartburn that is relived by antacids or milk. 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 She is worried that her use of antinausea medication in her previous pregnancy caused this congenital defect, so she refuses most antinausea medications. She is comfortable taking diclegis and jaime only. She felt some mild weakness that improved with fluids given by ED team. She currently denies dizziness, weakness, fevers, chills, shortness of breath, chest pain, nausea, vomiting, dysuria, hematuria, diarrhea, or constipation.    Ob/Gyn History:  ,  c/s for craniosynostosis also complicated by hyperemesis gravidarum requiring home zofran pump, SAB x2               LMP -   8/10/18                Cycle Length - regular, monthly  Denies history of ovarian cysts, uterine fibroids, abnormal paps, or STIs  Last Pap Smear - several months ago, normal per pt    HOSPITAL COURSE:  33y female HD#1,  34w5d dated by 1st trimester sonogram, presents to the ED on 4/10 with CC of SOB for past 2 days. Patient was sinus tachy to 200s, adenosine administered, no response, cardioverted with no resolution. OBGYN team proceeded with emergent . Patient intubated in ED and taken to L&D. EBL 800cc, no acute events intraop. SICU consult placed for hemodynamic monitoring and ventilator management.    SICU COURSE:  Patient was sedated with propofol and precedex, became acidotic and retaining. Cards concerned for thyroid storm and advised no CT with contrast to r/o PE, patient was not stable for V/Q scan. Central and arterial line placed overnight for HD monitoring. Mead placed in AM for possible hemodialysis vs CVVH.    During AM rounds on  patient became sinus tachy to 180s, multiple lopressor pushes given with minimal resolution.  Patient amio gtt d/c'd started on esmolol, milrinone with resolution of sinus tachy 120s. Patient weaned off shameka, levo titrating down. Nephro consult placed, recs CVVH for hyperkalemia, low UO. Cardiology, EPS, CT Surg, Interventional cardiology bedside for evaluation determined patient would benefit from ecmo/impella due to decreasing EF 10% from 50% in ED. VA ECMO placed (right SC arterial, right femoral venous) and transferred to [facility] for further workup and management.     On arrival to [facility] at 1130pm on 19, pt received on Levophed 0.23mcg/kg/min (50ml/hr), Vasopressin 0.06units/hr (4ml), Phenylephrine 0.5mcg/kg/min (21ml/hr), VA ECMO 3100RPM with 2.45LPM    HR: 107  CVP: 23  PAP: 41/26 (31) (2019 23:39)      Medications:  amiodarone Infusion 0.5 mG/Min IV Continuous <Continuous>  dextrose 50% Injectable 50 milliLiter(s) IV Push every 15 minutes  dextrose 50% Injectable 25 milliLiter(s) IV Push every 15 minutes  DOBUTamine Infusion 5 MICROgram(s)/kG/Min IV Continuous <Continuous>  docusate sodium 100 milliGRAM(s) Oral three times a day  heparin  Infusion 1300 Unit(s)/Hr IV Continuous <Continuous>  heparin  Injectable 5000 Unit(s) IV Push once  heparin  Injectable 5000 Unit(s) IV Push once  HYDROmorphone  Injectable 0.5 milliGRAM(s) IV Push every 2 hours PRN  insulin lispro (HumaLOG) corrective regimen sliding scale   SubCutaneous three times a day before meals  insulin lispro (HumaLOG) corrective regimen sliding scale   SubCutaneous at bedtime  niCARdipine Infusion 5 mG/Hr IV Continuous <Continuous>  ondansetron Injectable 4 milliGRAM(s) IV Push every 4 hours PRN  oxyCODONE    IR 5 milliGRAM(s) Oral every 6 hours PRN  pantoprazole    Tablet 40 milliGRAM(s) Oral before breakfast  polyethylene glycol 3350 17 Gram(s) Oral daily  simethicone 80 milliGRAM(s) Chew every 4 hours PRN  sodium chloride 0.9% lock flush 10 milliLiter(s) IV Push every 1 hour PRN  sodium chloride 0.9%. 1000 milliLiter(s) IV Continuous <Continuous>      Review of Systems:  Constitutional: [ ] Fever [ ] Chills [ ] Fatigue [ ] Weight change   HEENT: [ ] Blurred vision [ ] Eye Pain [ ] Headache [ ] Runny nose [ ] Sore Throat   Respiratory: [ ] Cough [ ] Wheezing [ ] Shortness of breath  Cardiovascular: [ ] Chest Pain [ ] Palpitations [ ] MENDEZ [ ] PND [ ] Orthopnea  Gastrointestinal: [ ] Abdominal Pain [ ] Diarrhea [ ] Constipation [ ] Hemorrhoids [ ] Nausea [ ] Vomiting  Genitourinary: [ ] Nocturia [ ] Dysuria [ ] Incontinence  Extremities: [ ] Swelling [ ] Joint Pain  Neurologic: [ ] Focal deficit [ ] Paresthesias [ ] Syncope  Lymphatic: [ ] Swelling [ ] Lymphadenopathy   Skin: [ ] Rash [ ] Ecchymoses [ ] Wounds [ ] Lesions  Psychiatry: [ ] Depression [ ] Suicidal/Homicidal Ideation [ ] Anxiety [ ] Sleep Disturbances  [ ] 10 point review of systems is otherwise negative except as mentioned above            [ ]Unable to obtain    Vitals:  T(C): 36.6 (04-15-19 @ 09:00), Max: 36.7 (19 @ 16:00)  HR: 145 (04-15-19 @ 11:15) (112 - 150)  BP: --  BP(mean): --  RR: 13 (04-15-19 @ 11:15) (10 - 34)  SpO2: 99% (04-15-19 @ 11:15) (94% - 100%)  Wt(kg): --  Daily     Daily Weight in k.6 (15 Apr 2019 02:00)  I&O's Summary    2019 07:01  -  15 Apr 2019 07:00  --------------------------------------------------------  IN: 1405.8 mL / OUT: 2375 mL / NET: -969.2 mL    15 Apr 2019 07:01  -  15 Apr 2019 11:46  --------------------------------------------------------  IN: 195.6 mL / OUT: 440 mL / NET: -244.4 mL        Physical Exam:  General: NAD  Eye: PERRL, EOMI  HENT: Normal oral mucosa NC/AT  CV: Normal S1/S2, tachycardic, No M/R/G, trace edema, no elevation in JVP, VA ECMO in place  Resp: Normal respiratory effort, clear to auscultation bilaterally, no wheezing, no crackles  Abd: Soft, Non-tender, Non-distended, BS+  Ext: No clubbing, No joint deformity   Neuro: Non-focal, motor and sensory intact  Lymph: No lymphadenopathy  Psych: AAOx3, Mood & affect appropriate  Skin: No rashes, No ecchymoses, No cyanosis    Labs:                        7.4    15.1  )-----------( 72       ( 15 Apr 2019 01:34 )             22.9     04-15    135  |  101  |  28<H>  ----------------------------<  104<H>  4.2   |  24  |  3.00<H>    Ca    7.8<L>      15 Apr 2019 01:34  Phos  3.5     -15  Mg     2.4     -15    TPro  4.5<L>  /  Alb  2.3<L>  /  TBili  2.4<H>  /  DBili  x   /  AST  768<H>  /  ALT  627<H>  /  AlkPhos  133<H>  04-15    PT/INR - ( 15 Apr 2019 01:34 )   PT: 15.5 sec;   INR: 1.34 ratio         PTT - ( 15 Apr 2019 08:19 )  PTT:53.2 sec  CARDIAC MARKERS ( 2019 21:56 )  x     / x     / 611 U/L / x     / x            Total Cholesterol: 140  LDL: 75  HDL: 31  T        New results/imaging:

## 2019-04-15 NOTE — DIETITIAN INITIAL EVALUATION ADULT. - PERTINENT LABORATORY DATA
Hgb/Hct:7.4/22.9-low, Na:135, K:4.2, Cl:101, BUN:28--high, Cr:3.0-high, Glucose:104-high, Ca:7.8-low, Total Protein:4.5, Albumin:2.4-high, c-reactive protein:6.3-high,, AlkPhos:133-high, AST:768-high, ALT:627-high, M.4, Hgba1c: 5.5%, Cholesterol: 140, triglycerides: 169-high, HDL: 31, LDL: 31

## 2019-04-15 NOTE — PROVIDER CONTACT NOTE (OTHER) - ACTION/TREATMENT ORDERED:
Check pulses q1h, continue to assess & report any changes
Continue to check pulses q1h & monitor to any changes

## 2019-04-15 NOTE — PROGRESS NOTE ADULT - ASSESSMENT
34 yo F with history of hyperemesis gravidarum  initially presented to [facility] for SOB. She was found in SVT and received adenosinex4 and failed cardioversion x2. Pt then emergently taken for  and post op went into shock. She was found to have reduced EF and placed on VA ECMO and CVVHD. Transferred to [facility] on  for further management. 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  She is currently on VA ECMO. She has been titrated off pressors and her urine output has improved. She is alert and awake. She tolerated the ECMO wean today with clamping except for noted sinus tachycardia to the 140s.     Today her CVP was 2. Pa: 11. Pa sat this am 84   ECMO at 4000 RPM this am with 4.1L flow

## 2019-04-16 ENCOUNTER — TRANSCRIPTION ENCOUNTER (OUTPATIENT)
Age: 34
End: 2019-04-16

## 2019-04-16 ENCOUNTER — APPOINTMENT (OUTPATIENT)
Dept: CARDIOTHORACIC SURGERY | Facility: CLINIC | Age: 34
End: 2019-04-16

## 2019-04-16 DIAGNOSIS — Z3A.34 34 WEEKS GESTATION OF PREGNANCY: ICD-10-CM

## 2019-04-16 DIAGNOSIS — N17.9 ACUTE KIDNEY FAILURE, UNSPECIFIED: ICD-10-CM

## 2019-04-16 DIAGNOSIS — I47.1 SUPRAVENTRICULAR TACHYCARDIA: ICD-10-CM

## 2019-04-16 DIAGNOSIS — O34.211 MATERNAL CARE FOR LOW TRANSVERSE SCAR FROM PREVIOUS CESAREAN DELIVERY: ICD-10-CM

## 2019-04-16 DIAGNOSIS — E87.5 HYPERKALEMIA: ICD-10-CM

## 2019-04-16 DIAGNOSIS — I42.9 CARDIOMYOPATHY, UNSPECIFIED: ICD-10-CM

## 2019-04-16 DIAGNOSIS — K72.00 ACUTE AND SUBACUTE HEPATIC FAILURE WITHOUT COMA: ICD-10-CM

## 2019-04-16 DIAGNOSIS — J96.01 ACUTE RESPIRATORY FAILURE WITH HYPOXIA: ICD-10-CM

## 2019-04-16 DIAGNOSIS — R06.02 SHORTNESS OF BREATH: ICD-10-CM

## 2019-04-16 DIAGNOSIS — O99.89 OTHER SPECIFIED DISEASES AND CONDITIONS COMPLICATING PREGNANCY, CHILDBIRTH AND THE PUERPERIUM: ICD-10-CM

## 2019-04-16 DIAGNOSIS — E87.2 ACIDOSIS: ICD-10-CM

## 2019-04-16 DIAGNOSIS — I48.91 UNSPECIFIED ATRIAL FIBRILLATION: ICD-10-CM

## 2019-04-16 DIAGNOSIS — I95.9 HYPOTENSION, UNSPECIFIED: ICD-10-CM

## 2019-04-16 DIAGNOSIS — R57.0 CARDIOGENIC SHOCK: ICD-10-CM

## 2019-04-16 LAB
ALBUMIN SERPL ELPH-MCNC: 2.3 G/DL — LOW (ref 3.3–5)
ALBUMIN SERPL ELPH-MCNC: 2.4 G/DL — LOW (ref 3.3–5)
ALP SERPL-CCNC: 151 U/L — HIGH (ref 40–120)
ALP SERPL-CCNC: 158 U/L — HIGH (ref 40–120)
ALT FLD-CCNC: 276 U/L — HIGH (ref 10–45)
ALT FLD-CCNC: 364 U/L — HIGH (ref 10–45)
ANION GAP SERPL CALC-SCNC: 13 MMOL/L — SIGNIFICANT CHANGE UP (ref 5–17)
ANION GAP SERPL CALC-SCNC: 14 MMOL/L — SIGNIFICANT CHANGE UP (ref 5–17)
APTT BLD: 30.4 SEC — SIGNIFICANT CHANGE UP (ref 27.5–36.3)
APTT BLD: 68.9 SEC — HIGH (ref 27.5–36.3)
AST SERPL-CCNC: 187 U/L — HIGH (ref 10–40)
AST SERPL-CCNC: 271 U/L — HIGH (ref 10–40)
BASE EXCESS BLDMV CALC-SCNC: -0.4 MMOL/L — SIGNIFICANT CHANGE UP (ref -3–3)
BASE EXCESS BLDMV CALC-SCNC: -1 MMOL/L — SIGNIFICANT CHANGE UP (ref -3–3)
BASE EXCESS BLDMV CALC-SCNC: -3.3 MMOL/L — LOW (ref -3–3)
BASE EXCESS BLDV CALC-SCNC: -3.6 MMOL/L — LOW (ref -2–2)
BASE EXCESS BLDV CALC-SCNC: -3.9 MMOL/L — LOW (ref -2–2)
BASE EXCESS BLDV CALC-SCNC: -8.5 MMOL/L — LOW (ref -2–2)
BILIRUB SERPL-MCNC: 1.3 MG/DL — HIGH (ref 0.2–1.2)
BILIRUB SERPL-MCNC: 1.5 MG/DL — HIGH (ref 0.2–1.2)
BUN SERPL-MCNC: 48 MG/DL — HIGH (ref 7–23)
BUN SERPL-MCNC: 55 MG/DL — HIGH (ref 7–23)
CA-I SERPL-SCNC: 0.93 MMOL/L — LOW (ref 1.12–1.3)
CA-I SERPL-SCNC: 0.96 MMOL/L — LOW (ref 1.12–1.3)
CA-I SERPL-SCNC: 1.08 MMOL/L — LOW (ref 1.12–1.3)
CALCIUM SERPL-MCNC: 7.6 MG/DL — LOW (ref 8.4–10.5)
CALCIUM SERPL-MCNC: 8.1 MG/DL — LOW (ref 8.4–10.5)
CHLORIDE BLDV-SCNC: 103 MMOL/L — SIGNIFICANT CHANGE UP (ref 96–108)
CHLORIDE BLDV-SCNC: 111 MMOL/L — HIGH (ref 96–108)
CHLORIDE BLDV-SCNC: SIGNIFICANT CHANGE UP MMOL/L (ref 96–108)
CHLORIDE SERPL-SCNC: 100 MMOL/L — SIGNIFICANT CHANGE UP (ref 96–108)
CHLORIDE SERPL-SCNC: 98 MMOL/L — SIGNIFICANT CHANGE UP (ref 96–108)
CO2 BLDMV-SCNC: 23 MMOL/L — SIGNIFICANT CHANGE UP (ref 21–29)
CO2 BLDMV-SCNC: 24 MMOL/L — SIGNIFICANT CHANGE UP (ref 21–29)
CO2 BLDMV-SCNC: 25 MMOL/L — SIGNIFICANT CHANGE UP (ref 21–29)
CO2 BLDV-SCNC: 17 MMOL/L — LOW (ref 22–30)
CO2 BLDV-SCNC: 23 MMOL/L — SIGNIFICANT CHANGE UP (ref 22–30)
CO2 SERPL-SCNC: 20 MMOL/L — LOW (ref 22–31)
CO2 SERPL-SCNC: 20 MMOL/L — LOW (ref 22–31)
CREAT SERPL-MCNC: 4.66 MG/DL — HIGH (ref 0.5–1.3)
CREAT SERPL-MCNC: 5.18 MG/DL — HIGH (ref 0.5–1.3)
FIBRINOGEN PPP-MCNC: 402 MG/DL — SIGNIFICANT CHANGE UP (ref 350–510)
GAS PNL BLDA: SIGNIFICANT CHANGE UP
GAS PNL BLDMV: SIGNIFICANT CHANGE UP
GAS PNL BLDV: 130 MMOL/L — LOW (ref 136–145)
GAS PNL BLDV: 132 MMOL/L — LOW (ref 136–145)
GAS PNL BLDV: 133 MMOL/L — LOW (ref 136–145)
GAS PNL BLDV: SIGNIFICANT CHANGE UP
GLUCOSE BLDV-MCNC: 112 MG/DL — HIGH (ref 70–99)
GLUCOSE BLDV-MCNC: 116 MG/DL — HIGH (ref 70–99)
GLUCOSE BLDV-MCNC: 86 MG/DL — SIGNIFICANT CHANGE UP (ref 70–99)
GLUCOSE SERPL-MCNC: 118 MG/DL — HIGH (ref 70–99)
GLUCOSE SERPL-MCNC: 118 MG/DL — HIGH (ref 70–99)
HAPTOGLOB SERPL-MCNC: <20 MG/DL — LOW (ref 34–200)
HCO3 BLDMV-SCNC: 22 MMOL/L — SIGNIFICANT CHANGE UP (ref 20–28)
HCO3 BLDMV-SCNC: 23 MMOL/L — SIGNIFICANT CHANGE UP (ref 20–28)
HCO3 BLDMV-SCNC: 24 MMOL/L — SIGNIFICANT CHANGE UP (ref 20–28)
HCO3 BLDV-SCNC: 16 MMOL/L — LOW (ref 21–29)
HCO3 BLDV-SCNC: 22 MMOL/L — SIGNIFICANT CHANGE UP (ref 21–29)
HCO3 BLDV-SCNC: 22 MMOL/L — SIGNIFICANT CHANGE UP (ref 21–29)
HCT VFR BLD CALC: 24.2 % — LOW (ref 34.5–45)
HCT VFR BLD CALC: 27.9 % — LOW (ref 34.5–45)
HCT VFR BLDA CALC: 22 % — CRITICAL LOW (ref 39–50)
HCT VFR BLDA CALC: 22 % — CRITICAL LOW (ref 39–50)
HCT VFR BLDA CALC: 28 % — LOW (ref 39–50)
HGB BLD CALC-MCNC: 6.9 G/DL — CRITICAL LOW (ref 11.5–15.5)
HGB BLD CALC-MCNC: 7 G/DL — CRITICAL LOW (ref 11.5–15.5)
HGB BLD CALC-MCNC: 9 G/DL — LOW (ref 11.5–15.5)
HGB BLD-MCNC: 8.2 G/DL — LOW (ref 11.5–15.5)
HGB BLD-MCNC: 9.2 G/DL — LOW (ref 11.5–15.5)
HOROWITZ INDEX BLDMV+IHG-RTO: 100 — SIGNIFICANT CHANGE UP
HOROWITZ INDEX BLDMV+IHG-RTO: 100 — SIGNIFICANT CHANGE UP
HOROWITZ INDEX BLDMV+IHG-RTO: 36 — SIGNIFICANT CHANGE UP
HOROWITZ INDEX BLDV+IHG-RTO: 0 — SIGNIFICANT CHANGE UP
HOROWITZ INDEX BLDV+IHG-RTO: 36 — SIGNIFICANT CHANGE UP
HOROWITZ INDEX BLDV+IHG-RTO: 36 — SIGNIFICANT CHANGE UP
INR BLD: 1.19 RATIO — HIGH (ref 0.88–1.16)
LACTATE BLDV-MCNC: 1.1 MMOL/L — SIGNIFICANT CHANGE UP (ref 0.7–2)
LACTATE BLDV-MCNC: 1.3 MMOL/L — SIGNIFICANT CHANGE UP (ref 0.7–2)
LACTATE BLDV-MCNC: 1.8 MMOL/L — SIGNIFICANT CHANGE UP (ref 0.7–2)
LDH SERPL L TO P-CCNC: 649 U/L — HIGH (ref 50–242)
MAGNESIUM SERPL-MCNC: 2.7 MG/DL — HIGH (ref 1.6–2.6)
MCHC RBC-ENTMCNC: 28.3 PG — SIGNIFICANT CHANGE UP (ref 27–34)
MCHC RBC-ENTMCNC: 29.9 PG — SIGNIFICANT CHANGE UP (ref 27–34)
MCHC RBC-ENTMCNC: 32.9 GM/DL — SIGNIFICANT CHANGE UP (ref 32–36)
MCHC RBC-ENTMCNC: 33.8 GM/DL — SIGNIFICANT CHANGE UP (ref 32–36)
MCV RBC AUTO: 86.2 FL — SIGNIFICANT CHANGE UP (ref 80–100)
MCV RBC AUTO: 88.5 FL — SIGNIFICANT CHANGE UP (ref 80–100)
O2 CT VFR BLD CALC: 41 MMHG — SIGNIFICANT CHANGE UP (ref 30–65)
O2 CT VFR BLD CALC: 47 MMHG — SIGNIFICANT CHANGE UP (ref 30–65)
O2 CT VFR BLD CALC: 48 MMHG — SIGNIFICANT CHANGE UP (ref 30–65)
OTHER CELLS CSF MANUAL: 10 ML/DL — LOW (ref 18–22)
OTHER CELLS CSF MANUAL: 8 ML/DL — LOW (ref 18–22)
PCO2 BLDMV: 37 MMHG — SIGNIFICANT CHANGE UP (ref 30–65)
PCO2 BLDMV: 38 MMHG — SIGNIFICANT CHANGE UP (ref 30–65)
PCO2 BLDMV: 42 MMHG — SIGNIFICANT CHANGE UP (ref 30–65)
PCO2 BLDV: 33 MMHG — LOW (ref 35–50)
PCO2 BLDV: 43 MMHG — SIGNIFICANT CHANGE UP (ref 35–50)
PCO2 BLDV: 46 MMHG — SIGNIFICANT CHANGE UP (ref 35–50)
PH BLDMV: 7.34 — SIGNIFICANT CHANGE UP (ref 7.32–7.45)
PH BLDMV: 7.41 — SIGNIFICANT CHANGE UP (ref 7.32–7.45)
PH BLDMV: 7.41 — SIGNIFICANT CHANGE UP (ref 7.32–7.45)
PH BLDV: 7.3 — LOW (ref 7.35–7.45)
PH BLDV: 7.32 — LOW (ref 7.35–7.45)
PH BLDV: 7.32 — LOW (ref 7.35–7.45)
PHOSPHATE SERPL-MCNC: 4.9 MG/DL — HIGH (ref 2.5–4.5)
PLATELET # BLD AUTO: 89 K/UL — LOW (ref 150–400)
PLATELET # BLD AUTO: 93 K/UL — LOW (ref 150–400)
PO2 BLDV: 42 MMHG — SIGNIFICANT CHANGE UP (ref 25–45)
PO2 BLDV: 46 MMHG — HIGH (ref 25–45)
PO2 BLDV: 48 MMHG — HIGH (ref 25–45)
POTASSIUM BLDV-SCNC: 3.7 MMOL/L — SIGNIFICANT CHANGE UP (ref 3.5–5.3)
POTASSIUM BLDV-SCNC: 4.9 MMOL/L — SIGNIFICANT CHANGE UP (ref 3.5–5.3)
POTASSIUM BLDV-SCNC: 5.1 MMOL/L — HIGH (ref 3.5–5)
POTASSIUM SERPL-MCNC: 5.2 MMOL/L — SIGNIFICANT CHANGE UP (ref 3.5–5.3)
POTASSIUM SERPL-MCNC: 5.2 MMOL/L — SIGNIFICANT CHANGE UP (ref 3.5–5.3)
POTASSIUM SERPL-SCNC: 5.2 MMOL/L — SIGNIFICANT CHANGE UP (ref 3.5–5.3)
POTASSIUM SERPL-SCNC: 5.2 MMOL/L — SIGNIFICANT CHANGE UP (ref 3.5–5.3)
PREALB SERPL-MCNC: 9 MG/DL — LOW (ref 20–40)
PROT SERPL-MCNC: 4.4 G/DL — LOW (ref 6–8.3)
PROT SERPL-MCNC: 4.5 G/DL — LOW (ref 6–8.3)
PROTHROM AB SERPL-ACNC: 13.7 SEC — HIGH (ref 10–12.9)
RBC # BLD: 2.73 M/UL — LOW (ref 3.8–5.2)
RBC # BLD: 3.23 M/UL — LOW (ref 3.8–5.2)
RBC # FLD: 14.4 % — SIGNIFICANT CHANGE UP (ref 10.3–14.5)
RBC # FLD: 15 % — HIGH (ref 10.3–14.5)
RUBV IGG SER-ACNC: 1.1 INDEX — SIGNIFICANT CHANGE UP
RUBV IGG SER-IMP: POSITIVE — SIGNIFICANT CHANGE UP
SAO2 % BLDMV: 74 % — SIGNIFICANT CHANGE UP (ref 60–90)
SAO2 % BLDMV: 81 % — SIGNIFICANT CHANGE UP (ref 60–90)
SAO2 % BLDMV: 82 % — SIGNIFICANT CHANGE UP (ref 60–90)
SAO2 % BLDV: 70 % — SIGNIFICANT CHANGE UP (ref 67–88)
SAO2 % BLDV: 78 % — SIGNIFICANT CHANGE UP (ref 67–88)
SAO2 % BLDV: 84 % — SIGNIFICANT CHANGE UP (ref 67–88)
SODIUM SERPL-SCNC: 131 MMOL/L — LOW (ref 135–145)
SODIUM SERPL-SCNC: 134 MMOL/L — LOW (ref 135–145)
SRA INTERP SER-IMP: SIGNIFICANT CHANGE UP
WBC # BLD: 23.2 K/UL — HIGH (ref 3.8–10.5)
WBC # BLD: 32.2 K/UL — HIGH (ref 3.8–10.5)
WBC # FLD AUTO: 23.2 K/UL — HIGH (ref 3.8–10.5)
WBC # FLD AUTO: 32.2 K/UL — HIGH (ref 3.8–10.5)

## 2019-04-16 PROCEDURE — 99232 SBSQ HOSP IP/OBS MODERATE 35: CPT | Mod: GC

## 2019-04-16 PROCEDURE — 99233 SBSQ HOSP IP/OBS HIGH 50: CPT | Mod: GC

## 2019-04-16 PROCEDURE — 99291 CRITICAL CARE FIRST HOUR: CPT

## 2019-04-16 PROCEDURE — 71045 X-RAY EXAM CHEST 1 VIEW: CPT | Mod: 26

## 2019-04-16 PROCEDURE — 33984 ECMO/ECLS RMVL PRPH CANNULA: CPT

## 2019-04-16 RX ORDER — ONDANSETRON 8 MG/1
4 TABLET, FILM COATED ORAL EVERY 8 HOURS
Refills: 0 | Status: DISCONTINUED | OUTPATIENT
Start: 2019-04-16 | End: 2019-04-16

## 2019-04-16 RX ORDER — NICARDIPINE HYDROCHLORIDE 30 MG/1
5 CAPSULE, EXTENDED RELEASE ORAL
Qty: 40 | Refills: 0 | Status: DISCONTINUED | OUTPATIENT
Start: 2019-04-16 | End: 2019-04-18

## 2019-04-16 RX ORDER — CHLORHEXIDINE GLUCONATE 213 G/1000ML
5 SOLUTION TOPICAL EVERY 4 HOURS
Refills: 0 | Status: DISCONTINUED | OUTPATIENT
Start: 2019-04-16 | End: 2019-04-16

## 2019-04-16 RX ORDER — ONDANSETRON 8 MG/1
4 TABLET, FILM COATED ORAL ONCE
Refills: 0 | Status: DISCONTINUED | OUTPATIENT
Start: 2019-04-16 | End: 2019-04-16

## 2019-04-16 RX ORDER — CEFUROXIME AXETIL 250 MG
1500 TABLET ORAL EVERY 12 HOURS
Refills: 0 | Status: COMPLETED | OUTPATIENT
Start: 2019-04-16 | End: 2019-04-16

## 2019-04-16 RX ORDER — METOCLOPRAMIDE HCL 10 MG
10 TABLET ORAL ONCE
Refills: 0 | Status: DISCONTINUED | OUTPATIENT
Start: 2019-04-16 | End: 2019-04-16

## 2019-04-16 RX ORDER — SODIUM CHLORIDE 9 MG/ML
1000 INJECTION INTRAMUSCULAR; INTRAVENOUS; SUBCUTANEOUS
Refills: 0 | Status: DISCONTINUED | OUTPATIENT
Start: 2019-04-16 | End: 2019-04-28

## 2019-04-16 RX ORDER — BENZOCAINE AND MENTHOL 5; 1 G/100ML; G/100ML
1 LIQUID ORAL
Refills: 0 | Status: DISCONTINUED | OUTPATIENT
Start: 2019-04-16 | End: 2019-04-28

## 2019-04-16 RX ORDER — OXYCODONE AND ACETAMINOPHEN 5; 325 MG/1; MG/1
2 TABLET ORAL EVERY 6 HOURS
Refills: 0 | Status: DISCONTINUED | OUTPATIENT
Start: 2019-04-16 | End: 2019-04-18

## 2019-04-16 RX ORDER — PANTOPRAZOLE SODIUM 20 MG/1
40 TABLET, DELAYED RELEASE ORAL DAILY
Refills: 0 | Status: DISCONTINUED | OUTPATIENT
Start: 2019-04-16 | End: 2019-04-18

## 2019-04-16 RX ORDER — ONDANSETRON 8 MG/1
4 TABLET, FILM COATED ORAL EVERY 8 HOURS
Refills: 0 | Status: DISCONTINUED | OUTPATIENT
Start: 2019-04-16 | End: 2019-04-22

## 2019-04-16 RX ORDER — OXYCODONE AND ACETAMINOPHEN 5; 325 MG/1; MG/1
1 TABLET ORAL EVERY 4 HOURS
Refills: 0 | Status: DISCONTINUED | OUTPATIENT
Start: 2019-04-16 | End: 2019-04-18

## 2019-04-16 RX ORDER — MEPERIDINE HYDROCHLORIDE 50 MG/ML
25 INJECTION INTRAMUSCULAR; INTRAVENOUS; SUBCUTANEOUS ONCE
Refills: 0 | Status: DISCONTINUED | OUTPATIENT
Start: 2019-04-16 | End: 2019-04-18

## 2019-04-16 RX ORDER — AMIODARONE HYDROCHLORIDE 400 MG/1
0.5 TABLET ORAL
Qty: 900 | Refills: 0 | Status: DISCONTINUED | OUTPATIENT
Start: 2019-04-16 | End: 2019-04-18

## 2019-04-16 RX ADMIN — ONDANSETRON 4 MILLIGRAM(S): 8 TABLET, FILM COATED ORAL at 02:21

## 2019-04-16 RX ADMIN — HYDROMORPHONE HYDROCHLORIDE 0.5 MILLIGRAM(S): 2 INJECTION INTRAMUSCULAR; INTRAVENOUS; SUBCUTANEOUS at 05:30

## 2019-04-16 RX ADMIN — PANTOPRAZOLE SODIUM 40 MILLIGRAM(S): 20 TABLET, DELAYED RELEASE ORAL at 05:39

## 2019-04-16 RX ADMIN — OXYCODONE AND ACETAMINOPHEN 1 TABLET(S): 5; 325 TABLET ORAL at 20:45

## 2019-04-16 RX ADMIN — NICARDIPINE HYDROCHLORIDE 25 MG/HR: 30 CAPSULE, EXTENDED RELEASE ORAL at 16:10

## 2019-04-16 RX ADMIN — HEPARIN SODIUM 14.5 UNIT(S)/HR: 5000 INJECTION INTRAVENOUS; SUBCUTANEOUS at 00:25

## 2019-04-16 RX ADMIN — ONDANSETRON 4 MILLIGRAM(S): 8 TABLET, FILM COATED ORAL at 18:00

## 2019-04-16 RX ADMIN — OXYCODONE AND ACETAMINOPHEN 1 TABLET(S): 5; 325 TABLET ORAL at 20:30

## 2019-04-16 RX ADMIN — HYDROMORPHONE HYDROCHLORIDE 0.5 MILLIGRAM(S): 2 INJECTION INTRAMUSCULAR; INTRAVENOUS; SUBCUTANEOUS at 05:45

## 2019-04-16 RX ADMIN — Medication 100 MILLIGRAM(S): at 05:35

## 2019-04-16 RX ADMIN — Medication 100 MILLIGRAM(S): at 21:26

## 2019-04-16 RX ADMIN — HEPARIN SODIUM 14.5 UNIT(S)/HR: 5000 INJECTION INTRAVENOUS; SUBCUTANEOUS at 03:07

## 2019-04-16 RX ADMIN — HYDROMORPHONE HYDROCHLORIDE 0.5 MILLIGRAM(S): 2 INJECTION INTRAMUSCULAR; INTRAVENOUS; SUBCUTANEOUS at 03:00

## 2019-04-16 RX ADMIN — HYDROMORPHONE HYDROCHLORIDE 0.5 MILLIGRAM(S): 2 INJECTION INTRAMUSCULAR; INTRAVENOUS; SUBCUTANEOUS at 02:22

## 2019-04-16 RX ADMIN — ONDANSETRON 4 MILLIGRAM(S): 8 TABLET, FILM COATED ORAL at 11:00

## 2019-04-16 RX ADMIN — AMIODARONE HYDROCHLORIDE 16.67 MG/MIN: 400 TABLET ORAL at 00:26

## 2019-04-16 RX ADMIN — ONDANSETRON 4 MILLIGRAM(S): 8 TABLET, FILM COATED ORAL at 05:30

## 2019-04-16 NOTE — PROGRESS NOTE ADULT - PROBLEM SELECTOR PLAN 1
She was successfully decannulated today.   She remains off inotropes. Per handoff intraoperative BETTY with LVEF approximately 30%.   Will continue to monitor off inotropes

## 2019-04-16 NOTE — PROGRESS NOTE ADULT - SUBJECTIVE AND OBJECTIVE BOX
JOE BEDOLLA   MRN#: [id number]     The patient is a 33y Female who was seen, evaluated, & examined with the CTICU staff post-operatively with a multidisciplinary care plan formulated & implemented.  All available clinical, laboratory, radiographic, pharmacologic, and electrocardiographic data were reviewed & analyzed.      The patient was in the CTICU in critical condition secondary to:     persistent cardiopulmonary dysfunction  cardiogenic shock-cardiovascular dysfunction    For support and evaluation & prevention of further decompensation secondary to persistent cardiopulmonary dysfunction and cardiogenic shock-cardiovascular dysfunction, respiratory status required:     supplemental oxygen with nasal cannula  continuous pulse oximetry monitoring  following ABGs with A-line monitoring    Invasive hemodynamic monitoring with     a PA catheter was required to follow serial CIs/MVO2s   an A-line was required for continuous MAP/BP monitoring     to ensure adequate cardiovascular support and to evaluate for & help prevent decompensation while receiving     IV Cardene infusion  IV Amiodarone infusion  VA ECMO    secondary to     cardiogenic shock-cardiovascular dysfunction    In addition:  VA ECMO decannulated this AM, EF roughly 30-35%  On Cardene infusion for elevated blood pressure, target MAPs 70s-80s  CVVH off for last few days but renal function is worsening and urine output is low at 50 cc/hr - will discuss restarting with Renal  LFTs improving, likely recovering from poor perfusion    The patient required critical care management and I provided 30 minutes of non-continuous care to the patient in addition to  discussing the patient and plan at length with the CTICU staff and helping coordinate care.

## 2019-04-16 NOTE — PROGRESS NOTE ADULT - ASSESSMENT
33 f that was 34w pregnant p/w cough, SOB, N/V, found to have SVT to 200s with cardiogenic shock, s/p , transferred to SICU, multiorgan failure on ECMO, CVVH, LFTs in thousands due to shock liver  UA neg  CXR- ?LLL pna vs atelectasis, pulm edema  blood cx negative, sputum negative    cardiogenic shock due to ?post partum cardiomyopathy vs viral etiology  ?LLL pneumonia   Improving overall condition post extubation    Antibiotics Cefuroxime given perioperatively for removal of ECMO cannulas    Marked leukocytosis possibly reactive but would send blood cultures x 2sets    Fernandez Batista MD  [phone]  After 5pm/weekends [phone]

## 2019-04-16 NOTE — PROGRESS NOTE ADULT - ASSESSMENT
34 yo lady with no past medical history 35 weeks pregnant transferred to [facility] after she presented with shortness of breath, SVT and acutely decompensated with severe cardiomyopathy, needing ECMO, s/p emergency  now in multiorgan failure and YAJAIRA.

## 2019-04-16 NOTE — PROGRESS NOTE ADULT - ATTENDING COMMENTS
Doing well. Tolerated ECMO decannulation today. Appears hypovolemic.  Tele reviewed and appears to be in sinus tach. Denies complaints. Labs reviewed BUN/Cr uptrending (last HD 4/14), albumin 2.2, AST/ALT improving.   - if requires anti-hypertensive, would start hydral 10 mg PO q8h  - goal CVP 6-8; plan for HD today but would just do clearance as she's hypovolemic  - BUN/Cr uptrending likely d/t impaired clearance; may require intermittent HD  - thrombocytopenia; HIT negative    Critical care time = 60 minutes

## 2019-04-16 NOTE — PROGRESS NOTE ADULT - SUBJECTIVE AND OBJECTIVE BOX
Pt in OR - to be decannulated today.     REVIEW OF SYSTEMS:  General: no fatigue/malaise, weight loss/gain.  Skin: no rashes.  Ophthalmologic: no blurred vision, no loss of vision. 	  ENT: no sore throat, rhinorrhea, sinus congestion.  Respiratory: no SOB, cough or wheeze.  Gastrointestinal:  no N/V/D, no melena/hematemesis/hematochezia.  Genitourinary: no dysuria/hesitancy or hematuria.  Musculoskeletal: no myalgias or arthralgias.  Neurological: no changes in vision or hearing, no lightheadedness/dizziness, no syncope/near syncope	  Psychiatric: no unusual stress/anxiety.   Hematology/Lymphatics: no unusual bleeding, bruising and no lymphadenopathy.  Endocrine: no unusual thirst.   All others negative except as stated above and in HPI.    PHYSICAL EXAM:  T(C): 36.2 (04-16-19 @ 07:07), Max: 37.2 (04-15-19 @ 12:00)  HR: 107 (04-16-19 @ 08:00) (77 - 145)  BP: 126/62 (04-16-19 @ 07:07) (126/62 - 126/62)  RR: 12 (04-16-19 @ 08:00) (1 - 26)  SpO2: 100% (04-16-19 @ 08:00) (97% - 100%)  Wt(kg): --  I&O's Summary    15 Apr 2019 07:01  -  16 Apr 2019 07:00  --------------------------------------------------------  IN: 2235.8 mL / OUT: 2020 mL / NET: 215.8 mL    16 Apr 2019 07:01  -  16 Apr 2019 11:02  --------------------------------------------------------  IN: 71.2 mL / OUT: 40 mL / NET: 31.2 mL    Appearance: Normal	  HEENT:   Normal oral mucosa, PERRL, EOMI	  Lymphatic: No lymphadenopathy  Cardiovascular: Normal S1 S2, No JVD, No murmurs, No edema  Respiratory: Lungs clear to auscultation	  Psychiatry: A & O x 3, Mood & affect appropriate  Gastrointestinal:  Soft, Non-tender, + BS	  Skin: No rashes, No ecchymoses, No cyanosis	  Neurologic: Non-focal  Extremities: Normal range of motion, No clubbing, cyanosis or edema  Vascular: Peripheral pulses palpable bilaterally        LABS:	 	    CBC Full  -  ( 16 Apr 2019 02:29 )  WBC Count : 23.2 K/uL  Hemoglobin : 8.2 g/dL  Hematocrit : 24.2 %  Platelet Count - Automated : 89 K/uL  Mean Cell Volume : 88.5 fl  Mean Cell Hemoglobin : 29.9 pg  Mean Cell Hemoglobin Concentration : 33.8 gm/dL  Auto Neutrophil # : x  Auto Lymphocyte # : x  Auto Monocyte # : x  Auto Eosinophil # : x  Auto Basophil # : x  Auto Neutrophil % : x  Auto Lymphocyte % : x  Auto Monocyte % : x  Auto Eosinophil % : x  Auto Basophil % : x    04-16    134<L>  |  100  |  48<H>  ----------------------------<  118<H>  5.2   |  20<L>  |  4.66<H>  04-15    135  |  101  |  28<H>  ----------------------------<  104<H>  4.2   |  24  |  3.00<H>    Ca    7.6<L>      16 Apr 2019 02:29  Ca    7.8<L>      15 Apr 2019 01:34  Phos  4.9     04-16  Phos  3.5     04-15  Mg     2.7     04-16  Mg     2.4     04-15    TPro  4.5<L>  /  Alb  2.4<L>  /  TBili  1.5<H>  /  DBili  x   /  AST  271<H>  /  ALT  364<H>  /  AlkPhos  151<H>  04-16  TPro  4.5<L>  /  Alb  2.3<L>  /  TBili  2.4<H>  /  DBili  x   /  AST  768<H>  /  ALT  627<H>  /  AlkPhos  133<H>  04-15 Pt s/p successful decannulation of VA ECMO today and is extubated. She is off pressors and inotropes and stable. HR improved after receiving blood products.      MEDICATIONS:  amiodarone Infusion 0.99 mG/Min IV Continuous <Continuous>  niCARdipine Infusion 5 mG/Hr IV Continuous <Continuous>  cefuroxime  IVPB 1500 milliGRAM(s) IV Intermittent every 12 hours  meperidine     Injectable 25 milliGRAM(s) IV Push once  ondansetron Injectable 4 milliGRAM(s) IV Push every 8 hours PRN  oxyCODONE    5 mG/acetaminophen 325 mG 1 Tablet(s) Oral every 4 hours PRN  oxyCODONE    5 mG/acetaminophen 325 mG 2 Tablet(s) Oral every 6 hours PRN  pantoprazole  Injectable 40 milliGRAM(s) IV Push daily  sodium chloride 0.9%. 1000 milliLiter(s) IV Continuous <Continuous>    PHYSICAL EXAM:  T(C): 36.3 (04-16-19 @ 13:00), Max: 37.1 (04-15-19 @ 16:00)  HR: 84 (04-16-19 @ 15:00) (77 - 136)  BP: 126/62 (04-16-19 @ 07:07) (126/62 - 126/62)  RR: 25 (04-16-19 @ 15:00) (1 - 31)  SpO2: 97% (04-16-19 @ 15:00) (91% - 100%)  Wt(kg): --  I&O's Summary    15 Apr 2019 07:01  -  16 Apr 2019 07:00  --------------------------------------------------------  IN: 2235.8 mL / OUT: 2020 mL / NET: 215.8 mL    16 Apr 2019 07:01  -  16 Apr 2019 15:13  --------------------------------------------------------  IN: 338 mL / OUT: 260 mL / NET: 78 mL    Appearance: Normal	  HEENT:   Normal oral mucosa, PERRL, EOMI	  Lymphatic: No lymphadenopathy  Cardiovascular: Normal S1 S2, No JVD, No murmurs, No edema  Respiratory: Lungs clear to auscultation	  Psychiatry: A & O x 3, Mood & affect appropriate  Gastrointestinal:  Soft, Non-tender, + BS	  Skin: No rashes, No ecchymoses, No cyanosis	  Neurologic: Non-focal  Extremities: RUE swollen, Normal range of motion, No clubbing, cyanosis or edema  Vascular: Peripheral pulses palpable bilaterally    LABS:	 	    CBC Full  -  ( 16 Apr 2019 11:17 )  WBC Count : 32.2 K/uL  Hemoglobin : 9.2 g/dL  Hematocrit : 27.9 %  Platelet Count - Automated : 93 K/uL  Mean Cell Volume : 86.2 fl  Mean Cell Hemoglobin : 28.3 pg  Mean Cell Hemoglobin Concentration : 32.9 gm/dL  Auto Neutrophil # : x  Auto Lymphocyte # : x  Auto Monocyte # : x  Auto Eosinophil # : x  Auto Basophil # : x  Auto Neutrophil % : x  Auto Lymphocyte % : x  Auto Monocyte % : x  Auto Eosinophil % : x  Auto Basophil % : x    04-16    131<L>  |  98  |  55<H>  ----------------------------<  118<H>  5.2   |  20<L>  |  5.18<H>  04-16    134<L>  |  100  |  48<H>  ----------------------------<  118<H>  5.2   |  20<L>  |  4.66<H>    Ca    8.1<L>      16 Apr 2019 11:17  Ca    7.6<L>      16 Apr 2019 02:29  Phos  4.9     04-16  Phos  3.5     04-15  Mg     2.7     04-16  Mg     2.4     04-15    TPro  4.4<L>  /  Alb  2.3<L>  /  TBili  1.3<H>  /  DBili  x   /  AST  187<H>  /  ALT  276<H>  /  AlkPhos  158<H>  04-16  TPro  4.5<L>  /  Alb  2.4<L>  /  TBili  1.5<H>  /  DBili  x   /  AST  271<H>  /  ALT  364<H>  /  AlkPhos  151<H>  04-16

## 2019-04-16 NOTE — PROGRESS NOTE ADULT - PROBLEM SELECTOR PLAN 3
There is a component of baseline normocytic anemia with confounding hemolysis from ECMO circuit.   Goal to keep Hgb > 7

## 2019-04-16 NOTE — PROGRESS NOTE ADULT - PROBLEM SELECTOR PLAN 2
Creatinine has fluctuated and continues to be elevated. Likely acute ATN from shock. However her urine output has greatly improved.   She will undergo HD session today given increasing creatinine.

## 2019-04-16 NOTE — PROGRESS NOTE ADULT - PROBLEM SELECTOR PLAN 1
Pt with YAJAIRA - ATN from shock/ acute cardiomyopathy. Pt off lasix, UOP ~2.3 L. Currently off CVVHDF. Creatinine acutely clover to 3 from 2, however electrolytes and acid-base stable. ECMO weaned today. Will do iHD as patient's creatinine rapidlty rising howevAntibiotics dose adjustment to GFR <10 once CRRT stopped. Monitor electrolytes.

## 2019-04-16 NOTE — PROGRESS NOTE ADULT - SUBJECTIVE AND OBJECTIVE BOX
[facility] Division of Kidney Diseases & Hypertension  FOLLOW UP NOTE  [phone]--------------------------------------------------------------------------------  Chief Complaint:History of extracorporeal membrane oxygenation treatment      24 hour events/subjective: Patient had ECMO decannulation today. Patient noted to have rapidly rising creatinine. To undergo HD today.        PAST HISTORY  --------------------------------------------------------------------------------  No significant changes to PMH, PSH, FHx, SHx, unless otherwise noted    ALLERGIES & MEDICATIONS  --------------------------------------------------------------------------------  Allergies    No Known Allergies    Intolerances    Reglan (Other)    Standing Inpatient Medications  amiodarone Infusion 0.99 mG/Min IV Continuous <Continuous>  cefuroxime  IVPB 1500 milliGRAM(s) IV Intermittent every 12 hours  chlorhexidine 0.12% Liquid 5 milliLiter(s) Oral Mucosa every 4 hours  meperidine     Injectable 25 milliGRAM(s) IV Push once  niCARdipine Infusion 5 mG/Hr IV Continuous <Continuous>  pantoprazole  Injectable 40 milliGRAM(s) IV Push daily  sodium chloride 0.9%. 1000 milliLiter(s) IV Continuous <Continuous>    PRN Inpatient Medications  oxyCODONE    5 mG/acetaminophen 325 mG 1 Tablet(s) Oral every 4 hours PRN  oxyCODONE    5 mG/acetaminophen 325 mG 2 Tablet(s) Oral every 6 hours PRN      REVIEW OF SYSTEMS  --------------------------------------------------------------------------------  Gen: No  fevers/chills  Skin: No rashes  Head/Eyes/Ears/Mouth: No headache; Normal hearing; Normal vision w/o blurriness  Respiratory: No dyspnea, cough, wheezing, hemoptysis  CV: No chest pain, PND, orthopnea  GI: No abdominal pain, diarrhea, constipation, nausea, vomiting  : No increased frequency, dysuria, hematuria, nocturia  MSK: No joint pain/swelling; no back pain; no edema  Neuro: No dizziness/lightheadedness, weakness, seizures, numbness, tingling      All other systems were reviewed and are negative, except as noted.    VITALS/PHYSICAL EXAM  --------------------------------------------------------------------------------  T(C): 36.3 (04-16-19 @ 13:00), Max: 37.1 (04-15-19 @ 16:00)  HR: 91 (04-16-19 @ 14:00) (77 - 136)  BP: 126/62 (04-16-19 @ 07:07) (126/62 - 126/62)  RR: 31 (04-16-19 @ 14:00) (1 - 31)  SpO2: 95% (04-16-19 @ 14:00) (91% - 100%)  Wt(kg): --  Height (cm): 165 (04-16-19 @ 06:55)  Weight (kg): 126.6 (04-16-19 @ 07:07)  BMI (kg/m2): 46.5 (04-16-19 @ 07:07)  BSA (m2): 2.28 (04-16-19 @ 07:07)      04-15-19 @ 07:01  -  04-16-19 @ 07:00  --------------------------------------------------------  IN: 2235.8 mL / OUT: 2020 mL / NET: 215.8 mL    04-16-19 @ 07:01  -  04-16-19 @ 14:13  --------------------------------------------------------  IN: 221.2 mL / OUT: 200 mL / NET: 21.2 mL      Physical Exam:  	Gen: Awake and interactive  	HEENT: Anicteric  	Pulm: Coarse breath sounds  	CV:  Tachycardic  	Abd: +BS, soft   	Ext: 1/2+ edema of the LE  	Neuro: No focal deficits  	Skin: Warm  	Vascular: No cyanosis              Access: N/A, off CRRT    LABS/STUDIES  --------------------------------------------------------------------------------              9.2    32.2  >-----------<  93       [04-16-19 @ 11:17]              27.9     131  |  98  |  55  ----------------------------<  118      [04-16-19 @ 11:17]  5.2   |  20  |  5.18        Ca     8.1     [04-16-19 @ 11:17]      Mg     2.7     [04-16-19 @ 02:29]      Phos  4.9     [04-16-19 @ 02:29]    TPro  4.4  /  Alb  2.3  /  TBili  1.3  /  DBili  x   /  AST  187  /  ALT  276  /  AlkPhos  158  [04-16-19 @ 11:17]    PT/INR: PT 13.7 , INR 1.19       [04-16-19 @ 11:17]  PTT: 30.4       [04-16-19 @ 11:17]          [04-16-19 @ 02:29]    Creatinine Trend:  SCr 5.18 [04-16 @ 11:17]  SCr 4.66 [04-16 @ 02:29]  SCr 3.00 [04-15 @ 01:34]  SCr 1.91 [04-14 @ 01:58]  SCr 2.02 [04-13 @ 01:59]    Urinalysis - [04-12-19 @ 01:00]      Color Yellow / Appearance Slightly Turbid / SG 1.012 / pH 6.0      Gluc Negative / Ketone Negative  / Bili Negative / Urobili Negative       Blood Small / Protein 30 mg/dL / Leuk Est Negative / Nitrite Negative      RBC 15 / WBC 8 / Hyaline 5 / Gran  / Sq Epi  / Non Sq Epi 2 / Bacteria Negative    Urine Creatinine 5      [04-14-19 @ 12:04]  Urine Protein 12      [04-14-19 @ 16:06]  Urine Sodium 99.0      [04-11-19 @ 06:07]    Iron 49, TIBC 312, %sat 16      [04-15-19 @ 09:04]  Ferritin 202      [04-15-19 @ 08:24]  HbA1c 5.5      [04-13-19 @ 09:28]  TSH 0.79      [04-10-19 @ 18:55]  Lipid: chol 140, , HDL 31, LDL 75      [04-15-19 @ 09:04]    HBsAb Nonreact      [04-15-19 @ 09:02]  HBsAg Nonreact      [04-15-19 @ 09:02]  HBcAb Nonreact      [04-15-19 @ 09:02]  HCV 0.06, Nonreact      [04-15-19 @ 09:02]  HIV Nonreact      [04-10-19 @ 18:51]

## 2019-04-16 NOTE — PROGRESS NOTE ADULT - PROBLEM SELECTOR PLAN 5
Currently on amiodarone per CTU.   She is sinus tach at current time. Would discontinue amio gtt and observe.   EP input appreciated.

## 2019-04-16 NOTE — PROGRESS NOTE ADULT - SUBJECTIVE AND OBJECTIVE BOX
INFECTIOUS DISEASES FOLLOW UP-- Yanni Batista  [phone]    This is a follow up note for this  33yFemale with  History of extracorporeal membrane oxygenation treatmentnow discontinued  Receiving hemodialysis but starting to make urine  feels weak but extubated and talking      ROS:  CONSTITUTIONAL:  No fever, good appetite  CARDIOVASCULAR:  No chest pain or palpitations  RESPIRATORY:  No dyspnea  GASTROINTESTINAL:  No nausea, vomiting, diarrhea, or abdominal pain  GENITOURINARY:  No dysuria  NEUROLOGIC:  No headache,     Allergies    No Known Allergies    Intolerances    Reglan (Other)      ANTIBIOTICS/RELEVANT:  antimicrobials    immunologic:    OTHER:  amiodarone Infusion 0.5 mG/Min IV Continuous <Continuous>  meperidine     Injectable 25 milliGRAM(s) IV Push once  niCARdipine Infusion 5 mG/Hr IV Continuous <Continuous>  ondansetron Injectable 4 milliGRAM(s) IV Push every 8 hours PRN  oxyCODONE    5 mG/acetaminophen 325 mG 1 Tablet(s) Oral every 4 hours PRN  oxyCODONE    5 mG/acetaminophen 325 mG 2 Tablet(s) Oral every 6 hours PRN  pantoprazole  Injectable 40 milliGRAM(s) IV Push daily  sodium chloride 0.9%. 1000 milliLiter(s) IV Continuous <Continuous>      Objective:  Vital Signs Last 24 Hrs  T(C): 36.7 (16 Apr 2019 20:22), Max: 36.7 (15 Apr 2019 23:00)  T(F): 98.1 (16 Apr 2019 20:22), Max: 98.1 (15 Apr 2019 23:00)  HR: 132 (16 Apr 2019 21:00) (77 - 138)  BP: 126/62 (16 Apr 2019 07:07) (126/62 - 126/62)  BP(mean): --  RR: 18 (16 Apr 2019 21:00) (3 - 31)  SpO2: 99% (16 Apr 2019 21:00) (91% - 100%)    PHYSICAL EXAM:  Constitutional:no acute distress  Eyes:DAVID, EOMI  Ear/Nose/Throat: no oral lesions, 	  Respiratory: clear BL  Cardiovascular: S1S2  Gastrointestinal:soft, (+) BS, no tenderness  Extremities:no e/e/c  No Lymphadenopathy  IV sites not inflammed.    LABS:                        9.2    32.2  )-----------( 93       ( 16 Apr 2019 11:17 )             27.9     04-16    131<L>  |  98  |  55<H>  ----------------------------<  118<H>  5.2   |  20<L>  |  5.18<H>    Ca    8.1<L>      16 Apr 2019 11:17  Phos  4.9     04-16  Mg     2.7     04-16    TPro  4.4<L>  /  Alb  2.3<L>  /  TBili  1.3<H>  /  DBili  x   /  AST  187<H>  /  ALT  276<H>  /  AlkPhos  158<H>  04-16    PT/INR - ( 16 Apr 2019 11:17 )   PT: 13.7 sec;   INR: 1.19 ratio         PTT - ( 16 Apr 2019 11:17 )  PTT:30.4 sec      MICROBIOLOGY:            RECENT CULTURES:  04-13 @ 00:45  .Sputum  --  --  --    No growth at 48 hours  --  04-12 @ 21:04  .Blood  --  --  --    No growth to date.  --  04-12 @ 02:42  .Blood  --  --  --    No growth to date.  --      RADIOLOGY & ADDITIONAL STUDIES:    < from: Xray Chest 1 View- PORTABLE-Routine (04.16.19 @ 02:38) >  Impression:    The heart is enlarged. Left lower lobe pneumonia and/or atelectasis. The   right lung is clear. A Cordis sheet catheter is seen on the right and the   tip is in the main pulmonary artery. Metallic clips are seen in the soft   tissue of the region of the right clavicle. No pneumothorax.    < end of copied text >

## 2019-04-16 NOTE — PROGRESS NOTE ADULT - ASSESSMENT
34 yo F with history of hyperemesis gravidarum  initially presented to [facility] for SOB. She was found in SVT and received adenosinex4 and failed cardioversion x2. Pt then emergently taken for  and post op went into shock. She was found to have reduced EF and placed on VA ECMO and CVVHD. Transferred to [facility] on  for further management. 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  She is currently on VA ECMO. She has been titrated off pressors and her urine output has improved although her creatinine remains elevated. She is alert and awake.   ECMO circuit decannulated on . She is doing well and is extubated. Her HR has improved. She is due for HD today.

## 2019-04-17 LAB
ALBUMIN SERPL ELPH-MCNC: 2.6 G/DL — LOW (ref 3.3–5)
ALP SERPL-CCNC: 194 U/L — HIGH (ref 40–120)
ALT FLD-CCNC: 224 U/L — HIGH (ref 10–45)
ANABASINE UR-MCNC: <2 NG/ML — SIGNIFICANT CHANGE UP
ANION GAP SERPL CALC-SCNC: 13 MMOL/L — SIGNIFICANT CHANGE UP (ref 5–17)
AST SERPL-CCNC: 153 U/L — HIGH (ref 10–40)
BILIRUB SERPL-MCNC: 1.2 MG/DL — SIGNIFICANT CHANGE UP (ref 0.2–1.2)
BUN SERPL-MCNC: 42 MG/DL — HIGH (ref 7–23)
CALCIUM SERPL-MCNC: 7.8 MG/DL — LOW (ref 8.4–10.5)
CHLORIDE SERPL-SCNC: 99 MMOL/L — SIGNIFICANT CHANGE UP (ref 96–108)
CK MB BLD-MCNC: HIGH TITER
CO2 SERPL-SCNC: 23 MMOL/L — SIGNIFICANT CHANGE UP (ref 22–31)
COTININE UR-MCNC: <5 NG/ML — SIGNIFICANT CHANGE UP
COXSACKIE TYPE A-24: HIGH TITER
CREAT SERPL-MCNC: 4.15 MG/DL — HIGH (ref 0.5–1.3)
CULTURE RESULTS: SIGNIFICANT CHANGE UP
CV A24 IGG TITR SER IF: HIGH TITER
CV A7 AB SER-ACNC: HIGH TITER
CV A9 AB TITR FLD: HIGH TITER
CV B1 AB TITR FLD: HIGH
CV B2 AB TITR FLD: HIGH
CV B3 AB TITR FLD: NEGATIVE — SIGNIFICANT CHANGE UP
CV B4 AB TITR FLD: HIGH
CV B5 AB TITR FLD: HIGH
CV B6 AB TITR FLD: HIGH
GAS PNL BLDA: SIGNIFICANT CHANGE UP
GLUCOSE SERPL-MCNC: 98 MG/DL — SIGNIFICANT CHANGE UP (ref 70–99)
HCT VFR BLD CALC: 24.4 % — LOW (ref 34.5–45)
HGB BLD-MCNC: 8.1 G/DL — LOW (ref 11.5–15.5)
LDH SERPL L TO P-CCNC: 577 U/L — HIGH (ref 50–242)
MAGNESIUM SERPL-MCNC: 2.2 MG/DL — SIGNIFICANT CHANGE UP (ref 1.6–2.6)
MCHC RBC-ENTMCNC: 28.2 PG — SIGNIFICANT CHANGE UP (ref 27–34)
MCHC RBC-ENTMCNC: 33.2 GM/DL — SIGNIFICANT CHANGE UP (ref 32–36)
MCV RBC AUTO: 85 FL — SIGNIFICANT CHANGE UP (ref 80–100)
NICOTINE UR-MCNC: <5 NG/ML — SIGNIFICANT CHANGE UP
NORNICOTINE UR-MCNC: <2 NG/ML — SIGNIFICANT CHANGE UP
PHOSPHATE SERPL-MCNC: 4.8 MG/DL — HIGH (ref 2.5–4.5)
PLATELET # BLD AUTO: 111 K/UL — LOW (ref 150–400)
POTASSIUM SERPL-MCNC: 4.4 MMOL/L — SIGNIFICANT CHANGE UP (ref 3.5–5.3)
POTASSIUM SERPL-SCNC: 4.4 MMOL/L — SIGNIFICANT CHANGE UP (ref 3.5–5.3)
PROT SERPL-MCNC: 4.6 G/DL — LOW (ref 6–8.3)
RBC # BLD: 2.88 M/UL — LOW (ref 3.8–5.2)
RBC # FLD: 15.1 % — HIGH (ref 10.3–14.5)
SODIUM SERPL-SCNC: 135 MMOL/L — SIGNIFICANT CHANGE UP (ref 135–145)
SPECIMEN SOURCE: SIGNIFICANT CHANGE UP
WBC # BLD: 23.2 K/UL — HIGH (ref 3.8–10.5)
WBC # FLD AUTO: 23.2 K/UL — HIGH (ref 3.8–10.5)

## 2019-04-17 PROCEDURE — 99232 SBSQ HOSP IP/OBS MODERATE 35: CPT | Mod: GC

## 2019-04-17 PROCEDURE — 99233 SBSQ HOSP IP/OBS HIGH 50: CPT | Mod: GC

## 2019-04-17 PROCEDURE — 76700 US EXAM ABDOM COMPLETE: CPT | Mod: 26

## 2019-04-17 PROCEDURE — 99291 CRITICAL CARE FIRST HOUR: CPT

## 2019-04-17 PROCEDURE — 71045 X-RAY EXAM CHEST 1 VIEW: CPT | Mod: 26

## 2019-04-17 RX ORDER — HYDRALAZINE HCL 50 MG
10 TABLET ORAL EVERY 8 HOURS
Refills: 0 | Status: DISCONTINUED | OUTPATIENT
Start: 2019-04-17 | End: 2019-04-17

## 2019-04-17 RX ORDER — HYDRALAZINE HCL 50 MG
25 TABLET ORAL EVERY 8 HOURS
Refills: 0 | Status: DISCONTINUED | OUTPATIENT
Start: 2019-04-17 | End: 2019-04-17

## 2019-04-17 RX ORDER — HYDROMORPHONE HYDROCHLORIDE 2 MG/ML
0.5 INJECTION INTRAMUSCULAR; INTRAVENOUS; SUBCUTANEOUS ONCE
Refills: 0 | Status: DISCONTINUED | OUTPATIENT
Start: 2019-04-17 | End: 2019-04-17

## 2019-04-17 RX ORDER — AMIODARONE HYDROCHLORIDE 400 MG/1
TABLET ORAL
Refills: 0 | Status: DISCONTINUED | OUTPATIENT
Start: 2019-04-17 | End: 2019-04-22

## 2019-04-17 RX ORDER — AMIODARONE HYDROCHLORIDE 400 MG/1
200 TABLET ORAL DAILY
Refills: 0 | Status: DISCONTINUED | OUTPATIENT
Start: 2019-04-21 | End: 2019-04-22

## 2019-04-17 RX ORDER — AMIODARONE HYDROCHLORIDE 400 MG/1
400 TABLET ORAL EVERY 8 HOURS
Refills: 0 | Status: COMPLETED | OUTPATIENT
Start: 2019-04-17 | End: 2019-04-21

## 2019-04-17 RX ORDER — HYDRALAZINE HCL 50 MG
10 TABLET ORAL ONCE
Refills: 0 | Status: COMPLETED | OUTPATIENT
Start: 2019-04-17 | End: 2019-04-17

## 2019-04-17 RX ORDER — HYDRALAZINE HCL 50 MG
50 TABLET ORAL EVERY 8 HOURS
Refills: 0 | Status: DISCONTINUED | OUTPATIENT
Start: 2019-04-17 | End: 2019-04-19

## 2019-04-17 RX ORDER — HYDRALAZINE HCL 50 MG
25 TABLET ORAL ONCE
Refills: 0 | Status: COMPLETED | OUTPATIENT
Start: 2019-04-17 | End: 2019-04-17

## 2019-04-17 RX ADMIN — BENZOCAINE AND MENTHOL 1 LOZENGE: 5; 1 LIQUID ORAL at 00:09

## 2019-04-17 RX ADMIN — Medication 25 MILLIGRAM(S): at 12:27

## 2019-04-17 RX ADMIN — PANTOPRAZOLE SODIUM 40 MILLIGRAM(S): 20 TABLET, DELAYED RELEASE ORAL at 12:26

## 2019-04-17 RX ADMIN — OXYCODONE AND ACETAMINOPHEN 1 TABLET(S): 5; 325 TABLET ORAL at 18:55

## 2019-04-17 RX ADMIN — OXYCODONE AND ACETAMINOPHEN 1 TABLET(S): 5; 325 TABLET ORAL at 18:28

## 2019-04-17 RX ADMIN — NICARDIPINE HYDROCHLORIDE 25 MG/HR: 30 CAPSULE, EXTENDED RELEASE ORAL at 18:29

## 2019-04-17 RX ADMIN — AMIODARONE HYDROCHLORIDE 400 MILLIGRAM(S): 400 TABLET ORAL at 12:40

## 2019-04-17 RX ADMIN — ONDANSETRON 4 MILLIGRAM(S): 8 TABLET, FILM COATED ORAL at 03:17

## 2019-04-17 RX ADMIN — AMIODARONE HYDROCHLORIDE 16.67 MG/MIN: 400 TABLET ORAL at 18:30

## 2019-04-17 RX ADMIN — Medication 25 MILLIGRAM(S): at 19:14

## 2019-04-17 RX ADMIN — HYDROMORPHONE HYDROCHLORIDE 0.5 MILLIGRAM(S): 2 INJECTION INTRAMUSCULAR; INTRAVENOUS; SUBCUTANEOUS at 05:00

## 2019-04-17 RX ADMIN — AMIODARONE HYDROCHLORIDE 400 MILLIGRAM(S): 400 TABLET ORAL at 21:42

## 2019-04-17 RX ADMIN — Medication 10 MILLIGRAM(S): at 23:26

## 2019-04-17 RX ADMIN — Medication 10 MILLIGRAM(S): at 09:16

## 2019-04-17 RX ADMIN — Medication 50 MILLIGRAM(S): at 21:42

## 2019-04-17 RX ADMIN — HYDROMORPHONE HYDROCHLORIDE 0.5 MILLIGRAM(S): 2 INJECTION INTRAMUSCULAR; INTRAVENOUS; SUBCUTANEOUS at 04:37

## 2019-04-17 NOTE — PROGRESS NOTE ADULT - ATTENDING COMMENTS
Doing much better  SCr 4.15  elytes normal  UO good  Will hold on dialysis for now  Observe, in duretic phase ATN, should recover soon

## 2019-04-17 NOTE — PROGRESS NOTE ADULT - ASSESSMENT
A/P: 33 year old F pt with PMH noted below presented as a transfer 2/2 postpartum cardiogenic shock s/p ECMO, EP consulted for AT.    - hemodynamically stabilized s/p ECMO off  - likely sinus tachycardia following HD, 80's on 4/16 PM  - amiodarone started at 9PM on 4/13, wean off as tolerated    Brien Dunn, #[id number]  EP Fellow

## 2019-04-17 NOTE — PROGRESS NOTE ADULT - PROBLEM SELECTOR PLAN 5
Currently on amiodarone per CTU.   She is sinus tach at current time. Would discontinue amio gtt and observe. will eventually wean when stable  EP input appreciated.

## 2019-04-17 NOTE — PROGRESS NOTE ADULT - SUBJECTIVE AND OBJECTIVE BOX
INFECTIOUS DISEASES FOLLOW UP-- Yanni Batista  [phone]    This is a follow up note for this  33yFemale with  History of extracorporeal membrane oxygenation treatment  ECMO removed, no longer requiring hemodialysis  speaking in full sentences      ROS:  CONSTITUTIONAL:  No fever, good appetite  CARDIOVASCULAR:  No chest pain or palpitations  RESPIRATORY:  No dyspnea  GASTROINTESTINAL:  No nausea, vomiting, diarrhea,  some abdominal pain and distension  GENITOURINARY:  No dysuria  NEUROLOGIC:  No headache,     Allergies    No Known Allergies    Intolerances    Reglan (Other)      ANTIBIOTICS/RELEVANT:  antimicrobials    immunologic:    OTHER:  amiodarone    Tablet 400 milliGRAM(s) Oral every 8 hours  amiodarone    Tablet   Oral   amiodarone Infusion 0.5 mG/Min IV Continuous <Continuous>  benzocaine 15 mG/menthol 3.6 mG Lozenge 1 Lozenge Oral every 2 hours PRN  hydrALAZINE 50 milliGRAM(s) Oral every 8 hours  meperidine     Injectable 25 milliGRAM(s) IV Push once  niCARdipine Infusion 5 mG/Hr IV Continuous <Continuous>  ondansetron Injectable 4 milliGRAM(s) IV Push every 8 hours PRN  oxyCODONE    5 mG/acetaminophen 325 mG 1 Tablet(s) Oral every 4 hours PRN  oxyCODONE    5 mG/acetaminophen 325 mG 2 Tablet(s) Oral every 6 hours PRN  pantoprazole  Injectable 40 milliGRAM(s) IV Push daily  sodium chloride 0.9%. 1000 milliLiter(s) IV Continuous <Continuous>      Objective:  Vital Signs Last 24 Hrs  T(C): 36.7 (2019 19:00), Max: 36.8 (2019 00:00)  T(F): 98.1 (2019 19:00), Max: 98.3 (2019 00:00)  HR: 118 (2019 21:00) (113 - 142)  BP: --  BP(mean): --  RR: 21 (2019 21:00) (9 - 47)  SpO2: 96% (2019 21:00) (94% - 100%)    PHYSICAL EXAM:  Constitutional:no acute distress  Eyes:DAVID, EOMI  Ear/Nose/Throat: no oral lesions, 	  Respiratory: clear BL  Cardiovascular: S1S2  Gastrointestinal: distended,  wound dressing intact not soiled  Extremities:no e/e/c moderate edema RUE swelling improving post ECMO removal  No Lymphadenopathy  IV sites not inflammed.    LABS:                        8.1    23.2  )-----------( 111      ( 2019 00:45 )             24.4         135  |  99  |  42<H>  ----------------------------<  98  4.4   |  23  |  4.15<H>    Ca    7.8<L>      2019 00:45  Phos  4.8       Mg     2.2         TPro  4.6<L>  /  Alb  2.6<L>  /  TBili  1.2  /  DBili  x   /  AST  153<H>  /  ALT  224<H>  /  AlkPhos  194<H>      PT/INR - ( 2019 11:17 )   PT: 13.7 sec;   INR: 1.19 ratio         PTT - ( 2019 11:17 )  PTT:30.4 sec      MICROBIOLOGY:            RECENT CULTURES:   @ 00:45  .Sputum  --  --  --    No growth at 48 hours  --   @ 21:04  .Blood  --  --  --    No growth to date.  --   @ 02:42  .Blood  --  --  --    No growth at 5 days.  --      RADIOLOGY & ADDITIONAL STUDIES:

## 2019-04-17 NOTE — PROGRESS NOTE ADULT - ASSESSMENT
32 yo F with history of hyperemesis gravidarum  initially presented to [facility] (35 weeks gestation) for SOB. She was found in SVT and received adenosinex4 and failed cardioversion x2. Pt then emergently taken for  and post op went into shock. She was found to have reduced EF and placed on VA ECMO and CVVHD. Transferred to [facility] on  for further management. 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 Was successfully decannulated after successful wean on . She has been titrated off pressors and her urine output has improved although her creatinine remains elevated. She remains tachycardic but is tachycardic and notably hypertensive.

## 2019-04-17 NOTE — PROGRESS NOTE ADULT - PROBLEM SELECTOR PLAN 2
Creatinine has fluctuated and continues to be elevated. Likely acute ATN from shock. However her urine output has greatly improved.

## 2019-04-17 NOTE — PROGRESS NOTE ADULT - SUBJECTIVE AND OBJECTIVE BOX
JOE BEDOLLA   MRN#: [id number]     The patient is a 33y Female who was seen, evaluated, & examined with the CTICU staff post-operatively with a multidisciplinary care plan formulated & implemented.  All available clinical, laboratory, radiographic, pharmacologic, and electrocardiographic data were reviewed & analyzed.      The patient was in the CTICU in critical condition secondary to:     persistent cardiopulmonary dysfunction  malignant hypertension    For support and evaluation & prevention of further decompensation secondary to persistent cardiopulmonary dysfunction and cardiogenic shock-cardiovascular dysfunction, respiratory status required:     supplemental oxygen with nasal cannula  continuous pulse oximetry monitoring  following ABGs with A-line monitoring    Invasive hemodynamic monitoring with     an A-line was required for continuous MAP/BP monitoring     to ensure adequate cardiovascular support and to evaluate for & help prevent decompensation while receiving     IV Cardene infusion  IV Amiodarone infusion    secondary to     malignant hypertension    In addition:  VA ECMO decannulated yesterday, EF roughly 30-35%  On Cardene infusion for elevated blood pressure, will add Hydralazine and wean off Cardene for target MAPs 70s-80s  Dialyzed yesterday for worsening renal function low urine output -  cc/hr of urine currently with total body volume overload and elevated JVP 12 cm H2O - will let her autodiurese and mobilize fluid  AHMET stockings  LFTs improving, likely recovering from poor perfusion  DASH diet  OOB/PT    The patient required critical care management and I provided 30 minutes of non-continuous care to the patient in addition to  discussing the patient and plan at length with the CTICU staff and helping coordinate care. JOE BEDOLLA   MRN#: [id number]     The patient is a 33y Female who was seen, evaluated, & examined with the CTICU staff post-operatively with a multidisciplinary care plan formulated & implemented.  All available clinical, laboratory, radiographic, pharmacologic, and electrocardiographic data were reviewed & analyzed.      The patient was in the CTICU in critical condition secondary to:     persistent cardiopulmonary dysfunction  malignant hypertension    For support and evaluation & prevention of further decompensation secondary to persistent cardiopulmonary dysfunction and cardiogenic shock-cardiovascular dysfunction, respiratory status required:     supplemental oxygen with nasal cannula  continuous pulse oximetry monitoring  following ABGs with A-line monitoring    Invasive hemodynamic monitoring with     an A-line was required for continuous MAP/BP monitoring     to ensure adequate cardiovascular support and to evaluate for & help prevent decompensation while receiving     IV Cardene infusion  IV Amiodarone infusion    secondary to     malignant hypertension    In addition:  VA ECMO decannulated yesterday, EF roughly 30-35%  On Cardene infusion for elevated blood pressure, will add Hydralazine and wean off Cardene for target MAPs 70s-80s  Dialyzed yesterday for worsening renal function low urine output -  cc/hr of urine currently with total body volume overload but low JVP 6 cm H2O - will let her autodiurese and mobilize fluid  AHMET stockings  LFTs improving, likely recovering from poor perfusion  DASH diet  OOB/PT    The patient required critical care management and I provided 30 minutes of non-continuous care to the patient in addition to  discussing the patient and plan at length with the CTICU staff and helping coordinate care.

## 2019-04-17 NOTE — PROGRESS NOTE ADULT - SUBJECTIVE AND OBJECTIVE BOX
Interval History:  doing well   reports some pain in lower abdomen (site of )  tachycardic after HD session     Medications:  amiodarone    Tablet 400 milliGRAM(s) Oral every 8 hours  amiodarone    Tablet   Oral   amiodarone Infusion 0.5 mG/Min IV Continuous <Continuous>  benzocaine 15 mG/menthol 3.6 mG Lozenge 1 Lozenge Oral every 2 hours PRN  hydrALAZINE 50 milliGRAM(s) Oral every 8 hours  meperidine     Injectable 25 milliGRAM(s) IV Push once  niCARdipine Infusion 5 mG/Hr IV Continuous <Continuous>  ondansetron Injectable 4 milliGRAM(s) IV Push every 8 hours PRN  oxyCODONE    5 mG/acetaminophen 325 mG 1 Tablet(s) Oral every 4 hours PRN  oxyCODONE    5 mG/acetaminophen 325 mG 2 Tablet(s) Oral every 6 hours PRN  pantoprazole  Injectable 40 milliGRAM(s) IV Push daily  sodium chloride 0.9%. 1000 milliLiter(s) IV Continuous <Continuous>      Vitals:  T(C): 36.7 (19 @ 19:00), Max: 36.8 (19 @ 00:00)  HR: 113 (19 @ 20:00) (113 - 142)  RR: 22 (19 @ 20:00) (9 - 47)  SpO2: 95% (19 @ 20:00) (94% - 100%)    Daily     Daily Weight in k.9 (2019 00:00)    Weight (kg): 126.6 ( @ 07:07)    I&O's Summary    2019 07:  -  2019 07:00  --------------------------------------------------------  IN: 1186.2 mL / OUT: 2365 mL / NET: -1178.8 mL    2019 07:  -  2019 21:13  --------------------------------------------------------  IN: 1834.9 mL / OUT: 1060 mL / NET: 774.9 mL    Physical Exam:  Appearance: No Acute Distress  HEENT: PERRL  Neck: No JVD  Cardiovascular: tachycardic  Respiratory: Clear to auscultation bilaterally  Gastrointestinal: Soft, Non-tender	  Skin: No cyanosis	  Neurologic: Non-focal  Extremities: 1+ pitting b/l LE edema  Psychiatry: A & O x 3, Mood & affect appropriate    Labs:                        8.1    23.2  )-----------( 111      ( 2019 00:45 )             24.4         135  |  99  |  42<H>  ----------------------------<  98  4.4   |  23  |  4.15<H>    Ca    7.8<L>      2019 00:45  Phos  4.8       Mg     2.2         TPro  4.6<L>  /  Alb  2.6<L>  /  TBili  1.2  /  DBili  x   /  AST  153<H>  /  ALT  224<H>  /  AlkPhos  194<H>      PT/INR - ( 2019 11:17 )   PT: 13.7 sec;   INR: 1.19 ratio         PTT - ( 2019 11:17 )  PTT:30.4 sec

## 2019-04-17 NOTE — PHYSICAL THERAPY INITIAL EVALUATION ADULT - PLANNED THERAPY INTERVENTIONS, PT EVAL
GOAL: Pt will perform 10 stairs with or without U HR as needed within 3-4weeks./balance training/bed mobility training/gait training/transfer training

## 2019-04-17 NOTE — PHYSICAL THERAPY INITIAL EVALUATION ADULT - PERTINENT HX OF CURRENT PROBLEM, REHAB EVAL
Pt is a 33 y.o. female HD#1,  34w5d dated by 1st trimester sonogram, presents to the ED on 4/10 with CC of SOB for past 2 days. Patient was sinus tachy to 200s, adenosine administered, no response, cardioverted with no resolution. OBGYN team proceeded with emergent .

## 2019-04-17 NOTE — PROGRESS NOTE ADULT - PROBLEM SELECTOR PLAN 4
Secondary to pulmonary edema from cardiogenic shock in peripartum period.   She has been successfully extubated and is breathing well. Continue tomonitor

## 2019-04-17 NOTE — PROGRESS NOTE ADULT - ASSESSMENT
33 f that was 34w pregnant p/w cough, SOB, N/V, found to have SVT to 200s with cardiogenic shock, s/p , transferred to SICU, multiorgan failure on ECMO, CVVH, LFTs in thousands due to shock liver  UA neg  CXR- ?LLL pna vs atelectasis, pulm edema  blood cx negative, sputum negative    cardiogenic shock due to ?post partum cardiomyopathy vs viral etiology with mildly elevated Coxsackie titers  Improving overall condition post extubation    Antibiotics completed      Fernandez Batista MD  [phone]  After 5pm/weekends [phone]

## 2019-04-17 NOTE — PHYSICAL THERAPY INITIAL EVALUATION ADULT - PRECAUTIONS/LIMITATIONS, REHAB EVAL
continued from above:  Patient intubated in ED and taken to L&D. EBL 800cc, no acute events intraop. SICU consult placed for hemodynamic monitoring and ventilator management. Course complicated by 's depsite medical therapy, hyperkalemia requiring CVVHD, cardiogenic shock with EF decreaseing from 50% to 30% down to 10% requiring VA ECMO placement and transferred to [facility] for further workup and management. VA ECMO decannulated 4/16./cardiac precautions/surgical precautions

## 2019-04-17 NOTE — PROGRESS NOTE ADULT - PROBLEM SELECTOR PLAN 1
She was successfully decannulated 4/16.   She remains off inotropes. Per handoff intraoperative BETTY with LVEF approximately 30%.   Will continue to monitor off inotropes  start hydral 10 mg q8h (hold for SBP <90)  encourage fluid intake as is volume depleted

## 2019-04-17 NOTE — PROGRESS NOTE ADULT - SUBJECTIVE AND OBJECTIVE BOX
[facility] DIVISION OF KIDNEY DISEASES AND HYPERTENSION -- FOLLOW UP NOTE  --------------------------------------------------------------------------------  Chief Complaint: Awake alert!    24 hour events/subjective:        PAST HISTORY  --------------------------------------------------------------------------------  No significant changes to PMH, PSH, FHx, SHx, unless otherwise noted    ALLERGIES & MEDICATIONS  --------------------------------------------------------------------------------  Allergies    No Known Allergies    Intolerances    Reglan (Other)    Standing Inpatient Medications  amiodarone    Tablet 400 milliGRAM(s) Oral every 8 hours  amiodarone    Tablet   Oral   amiodarone Infusion 0.5 mG/Min IV Continuous <Continuous>  hydrALAZINE 10 milliGRAM(s) Oral every 8 hours  meperidine     Injectable 25 milliGRAM(s) IV Push once  niCARdipine Infusion 5 mG/Hr IV Continuous <Continuous>  pantoprazole  Injectable 40 milliGRAM(s) IV Push daily  sodium chloride 0.9%. 1000 milliLiter(s) IV Continuous <Continuous>    PRN Inpatient Medications  benzocaine 15 mG/menthol 3.6 mG Lozenge 1 Lozenge Oral every 2 hours PRN  ondansetron Injectable 4 milliGRAM(s) IV Push every 8 hours PRN  oxyCODONE    5 mG/acetaminophen 325 mG 1 Tablet(s) Oral every 4 hours PRN  oxyCODONE    5 mG/acetaminophen 325 mG 2 Tablet(s) Oral every 6 hours PRN      REVIEW OF SYSTEMS  --------------------------------------------------------------------------------  Gen: No weight changes, fatigue, fevers/chills, weakness  Skin: No rashes  Head/Eyes/Ears/Mouth: No headache; Normal hearing; Normal vision w/o blurriness; No sinus pain/discomfort, sore throat  Respiratory: No dyspnea, cough, wheezing, hemoptysis  CV: No chest pain, PND, orthopnea  GI: No abdominal pain, diarrhea, constipation, nausea, vomiting, melena, hematochezia  : No increased frequency, dysuria, hematuria, nocturia  MSK: No joint pain/swelling; no back pain; no edema  Neuro: No dizziness/lightheadedness, weakness, seizures, numbness, tingling  Heme: No easy bruising or bleeding  Endo: No heat/cold intolerance  Psych: No significant nervousness, anxiety, stress, depression    All other systems were reviewed and are negative, except as noted.    VITALS/PHYSICAL EXAM  --------------------------------------------------------------------------------  T(C): 36.5 (04-17-19 @ 08:00), Max: 36.8 (04-17-19 @ 00:00)  HR: 126 (04-17-19 @ 10:00) (83 - 138)  BP: --  RR: 18 (04-17-19 @ 10:00) (9 - 31)  SpO2: 96% (04-17-19 @ 10:00) (91% - 100%)  Wt(kg): --  Height (cm): 165 (04-16-19 @ 06:55)  Weight (kg): 126.6 (04-16-19 @ 07:07)  BMI (kg/m2): 46.5 (04-16-19 @ 07:07)  BSA (m2): 2.28 (04-16-19 @ 07:07)      04-16-19 @ 07:01  -  04-17-19 @ 07:00  --------------------------------------------------------  IN: 1186.2 mL / OUT: 2365 mL / NET: -1178.8 mL    04-17-19 @ 07:01  -  04-17-19 @ 10:27  --------------------------------------------------------  IN: 332.1 mL / OUT: 290 mL / NET: 42.1 mL      Physical Exam:  	Gen: NAD, well-appearing  	HEENT: PERRL, supple neck, clear oropharynx  	Pulm: CTA B/L  	CV: RRR, S1S2; no rub  	Back: No spinal or CVA tenderness; no sacral edema  	Abd: +BS, soft, nontender/nondistended  	: No suprapubic tenderness  	UE: Warm, FROM, no clubbing, intact strength; no edema; no asterixis  	LE: Warm, FROM, no clubbing, intact strength; no edema  	Neuro: No focal deficits, intact gait  	Psych: Normal affect and mood  	Skin: Warm, without rashes  	Vascular access:    LABS/STUDIES  --------------------------------------------------------------------------------              8.1    23.2  >-----------<  111      [04-17-19 @ 00:45]              24.4     135  |  99  |  42  ----------------------------<  98      [04-17-19 @ 00:45]  4.4   |  23  |  4.15        Ca     7.8     [04-17-19 @ 00:45]      Mg     2.2     [04-17-19 @ 00:45]      Phos  4.8     [04-17-19 @ 00:45]    TPro  4.6  /  Alb  2.6  /  TBili  1.2  /  DBili  x   /  AST  153  /  ALT  224  /  AlkPhos  194  [04-17-19 @ 00:45]    PT/INR: PT 13.7 , INR 1.19       [04-16-19 @ 11:17]  PTT: 30.4       [04-16-19 @ 11:17]          [04-17-19 @ 00:45]    Creatinine Trend:  SCr 4.15 [04-17 @ 00:45]  SCr 5.18 [04-16 @ 11:17]  SCr 4.66 [04-16 @ 02:29]  SCr 3.00 [04-15 @ 01:34]  SCr 1.91 [04-14 @ 01:58]    Urinalysis - [04-12-19 @ 01:00]      Color Yellow / Appearance Slightly Turbid / SG 1.012 / pH 6.0      Gluc Negative / Ketone Negative  / Bili Negative / Urobili Negative       Blood Small / Protein 30 mg/dL / Leuk Est Negative / Nitrite Negative      RBC 15 / WBC 8 / Hyaline 5 / Gran  / Sq Epi  / Non Sq Epi 2 / Bacteria Negative    Urine Creatinine 5      [04-14-19 @ 12:04]  Urine Protein 12      [04-14-19 @ 16:06]  Urine Sodium 99.0      [04-11-19 @ 06:07]    Iron 49, TIBC 312, %sat 16      [04-15-19 @ 09:04]  Ferritin 202      [04-15-19 @ 08:24]  HbA1c 5.5      [04-13-19 @ 09:28]  TSH 0.79      [04-10-19 @ 18:55]  Lipid: chol 140, , HDL 31, LDL 75      [04-15-19 @ 09:04]    HBsAb Nonreact      [04-15-19 @ 09:02]  HBsAg Nonreact      [04-15-19 @ 09:02]  HBcAb Nonreact      [04-15-19 @ 09:02]  HCV 0.06, Nonreact      [04-15-19 @ 09:02]  HIV Nonreact      [04-10-19 @ 18:51]

## 2019-04-17 NOTE — PROGRESS NOTE ADULT - SUBJECTIVE AND OBJECTIVE BOX
Cardiology Progress Note    Interval: Pt feeling well with no chest pain and palpitations, urine output picking up    Tele: Sinus tachy 120s since 5:30PM on  following HD    HPI:  34 yo  at 10w1d GA by LMP of 8/10/18 with EDC of 18 with known hyperemesis gravidarum presents with nausea and vomiting many times yesterday 4/10/19. She had been taking diclegis for the past week with partial relief. She last held down a full meal yesterday (10/18) afternoon. The only liquid that she tolerates is milk, she vomits even with water. She has persistent heartburn that is relived by antacids or milk. 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 She is worried that her use of antinausea medication in her previous pregnancy caused this congenital defect, so she refuses most antinausea medications. She is comfortable taking diclegis and jaime only. She felt some mild weakness that improved with fluids given by ED team. She currently denies dizziness, weakness, fevers, chills, shortness of breath, chest pain, nausea, vomiting, dysuria, hematuria, diarrhea, or constipation.    Ob/Gyn History:  ,  c/s for craniosynostosis also complicated by hyperemesis gravidarum requiring home zofran pump, SAB x2               LMP -   8/10/18                Cycle Length - regular, monthly  Denies history of ovarian cysts, uterine fibroids, abnormal paps, or STIs  Last Pap Smear - several months ago, normal per pt    HOSPITAL COURSE:  33y female HD#1,  34w5d dated by 1st trimester sonogram, presents to the ED on 4/10 with CC of SOB for past 2 days. Patient was sinus tachy to 200s, adenosine administered, no response, cardioverted with no resolution. OBGYN team proceeded with emergent . Patient intubated in ED and taken to L&D. EBL 800cc, no acute events intraop. SICU consult placed for hemodynamic monitoring and ventilator management.    SICU COURSE:  Patient was sedated with propofol and precedex, became acidotic and retaining. Cards concerned for thyroid storm and advised no CT with contrast to r/o PE, patient was not stable for V/Q scan. Central and arterial line placed overnight for HD monitoring. [place] placed in AM for possible hemodialysis vs CVVH.    During AM rounds on  patient became sinus tachy to 180s, multiple lopressor pushes given with minimal resolution.  Patient amio gtt d/c'd started on esmolol, milrinone with resolution of sinus tachy 120s. Patient weaned off shameka, levo titrating down. Nephro consult placed, recs CVVH for hyperkalemia, low UO. Cardiology, EPS, CT Surg, Interventional cardiology bedside for evaluation determined patient would benefit from ecmo/impella due to decreasing EF 10% from 50% in ED. VA ECMO placed (right SC arterial, right femoral venous) and transferred to [facility] for further workup and management.     On arrival to [facility] at 1130pm on 19, pt received on Levophed 0.23mcg/kg/min (50ml/hr), Vasopressin 0.06units/hr (4ml), Phenylephrine 0.5mcg/kg/min (21ml/hr), VA ECMO 3100RPM with 2.45LPM    HR: 107  CVP: 23  PAP: 41/26 (31) (2019 23:39)      Medications:  amiodarone    Tablet 400 milliGRAM(s) Oral every 8 hours  amiodarone    Tablet   Oral   amiodarone Infusion 0.5 mG/Min IV Continuous <Continuous>  benzocaine 15 mG/menthol 3.6 mG Lozenge 1 Lozenge Oral every 2 hours PRN  hydrALAZINE 10 milliGRAM(s) Oral every 8 hours  meperidine     Injectable 25 milliGRAM(s) IV Push once  niCARdipine Infusion 5 mG/Hr IV Continuous <Continuous>  ondansetron Injectable 4 milliGRAM(s) IV Push every 8 hours PRN  oxyCODONE    5 mG/acetaminophen 325 mG 1 Tablet(s) Oral every 4 hours PRN  oxyCODONE    5 mG/acetaminophen 325 mG 2 Tablet(s) Oral every 6 hours PRN  pantoprazole  Injectable 40 milliGRAM(s) IV Push daily  sodium chloride 0.9%. 1000 milliLiter(s) IV Continuous <Continuous>      Review of Systems:  Constitutional: [ ] Fever [ ] Chills [ ] Fatigue [ ] Weight change   HEENT: [ ] Blurred vision [ ] Eye Pain [ ] Headache [ ] Runny nose [ ] Sore Throat   Respiratory: [ ] Cough [ ] Wheezing [ ] Shortness of breath  Cardiovascular: [ ] Chest Pain [ ] Palpitations [ ] MENDEZ [ ] PND [ ] Orthopnea  Gastrointestinal: [ ] Abdominal Pain [ ] Diarrhea [ ] Constipation [ ] Hemorrhoids [ ] Nausea [ ] Vomiting  Genitourinary: [ ] Nocturia [ ] Dysuria [ ] Incontinence  Extremities: [ ] Swelling [ ] Joint Pain  Neurologic: [ ] Focal deficit [ ] Paresthesias [ ] Syncope  Lymphatic: [ ] Swelling [ ] Lymphadenopathy   Skin: [ ] Rash [ ] Ecchymoses [ ] Wounds [ ] Lesions  Psychiatry: [ ] Depression [ ] Suicidal/Homicidal Ideation [ ] Anxiety [ ] Sleep Disturbances  [ ] 10 point review of systems is otherwise negative except as mentioned above            [ ]Unable to obtain    Vitals:  T(C): 36.5 (19 @ 08:00), Max: 36.8 (19 @ 00:00)  HR: 126 (19 @ 10:00) (83 - 138)  BP: --  BP(mean): --  RR: 18 (19 @ 10:00) (9 - 31)  SpO2: 96% (19 @ 10:00) (91% - 100%)  Wt(kg): --  Daily     Daily Weight in k.9 (2019 00:00)  I&O's Summary    2019 07:  -  2019 07:00  --------------------------------------------------------  IN: 1186.2 mL / OUT: 2365 mL / NET: -1178.8 mL    2019 07:  -  2019 10:32  --------------------------------------------------------  IN: 332.1 mL / OUT: 290 mL / NET: 42.1 mL        Physical Exam:  General: NAD  Eye: PERRL, EOMI  HENT: Normal oral mucosa NC/AT  CV: Normal S1/S2, tachycardic, No M/R/G, no edema, no elevation in JVP  Resp: Normal respiratory effort, clear to auscultation bilaterally, no wheezing, no crackles  Abd: Soft, Non-tender, Non-distended, BS+  Ext: No clubbing, No joint deformity   Neuro: Non-focal, motor and sensory intact  Lymph: No lymphadenopathy  Psych: AAOx3, Mood & affect appropriate  Skin: No rashes, No ecchymoses, No cyanosis    Labs:                        8.1    23.2  )-----------( 111      ( 2019 00:45 )             24.4         135  |  99  |  42<H>  ----------------------------<  98  4.4   |  23  |  4.15<H>    Ca    7.8<L>      2019 00:45  Phos  4.8       Mg     2.2         TPro  4.6<L>  /  Alb  2.6<L>  /  TBili  1.2  /  DBili  x   /  AST  153<H>  /  ALT  224<H>  /  AlkPhos  194<H>      PT/INR - ( 2019 11:17 )   PT: 13.7 sec;   INR: 1.19 ratio         PTT - ( 2019 11:17 )  PTT:30.4 sec              New results/imaging:

## 2019-04-17 NOTE — PHYSICAL THERAPY INITIAL EVALUATION ADULT - ADDITIONAL COMMENTS
As per pt, pt lives in a private house w/ spouse and 10 steps to enter w/ UHR. PTA pt was independent w/ all mobility and ADLs.

## 2019-04-18 LAB
ALBUMIN SERPL ELPH-MCNC: 2.4 G/DL — LOW (ref 3.3–5)
ALBUMIN SERPL ELPH-MCNC: 2.5 G/DL — LOW (ref 3.3–5)
ALP SERPL-CCNC: 174 U/L — HIGH (ref 40–120)
ALP SERPL-CCNC: 182 U/L — HIGH (ref 40–120)
ALT FLD-CCNC: 127 U/L — HIGH (ref 10–45)
ALT FLD-CCNC: 143 U/L — HIGH (ref 10–45)
ANION GAP SERPL CALC-SCNC: 14 MMOL/L — SIGNIFICANT CHANGE UP (ref 5–17)
ANION GAP SERPL CALC-SCNC: 14 MMOL/L — SIGNIFICANT CHANGE UP (ref 5–17)
ANTIBODY ID 1_1: SIGNIFICANT CHANGE UP
AST SERPL-CCNC: 86 U/L — HIGH (ref 10–40)
AST SERPL-CCNC: 99 U/L — HIGH (ref 10–40)
BILIRUB SERPL-MCNC: 0.8 MG/DL — SIGNIFICANT CHANGE UP (ref 0.2–1.2)
BILIRUB SERPL-MCNC: 0.9 MG/DL — SIGNIFICANT CHANGE UP (ref 0.2–1.2)
BLD GP AB SCN SERPL QL: POSITIVE — SIGNIFICANT CHANGE UP
BUN SERPL-MCNC: 61 MG/DL — HIGH (ref 7–23)
BUN SERPL-MCNC: 65 MG/DL — HIGH (ref 7–23)
CALCIUM SERPL-MCNC: 7.6 MG/DL — LOW (ref 8.4–10.5)
CALCIUM SERPL-MCNC: 7.7 MG/DL — LOW (ref 8.4–10.5)
CHLORIDE SERPL-SCNC: 96 MMOL/L — SIGNIFICANT CHANGE UP (ref 96–108)
CHLORIDE SERPL-SCNC: 98 MMOL/L — SIGNIFICANT CHANGE UP (ref 96–108)
CO2 SERPL-SCNC: 22 MMOL/L — SIGNIFICANT CHANGE UP (ref 22–31)
CO2 SERPL-SCNC: 22 MMOL/L — SIGNIFICANT CHANGE UP (ref 22–31)
CREAT SERPL-MCNC: 5.72 MG/DL — HIGH (ref 0.5–1.3)
CREAT SERPL-MCNC: 6.02 MG/DL — HIGH (ref 0.5–1.3)
GAS PNL BLDA: SIGNIFICANT CHANGE UP
GLUCOSE SERPL-MCNC: 104 MG/DL — HIGH (ref 70–99)
GLUCOSE SERPL-MCNC: 89 MG/DL — SIGNIFICANT CHANGE UP (ref 70–99)
HAPTOGLOB SERPL-MCNC: 78 MG/DL — SIGNIFICANT CHANGE UP (ref 34–200)
HCT VFR BLD CALC: 21.7 % — LOW (ref 34.5–45)
HCT VFR BLD CALC: 22.4 % — LOW (ref 34.5–45)
HGB BLD-MCNC: 7.3 G/DL — LOW (ref 11.5–15.5)
HGB BLD-MCNC: 7.6 G/DL — LOW (ref 11.5–15.5)
LDH SERPL L TO P-CCNC: 519 U/L — HIGH (ref 50–242)
MAGNESIUM SERPL-MCNC: 2.3 MG/DL — SIGNIFICANT CHANGE UP (ref 1.6–2.6)
MAGNESIUM SERPL-MCNC: 2.3 MG/DL — SIGNIFICANT CHANGE UP (ref 1.6–2.6)
MCHC RBC-ENTMCNC: 29.4 PG — SIGNIFICANT CHANGE UP (ref 27–34)
MCHC RBC-ENTMCNC: 29.5 PG — SIGNIFICANT CHANGE UP (ref 27–34)
MCHC RBC-ENTMCNC: 33.8 GM/DL — SIGNIFICANT CHANGE UP (ref 32–36)
MCHC RBC-ENTMCNC: 33.8 GM/DL — SIGNIFICANT CHANGE UP (ref 32–36)
MCV RBC AUTO: 87 FL — SIGNIFICANT CHANGE UP (ref 80–100)
MCV RBC AUTO: 87.3 FL — SIGNIFICANT CHANGE UP (ref 80–100)
PHOSPHATE SERPL-MCNC: 4.7 MG/DL — HIGH (ref 2.5–4.5)
PHOSPHATE SERPL-MCNC: 4.8 MG/DL — HIGH (ref 2.5–4.5)
PLATELET # BLD AUTO: 137 K/UL — LOW (ref 150–400)
PLATELET # BLD AUTO: 186 K/UL — SIGNIFICANT CHANGE UP (ref 150–400)
POTASSIUM SERPL-MCNC: 4.1 MMOL/L — SIGNIFICANT CHANGE UP (ref 3.5–5.3)
POTASSIUM SERPL-MCNC: 4.1 MMOL/L — SIGNIFICANT CHANGE UP (ref 3.5–5.3)
POTASSIUM SERPL-SCNC: 4.1 MMOL/L — SIGNIFICANT CHANGE UP (ref 3.5–5.3)
POTASSIUM SERPL-SCNC: 4.1 MMOL/L — SIGNIFICANT CHANGE UP (ref 3.5–5.3)
PROT SERPL-MCNC: 4.3 G/DL — LOW (ref 6–8.3)
PROT SERPL-MCNC: 4.5 G/DL — LOW (ref 6–8.3)
RBC # BLD: 2.48 M/UL — LOW (ref 3.8–5.2)
RBC # BLD: 2.57 M/UL — LOW (ref 3.8–5.2)
RBC # FLD: 15.5 % — HIGH (ref 10.3–14.5)
RBC # FLD: 15.5 % — HIGH (ref 10.3–14.5)
RH IG SCN BLD-IMP: NEGATIVE — SIGNIFICANT CHANGE UP
SODIUM SERPL-SCNC: 132 MMOL/L — LOW (ref 135–145)
SODIUM SERPL-SCNC: 134 MMOL/L — LOW (ref 135–145)
WBC # BLD: 17.2 K/UL — HIGH (ref 3.8–10.5)
WBC # BLD: 18 K/UL — HIGH (ref 3.8–10.5)
WBC # FLD AUTO: 17.2 K/UL — HIGH (ref 3.8–10.5)
WBC # FLD AUTO: 18 K/UL — HIGH (ref 3.8–10.5)

## 2019-04-18 PROCEDURE — 71045 X-RAY EXAM CHEST 1 VIEW: CPT | Mod: 26

## 2019-04-18 PROCEDURE — 99233 SBSQ HOSP IP/OBS HIGH 50: CPT | Mod: GC

## 2019-04-18 PROCEDURE — 99291 CRITICAL CARE FIRST HOUR: CPT

## 2019-04-18 RX ORDER — ACETAMINOPHEN 500 MG
1000 TABLET ORAL ONCE
Refills: 0 | Status: COMPLETED | OUTPATIENT
Start: 2019-04-18 | End: 2019-04-18

## 2019-04-18 RX ORDER — ACETAMINOPHEN 500 MG
650 TABLET ORAL EVERY 6 HOURS
Refills: 0 | Status: DISCONTINUED | OUTPATIENT
Start: 2019-04-18 | End: 2019-04-28

## 2019-04-18 RX ORDER — HYDRALAZINE HCL 50 MG
10 TABLET ORAL EVERY 6 HOURS
Refills: 0 | Status: DISCONTINUED | OUTPATIENT
Start: 2019-04-18 | End: 2019-04-24

## 2019-04-18 RX ORDER — DOCUSATE SODIUM 100 MG
100 CAPSULE ORAL THREE TIMES A DAY
Refills: 0 | Status: DISCONTINUED | OUTPATIENT
Start: 2019-04-18 | End: 2019-04-28

## 2019-04-18 RX ORDER — SODIUM CHLORIDE 0.65 %
1 AEROSOL, SPRAY (ML) NASAL THREE TIMES A DAY
Refills: 0 | Status: DISCONTINUED | OUTPATIENT
Start: 2019-04-18 | End: 2019-04-28

## 2019-04-18 RX ORDER — SENNA PLUS 8.6 MG/1
2 TABLET ORAL AT BEDTIME
Refills: 0 | Status: DISCONTINUED | OUTPATIENT
Start: 2019-04-18 | End: 2019-04-28

## 2019-04-18 RX ORDER — HYDRALAZINE HCL 50 MG
25 TABLET ORAL ONCE
Refills: 0 | Status: COMPLETED | OUTPATIENT
Start: 2019-04-18 | End: 2019-04-18

## 2019-04-18 RX ORDER — OXYCODONE HYDROCHLORIDE 5 MG/1
5 TABLET ORAL EVERY 4 HOURS
Refills: 0 | Status: DISCONTINUED | OUTPATIENT
Start: 2019-04-18 | End: 2019-04-20

## 2019-04-18 RX ADMIN — OXYCODONE AND ACETAMINOPHEN 1 TABLET(S): 5; 325 TABLET ORAL at 10:45

## 2019-04-18 RX ADMIN — Medication 25 MILLIGRAM(S): at 23:40

## 2019-04-18 RX ADMIN — ONDANSETRON 4 MILLIGRAM(S): 8 TABLET, FILM COATED ORAL at 16:57

## 2019-04-18 RX ADMIN — Medication 650 MILLIGRAM(S): at 12:07

## 2019-04-18 RX ADMIN — Medication 400 MILLIGRAM(S): at 20:26

## 2019-04-18 RX ADMIN — ONDANSETRON 4 MILLIGRAM(S): 8 TABLET, FILM COATED ORAL at 05:55

## 2019-04-18 RX ADMIN — OXYCODONE AND ACETAMINOPHEN 1 TABLET(S): 5; 325 TABLET ORAL at 10:15

## 2019-04-18 RX ADMIN — OXYCODONE AND ACETAMINOPHEN 1 TABLET(S): 5; 325 TABLET ORAL at 01:15

## 2019-04-18 RX ADMIN — OXYCODONE AND ACETAMINOPHEN 1 TABLET(S): 5; 325 TABLET ORAL at 06:25

## 2019-04-18 RX ADMIN — SENNA PLUS 2 TABLET(S): 8.6 TABLET ORAL at 22:04

## 2019-04-18 RX ADMIN — Medication 50 MILLIGRAM(S): at 22:06

## 2019-04-18 RX ADMIN — AMIODARONE HYDROCHLORIDE 400 MILLIGRAM(S): 400 TABLET ORAL at 14:49

## 2019-04-18 RX ADMIN — Medication 100 MILLIGRAM(S): at 22:04

## 2019-04-18 RX ADMIN — Medication 1000 MILLIGRAM(S): at 21:51

## 2019-04-18 RX ADMIN — Medication 50 MILLIGRAM(S): at 14:49

## 2019-04-18 RX ADMIN — AMIODARONE HYDROCHLORIDE 400 MILLIGRAM(S): 400 TABLET ORAL at 22:04

## 2019-04-18 RX ADMIN — Medication 50 MILLIGRAM(S): at 05:14

## 2019-04-18 RX ADMIN — OXYCODONE AND ACETAMINOPHEN 1 TABLET(S): 5; 325 TABLET ORAL at 00:45

## 2019-04-18 RX ADMIN — Medication 650 MILLIGRAM(S): at 11:37

## 2019-04-18 RX ADMIN — Medication 100 MILLIGRAM(S): at 14:49

## 2019-04-18 RX ADMIN — OXYCODONE AND ACETAMINOPHEN 1 TABLET(S): 5; 325 TABLET ORAL at 05:55

## 2019-04-18 RX ADMIN — AMIODARONE HYDROCHLORIDE 400 MILLIGRAM(S): 400 TABLET ORAL at 05:14

## 2019-04-18 NOTE — PROGRESS NOTE ADULT - PROBLEM SELECTOR PLAN 5
Currently on amiodarone per CTU.   She is sinus tach at current time but improving  on PO amio   EP input appreciated.

## 2019-04-18 NOTE — PROGRESS NOTE ADULT - ASSESSMENT
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

## 2019-04-18 NOTE — CHART NOTE - NSCHARTNOTEFT_GEN_A_CORE
Patient seen with Dr. Thierry Guadarrama. Patient now off of ECMO, s/p dialysis 1 time for rising creatinine. Patient reports she has pain and nausea that she attributes to pain medications.     Per CT ICU, Patient's cardiac status is improving. Nephrology following, they attribute her rising creatinine is due to ATN, will hold dialysis for now. Patient is being evaluated for transfer to a step down unit.    Incision inspected, re-stapled on day that ECMO was discontinued due to wound dehiscence. Incision clean, dry, intact. Will reassess for removal of staples 4-5 days post re-closure. If there are signs of wound infection, recommend removing staples and packing incision to allow to heal by secondary intention.    OB will continue to follow  CHARISSE Angulo PGY-3

## 2019-04-18 NOTE — PROGRESS NOTE ADULT - PROBLEM SELECTOR PLAN 1
She was successfully decannulated 4/16.   She remains off inotropes. Per handoff intraoperative BETTY with LVEF approximately 30%.   Will continue to monitor off inotropes  continue hydral 50 mg q8h (hold for SBP <90)  encourage fluid intake as is volume depleted  will start toprol XL 12.5 mg tomorrow

## 2019-04-18 NOTE — PROGRESS NOTE ADULT - PROBLEM SELECTOR PLAN 1
Pt with YAJAIRA - ATN from shock/ acute cardiomyopathy. Pt off lasix, UOP ~2.3 L. Currently off CVVHDF. Creatinine acutely clover to 3 from 2, however electrolytes and acid-base stable. patient off ECMO and dialysis however creatinine continues to rise but making good urine. Will continue to monitor and defer dialysis for now as there is no acute indication.

## 2019-04-18 NOTE — PROGRESS NOTE ADULT - PROBLEM SELECTOR PLAN 4
Secondary to pulmonary edema from cardiogenic shock in peripartum period.   She has been successfully extubated and is breathing well. Continue to monitor

## 2019-04-18 NOTE — PROGRESS NOTE ADULT - SUBJECTIVE AND OBJECTIVE BOX
Interval History:  denies complaints  feeling better  HR improving    Medications:  acetaminophen   Tablet .. 650 milliGRAM(s) Oral every 6 hours PRN  amiodarone    Tablet 400 milliGRAM(s) Oral every 8 hours  amiodarone    Tablet   Oral   benzocaine 15 mG/menthol 3.6 mG Lozenge 1 Lozenge Oral every 2 hours PRN  docusate sodium 100 milliGRAM(s) Oral three times a day  hydrALAZINE 50 milliGRAM(s) Oral every 8 hours  ondansetron Injectable 4 milliGRAM(s) IV Push every 8 hours PRN  oxyCODONE    IR 5 milliGRAM(s) Oral every 4 hours PRN  senna 2 Tablet(s) Oral at bedtime  sodium chloride 0.9%. 1000 milliLiter(s) IV Continuous <Continuous>    Vitals:  T(C): 36.9 (19 @ 20:00), Max: 36.9 (19 @ 11:00)  HR: 104 (19 @ 20:00) (89 - 119)  BP: --  BP(mean): --  RR: 26 (19 @ 20:00) (10 - 26)  SpO2: 96% (19 @ 20:00) (93% - 100%)    Daily     Daily Weight in k (2019 00:00)    I&O's Summary    2019 07:  -  2019 07:00  --------------------------------------------------------  IN: 1954.9 mL / OUT: 2150 mL / NET: -195.1 mL    2019 07:  -  2019 21:34  --------------------------------------------------------  IN: 630 mL / OUT: 1320 mL / NET: -690 mL    Physical Exam:  Appearance: No Acute Distress  HEENT: PERRL  Neck: No JVD  Cardiovascular: Normal S1 S2, No murmurs/rubs/gallops; tachycardic  Respiratory: Clear to auscultation bilaterally  Gastrointestinal: Soft, Non-tender	  Skin: No cyanosis	  Neurologic: Non-focal  Extremities: 2+ LE edema  Psychiatry: A & O x 3, Mood & affect appropriate    Labs:                        7.6    17.2  )-----------( 186      ( 2019 12:26 )             22.4         132<L>  |  96  |  65<H>  ----------------------------<  104<H>  4.1   |  22  |  6.02<H>    Ca    7.6<L>      2019 12:26  Phos  4.8       Mg     2.3         TPro  4.5<L>  /  Alb  2.5<L>  /  TBili  0.8  /  DBili  x   /  AST  86<H>  /  ALT  127<H>  /  AlkPhos  174<H>

## 2019-04-18 NOTE — PROGRESS NOTE ADULT - PROBLEM SELECTOR PLAN 3
There is a component of baseline normocytic anemia; ? RP bleed  Goal to keep Hgb > 7; would transfuse if necessary

## 2019-04-18 NOTE — PROGRESS NOTE ADULT - SUBJECTIVE AND OBJECTIVE BOX
9pm-9:30pm    Remained critically ill on continuos ICU monitoring.      OBJECTIVE:  ICU Vital Signs Last 24 Hrs  T(C): 36.9 (2019 20:00), Max: 36.9 (2019 11:00)  T(F): 98.5 (2019 20:00), Max: 98.5 (2019 20:00)  HR: 103 (2019 22:00) (89 - 119)  BP: --  BP(mean): --  ABP: 164/69 (2019 22:00) (126/63 - 169/75)  ABP(mean): 94 (2019 22:00) (66 - 102)  RR: 22 (2019 22:00) (10 - 27)  SpO2: 94% (:00) (93% - 100%)         @ 07:01  -   @ 07:00  --------------------------------------------------------  IN: 1954.9 mL / OUT: 2150 mL / NET: -195.1 mL     @ 07:01  -   @ 22:13  --------------------------------------------------------  IN: 750 mL / OUT: 1520 mL / NET: -770 mL      CAPILLARY BLOOD GLUCOSE          PHYSICAL EXAM:Daily     Daily Weight in k (2019 00:00)  General: WN/WD NAD  Neurology: A&Ox3, nonfocal, VELÁSQUEZ x 4  Eyes: PERRLA/ EOMI, Gross vision intact  ENT/Neck: Neck supple, trachea midline, No JVD, Gross hearing intact  Respiratory:  B/L fine  rales + R chest staples  CV: RRR, S1S2, no murmurs, rubs or gallops  Abdominal: Soft, NT, ND +BS,    Extremities: 2 + edema, + peripheral pulses  Skin: No Rashes, Hematoma, Ecchymosis  Incisions: stable        HOSPITAL MEDICATIONS:  MEDICATIONS  (STANDING):  amiodarone    Tablet 400 milliGRAM(s) Oral every 8 hours  amiodarone    Tablet   Oral   docusate sodium 100 milliGRAM(s) Oral three times a day  hydrALAZINE 50 milliGRAM(s) Oral every 8 hours  senna 2 Tablet(s) Oral at bedtime  sodium chloride 0.9%. 1000 milliLiter(s) (10 mL/Hr) IV Continuous <Continuous>    MEDICATIONS  (PRN):  acetaminophen   Tablet .. 650 milliGRAM(s) Oral every 6 hours PRN Mild Pain (1 - 3)  benzocaine 15 mG/menthol 3.6 mG Lozenge 1 Lozenge Oral every 2 hours PRN Sore Throat  ondansetron Injectable 4 milliGRAM(s) IV Push every 8 hours PRN Nausea and/or Vomiting  oxyCODONE    IR 5 milliGRAM(s) Oral every 4 hours PRN Moderate Pain (4 - 6)      LABS:                        7.6    17.2  )-----------( 186      ( 2019 12:26 )             22.4         132<L>  |  96  |  65<H>  ----------------------------<  104<H>  4.1   |  22  |  6.02<H>    Ca    7.6<L>      2019 12:26  Phos  4.8       Mg     2.3         TPro  4.5<L>  /  Alb  2.5<L>  /  TBili  0.8  /  DBili  x   /  AST  86<H>  /  ALT  127<H>  /  AlkPhos  174<H>          Arterial Blood Gas:   @ 18:02  7.46/35/73/25/96/1.2  ABG lactate: --  Arterial Blood Gas:   @ 08:09  7.47/33/93/24/98/.6  ABG lactate: --  Arterial Blood Gas:   @ 01:00  7.51/32/81/25/98/2.5  ABG lactate: --  Arterial Blood Gas:   @ 19:20  7.52/31/146/25/100/2.5  ABG lactate: --  Arterial Blood Gas:   @ 14:11  7.48/33/79/25/97/1.6  ABG lactate: --  Arterial Blood Gas:   @ 05:05  7.48/36/76/26/96/3.2  ABG lactate: --  Arterial Blood Gas:   @ 00:18  7.48/35/77/26/96/2.4  ABG lactate: --        LIVER FUNCTIONS - ( 2019 12:26 )  Alb: 2.5 g/dL / Pro: 4.5 g/dL / ALK PHOS: 174 U/L / ALT: 127 U/L / AST: 86 U/L / GGT: x           MICROBIOLOGY:     RADIOLOGY:  X Reviewed and interpreted by me        I have spent 30 minutes providing critical care management to this patient. 9pm-9:30pm    Remained critically ill on continuos ICU monitoring.      OBJECTIVE:  ICU Vital Signs Last 24 Hrs  T(C): 36.9 (2019 20:00), Max: 36.9 (2019 11:00)  T(F): 98.5 (2019 20:00), Max: 98.5 (2019 20:00)  HR: 103 (2019 22:00) (89 - 119)  BP: --  BP(mean): --  ABP: 164/69 (2019 22:00) (126/63 - 169/75)  ABP(mean): 94 (2019 22:00) (66 - 102)  RR: 22 (2019 22:00) (10 - 27)  SpO2: 94% (:00) (93% - 100%)         @ 07:01  -   @ 07:00  --------------------------------------------------------  IN: 1954.9 mL / OUT: 2150 mL / NET: -195.1 mL     @ 07:01  -   @ 22:13  --------------------------------------------------------  IN: 750 mL / OUT: 1520 mL / NET: -770 mL      CAPILLARY BLOOD GLUCOSE          PHYSICAL EXAM:Daily     Daily Weight in k (2019 00:00)  General: WN/WD NAD  Neurology: A&Ox3, nonfocal, VELÁSQUEZ x 4  Eyes: PERRLA/ EOMI, Gross vision intact  ENT/Neck: Neck supple, trachea midline, No JVD, Gross hearing intact  Respiratory:  B/L fine  rales + R chest staples  CV: RRR, S1S2, no murmurs, rubs or gallops  Abdominal: Soft, NT, ND +BS,    Extremities: 2 + edema, + peripheral pulses  Skin: No Rashes, Hematoma, Ecchymosis  Incisions: stable        HOSPITAL MEDICATIONS:  MEDICATIONS  (STANDING):  amiodarone    Tablet 400 milliGRAM(s) Oral every 8 hours  amiodarone    Tablet   Oral   docusate sodium 100 milliGRAM(s) Oral three times a day  hydrALAZINE 50 milliGRAM(s) Oral every 8 hours  senna 2 Tablet(s) Oral at bedtime  sodium chloride 0.9%. 1000 milliLiter(s) (10 mL/Hr) IV Continuous <Continuous>    MEDICATIONS  (PRN):  acetaminophen   Tablet .. 650 milliGRAM(s) Oral every 6 hours PRN Mild Pain (1 - 3)  benzocaine 15 mG/menthol 3.6 mG Lozenge 1 Lozenge Oral every 2 hours PRN Sore Throat  ondansetron Injectable 4 milliGRAM(s) IV Push every 8 hours PRN Nausea and/or Vomiting  oxyCODONE    IR 5 milliGRAM(s) Oral every 4 hours PRN Moderate Pain (4 - 6)      LABS:                        7.6    17.2  )-----------( 186      ( 2019 12:26 )             22.4         132<L>  |  96  |  65<H>  ----------------------------<  104<H>  4.1   |  22  |  6.02<H>    Ca    7.6<L>      2019 12:26  Phos  4.8       Mg     2.3         TPro  4.5<L>  /  Alb  2.5<L>  /  TBili  0.8  /  DBili  x   /  AST  86<H>  /  ALT  127<H>  /  AlkPhos  174<H>          Arterial Blood Gas:   @ 18:02  7.46/35/73/25/96/1.2  ABG lactate: --  Arterial Blood Gas:   @ 08:09  7.47/33/93/24/98/.6  ABG lactate: --  Arterial Blood Gas:   @ 01:00  7.51/32/81/25/98/2.5  ABG lactate: --  Arterial Blood Gas:   @ 19:20  7.52/31/146/25/100/2.5  ABG lactate: --  Arterial Blood Gas:   @ 14:11  7.48/33/79/25/97/1.6  ABG lactate: --  Arterial Blood Gas:   @ 05:05  7.48/36/76/26/96/3.2  ABG lactate: --  Arterial Blood Gas:   @ 00:18  7.48/35/77/26/96/2.4  ABG lactate: --        LIVER FUNCTIONS - ( 2019 12:26 )  Alb: 2.5 g/dL / Pro: 4.5 g/dL / ALK PHOS: 174 U/L / ALT: 127 U/L / AST: 86 U/L / GGT: x           MICROBIOLOGY:     RADIOLOGY:  X Reviewed and interpreted by me

## 2019-04-18 NOTE — PROGRESS NOTE ADULT - SUBJECTIVE AND OBJECTIVE BOX
[facility] Division of Kidney Diseases & Hypertension  FOLLOW UP NOTE  [phone]--------------------------------------------------------------------------------  Chief Complaint:History of extracorporeal membrane oxygenation treatment      24 hour events/subjective: Patient out of bed to chair this AM. States she is feeling better and eating food. Creatinine noted to be climbing again to 5.72 however continues to make urine ~2L in last 24 hrs.      PAST HISTORY  --------------------------------------------------------------------------------  No significant changes to PMH, PSH, FHx, SHx, unless otherwise noted    ALLERGIES & MEDICATIONS  --------------------------------------------------------------------------------  Allergies    No Known Allergies    Intolerances    Reglan (Other)    Standing Inpatient Medications  amiodarone    Tablet 400 milliGRAM(s) Oral every 8 hours  amiodarone    Tablet   Oral   amiodarone Infusion 0.5 mG/Min IV Continuous <Continuous>  hydrALAZINE 50 milliGRAM(s) Oral every 8 hours  meperidine     Injectable 25 milliGRAM(s) IV Push once  niCARdipine Infusion 5 mG/Hr IV Continuous <Continuous>  pantoprazole  Injectable 40 milliGRAM(s) IV Push daily  sodium chloride 0.9%. 1000 milliLiter(s) IV Continuous <Continuous>    PRN Inpatient Medications  benzocaine 15 mG/menthol 3.6 mG Lozenge 1 Lozenge Oral every 2 hours PRN  ondansetron Injectable 4 milliGRAM(s) IV Push every 8 hours PRN  oxyCODONE    5 mG/acetaminophen 325 mG 1 Tablet(s) Oral every 4 hours PRN  oxyCODONE    5 mG/acetaminophen 325 mG 2 Tablet(s) Oral every 6 hours PRN      REVIEW OF SYSTEMS  --------------------------------------------------------------------------------  Gen: No  fevers/chills  Skin: No rashes  Head/Eyes/Ears/Mouth: No headache; Normal hearing; Normal vision w/o blurriness  Respiratory: No dyspnea, cough, wheezing, hemoptysis  CV: No chest pain, PND, orthopnea  GI: No abdominal pain, diarrhea, constipation, nausea, vomiting  : No increased frequency, dysuria, hematuria, nocturia  MSK: No joint pain/swelling; no back pain; no edema  Neuro: No dizziness/lightheadedness, weakness, seizures, numbness, tingling      All other systems were reviewed and are negative, except as noted.    VITALS/PHYSICAL EXAM  --------------------------------------------------------------------------------  T(C): 36.8 (04-18-19 @ 07:00), Max: 36.8 (04-18-19 @ 07:00)  HR: 118 (04-18-19 @ 08:00) (99 - 142)  BP: --  RR: 15 (04-18-19 @ 08:00) (10 - 47)  SpO2: 96% (04-18-19 @ 08:00) (93% - 99%)  Wt(kg): --        04-17-19 @ 07:01  -  04-18-19 @ 07:00  --------------------------------------------------------  IN: 1954.9 mL / OUT: 2150 mL / NET: -195.1 mL    04-18-19 @ 07:01  -  04-18-19 @ 08:48  --------------------------------------------------------  IN: 10 mL / OUT: 150 mL / NET: -140 mL      Physical Exam:  	Gen: NAD, well-appearing  	HEENT: PERRL, supple neck, clear oropharynx  	Pulm: CTA B/L  	CV: RRR, S1S2; no rub  	Back: No spinal or CVA tenderness; no sacral edema  	Abd: +BS, soft, nontender/nondistended  	: No suprapubic tenderness  	UE: Warm, FROM, no clubbing, intact strength; no edema; no asterixis  	LE: Warm, FROM, no clubbing, intact strength; no edema  	Neuro: No focal deficits, intact gait  	Psych: Normal affect and mood  	Skin: Warm, without rashes      LABS/STUDIES  --------------------------------------------------------------------------------              7.3    18.0  >-----------<  137      [04-18-19 @ 01:08]              21.7     134  |  98  |  61  ----------------------------<  89      [04-18-19 @ 01:08]  4.1   |  22  |  5.72        Ca     7.7     [04-18-19 @ 01:08]      Mg     2.3     [04-18-19 @ 01:08]      Phos  4.7     [04-18-19 @ 01:08]    TPro  4.3  /  Alb  2.4  /  TBili  0.9  /  DBili  x   /  AST  99  /  ALT  143  /  AlkPhos  182  [04-18-19 @ 01:08]    PT/INR: PT 13.7 , INR 1.19       [04-16-19 @ 11:17]  PTT: 30.4       [04-16-19 @ 11:17]          [04-18-19 @ 08:07]    Creatinine Trend:  SCr 5.72 [04-18 @ 01:08]  SCr 4.15 [04-17 @ 00:45]  SCr 5.18 [04-16 @ 11:17]  SCr 4.66 [04-16 @ 02:29]  SCr 3.00 [04-15 @ 01:34]    Urinalysis - [04-12-19 @ 01:00]      Color Yellow / Appearance Slightly Turbid / SG 1.012 / pH 6.0      Gluc Negative / Ketone Negative  / Bili Negative / Urobili Negative       Blood Small / Protein 30 mg/dL / Leuk Est Negative / Nitrite Negative      RBC 15 / WBC 8 / Hyaline 5 / Gran  / Sq Epi  / Non Sq Epi 2 / Bacteria Negative    Urine Creatinine 5      [04-14-19 @ 12:04]  Urine Protein 12      [04-14-19 @ 16:06]    Iron 49, TIBC 312, %sat 16      [04-15-19 @ 09:04]  Ferritin 202      [04-15-19 @ 08:24]  HbA1c 5.5      [04-13-19 @ 09:28]  Lipid: chol 140, , HDL 31, LDL 75      [04-15-19 @ 09:04]    HBsAb Nonreact      [04-15-19 @ 09:02]  HBsAg Nonreact      [04-15-19 @ 09:02]  HBcAb Nonreact      [04-15-19 @ 09:02]  HCV 0.06, Nonreact      [04-15-19 @ 09:02]

## 2019-04-18 NOTE — PROGRESS NOTE ADULT - ATTENDING COMMENTS
Doing great  Feels well  UOO excellent  elytes nl  But SCr still rises in the diuretic phase of recovery

## 2019-04-18 NOTE — PROGRESS NOTE ADULT - ASSESSMENT
32 yo lady with no past medical history 35 weeks pregnant transferred to [facility] after she presented with shortness of breath, SVT and acutely decompensated with severe cardiomyopathy, needing ECMO, s/p emergency  now in multiorgan failure and YAJAIRA.

## 2019-04-18 NOTE — PHARMACOTHERAPY INTERVENTION NOTE - COMMENTS
34 y/o female is being evaluated for a heart transplant.    Outpatient medication reconciliation reviewed and will be re-started appropriately.  Plan discussed with multidisciplinary heart transplant team.     The patient has no contraindications to receive a heart transplant and is cleared from the pharmacy standpoint. The only missing information that I have is her vaccinations history - will recommend to assure that she receives/received influenza and pneumococcal series vaccines.

## 2019-04-18 NOTE — PROGRESS NOTE ADULT - ASSESSMENT
33 year old F pt with PMH noted below presented as a transfer 2/2 postpartum cardiogenic shock s/p ECMO, EP consulted for possible AT. Pt now in sinus tachycardia with -120s.   - EP to sign off , please reconsult if any questions. 33 year old F pt with PMH noted below presented as a transfer 2/2 postpartum cardiogenic shock s/p ECMO, EP consulted for possible AT. Pt now in sinus tachycardia with -120s.   - EP to sign off , please reconsult if any questions.   - no need for   amio further.

## 2019-04-18 NOTE — PROGRESS NOTE ADULT - ASSESSMENT
32 yo F with history of hyperemesis gravidarum  initially presented to [facility] (35 weeks gestation) for SOB. She was found in SVT and received adenosinex4 and failed cardioversion x2. Pt then emergently taken for  and post op went into shock. She was found to have reduced EF and placed on [facility] ECMO and CVVHD. Transferred to [facility] on  for further management. 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 Was successfully decannulated after successful wean on . She has been titrated off pressors and her urine output has improved although her creatinine remains elevated. She remains tachycardic (improving) and notably hypertensive.

## 2019-04-18 NOTE — PROGRESS NOTE ADULT - SUBJECTIVE AND OBJECTIVE BOX
Patient seen and examined at bedside.    Overnight Events: no acute events     Medications:  amiodarone    Tablet 400 milliGRAM(s) Oral every 8 hours  amiodarone    Tablet   Oral   benzocaine 15 mG/menthol 3.6 mG Lozenge 1 Lozenge Oral every 2 hours PRN  docusate sodium 100 milliGRAM(s) Oral three times a day  hydrALAZINE 50 milliGRAM(s) Oral every 8 hours  ondansetron Injectable 4 milliGRAM(s) IV Push every 8 hours PRN  oxyCODONE    5 mG/acetaminophen 325 mG 1 Tablet(s) Oral every 4 hours PRN  oxyCODONE    5 mG/acetaminophen 325 mG 2 Tablet(s) Oral every 6 hours PRN  senna 2 Tablet(s) Oral at bedtime  sodium chloride 0.9%. 1000 milliLiter(s) IV Continuous <Continuous>      PAST MEDICAL & SURGICAL HISTORY:  Asthma: as child, no inhaler use&gt;10 years. Denies hospitlizations or intubations. No current inhaler.  History of low transverse  section      Vitals:  T(F): 98.2 (-), Max: 98.2 (-)  HR: 95 () (95 - 142)  BP: --  RR: 12 (-)  SpO2: 97% ()    I&O's Summary    2019 07:  -  2019 07:00  --------------------------------------------------------  IN: 1954.9 mL / OUT: 2150 mL / NET: -195.1 mL    2019 07:  -  2019 09:57  --------------------------------------------------------  IN: 120 mL / OUT: 250 mL / NET: -130 mL        Physical Exam:  General: NAD  Eye: PERRL, EOMI  HENT: Normal oral mucosa NC/AT  CV: Normal S1/S2, tachycardic, No M/R/G, no edema, no elevation in JVP  Resp: Normal respiratory effort, clear to auscultation bilaterally, no wheezing, no crackles  Abd: Soft, Non-tender, Non-distended, BS+  Ext: No clubbing, No joint deformity   Neuro: Non-focal, motor and sensory intact  Lymph: No lymphadenopathy  Psych: AAOx3, Mood & affect appropriate  Skin: No rashes, No ecchymoses, No cyanosis                          7.3    18.0  )-----------( 137      ( 2019 01:08 )             21.7     04-18    134<L>  |  98  |  61<H>  ----------------------------<  89  4.1   |  22  |  5.72<H>    Ca    7.7<L>      2019 01:08  Phos  4.7     04-18  Mg     2.3     04-18    TPro  4.3<L>  /  Alb  2.4<L>  /  TBili  0.9  /  DBili  x   /  AST  99<H>  /  ALT  143<H>  /  AlkPhos  182<H>  0418    PT/INR - ( 2019 11:17 )   PT: 13.7 sec;   INR: 1.19 ratio         PTT - ( 2019 11:17 )  PTT:30.4 sec  CARDIAC MARKERS ( 2019 21:56 )  x     / x     / x     / 611 U/L / x     / x      CARDIAC MARKERS ( 2019 12:22 )  x     / x     / 0.03 ng/mL / 801 U/L / x     / 6.0 ng/mL              New ECG(s): Personally reviewed    Echo:    Stress Testing:     Cath:    Imaging:    Interpretation of Telemetry:

## 2019-04-19 LAB
ALBUMIN SERPL ELPH-MCNC: 2.5 G/DL — LOW (ref 3.3–5)
ALBUMIN SERPL ELPH-MCNC: 2.6 G/DL — LOW (ref 3.3–5)
ALP SERPL-CCNC: 174 U/L — HIGH (ref 40–120)
ALP SERPL-CCNC: 180 U/L — HIGH (ref 40–120)
ALT FLD-CCNC: 104 U/L — HIGH (ref 10–45)
ALT FLD-CCNC: 111 U/L — HIGH (ref 10–45)
ANION GAP SERPL CALC-SCNC: 13 MMOL/L — SIGNIFICANT CHANGE UP (ref 5–17)
ANION GAP SERPL CALC-SCNC: 15 MMOL/L — SIGNIFICANT CHANGE UP (ref 5–17)
AST SERPL-CCNC: 67 U/L — HIGH (ref 10–40)
AST SERPL-CCNC: 75 U/L — HIGH (ref 10–40)
BILIRUB SERPL-MCNC: 0.8 MG/DL — SIGNIFICANT CHANGE UP (ref 0.2–1.2)
BILIRUB SERPL-MCNC: 0.8 MG/DL — SIGNIFICANT CHANGE UP (ref 0.2–1.2)
BUN SERPL-MCNC: 69 MG/DL — HIGH (ref 7–23)
BUN SERPL-MCNC: 72 MG/DL — HIGH (ref 7–23)
CALCIUM SERPL-MCNC: 7.7 MG/DL — LOW (ref 8.4–10.5)
CALCIUM SERPL-MCNC: 7.8 MG/DL — LOW (ref 8.4–10.5)
CHLORIDE SERPL-SCNC: 96 MMOL/L — SIGNIFICANT CHANGE UP (ref 96–108)
CHLORIDE SERPL-SCNC: 97 MMOL/L — SIGNIFICANT CHANGE UP (ref 96–108)
CO2 SERPL-SCNC: 21 MMOL/L — LOW (ref 22–31)
CO2 SERPL-SCNC: 22 MMOL/L — SIGNIFICANT CHANGE UP (ref 22–31)
CREAT SERPL-MCNC: 6.76 MG/DL — HIGH (ref 0.5–1.3)
CREAT SERPL-MCNC: 6.92 MG/DL — HIGH (ref 0.5–1.3)
GAS PNL BLDA: SIGNIFICANT CHANGE UP
GLUCOSE SERPL-MCNC: 141 MG/DL — HIGH (ref 70–99)
GLUCOSE SERPL-MCNC: 91 MG/DL — SIGNIFICANT CHANGE UP (ref 70–99)
HCT VFR BLD CALC: 24.1 % — LOW (ref 34.5–45)
HGB BLD-MCNC: 7.7 G/DL — LOW (ref 11.5–15.5)
MAGNESIUM SERPL-MCNC: 2.3 MG/DL — SIGNIFICANT CHANGE UP (ref 1.6–2.6)
MCHC RBC-ENTMCNC: 27.5 PG — SIGNIFICANT CHANGE UP (ref 27–34)
MCHC RBC-ENTMCNC: 31.9 GM/DL — LOW (ref 32–36)
MCV RBC AUTO: 86.2 FL — SIGNIFICANT CHANGE UP (ref 80–100)
PHOSPHATE SERPL-MCNC: 5.7 MG/DL — HIGH (ref 2.5–4.5)
PLATELET # BLD AUTO: 275 K/UL — SIGNIFICANT CHANGE UP (ref 150–400)
POTASSIUM SERPL-MCNC: 4.5 MMOL/L — SIGNIFICANT CHANGE UP (ref 3.5–5.3)
POTASSIUM SERPL-MCNC: 4.6 MMOL/L — SIGNIFICANT CHANGE UP (ref 3.5–5.3)
POTASSIUM SERPL-SCNC: 4.5 MMOL/L — SIGNIFICANT CHANGE UP (ref 3.5–5.3)
POTASSIUM SERPL-SCNC: 4.6 MMOL/L — SIGNIFICANT CHANGE UP (ref 3.5–5.3)
PROT SERPL-MCNC: 4.7 G/DL — LOW (ref 6–8.3)
PROT SERPL-MCNC: 5 G/DL — LOW (ref 6–8.3)
RBC # BLD: 2.79 M/UL — LOW (ref 3.8–5.2)
RBC # FLD: 15.4 % — HIGH (ref 10.3–14.5)
SODIUM SERPL-SCNC: 131 MMOL/L — LOW (ref 135–145)
SODIUM SERPL-SCNC: 133 MMOL/L — LOW (ref 135–145)
WBC # BLD: 18.6 K/UL — HIGH (ref 3.8–10.5)
WBC # FLD AUTO: 18.6 K/UL — HIGH (ref 3.8–10.5)

## 2019-04-19 PROCEDURE — 99232 SBSQ HOSP IP/OBS MODERATE 35: CPT

## 2019-04-19 PROCEDURE — 71045 X-RAY EXAM CHEST 1 VIEW: CPT | Mod: 26

## 2019-04-19 PROCEDURE — 99233 SBSQ HOSP IP/OBS HIGH 50: CPT | Mod: GC

## 2019-04-19 RX ORDER — HYDRALAZINE HCL 50 MG
25 TABLET ORAL ONCE
Refills: 0 | Status: COMPLETED | OUTPATIENT
Start: 2019-04-19 | End: 2019-04-19

## 2019-04-19 RX ORDER — PANTOPRAZOLE SODIUM 20 MG/1
40 TABLET, DELAYED RELEASE ORAL
Refills: 0 | Status: DISCONTINUED | OUTPATIENT
Start: 2019-04-19 | End: 2019-04-28

## 2019-04-19 RX ORDER — BUMETANIDE 0.25 MG/ML
2 INJECTION INTRAMUSCULAR; INTRAVENOUS ONCE
Refills: 0 | Status: COMPLETED | OUTPATIENT
Start: 2019-04-19 | End: 2019-04-19

## 2019-04-19 RX ORDER — METOPROLOL TARTRATE 50 MG
12.5 TABLET ORAL DAILY
Refills: 0 | Status: DISCONTINUED | OUTPATIENT
Start: 2019-04-19 | End: 2019-04-25

## 2019-04-19 RX ORDER — HEPARIN SODIUM 5000 [USP'U]/ML
5000 INJECTION INTRAVENOUS; SUBCUTANEOUS EVERY 8 HOURS
Refills: 0 | Status: DISCONTINUED | OUTPATIENT
Start: 2019-04-19 | End: 2019-04-28

## 2019-04-19 RX ORDER — ONDANSETRON 8 MG/1
4 TABLET, FILM COATED ORAL ONCE
Refills: 0 | Status: COMPLETED | OUTPATIENT
Start: 2019-04-19 | End: 2019-04-19

## 2019-04-19 RX ORDER — ACETAMINOPHEN 500 MG
1000 TABLET ORAL ONCE
Refills: 0 | Status: COMPLETED | OUTPATIENT
Start: 2019-04-19 | End: 2019-04-19

## 2019-04-19 RX ORDER — HYDRALAZINE HCL 50 MG
75 TABLET ORAL EVERY 8 HOURS
Refills: 0 | Status: DISCONTINUED | OUTPATIENT
Start: 2019-04-19 | End: 2019-04-21

## 2019-04-19 RX ADMIN — Medication 1000 MILLIGRAM(S): at 10:05

## 2019-04-19 RX ADMIN — Medication 200 MILLIGRAM(S): at 03:32

## 2019-04-19 RX ADMIN — OXYCODONE HYDROCHLORIDE 5 MILLIGRAM(S): 5 TABLET ORAL at 22:57

## 2019-04-19 RX ADMIN — OXYCODONE HYDROCHLORIDE 5 MILLIGRAM(S): 5 TABLET ORAL at 23:29

## 2019-04-19 RX ADMIN — Medication 75 MILLIGRAM(S): at 21:59

## 2019-04-19 RX ADMIN — ONDANSETRON 4 MILLIGRAM(S): 8 TABLET, FILM COATED ORAL at 21:30

## 2019-04-19 RX ADMIN — Medication 75 MILLIGRAM(S): at 13:44

## 2019-04-19 RX ADMIN — Medication 100 MILLIGRAM(S): at 05:32

## 2019-04-19 RX ADMIN — ONDANSETRON 4 MILLIGRAM(S): 8 TABLET, FILM COATED ORAL at 01:45

## 2019-04-19 RX ADMIN — Medication 50 MILLIGRAM(S): at 05:32

## 2019-04-19 RX ADMIN — AMIODARONE HYDROCHLORIDE 400 MILLIGRAM(S): 400 TABLET ORAL at 05:32

## 2019-04-19 RX ADMIN — AMIODARONE HYDROCHLORIDE 400 MILLIGRAM(S): 400 TABLET ORAL at 22:00

## 2019-04-19 RX ADMIN — Medication 25 MILLIGRAM(S): at 08:26

## 2019-04-19 RX ADMIN — HEPARIN SODIUM 5000 UNIT(S): 5000 INJECTION INTRAVENOUS; SUBCUTANEOUS at 22:16

## 2019-04-19 RX ADMIN — AMIODARONE HYDROCHLORIDE 400 MILLIGRAM(S): 400 TABLET ORAL at 13:44

## 2019-04-19 RX ADMIN — BUMETANIDE 2 MILLIGRAM(S): 0.25 INJECTION INTRAMUSCULAR; INTRAVENOUS at 15:10

## 2019-04-19 RX ADMIN — Medication 12.5 MILLIGRAM(S): at 09:50

## 2019-04-19 RX ADMIN — Medication 100 MILLIGRAM(S): at 13:44

## 2019-04-19 RX ADMIN — Medication 400 MILLIGRAM(S): at 09:50

## 2019-04-19 NOTE — PROGRESS NOTE ADULT - ASSESSMENT
32 yo lady with no past medical history 35 weeks pregnant transferred to [facility] after she presented with shortness of breath, SVT and acutely decompensated with severe cardiomyopathy, needing ECMO, s/p emergency  now in multiorgan failure and YAJAIRA.

## 2019-04-19 NOTE — PROGRESS NOTE ADULT - PROBLEM SELECTOR PLAN 5
Currently on PO amiodarone load per CTU.   EP recommends no further amiodarone and would agree. She is young without current arrythmia and amiodarone with a number of adverse effects.   She is sinus tach at current time. Currently on PO amiodarone load per CTU.   Would recommend no further amiodarone. She is young without current arrythmia and amiodarone has a number of adverse effects.   She is sinus tach at current time.

## 2019-04-19 NOTE — PROGRESS NOTE ADULT - ATTENDING COMMENTS
YAJAIRA  ATN  Diuretic phase,  recovering but still with significant azotemia  No immediate need for dialysis, will follow closely

## 2019-04-19 NOTE — PROGRESS NOTE ADULT - PROBLEM SELECTOR PLAN 1
Pt with YAJAIRA - ATN from shock/ acute cardiomyopathy. Pt off lasix, UOP ~2.6 L. Currently off CVVHDF. Patient's creatinine continues to rise now 6.91 however making good urine. Will continue to monitor and defer dialysis for now as there is no acute indication. Will consider diuretics for now.

## 2019-04-19 NOTE — PROGRESS NOTE ADULT - SUBJECTIVE AND OBJECTIVE BOX
[facility] Division of Kidney Diseases & Hypertension  FOLLOW UP NOTE  [phone]--------------------------------------------------------------------------------  Chief Complaint:History of extracorporeal membrane oxygenation treatment      24 hour events/subjective: Patient out of bed to chair today. States overnight she developed a cough and some nausea. Otherwise patient states she is doing okay. Creatinine noted to be climbing still to 6.91 today. Patient continues to make urine, ~2.6L in last 24 hrs.        PAST HISTORY  --------------------------------------------------------------------------------  No significant changes to PMH, PSH, FHx, SHx, unless otherwise noted    ALLERGIES & MEDICATIONS  --------------------------------------------------------------------------------  Allergies    No Known Allergies    Intolerances    Reglan (Other)    Standing Inpatient Medications  acetaminophen  IVPB .. 1000 milliGRAM(s) IV Intermittent once  amiodarone    Tablet 400 milliGRAM(s) Oral every 8 hours  amiodarone    Tablet   Oral   docusate sodium 100 milliGRAM(s) Oral three times a day  hydrALAZINE 75 milliGRAM(s) Oral every 8 hours  metoprolol succinate ER 12.5 milliGRAM(s) Oral daily  senna 2 Tablet(s) Oral at bedtime  sodium chloride 0.9%. 1000 milliLiter(s) IV Continuous <Continuous>    PRN Inpatient Medications  acetaminophen   Tablet .. 650 milliGRAM(s) Oral every 6 hours PRN  benzocaine 15 mG/menthol 3.6 mG Lozenge 1 Lozenge Oral every 2 hours PRN  guaiFENesin   Syrup  (Sugar-Free) 200 milliGRAM(s) Oral every 6 hours PRN  hydrALAZINE Injectable 10 milliGRAM(s) IV Push every 6 hours PRN  ondansetron Injectable 4 milliGRAM(s) IV Push every 8 hours PRN  oxyCODONE    IR 5 milliGRAM(s) Oral every 4 hours PRN  sodium chloride 0.65% Nasal 1 Spray(s) Both Nostrils three times a day PRN      REVIEW OF SYSTEMS  --------------------------------------------------------------------------------  Gen: No  fevers/chills  Skin: No rashes  Head/Eyes/Ears/Mouth: No headache; Normal hearing; Normal vision w/o blurriness  Respiratory: Has cough  CV: No chest pain, PND, orthopnea  GI: No abdominal pain, diarrhea, constipation, nausea, vomiting  : No increased frequency, dysuria, hematuria, nocturia  MSK: No joint pain/swelling; no back pain; no edema  Neuro: No dizziness/lightheadedness, weakness, seizures, numbness, tingling      All other systems were reviewed and are negative, except as noted.    VITALS/PHYSICAL EXAM  --------------------------------------------------------------------------------  T(C): 36.7 (04-19-19 @ 07:00), Max: 36.9 (04-18-19 @ 11:00)  HR: 102 (04-19-19 @ 09:00) (89 - 119)  BP: --  RR: 21 (04-19-19 @ 09:00) (12 - 30)  SpO2: 98% (04-19-19 @ 09:00) (92% - 100%)  Wt(kg): --        04-18-19 @ 07:01  -  04-19-19 @ 07:00  --------------------------------------------------------  IN: 1320 mL / OUT: 2660 mL / NET: -1340 mL    04-19-19 @ 07:01  -  04-19-19 @ 09:49  --------------------------------------------------------  IN: 20 mL / OUT: 300 mL / NET: -280 mL      Physical Exam:  	Gen: NAD, well-appearing  	HEENT: PERRL, supple neck, clear oropharynx  	Pulm: CTA B/L  	CV: RRR, S1S2; no rub  	Back: No spinal or CVA tenderness; no sacral edema  	Abd: +BS, soft, nontender/nondistended  	: No suprapubic tenderness  	UE: Warm, FROM, no clubbing, intact strength; no edema; no asterixis  	LE: Warm, FROM, no clubbing, intact strength; no edema, legs wrapped in ace bandages  	Neuro: No focal deficits, intact gait  	Psych: Normal affect and mood  	Skin: Warm, without rashes    LABS/STUDIES  --------------------------------------------------------------------------------              7.7    18.6  >-----------<  275      [04-19-19 @ 02:22]              24.1     131  |  96  |  69  ----------------------------<  91      [04-19-19 @ 02:22]  4.6   |  22  |  6.76        Ca     7.7     [04-19-19 @ 02:22]      Mg     2.3     [04-19-19 @ 02:22]      Phos  5.7     [04-19-19 @ 02:22]    TPro  4.7  /  Alb  2.5  /  TBili  0.8  /  DBili  x   /  AST  75  /  ALT  111  /  AlkPhos  174  [04-19-19 @ 02:22]              [04-18-19 @ 08:07]    Creatinine Trend:  SCr 6.76 [04-19 @ 02:22]  SCr 6.02 [04-18 @ 12:26]  SCr 5.72 [04-18 @ 01:08]  SCr 4.15 [04-17 @ 00:45]  SCr 5.18 [04-16 @ 11:17]      Urine Creatinine 5      [04-14-19 @ 12:04]  Urine Protein 12      [04-14-19 @ 16:06]    Iron 49, TIBC 312, %sat 16      [04-15-19 @ 09:04]  Ferritin 202      [04-15-19 @ 08:24]  HbA1c 5.5      [04-13-19 @ 09:28]  Lipid: chol 140, , HDL 31, LDL 75      [04-15-19 @ 09:04]    HBsAb Nonreact      [04-15-19 @ 09:02]  HBsAg Nonreact      [04-15-19 @ 09:02]  HBcAb Nonreact      [04-15-19 @ 09:02]  HCV 0.06, Nonreact      [04-15-19 @ 09:02]

## 2019-04-19 NOTE — PROGRESS NOTE ADULT - SUBJECTIVE AND OBJECTIVE BOX
Sitting in chair. Denies CP, SOB. LE wrapped with ACE bandages. She would like to hold off on any dialysis catheter.     MEDICATIONS:  amiodarone    Tablet 400 milliGRAM(s) Oral every 8 hours  amiodarone    Tablet   Oral   hydrALAZINE 75 milliGRAM(s) Oral every 8 hours  hydrALAZINE Injectable 10 milliGRAM(s) IV Push every 6 hours PRN  metoprolol succinate ER 12.5 milliGRAM(s) Oral daily  guaiFENesin   Syrup  (Sugar-Free) 200 milliGRAM(s) Oral every 6 hours PRN  acetaminophen   Tablet .. 650 milliGRAM(s) Oral every 6 hours PRN  ondansetron Injectable 4 milliGRAM(s) IV Push every 8 hours PRN  oxyCODONE    IR 5 milliGRAM(s) Oral every 4 hours PRN  docusate sodium 100 milliGRAM(s) Oral three times a day  senna 2 Tablet(s) Oral at bedtime  benzocaine 15 mG/menthol 3.6 mG Lozenge 1 Lozenge Oral every 2 hours PRN  sodium chloride 0.65% Nasal 1 Spray(s) Both Nostrils three times a day PRN  sodium chloride 0.9%. 1000 milliLiter(s) IV Continuous <Continuous>    PHYSICAL EXAM:  T(C): 36.7 (04-19-19 @ 07:00), Max: 36.9 (04-18-19 @ 20:00)  HR: 101 (04-19-19 @ 10:00) (89 - 114)  BP: --  RR: 30 (04-19-19 @ 10:00) (12 - 30)  SpO2: 100% (04-19-19 @ 10:00) (92% - 100%)  Wt(kg): --  I&O's Summary    18 Apr 2019 07:01  -  19 Apr 2019 07:00  --------------------------------------------------------  IN: 1320 mL / OUT: 2660 mL / NET: -1340 mL    19 Apr 2019 07:01  -  19 Apr 2019 13:29  --------------------------------------------------------  IN: 130 mL / OUT: 300 mL / NET: -170 mL    Appearance: NAD, sitting in chair	  HEENT:   Normal oral mucosa, PERRL, EOMI	  Lymphatic: No lymphadenopathy  Cardiovascular: Normal S1 S2, JVD not elevated, No murmurs, LE eema - wrapped in ACE bandages bilaterally -  Respiratory: Lungs clear to auscultation	  Psychiatry: A & O x 3, Mood & affect appropriate  Gastrointestinal:  Soft, Non-tender, + BS	  Skin: No rashes, No ecchymoses, No cyanosis	  Neurologic: Non-focal  Extremities: Normal range of motion, No clubbing, cyanosis or edema  Vascular: Peripheral pulses palpable bilaterally    LABS:	 	    CBC Full  -  ( 19 Apr 2019 02:22 )  WBC Count : 18.6 K/uL  Hemoglobin : 7.7 g/dL  Hematocrit : 24.1 %  Platelet Count - Automated : 275 K/uL  Mean Cell Volume : 86.2 fl  Mean Cell Hemoglobin : 27.5 pg  Mean Cell Hemoglobin Concentration : 31.9 gm/dL  Auto Neutrophil # : x  Auto Lymphocyte # : x  Auto Monocyte # : x  Auto Eosinophil # : x  Auto Basophil # : x  Auto Neutrophil % : x  Auto Lymphocyte % : x  Auto Monocyte % : x  Auto Eosinophil % : x  Auto Basophil % : x    04-19    131<L>  |  96  |  69<H>  ----------------------------<  91  4.6   |  22  |  6.76<H>  04-18    132<L>  |  96  |  65<H>  ----------------------------<  104<H>  4.1   |  22  |  6.02<H>    Ca    7.7<L>      19 Apr 2019 02:22  Ca    7.6<L>      18 Apr 2019 12:26  Phos  5.7     04-19  Phos  4.8     04-18  Mg     2.3     04-19  Mg     2.3     04-18    TPro  4.7<L>  /  Alb  2.5<L>  /  TBili  0.8  /  DBili  x   /  AST  75<H>  /  ALT  111<H>  /  AlkPhos  174<H>  04-19  TPro  4.5<L>  /  Alb  2.5<L>  /  TBili  0.8  /  DBili  x   /  AST  86<H>  /  ALT  127<H>  /  AlkPhos  174<H>  04-18 Sitting in chair. Denies CP, SOB. LE wrapped with ACE bandages. She would like to hold off on any dialysis catheter.     MEDICATIONS:  amiodarone    Tablet 400 milliGRAM(s) Oral every 8 hours  amiodarone    Tablet   Oral   hydrALAZINE 75 milliGRAM(s) Oral every 8 hours  hydrALAZINE Injectable 10 milliGRAM(s) IV Push every 6 hours PRN  metoprolol succinate ER 12.5 milliGRAM(s) Oral daily  guaiFENesin   Syrup  (Sugar-Free) 200 milliGRAM(s) Oral every 6 hours PRN  acetaminophen   Tablet .. 650 milliGRAM(s) Oral every 6 hours PRN  ondansetron Injectable 4 milliGRAM(s) IV Push every 8 hours PRN  oxyCODONE    IR 5 milliGRAM(s) Oral every 4 hours PRN  docusate sodium 100 milliGRAM(s) Oral three times a day  senna 2 Tablet(s) Oral at bedtime  benzocaine 15 mG/menthol 3.6 mG Lozenge 1 Lozenge Oral every 2 hours PRN  sodium chloride 0.65% Nasal 1 Spray(s) Both Nostrils three times a day PRN  sodium chloride 0.9%. 1000 milliLiter(s) IV Continuous <Continuous>    PHYSICAL EXAM:  T(C): 36.7 (04-19-19 @ 07:00), Max: 36.9 (04-18-19 @ 20:00)  HR: 101 (04-19-19 @ 10:00) (89 - 114)  BP: --  RR: 30 (04-19-19 @ 10:00) (12 - 30)  SpO2: 100% (04-19-19 @ 10:00) (92% - 100%)  Wt(kg): --  I&O's Summary    18 Apr 2019 07:01  -  19 Apr 2019 07:00  --------------------------------------------------------  IN: 1320 mL / OUT: 2660 mL / NET: -1340 mL    19 Apr 2019 07:01  -  19 Apr 2019 13:29  --------------------------------------------------------  IN: 130 mL / OUT: 300 mL / NET: -170 mL    Appearance: NAD, sitting in chair	  HEENT:   Normal oral mucosa, PERRL, EOMI	  Lymphatic: No lymphadenopathy  Cardiovascular: Normal S1 S2, JVD to 8cm H2O, No murmurs, LE edema - wrapped in ACE bandages bilaterally -  Respiratory: Lungs clear to auscultation	  Psychiatry: A & O x 3, Mood & affect appropriate  Gastrointestinal:  Soft, Non-tender, + BS	  Skin: No rashes, No ecchymoses, No cyanosis	  Neurologic: Non-focal  Extremities: Normal range of motion, No clubbing, cyanosis or edema  Vascular: Peripheral pulses palpable bilaterally    LABS:	 	    CBC Full  -  ( 19 Apr 2019 02:22 )  WBC Count : 18.6 K/uL  Hemoglobin : 7.7 g/dL  Hematocrit : 24.1 %  Platelet Count - Automated : 275 K/uL  Mean Cell Volume : 86.2 fl  Mean Cell Hemoglobin : 27.5 pg  Mean Cell Hemoglobin Concentration : 31.9 gm/dL  Auto Neutrophil # : x  Auto Lymphocyte # : x  Auto Monocyte # : x  Auto Eosinophil # : x  Auto Basophil # : x  Auto Neutrophil % : x  Auto Lymphocyte % : x  Auto Monocyte % : x  Auto Eosinophil % : x  Auto Basophil % : x    04-19    131<L>  |  96  |  69<H>  ----------------------------<  91  4.6   |  22  |  6.76<H>  04-18    132<L>  |  96  |  65<H>  ----------------------------<  104<H>  4.1   |  22  |  6.02<H>    Ca    7.7<L>      19 Apr 2019 02:22  Ca    7.6<L>      18 Apr 2019 12:26  Phos  5.7     04-19  Phos  4.8     04-18  Mg     2.3     04-19  Mg     2.3     04-18    TPro  4.7<L>  /  Alb  2.5<L>  /  TBili  0.8  /  DBili  x   /  AST  75<H>  /  ALT  111<H>  /  AlkPhos  174<H>  04-19  TPro  4.5<L>  /  Alb  2.5<L>  /  TBili  0.8  /  DBili  x   /  AST  86<H>  /  ALT  127<H>  /  AlkPhos  174<H>  04-18

## 2019-04-19 NOTE — PROGRESS NOTE ADULT - PROBLEM SELECTOR PLAN 2
acute ATN from shock  Creatinine continues to be elevated. Nephrology input appreciated  Currently has no acute indication for HD  Continue to monitor renal function closely  Sierra rodney

## 2019-04-19 NOTE — PROGRESS NOTE ADULT - PROBLEM SELECTOR PLAN 1
She was successfully decannulated 4/16.   She remains off inotropes. Per handoff intraoperative BETTY with LVEF approximately 30%. Will likely repeat TTE next week for EF assessment.   Will continue to monitor off inotropes  Increase hydral to 75 mg q8h (hold for SBP <90)  encourage fluid intake since she appears volume depleted  will increase toprol to 75 mg qd ( to receive extra 25 mg this am) She was successfully decannulated 4/16.   She remains off inotropes. Per handoff intraoperative BETTY with LVEF approximately 30%. Will likely repeat TTE next week for EF assessment.   Will continue to monitor off inotropes  Increase hydral to 75 mg q8h (hold for SBP <90)  Given increase in JVP will give Bumex 2mg IV x1 now.   will increase toprol to 75 mg qd ( to receive extra 25 mg this am)

## 2019-04-19 NOTE — PROGRESS NOTE ADULT - SUBJECTIVE AND OBJECTIVE BOX
VITAL SIGNS    Telemetry:    Vital Signs Last 24 Hrs  T(C): 36.7 (19 @ 17:25), Max: 36.9 (19 @ 20:00)  T(F): 98.1 (19 @ 17:25), Max: 98.5 (19 @ 20:00)  HR: 86 (19 @ 17:25) (78 - 114)  BP: 132/72 (19 @ 17:25) (132/72 - 132/72)  RR: 20 (19 @ 17:25) (12 - 30)  SpO2: 97% (19 @ 17:25) (92% - 100%)             @ 07:01  -   @ 07:00  --------------------------------------------------------  IN: 1320 mL / OUT: 2660 mL / NET: -1340 mL     @ 07:01  -   @ 17:56  --------------------------------------------------------  IN: 670 mL / OUT: 1250 mL / NET: -580 mL       Daily     Daily   Admit Wt: Drug Dosing Weight  Height (cm): 165 (2019 06:55)  Weight (kg): 126.6 (2019 07:07)  BMI (kg/m2): 46.5 (2019 07:07)  BSA (m2): 2.28 (2019 07:07)     Daily     LABS      133<L>  |  97  |  72<H>  ----------------------------<  141<H>  4.5   |  21<L>  |  6.92<H>    Ca    7.8<L>      2019 13:59  Phos  5.7     04-19  Mg     2.3         TPro  5.0<L>  /  Alb  2.6<L>  /  TBili  0.8  /  DBili  x   /  AST  67<H>  /  ALT  104<H>  /  AlkPhos  180<H>                                   7.7    18.6  )-----------( 275      ( 2019 02:22 )             24.1                  Bilirubin Total, Serum: 0.8 mg/dL ( @ 13:59)  Bilirubin Total, Serum: 0.8 mg/dL ( @ 02:22)    CAPILLARY BLOOD GLUCOSE              Drains:     MS       [  ]   [  ]            L Pleural [  ]            R Pleural  [  ]            ELSIE  [  ]           Rodney  [x  ] [x] DANIE-left    [x] RIJ introducer    Pacing Wires      [  ]   Settings:                                  Isolated  [  ]                    CXR:      MEDICATIONS  acetaminophen   Tablet .. 650 milliGRAM(s) Oral every 6 hours PRN  amiodarone    Tablet 400 milliGRAM(s) Oral every 8 hours  amiodarone    Tablet   Oral   benzocaine 15 mG/menthol 3.6 mG Lozenge 1 Lozenge Oral every 2 hours PRN  docusate sodium 100 milliGRAM(s) Oral three times a day  guaiFENesin   Syrup  (Sugar-Free) 200 milliGRAM(s) Oral every 6 hours PRN  heparin  Injectable 5000 Unit(s) SubCutaneous every 8 hours  hydrALAZINE 75 milliGRAM(s) Oral every 8 hours  hydrALAZINE Injectable 10 milliGRAM(s) IV Push every 6 hours PRN  metoprolol succinate ER 12.5 milliGRAM(s) Oral daily  ondansetron Injectable 4 milliGRAM(s) IV Push every 8 hours PRN  oxyCODONE    IR 5 milliGRAM(s) Oral every 4 hours PRN  pantoprazole    Tablet 40 milliGRAM(s) Oral before breakfast  senna 2 Tablet(s) Oral at bedtime  sodium chloride 0.65% Nasal 1 Spray(s) Both Nostrils three times a day PRN  sodium chloride 0.9%. 1000 milliLiter(s) IV Continuous <Continuous>      PHYSICAL EXAM      Neurology: alert and oriented x 3, nonfocal, no gross deficits  CV :S1S2  Sternal Wound :  right ecmo site dsg CDI no hematoma  Lungs: cta  Abdomen: soft, nontender, nondistended, positive bowel sounds, staples to lower abdomen-incision cdi  :  rodney    Extremities:  Lower extrem 3+-4+ edema wrapped, RUE with edema and pain full, right groin stich intact no hematoma               PAST MEDICAL & SURGICAL HISTORY:  Asthma: as child, no inhaler use&gt;10 years. Denies hospitlizations or intubations. No current inhaler.  History of low transverse  section                 Discussed with Cardiothoracic Team at AM rounds.

## 2019-04-19 NOTE — PROGRESS NOTE ADULT - PROBLEM SELECTOR PLAN 2
Creatinine has fluctuated and continues to be elevated. Likely acute ATN from shock. However her urine output has greatly improved.  Nephrology input appreciated. She currently has no acute indication for HD, however her BUN continues to rise - if this continues she may need ultrafiltration. Creatinine has fluctuated and continues to be elevated. Likely acute ATN from shock. However her urine output has greatly improved.  Nephrology input appreciated. She currently has no acute indication for HD, however her BUN continues to rise - if this continues she may need clearance.

## 2019-04-19 NOTE — PROGRESS NOTE ADULT - ASSESSMENT
Ms. Jerome is a 33 year old woman with history of hyperemesis gravidarum  initially presented to [facility] (35 weeks gestation) for SOB. She was found in SVT and received adenosinex4 and failed cardioversion x2. Pt then emergently taken for  and post op went into shock. She was found to have reduced EF (10-15%) and placed on VA ECMO and CVVHD. Transferred to [facility] on  for further management. 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 Was successfully decannulated after successful wean on . She has been titrated off pressors and her urine output has improved although her creatinine and BUN remains elevated. She remains tachycardic (improving) and notably hypertensive. We have transitioned her off of cardene gtt to oral neurohormonal antagonists. Ms. Jerome is a 33 year old woman with history of hyperemesis gravidarum  initially presented to [facility] (35 weeks gestation) for SOB. She was found in SVT and received adenosinex4 and failed cardioversion x2. Pt then emergently taken for  and post op went into shock. She was found to have reduced EF (10-15%) and placed on VA ECMO and CVVHD. Transferred to [facility] on  for further management. 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 Was successfully decannulated after successful wean on . She has been titrated off pressors and her urine output has improved although her creatinine and BUN remains elevated. She remains tachycardic (improving) and notably hypertensive. We have transitioned her off of cardene gtt to oral neurohormonal antagonists.     CVP 8 with HJR to 11

## 2019-04-19 NOTE — CHART NOTE - NSCHARTNOTEFT_GEN_A_CORE
Patient seen with Dr. Thierry Guadarrama. Per CT ICU team, patient is stable to be transferred from ICU. Cardiac function improved. Patient is s/p dialysis for rising creatinine, nephrology holding on dialysis for now at patient is making adequate urine.        Incision inspected, C/D/I with staples in place. Will removed on Monday, 4/21.     Ob will continue to follow.    CHARISSE Angulo PGY-3  [id number]

## 2019-04-19 NOTE — PROGRESS NOTE ADULT - ASSESSMENT
34 yo  at 10w1d GA by LMP of 8/10/18 with EDC of 18 with known hyperemesis gravidarum presents with nausea and vomiting many times on 4/10/19.  HOSPITAL COURSE:  33y female HD#1,  34w5d dated by 1st trimester sonogram, presents to the ED on 4/10 with CC of SOB for past 2 days. Patient was sinus tachy to 200s, adenosine administered, no response, cardioverted with no resolution. OBGYN team proceeded with emergent . Patient intubated in ED and taken to L&D. EBL 800cc, no acute events intraop. SICU consult placed for hemodynamic monitoring and ventilator management.    SICU COURSE:  Patient was sedated with propofol and precedex, became acidotic and retaining. Cards concerned for thyroid storm and advised no CT with contrast to r/o PE, patient was not stable for V/Q scan. Central and arterial line placed overnight for HD monitoring. [place]ez placed in AM for possible hemodialysis vs CVVH.    During AM rounds on  patient became sinus tachy to 180s, multiple lopressor pushes given with minimal resolution.  Patient amio gtt d/c'd started on esmolol, milrinone with resolution of sinus tachy 120s. Patient weaned off shameka, levo titrating down. Nephro consult placed, recs CVVH for hyperkalemia, low UO. Cardiology, EPS, CT Surg, Interventional cardiology bedside for evaluation determined patient would benefit from ecmo/impella due to decreasing EF 10% from 50% in ED. VA ECMO placed (right SC arterial, right femoral venous) and transferred to [facility] for further workup and management.     On arrival to [facility] at 1130pm on 19, pt received on Levophed 0.23mcg/kg/min (50ml/hr), Vasopressin 0.06units/hr (4ml), Phenylephrine 0.5mcg/kg/min (21ml/hr), VA ECMO 3100RPM with 2.45LPM   ECMO d/cd  : Arrival to SDU, HD stable.

## 2019-04-19 NOTE — PROGRESS NOTE ADULT - PROBLEM SELECTOR PLAN 1
Monitor off inotropes.   intraoperative BETTY with LVEF approximately 30%. Will likely repeat TTE next week for EF assessment.    hydral increased to 75 mg q8h (hold for SBP <90) per HF  Bumex 2mg IV x1 given in CTU prior to coming to SDU. Monitor urine output   toprol increrased to 75 mg qd per HF.   Ambulate  Incentive spirometry

## 2019-04-20 LAB
ALBUMIN SERPL ELPH-MCNC: 2.8 G/DL — LOW (ref 3.3–5)
ALBUMIN SERPL ELPH-MCNC: 2.9 G/DL — LOW (ref 3.3–5)
ALP SERPL-CCNC: 173 U/L — HIGH (ref 40–120)
ALP SERPL-CCNC: 181 U/L — HIGH (ref 40–120)
ALT FLD-CCNC: 92 U/L — HIGH (ref 10–45)
ALT FLD-CCNC: 93 U/L — HIGH (ref 10–45)
ANION GAP SERPL CALC-SCNC: 16 MMOL/L — SIGNIFICANT CHANGE UP (ref 5–17)
ANION GAP SERPL CALC-SCNC: 17 MMOL/L — SIGNIFICANT CHANGE UP (ref 5–17)
AST SERPL-CCNC: 55 U/L — HIGH (ref 10–40)
AST SERPL-CCNC: 55 U/L — HIGH (ref 10–40)
BASE EXCESS BLDA CALC-SCNC: 0.4 MMOL/L — SIGNIFICANT CHANGE UP (ref -2–2)
BASOPHILS # BLD AUTO: 0 K/UL — SIGNIFICANT CHANGE UP (ref 0–0.2)
BASOPHILS NFR BLD AUTO: 0.1 % — SIGNIFICANT CHANGE UP (ref 0–2)
BILIRUB SERPL-MCNC: 0.8 MG/DL — SIGNIFICANT CHANGE UP (ref 0.2–1.2)
BILIRUB SERPL-MCNC: 0.9 MG/DL — SIGNIFICANT CHANGE UP (ref 0.2–1.2)
BUN SERPL-MCNC: 75 MG/DL — HIGH (ref 7–23)
BUN SERPL-MCNC: 76 MG/DL — HIGH (ref 7–23)
CALCIUM SERPL-MCNC: 8 MG/DL — LOW (ref 8.4–10.5)
CALCIUM SERPL-MCNC: 8.2 MG/DL — LOW (ref 8.4–10.5)
CHLORIDE SERPL-SCNC: 97 MMOL/L — SIGNIFICANT CHANGE UP (ref 96–108)
CHLORIDE SERPL-SCNC: 99 MMOL/L — SIGNIFICANT CHANGE UP (ref 96–108)
CO2 BLDA-SCNC: 24 MMOL/L — SIGNIFICANT CHANGE UP (ref 22–30)
CO2 SERPL-SCNC: 21 MMOL/L — LOW (ref 22–31)
CO2 SERPL-SCNC: 22 MMOL/L — SIGNIFICANT CHANGE UP (ref 22–31)
CREAT SERPL-MCNC: 7.56 MG/DL — HIGH (ref 0.5–1.3)
CREAT SERPL-MCNC: 7.73 MG/DL — HIGH (ref 0.5–1.3)
EOSINOPHIL # BLD AUTO: 0.3 K/UL — SIGNIFICANT CHANGE UP (ref 0–0.5)
EOSINOPHIL NFR BLD AUTO: 2 % — SIGNIFICANT CHANGE UP (ref 0–6)
GAS PNL BLDA: SIGNIFICANT CHANGE UP
GAS PNL BLDA: SIGNIFICANT CHANGE UP
GLUCOSE SERPL-MCNC: 105 MG/DL — HIGH (ref 70–99)
GLUCOSE SERPL-MCNC: 80 MG/DL — SIGNIFICANT CHANGE UP (ref 70–99)
HCO3 BLDA-SCNC: 23 MMOL/L — SIGNIFICANT CHANGE UP (ref 21–29)
HCT VFR BLD CALC: 24.6 % — LOW (ref 34.5–45)
HCT VFR BLD CALC: 25.2 % — LOW (ref 34.5–45)
HGB BLD-MCNC: 8.2 G/DL — LOW (ref 11.5–15.5)
HGB BLD-MCNC: 8.2 G/DL — LOW (ref 11.5–15.5)
LYMPHOCYTES # BLD AUTO: 12.9 % — LOW (ref 13–44)
LYMPHOCYTES # BLD AUTO: 2.1 K/UL — SIGNIFICANT CHANGE UP (ref 1–3.3)
MAGNESIUM SERPL-MCNC: 2.4 MG/DL — SIGNIFICANT CHANGE UP (ref 1.6–2.6)
MAGNESIUM SERPL-MCNC: 2.6 MG/DL — SIGNIFICANT CHANGE UP (ref 1.6–2.6)
MCHC RBC-ENTMCNC: 28.8 PG — SIGNIFICANT CHANGE UP (ref 27–34)
MCHC RBC-ENTMCNC: 28.9 PG — SIGNIFICANT CHANGE UP (ref 27–34)
MCHC RBC-ENTMCNC: 32.6 GM/DL — SIGNIFICANT CHANGE UP (ref 32–36)
MCHC RBC-ENTMCNC: 33.3 GM/DL — SIGNIFICANT CHANGE UP (ref 32–36)
MCV RBC AUTO: 86.8 FL — SIGNIFICANT CHANGE UP (ref 80–100)
MCV RBC AUTO: 88.3 FL — SIGNIFICANT CHANGE UP (ref 80–100)
MONOCYTES # BLD AUTO: 1.4 K/UL — HIGH (ref 0–0.9)
MONOCYTES NFR BLD AUTO: 8.7 % — SIGNIFICANT CHANGE UP (ref 2–14)
NEUTROPHILS # BLD AUTO: 12.5 K/UL — HIGH (ref 1.8–7.4)
NEUTROPHILS NFR BLD AUTO: 76.3 % — SIGNIFICANT CHANGE UP (ref 43–77)
PCO2 BLDA: 32 MMHG — SIGNIFICANT CHANGE UP (ref 32–46)
PH BLDA: 7.48 — HIGH (ref 7.35–7.45)
PHOSPHATE SERPL-MCNC: 6.1 MG/DL — HIGH (ref 2.5–4.5)
PHOSPHATE SERPL-MCNC: 6.5 MG/DL — HIGH (ref 2.5–4.5)
PLATELET # BLD AUTO: 355 K/UL — SIGNIFICANT CHANGE UP (ref 150–400)
PLATELET # BLD AUTO: 371 K/UL — SIGNIFICANT CHANGE UP (ref 150–400)
PO2 BLDA: 93 MMHG — SIGNIFICANT CHANGE UP (ref 74–108)
POTASSIUM SERPL-MCNC: 4.6 MMOL/L — SIGNIFICANT CHANGE UP (ref 3.5–5.3)
POTASSIUM SERPL-MCNC: 4.7 MMOL/L — SIGNIFICANT CHANGE UP (ref 3.5–5.3)
POTASSIUM SERPL-SCNC: 4.6 MMOL/L — SIGNIFICANT CHANGE UP (ref 3.5–5.3)
POTASSIUM SERPL-SCNC: 4.7 MMOL/L — SIGNIFICANT CHANGE UP (ref 3.5–5.3)
PROT SERPL-MCNC: 5 G/DL — LOW (ref 6–8.3)
PROT SERPL-MCNC: 5.1 G/DL — LOW (ref 6–8.3)
RBC # BLD: 2.83 M/UL — LOW (ref 3.8–5.2)
RBC # BLD: 2.85 M/UL — LOW (ref 3.8–5.2)
RBC # FLD: 15.3 % — HIGH (ref 10.3–14.5)
RBC # FLD: 15.9 % — HIGH (ref 10.3–14.5)
SAO2 % BLDA: 98 % — HIGH (ref 92–96)
SODIUM SERPL-SCNC: 135 MMOL/L — SIGNIFICANT CHANGE UP (ref 135–145)
SODIUM SERPL-SCNC: 137 MMOL/L — SIGNIFICANT CHANGE UP (ref 135–145)
WBC # BLD: 16.2 K/UL — HIGH (ref 3.8–10.5)
WBC # BLD: 16.4 K/UL — HIGH (ref 3.8–10.5)
WBC # FLD AUTO: 16.2 K/UL — HIGH (ref 3.8–10.5)
WBC # FLD AUTO: 16.4 K/UL — HIGH (ref 3.8–10.5)

## 2019-04-20 PROCEDURE — 76937 US GUIDE VASCULAR ACCESS: CPT | Mod: 26

## 2019-04-20 PROCEDURE — 36800 INSERTION OF CANNULA: CPT

## 2019-04-20 PROCEDURE — 99233 SBSQ HOSP IP/OBS HIGH 50: CPT | Mod: GC

## 2019-04-20 PROCEDURE — 71045 X-RAY EXAM CHEST 1 VIEW: CPT | Mod: 26

## 2019-04-20 RX ORDER — ACETAMINOPHEN 500 MG
1000 TABLET ORAL ONCE
Refills: 0 | Status: COMPLETED | OUTPATIENT
Start: 2019-04-20 | End: 2019-04-20

## 2019-04-20 RX ORDER — AMIODARONE HYDROCHLORIDE 400 MG/1
400 TABLET ORAL THREE TIMES A DAY
Refills: 0 | Status: DISCONTINUED | OUTPATIENT
Start: 2019-04-20 | End: 2019-04-20

## 2019-04-20 RX ORDER — ALPRAZOLAM 0.25 MG
0.25 TABLET ORAL AT BEDTIME
Refills: 0 | Status: DISCONTINUED | OUTPATIENT
Start: 2019-04-20 | End: 2019-04-26

## 2019-04-20 RX ADMIN — Medication 75 MILLIGRAM(S): at 06:30

## 2019-04-20 RX ADMIN — Medication 400 MILLIGRAM(S): at 09:36

## 2019-04-20 RX ADMIN — Medication 12.5 MILLIGRAM(S): at 06:30

## 2019-04-20 RX ADMIN — PANTOPRAZOLE SODIUM 40 MILLIGRAM(S): 20 TABLET, DELAYED RELEASE ORAL at 06:30

## 2019-04-20 RX ADMIN — AMIODARONE HYDROCHLORIDE 400 MILLIGRAM(S): 400 TABLET ORAL at 14:18

## 2019-04-20 RX ADMIN — AMIODARONE HYDROCHLORIDE 400 MILLIGRAM(S): 400 TABLET ORAL at 06:30

## 2019-04-20 RX ADMIN — Medication 400 MILLIGRAM(S): at 01:48

## 2019-04-20 RX ADMIN — HEPARIN SODIUM 5000 UNIT(S): 5000 INJECTION INTRAVENOUS; SUBCUTANEOUS at 14:18

## 2019-04-20 RX ADMIN — Medication 1000 MILLIGRAM(S): at 02:27

## 2019-04-20 RX ADMIN — Medication 75 MILLIGRAM(S): at 22:57

## 2019-04-20 RX ADMIN — HEPARIN SODIUM 5000 UNIT(S): 5000 INJECTION INTRAVENOUS; SUBCUTANEOUS at 22:58

## 2019-04-20 RX ADMIN — SENNA PLUS 2 TABLET(S): 8.6 TABLET ORAL at 22:58

## 2019-04-20 RX ADMIN — Medication 1000 MILLIGRAM(S): at 09:45

## 2019-04-20 RX ADMIN — AMIODARONE HYDROCHLORIDE 400 MILLIGRAM(S): 400 TABLET ORAL at 22:57

## 2019-04-20 RX ADMIN — HEPARIN SODIUM 5000 UNIT(S): 5000 INJECTION INTRAVENOUS; SUBCUTANEOUS at 06:29

## 2019-04-20 RX ADMIN — OXYCODONE HYDROCHLORIDE 5 MILLIGRAM(S): 5 TABLET ORAL at 12:40

## 2019-04-20 RX ADMIN — Medication 1 APPLICATORFUL: at 06:31

## 2019-04-20 RX ADMIN — ONDANSETRON 4 MILLIGRAM(S): 8 TABLET, FILM COATED ORAL at 16:19

## 2019-04-20 RX ADMIN — Medication 75 MILLIGRAM(S): at 14:18

## 2019-04-20 RX ADMIN — Medication 10 MILLIGRAM(S): at 09:19

## 2019-04-20 RX ADMIN — OXYCODONE HYDROCHLORIDE 5 MILLIGRAM(S): 5 TABLET ORAL at 12:20

## 2019-04-20 NOTE — PROGRESS NOTE ADULT - PROBLEM SELECTOR PLAN 1
Pt with YAJAIRA - ATN from shock/ acute cardiomyopathy. Pt with good urine output in response to IV diuretic therapy of 3.3L. Agree with continuing IV diuretic therapy.  Scr likely plateaued at 7.7 today.   continue to monitor urine output and daily weights.   Pt remains hypervolemic. Plan for IHD for hypervolemia today via RIJ non-tunneled HD catheter. Monitor for renal recovery daily. Pt with YAJAIRA - ATN from shock/ acute cardiomyopathy. Pt with good urine output in response to IV diuretic therapy of 3.3L. Agree with continuing IV diuretic therapy.  Scr likely plateaued at 7.7 today.   continue to monitor urine output and daily weights.   Pt remains hypervolemic. Plan for IHD for hypervolemia today via left IJ non-tunneled HD catheter. Monitor for renal recovery daily.

## 2019-04-20 NOTE — PROGRESS NOTE ADULT - ASSESSMENT
34 yo  at 10w1d GA by LMP of 8/10/18 with EDC of 18 with known hyperemesis gravidarum presents with nausea and vomiting many times on 4/10/19.  HOSPITAL COURSE:  33y female HD#1,  34w5d dated by 1st trimester sonogram, presents to the ED on 4/10 with CC of SOB for past 2 days. Patient was sinus tachy to 200s, adenosine administered, no response, cardioverted with no resolution. OBGYN team proceeded with emergent . Patient intubated in ED and taken to L&D. EBL 800cc, no acute events intraop. SICU consult placed for hemodynamic monitoring and ventilator management.    SICU COURSE:  Patient was sedated with propofol and precedex, became acidotic and retaining. Cards concerned for thyroid storm and advised no CT with contrast to r/o PE, patient was not stable for V/Q scan. Central and arterial line placed overnight for HD monitoring. [place] placed in AM for possible hemodialysis vs CVVH.    During AM rounds on  patient became sinus tachy to 180s, multiple lopressor pushes given with minimal resolution.  Patient amio gtt d/c'd started on esmolol, milrinone with resolution of sinus tachy 120s. Patient weaned off shameka, levo titrating down. Nephro consult placed, recs CVVH for hyperkalemia, low UO. Cardiology, EPS, CT Surg, Interventional cardiology bedside for evaluation determined patient would benefit from ecmo/impella due to decreasing EF 10% from 50% in ED. VA ECMO placed (right SC arterial, right femoral venous) and transferred to [facility] for further workup and management.     On arrival to [facility] at 1130pm on 19, pt received on Levophed 0.23mcg/kg/min (50ml/hr), Vasopressin 0.06units/hr (4ml), Phenylephrine 0.5mcg/kg/min (21ml/hr), VA ECMO 3100RPM with 2.45LPM   ECMO d/cd  : Arrival to SDU, HD stable.   : [facility] HD placed by [facility] CTU. CXR pending. Creatinine rising today, c/o sob abg appears stable

## 2019-04-20 NOTE — PROGRESS NOTE ADULT - PROBLEM SELECTOR PLAN 1
Monitor off inotropes.   intraoperative BETTY with LVEF approximately 30%. Will likely repeat TTE next week for EF assessment.    hydral  75 mg q8h (hold for SBP <90) per HF   Monitor urine output   toprol increrased to 75 mg qd per HF.   Ambulate  Incentive spirometry

## 2019-04-20 NOTE — PROGRESS NOTE ADULT - SUBJECTIVE AND OBJECTIVE BOX
VITAL SIGNS    Telemetry:    Vital Signs Last 24 Hrs  T(C): 36.7 (19 @ 09:00), Max: 36.9 (19 @ 23:00)  T(F): 98 (19 @ 09:00), Max: 98.5 (19 @ 23:00)  HR: 90 (19 @ 09:00) (78 - 93)  BP: 141/79 (19 @ 09:00) (131/60 - 141/79)  RR: 18 (19 @ 09:00) (14 - 29)  SpO2: 96% (19 @ 09:00) (92% - 100%)             @ 07:01  -   @ 07:00  --------------------------------------------------------  IN: 1005 mL / OUT: 3325 mL / NET: -2320 mL     @ 07:01  -   @ 10:00  --------------------------------------------------------  IN: 0 mL / OUT: 275 mL / NET: -275 mL       Daily     Daily   Admit Wt: Drug Dosing Weight  Height (cm): 165 (2019 06:55)  Weight (kg): 126.6 (2019 07:07)  BMI (kg/m2): 46.5 (2019 07:07)  BSA (m2): 2.28 (2019 07:07)     Daily     LABS      135  |  97  |  75<H>  ----------------------------<  80  4.6   |  21<L>  |  7.73<H>    Ca    8.2<L>      2019 06:46  Phos  6.5       Mg     2.6         TPro  5.1<L>  /  Alb  2.9<L>  /  TBili  0.9  /  DBili  x   /  AST  55<H>  /  ALT  93<H>  /  AlkPhos  173<H>                                   8.2    16.2  )-----------( 355      ( 2019 06:46 )             25.2                  Bilirubin Total, Serum: 0.9 mg/dL ( @ 06:46)  Bilirubin Total, Serum: 0.8 mg/dL ( @ 02:06)  Bilirubin Total, Serum: 0.8 mg/dL ( @ 13:59)    CAPILLARY BLOOD GLUCOSE              Drains:     MS       [  ]   [  ]            L Pleural [  ]            R Pleural  [  ]            ELSIE  [  ]           Bazzi  [ x ]    Pacing Wires      [  ]   Settings:                                  Isolated  [  ]                    CXR:      MEDICATIONS  acetaminophen   Tablet .. 650 milliGRAM(s) Oral every 6 hours PRN  amiodarone    Tablet 400 milliGRAM(s) Oral every 8 hours  amiodarone    Tablet   Oral   benzocaine 15 mG/menthol 3.6 mG Lozenge 1 Lozenge Oral every 2 hours PRN  docusate sodium 100 milliGRAM(s) Oral three times a day  guaiFENesin   Syrup  (Sugar-Free) 200 milliGRAM(s) Oral every 6 hours PRN  heparin  Injectable 5000 Unit(s) SubCutaneous every 8 hours  hydrALAZINE 75 milliGRAM(s) Oral every 8 hours  hydrALAZINE Injectable 10 milliGRAM(s) IV Push every 6 hours PRN  metoprolol succinate ER 12.5 milliGRAM(s) Oral daily  ondansetron Injectable 4 milliGRAM(s) IV Push every 8 hours PRN  oxyCODONE    IR 5 milliGRAM(s) Oral every 4 hours PRN  pantoprazole    Tablet 40 milliGRAM(s) Oral before breakfast  senna 2 Tablet(s) Oral at bedtime  sodium chloride 0.65% Nasal 1 Spray(s) Both Nostrils three times a day PRN  sodium chloride 0.9%. 1000 milliLiter(s) IV Continuous <Continuous>      PHYSICAL EXAM      Neurology: alert and oriented x 3, nonfocal, no gross deficits  CV :S1S2  Sternal Wound :  CDI , Stable  Lungs: cta  Abdomen: soft, nontender, nondistended, positive bowel sounds, staples in place to lower abdomen  : voids      Extremities:   Warm to touch 3+ edema to lower extrem b/l and right upper extremity with slight improvement in edema               PAST MEDICAL & SURGICAL HISTORY:  Asthma: as child, no inhaler use&gt;10 years. Denies hospitlizations or intubations. No current inhaler.  History of low transverse  section                 Discussed with Cardiothoracic Team at AM rounds.

## 2019-04-20 NOTE — PROGRESS NOTE ADULT - PROBLEM SELECTOR PLAN 2
acute ATN from shock  Creatinine continues to be elevated. Nephrology input appreciated  May need HD  Continue to monitor renal function closely  Sierra rodney

## 2019-04-20 NOTE — PROGRESS NOTE ADULT - ATTENDING COMMENTS
YAJAIRA  ATN  Diuretic phase,  Discussed with CTS- she has a left IJ HD cath  Will plan on HD today for clearance and also to help with volume overload

## 2019-04-20 NOTE — PROGRESS NOTE ADULT - SUBJECTIVE AND OBJECTIVE BOX
[facility] DIVISION OF KIDNEY DISEASES AND HYPERTENSION -- FOLLOW UP NOTE  --------------------------------------------------------------------------------  Chief Complaint: YAJAIRA, hypervolemia    24 hour events/subjective:  Pt with improving urine output of 3.3L. HD catheter changes to RIJ non-tunneled HD catheter       PAST HISTORY  --------------------------------------------------------------------------------  No significant changes to PMH, PSH, FHx, SHx, unless otherwise noted    ALLERGIES & MEDICATIONS  --------------------------------------------------------------------------------  Allergies    No Known Allergies    Intolerances    Reglan (Other)    Standing Inpatient Medications  amiodarone    Tablet 400 milliGRAM(s) Oral every 8 hours  amiodarone    Tablet   Oral   docusate sodium 100 milliGRAM(s) Oral three times a day  heparin  Injectable 5000 Unit(s) SubCutaneous every 8 hours  hydrALAZINE 75 milliGRAM(s) Oral every 8 hours  metoprolol succinate ER 12.5 milliGRAM(s) Oral daily  pantoprazole    Tablet 40 milliGRAM(s) Oral before breakfast  senna 2 Tablet(s) Oral at bedtime  sodium chloride 0.9%. 1000 milliLiter(s) IV Continuous <Continuous>    PRN Inpatient Medications  acetaminophen   Tablet .. 650 milliGRAM(s) Oral every 6 hours PRN  benzocaine 15 mG/menthol 3.6 mG Lozenge 1 Lozenge Oral every 2 hours PRN  guaiFENesin   Syrup  (Sugar-Free) 200 milliGRAM(s) Oral every 6 hours PRN  hydrALAZINE Injectable 10 milliGRAM(s) IV Push every 6 hours PRN  ondansetron Injectable 4 milliGRAM(s) IV Push every 8 hours PRN  oxyCODONE    IR 5 milliGRAM(s) Oral every 4 hours PRN  sodium chloride 0.65% Nasal 1 Spray(s) Both Nostrils three times a day PRN      REVIEW OF SYSTEMS  --------------------------------------------------------------------------------  Gen: +weakness  Skin: No rashes  Head/Eyes/Ears/Mouth: No headache  Respiratory: No dyspnea, cough  CV: No chest pain, PND, orthopnea  GI: No abdominal pain, diarrhea, constipation, nausea, vomiting  : No increased frequency  MSK: +edema  Neuro: No dizziness/lightheadednes    All other systems were reviewed and are negative, except as noted.    VITALS/PHYSICAL EXAM  --------------------------------------------------------------------------------  T(C): 36.7 (04-20-19 @ 11:00), Max: 36.9 (04-19-19 @ 23:00)  HR: 84 (04-20-19 @ 11:00) (82 - 93)  BP: 127/67 (04-20-19 @ 11:00) (127/67 - 141/79)  RR: 18 (04-20-19 @ 11:00) (18 - 29)  SpO2: 97% (04-20-19 @ 11:00) (92% - 98%)  Wt(kg): --    04-19-19 @ 07:01  -  04-20-19 @ 07:00  --------------------------------------------------------  IN: 1005 mL / OUT: 3325 mL / NET: -2320 mL    04-20-19 @ 07:01  -  04-20-19 @ 14:19  --------------------------------------------------------  IN: 225 mL / OUT: 1000 mL / NET: -775 mL      Physical Exam:  	Gen: NAD  	Pulm: bibasilaclifton ferrer B/L  	CV: RRR, S1S2  	Abd: +BS, soft, nontender/nondistended  	: No suprapubic tenderness + rodney  	UE: Warm  	LE: Warm, + LE edema, wrapped in ACE  	Psych: Normal affect and mood  	Skin: Warm, without rashes  	Vascular access: RIJ non-tunneled HD catheter     LABS/STUDIES  --------------------------------------------------------------------------------              8.2    16.2  >-----------<  355      [04-20-19 @ 06:46]              25.2     135  |  97  |  75  ----------------------------<  80      [04-20-19 @ 06:46]  4.6   |  21  |  7.73        Ca     8.2     [04-20-19 @ 06:46]      Mg     2.6     [04-20-19 @ 06:46]      Phos  6.5     [04-20-19 @ 06:46]    TPro  5.1  /  Alb  2.9  /  TBili  0.9  /  DBili  x   /  AST  55  /  ALT  93  /  AlkPhos  173  [04-20-19 @ 06:46]    Creatinine Trend:  SCr 7.73 [04-20 @ 06:46]  SCr 7.56 [04-20 @ 02:06]  SCr 6.92 [04-19 @ 13:59]  SCr 6.76 [04-19 @ 02:22]  SCr 6.02 [04-18 @ 12:26]    Urinalysis - [04-12-19 @ 01:00]      Color Yellow / Appearance Slightly Turbid / SG 1.012 / pH 6.0      Gluc Negative / Ketone Negative  / Bili Negative / Urobili Negative       Blood Small / Protein 30 mg/dL / Leuk Est Negative / Nitrite Negative      RBC 15 / WBC 8 / Hyaline 5 / Gran  / Sq Epi  / Non Sq Epi 2 / Bacteria Negative    Urine Creatinine 5      [04-14-19 @ 12:04]  Urine Protein 12      [04-14-19 @ 16:06]    Iron 49, TIBC 312, %sat 16      [04-15-19 @ 09:04]  Ferritin 202      [04-15-19 @ 08:24]  HbA1c 5.5      [04-13-19 @ 09:28]  TSH 0.79      [04-10-19 @ 18:55]  Lipid: chol 140, , HDL 31, LDL 75      [04-15-19 @ 09:04]    HBsAb Nonreact      [04-15-19 @ 09:02]  HBsAg Nonreact      [04-15-19 @ 09:02]  HBcAb Nonreact      [04-15-19 @ 09:02]  HCV 0.06, Nonreact      [04-15-19 @ 09:02]  HIV Nonreact      [04-10-19 @ 18:51] [facility] DIVISION OF KIDNEY DISEASES AND HYPERTENSION -- FOLLOW UP NOTE  --------------------------------------------------------------------------------  Chief Complaint: YAJAIRA, hypervolemia    24 hour events/subjective:  Pt with improving urine output of 3.3L. HD catheter changes to left IJ non-tunneled HD catheter       PAST HISTORY  --------------------------------------------------------------------------------  No significant changes to PMH, PSH, FHx, SHx, unless otherwise noted    ALLERGIES & MEDICATIONS  --------------------------------------------------------------------------------  Allergies    No Known Allergies    Intolerances    Reglan (Other)    Standing Inpatient Medications  amiodarone    Tablet 400 milliGRAM(s) Oral every 8 hours  amiodarone    Tablet   Oral   docusate sodium 100 milliGRAM(s) Oral three times a day  heparin  Injectable 5000 Unit(s) SubCutaneous every 8 hours  hydrALAZINE 75 milliGRAM(s) Oral every 8 hours  metoprolol succinate ER 12.5 milliGRAM(s) Oral daily  pantoprazole    Tablet 40 milliGRAM(s) Oral before breakfast  senna 2 Tablet(s) Oral at bedtime  sodium chloride 0.9%. 1000 milliLiter(s) IV Continuous <Continuous>    PRN Inpatient Medications  acetaminophen   Tablet .. 650 milliGRAM(s) Oral every 6 hours PRN  benzocaine 15 mG/menthol 3.6 mG Lozenge 1 Lozenge Oral every 2 hours PRN  guaiFENesin   Syrup  (Sugar-Free) 200 milliGRAM(s) Oral every 6 hours PRN  hydrALAZINE Injectable 10 milliGRAM(s) IV Push every 6 hours PRN  ondansetron Injectable 4 milliGRAM(s) IV Push every 8 hours PRN  oxyCODONE    IR 5 milliGRAM(s) Oral every 4 hours PRN  sodium chloride 0.65% Nasal 1 Spray(s) Both Nostrils three times a day PRN      REVIEW OF SYSTEMS  --------------------------------------------------------------------------------  Gen: +weakness  Skin: No rashes  Head/Eyes/Ears/Mouth: No headache  Respiratory: No dyspnea, cough  CV: No chest pain, PND, orthopnea  GI: No abdominal pain, diarrhea, constipation, nausea, vomiting  : No increased frequency  MSK: +edema  Neuro: No dizziness/lightheadednes    All other systems were reviewed and are negative, except as noted.    VITALS/PHYSICAL EXAM  --------------------------------------------------------------------------------  T(C): 36.7 (04-20-19 @ 11:00), Max: 36.9 (04-19-19 @ 23:00)  HR: 84 (04-20-19 @ 11:00) (82 - 93)  BP: 127/67 (04-20-19 @ 11:00) (127/67 - 141/79)  RR: 18 (04-20-19 @ 11:00) (18 - 29)  SpO2: 97% (04-20-19 @ 11:00) (92% - 98%)  Wt(kg): --    04-19-19 @ 07:01  -  04-20-19 @ 07:00  --------------------------------------------------------  IN: 1005 mL / OUT: 3325 mL / NET: -2320 mL    04-20-19 @ 07:01  -  04-20-19 @ 14:19  --------------------------------------------------------  IN: 225 mL / OUT: 1000 mL / NET: -775 mL      Physical Exam:  	Gen: NAD  	Pulm: bibasilaclifton ferrer B/L  	CV: RRR, S1S2  	Abd: +BS, soft, nontender/nondistended  	: No suprapubic tenderness + rodney  	UE: Warm  	LE: Warm, + LE edema, wrapped in ACE  	Psych: Normal affect and mood  	Skin: Warm, without rashes  	Vascular access: left IJ non-tunneled HD catheter     LABS/STUDIES  --------------------------------------------------------------------------------              8.2    16.2  >-----------<  355      [04-20-19 @ 06:46]              25.2     135  |  97  |  75  ----------------------------<  80      [04-20-19 @ 06:46]  4.6   |  21  |  7.73        Ca     8.2     [04-20-19 @ 06:46]      Mg     2.6     [04-20-19 @ 06:46]      Phos  6.5     [04-20-19 @ 06:46]    TPro  5.1  /  Alb  2.9  /  TBili  0.9  /  DBili  x   /  AST  55  /  ALT  93  /  AlkPhos  173  [04-20-19 @ 06:46]    Creatinine Trend:  SCr 7.73 [04-20 @ 06:46]  SCr 7.56 [04-20 @ 02:06]  SCr 6.92 [04-19 @ 13:59]  SCr 6.76 [04-19 @ 02:22]  SCr 6.02 [04-18 @ 12:26]    Urinalysis - [04-12-19 @ 01:00]      Color Yellow / Appearance Slightly Turbid / SG 1.012 / pH 6.0      Gluc Negative / Ketone Negative  / Bili Negative / Urobili Negative       Blood Small / Protein 30 mg/dL / Leuk Est Negative / Nitrite Negative      RBC 15 / WBC 8 / Hyaline 5 / Gran  / Sq Epi  / Non Sq Epi 2 / Bacteria Negative    Urine Creatinine 5      [04-14-19 @ 12:04]  Urine Protein 12      [04-14-19 @ 16:06]    Iron 49, TIBC 312, %sat 16      [04-15-19 @ 09:04]  Ferritin 202      [04-15-19 @ 08:24]  HbA1c 5.5      [04-13-19 @ 09:28]  TSH 0.79      [04-10-19 @ 18:55]  Lipid: chol 140, , HDL 31, LDL 75      [04-15-19 @ 09:04]    HBsAb Nonreact      [04-15-19 @ 09:02]  HBsAg Nonreact      [04-15-19 @ 09:02]  HBcAb Nonreact      [04-15-19 @ 09:02]  HCV 0.06, Nonreact      [04-15-19 @ 09:02]  HIV Nonreact      [04-10-19 @ 18:51]

## 2019-04-20 NOTE — PROGRESS NOTE ADULT - ASSESSMENT
32 yo lady with no past medical history 35 weeks pregnant transferred to [facility] after she presented with shortness of breath, SVT and acutely decompensated with severe cardiomyopathy, needing ECMO, s/p emergency  now in multiorgan failure and YAJAIRA.

## 2019-04-21 LAB
ALBUMIN SERPL ELPH-MCNC: 2.6 G/DL — LOW (ref 3.3–5)
ALP SERPL-CCNC: 149 U/L — HIGH (ref 40–120)
ALT FLD-CCNC: 70 U/L — HIGH (ref 10–45)
ANION GAP SERPL CALC-SCNC: 14 MMOL/L — SIGNIFICANT CHANGE UP (ref 5–17)
AST SERPL-CCNC: 39 U/L — SIGNIFICANT CHANGE UP (ref 10–40)
BASOPHILS # BLD AUTO: 0 K/UL — SIGNIFICANT CHANGE UP (ref 0–0.2)
BASOPHILS NFR BLD AUTO: 0.2 % — SIGNIFICANT CHANGE UP (ref 0–2)
BILIRUB SERPL-MCNC: 0.7 MG/DL — SIGNIFICANT CHANGE UP (ref 0.2–1.2)
BUN SERPL-MCNC: 52 MG/DL — HIGH (ref 7–23)
CALCIUM SERPL-MCNC: 8.2 MG/DL — LOW (ref 8.4–10.5)
CHLORIDE SERPL-SCNC: 100 MMOL/L — SIGNIFICANT CHANGE UP (ref 96–108)
CO2 SERPL-SCNC: 23 MMOL/L — SIGNIFICANT CHANGE UP (ref 22–31)
CREAT SERPL-MCNC: 6.03 MG/DL — HIGH (ref 0.5–1.3)
EOSINOPHIL # BLD AUTO: 0.4 K/UL — SIGNIFICANT CHANGE UP (ref 0–0.5)
EOSINOPHIL NFR BLD AUTO: 2.6 % — SIGNIFICANT CHANGE UP (ref 0–6)
GLUCOSE SERPL-MCNC: 82 MG/DL — SIGNIFICANT CHANGE UP (ref 70–99)
HCT VFR BLD CALC: 26 % — LOW (ref 34.5–45)
HGB BLD-MCNC: 8.5 G/DL — LOW (ref 11.5–15.5)
LYMPHOCYTES # BLD AUTO: 13.4 % — SIGNIFICANT CHANGE UP (ref 13–44)
LYMPHOCYTES # BLD AUTO: 2 K/UL — SIGNIFICANT CHANGE UP (ref 1–3.3)
MAGNESIUM SERPL-MCNC: 2.2 MG/DL — SIGNIFICANT CHANGE UP (ref 1.6–2.6)
MCHC RBC-ENTMCNC: 29.3 PG — SIGNIFICANT CHANGE UP (ref 27–34)
MCHC RBC-ENTMCNC: 32.9 GM/DL — SIGNIFICANT CHANGE UP (ref 32–36)
MCV RBC AUTO: 89 FL — SIGNIFICANT CHANGE UP (ref 80–100)
MONOCYTES # BLD AUTO: 1.3 K/UL — HIGH (ref 0–0.9)
MONOCYTES NFR BLD AUTO: 9.1 % — SIGNIFICANT CHANGE UP (ref 2–14)
NEUTROPHILS # BLD AUTO: 11 K/UL — HIGH (ref 1.8–7.4)
NEUTROPHILS NFR BLD AUTO: 74.6 % — SIGNIFICANT CHANGE UP (ref 43–77)
PHOSPHATE SERPL-MCNC: 5.5 MG/DL — HIGH (ref 2.5–4.5)
PLATELET # BLD AUTO: 357 K/UL — SIGNIFICANT CHANGE UP (ref 150–400)
POTASSIUM SERPL-MCNC: 4.9 MMOL/L — SIGNIFICANT CHANGE UP (ref 3.5–5.3)
POTASSIUM SERPL-SCNC: 4.9 MMOL/L — SIGNIFICANT CHANGE UP (ref 3.5–5.3)
PROT SERPL-MCNC: 5.1 G/DL — LOW (ref 6–8.3)
RBC # BLD: 2.92 M/UL — LOW (ref 3.8–5.2)
RBC # FLD: 15.9 % — HIGH (ref 10.3–14.5)
SODIUM SERPL-SCNC: 137 MMOL/L — SIGNIFICANT CHANGE UP (ref 135–145)
WBC # BLD: 14.7 K/UL — HIGH (ref 3.8–10.5)
WBC # FLD AUTO: 14.7 K/UL — HIGH (ref 3.8–10.5)

## 2019-04-21 PROCEDURE — 99233 SBSQ HOSP IP/OBS HIGH 50: CPT | Mod: GC

## 2019-04-21 PROCEDURE — 99232 SBSQ HOSP IP/OBS MODERATE 35: CPT

## 2019-04-21 PROCEDURE — 71045 X-RAY EXAM CHEST 1 VIEW: CPT | Mod: 26

## 2019-04-21 PROCEDURE — 93931 UPPER EXTREMITY STUDY: CPT | Mod: 26

## 2019-04-21 RX ORDER — ACETAMINOPHEN 500 MG
1000 TABLET ORAL ONCE
Refills: 0 | Status: COMPLETED | OUTPATIENT
Start: 2019-04-21 | End: 2019-04-21

## 2019-04-21 RX ORDER — HYDRALAZINE HCL 50 MG
100 TABLET ORAL EVERY 8 HOURS
Refills: 0 | Status: DISCONTINUED | OUTPATIENT
Start: 2019-04-21 | End: 2019-04-28

## 2019-04-21 RX ORDER — BUMETANIDE 0.25 MG/ML
1 INJECTION INTRAMUSCULAR; INTRAVENOUS EVERY 12 HOURS
Refills: 0 | Status: DISCONTINUED | OUTPATIENT
Start: 2019-04-21 | End: 2019-04-24

## 2019-04-21 RX ORDER — BUMETANIDE 0.25 MG/ML
3 INJECTION INTRAMUSCULAR; INTRAVENOUS EVERY 12 HOURS
Refills: 0 | Status: DISCONTINUED | OUTPATIENT
Start: 2019-04-21 | End: 2019-04-21

## 2019-04-21 RX ORDER — HYDRALAZINE HCL 50 MG
100 TABLET ORAL EVERY 8 HOURS
Refills: 0 | Status: DISCONTINUED | OUTPATIENT
Start: 2019-04-21 | End: 2019-04-21

## 2019-04-21 RX ADMIN — Medication 100 MILLIGRAM(S): at 15:21

## 2019-04-21 RX ADMIN — Medication 1000 MILLIGRAM(S): at 21:17

## 2019-04-21 RX ADMIN — Medication 100 MILLIGRAM(S): at 06:45

## 2019-04-21 RX ADMIN — Medication 650 MILLIGRAM(S): at 07:56

## 2019-04-21 RX ADMIN — Medication 400 MILLIGRAM(S): at 02:19

## 2019-04-21 RX ADMIN — AMIODARONE HYDROCHLORIDE 400 MILLIGRAM(S): 400 TABLET ORAL at 06:45

## 2019-04-21 RX ADMIN — Medication 12.5 MILLIGRAM(S): at 06:45

## 2019-04-21 RX ADMIN — Medication 400 MILLIGRAM(S): at 21:02

## 2019-04-21 RX ADMIN — Medication 650 MILLIGRAM(S): at 15:36

## 2019-04-21 RX ADMIN — Medication 650 MILLIGRAM(S): at 16:06

## 2019-04-21 RX ADMIN — HEPARIN SODIUM 5000 UNIT(S): 5000 INJECTION INTRAVENOUS; SUBCUTANEOUS at 06:45

## 2019-04-21 RX ADMIN — Medication 100 MILLIGRAM(S): at 21:00

## 2019-04-21 RX ADMIN — Medication 75 MILLIGRAM(S): at 06:45

## 2019-04-21 RX ADMIN — Medication 1000 MILLIGRAM(S): at 02:35

## 2019-04-21 RX ADMIN — Medication 400 MILLIGRAM(S): at 13:31

## 2019-04-21 RX ADMIN — Medication 0.25 MILLIGRAM(S): at 00:34

## 2019-04-21 RX ADMIN — HEPARIN SODIUM 5000 UNIT(S): 5000 INJECTION INTRAVENOUS; SUBCUTANEOUS at 21:00

## 2019-04-21 RX ADMIN — PANTOPRAZOLE SODIUM 40 MILLIGRAM(S): 20 TABLET, DELAYED RELEASE ORAL at 06:45

## 2019-04-21 RX ADMIN — Medication 650 MILLIGRAM(S): at 08:26

## 2019-04-21 RX ADMIN — Medication 1000 MILLIGRAM(S): at 14:01

## 2019-04-21 RX ADMIN — BUMETANIDE 124 MILLIGRAM(S): 0.25 INJECTION INTRAMUSCULAR; INTRAVENOUS at 17:06

## 2019-04-21 RX ADMIN — HEPARIN SODIUM 5000 UNIT(S): 5000 INJECTION INTRAVENOUS; SUBCUTANEOUS at 13:35

## 2019-04-21 NOTE — PROGRESS NOTE ADULT - ASSESSMENT
32 yo lady with no past medical history 35 weeks pregnant transferred to [facility] after she presented with shortness of breath, SVT and acutely decompensated with severe cardiomyopathy, needing ECMO, s/p emergency  now in multiorgan failure and YAJAIRA.

## 2019-04-21 NOTE — PROGRESS NOTE ADULT - ASSESSMENT
32 yo  at 10w1d GA by LMP of 8/10/18 with EDC of 18 with known hyperemesis gravidarum presents with nausea and vomiting many times on 4/10/19.  HOSPITAL COURSE:  33y female HD#1,  34w5d dated by 1st trimester sonogram, presents to the ED on 4/10 with CC of SOB for past 2 days. Patient was sinus tachy to 200s, adenosine administered, no response, cardioverted with no resolution. OBGYN team proceeded with emergent . Patient intubated in ED and taken to L&D. EBL 800cc, no acute events intraop. SICU consult placed for hemodynamic monitoring and ventilator management.    SICU COURSE:  Patient was sedated with propofol and precedex, became acidotic and retaining. Cards concerned for thyroid storm and advised no CT with contrast to r/o PE, patient was not stable for V/Q scan. Central and arterial line placed overnight for HD monitoring. Conception placed in AM for possible hemodialysis vs CVVH.    During AM rounds on  patient became sinus tachy to 180s, multiple lopressor pushes given with minimal resolution.  Patient amio gtt d/c'd started on esmolol, milrinone with resolution of sinus tachy 120s. Patient weaned off shameka, levo titrating down. Nephro consult placed, recs CVVH for hyperkalemia, low UO. Cardiology, EPS, CT Surg, Interventional cardiology bedside for evaluation determined patient would benefit from ecmo/impella due to decreasing EF 10% from 50% in ED. VA ECMO placed (right SC arterial, right femoral venous) and transferred to [facility] for further workup and management.     On arrival to [facility] at 1130pm on 19, pt received on Levophed 0.23mcg/kg/min (50ml/hr), Vasopressin 0.06units/hr (4ml), Phenylephrine 0.5mcg/kg/min (21ml/hr), VA ECMO 3100RPM with 2.45LPM   ECMO d/cd  : Arrival to SDU, HD stable.   : LIJ HD placed by [facility]. CXR pending. Creatinine rising today, c/o sob abg appears stable  : S/p HD yesterday with 1Liter taken off yesterday. No immediate plans for another round just yet will monitor for now. 32 yo  at 10w1d GA by LMP of 8/10/18 with EDC of 18 with known hyperemesis gravidarum presents with nausea and vomiting many times on 4/10/19.  HOSPITAL COURSE:  33y female HD#1,  34w5d dated by 1st trimester sonogram, presents to the ED on 4/10 with CC of SOB for past 2 days. Patient was sinus tachy to 200s, adenosine administered, no response, cardioverted with no resolution. OBGYN team proceeded with emergent . Patient intubated in ED and taken to L&D. EBL 800cc, no acute events intraop. SICU consult placed for hemodynamic monitoring and ventilator management.    SICU COURSE:  Patient was sedated with propofol and precedex, became acidotic and retaining. Cards concerned for thyroid storm and advised no CT with contrast to r/o PE, patient was not stable for V/Q scan. Central and arterial line placed overnight for HD monitoring. Dukedom placed in AM for possible hemodialysis vs CVVH.    During AM rounds on  patient became sinus tachy to 180s, multiple lopressor pushes given with minimal resolution.  Patient amio gtt d/c'd started on esmolol, milrinone with resolution of sinus tachy 120s. Patient weaned off shameka, levo titrating down. Nephro consult placed, recs CVVH for hyperkalemia, low UO. Cardiology, EPS, CT Surg, Interventional cardiology bedside for evaluation determined patient would benefit from ecmo/impella due to decreasing EF 10% from 50% in ED. VA ECMO placed (right SC arterial, right femoral venous) and transferred to [facility] for further workup and management.     On arrival to [facility] at 1130pm on 19, pt received on Levophed 0.23mcg/kg/min (50ml/hr), Vasopressin 0.06units/hr (4ml), Phenylephrine 0.5mcg/kg/min (21ml/hr), VA ECMO 3100RPM with 2.45LPM   ECMO d/cd  : Arrival to SDU, HD stable.   : [facility] HD placed by [facility]. CXR pending. Creatinine rising today, c/o sob abg appears stable  : S/p HD yesterday with 1Liter taken off yesterday. No immediate plans for another round just yet will monitor for now. D/w Dr. Call Va duplex to right arm to assess flow as pt complains numbness tingling weakness to RUE unable to hold baby safely-likely related to axillary canulation.  OT ordered for Right arm assessment and treatment

## 2019-04-21 NOTE — CONSULT NOTE ADULT - CONSULT REASON
AT
prelvad/transplant eval
ECMO, Post partum CM
Radial thrombus, hand pain
transferred from [place] POD#1 with heart failure on ECMO
CRRT, Oliguria

## 2019-04-21 NOTE — CONSULT NOTE ADULT - CONSULT REQUESTED BY NAME
Ongoing SW/CM Assessment/Plan of Care Note     See SW/CM flowsheets for goals and other objective data.    Patient/Family discharge goal (s):  Goal #1: Discharge to other institution(s) arranged          PT Recommendation:  Recommendation for Discharge: PT: Post acute therapy    OT Recommendation:  Recommendations for Discharge: OT: 24 Hour assist    SLP Recommendation:  Recommendations for Discharge: SLP: Rosalie Carr, [phone]        
Dr. Call
[place]
Dr. Call
CTU
CT ICU
Brendon Call

## 2019-04-21 NOTE — PROGRESS NOTE ADULT - SUBJECTIVE AND OBJECTIVE BOX
[facility] DIVISION OF KIDNEY DISEASES AND HYPERTENSION -- FOLLOW UP NOTE  --------------------------------------------------------------------------------    Chief Complaint: YAJAIRA, hypervolemia    24 hour events/subjective:  Pt with improving urine output of 2.9L. Pt with left IJ non-tunneled HD catheter. Pt had HD yesterday with 1L UF for hypervolemia. No plan for IHD today.     PAST HISTORY  --------------------------------------------------------------------------------  No significant changes to PMH, PSH, FHx, SHx, unless otherwise noted    ALLERGIES & MEDICATIONS  --------------------------------------------------------------------------------  Allergies    No Known Allergies    Intolerances    Reglan (Other)    Standing Inpatient Medications  acetaminophen  IVPB .. 1000 milliGRAM(s) IV Intermittent once  amiodarone    Tablet 200 milliGRAM(s) Oral daily  amiodarone    Tablet   Oral   docusate sodium 100 milliGRAM(s) Oral three times a day  heparin  Injectable 5000 Unit(s) SubCutaneous every 8 hours  hydrALAZINE 100 milliGRAM(s) Oral every 8 hours  metoprolol succinate ER 12.5 milliGRAM(s) Oral daily  pantoprazole    Tablet 40 milliGRAM(s) Oral before breakfast  senna 2 Tablet(s) Oral at bedtime  sodium chloride 0.9%. 1000 milliLiter(s) IV Continuous <Continuous>    PRN Inpatient Medications  acetaminophen   Tablet .. 650 milliGRAM(s) Oral every 6 hours PRN  ALPRAZolam 0.25 milliGRAM(s) Oral at bedtime PRN  benzocaine 15 mG/menthol 3.6 mG Lozenge 1 Lozenge Oral every 2 hours PRN  guaiFENesin   Syrup  (Sugar-Free) 200 milliGRAM(s) Oral every 6 hours PRN  hydrALAZINE Injectable 10 milliGRAM(s) IV Push every 6 hours PRN  ondansetron Injectable 4 milliGRAM(s) IV Push every 8 hours PRN  oxyCODONE    IR 5 milliGRAM(s) Oral every 4 hours PRN  sodium chloride 0.65% Nasal 1 Spray(s) Both Nostrils three times a day PRN      REVIEW OF SYSTEMS  --------------------------------------------------------------------------------  Gen: +weakness  Skin: No rashes  Head/Eyes/Ears/Mouth: No headache  Respiratory: No dyspnea, cough  CV: No chest pain, PND, orthopnea  GI: No abdominal pain, diarrhea, constipation, nausea, vomiting  : No increased frequency  MSK: +edema  Neuro: No dizziness/lightheadednes  All other systems were reviewed and are negative, except as noted.    VITALS/PHYSICAL EXAM  --------------------------------------------------------------------------------  T(C): 37 (04-21-19 @ 07:05), Max: 37 (04-20-19 @ 23:29)  HR: 93 (04-21-19 @ 11:43) (80 - 93)  BP: 140/69 (04-21-19 @ 07:05) (129/69 - 140/69)  RR: 18 (04-21-19 @ 11:43) (2 - 18)  SpO2: 98% (04-21-19 @ 11:43) (90% - 99%)  Wt(kg): --    Weight (kg): 110.4 (04-21-19 @ 07:00)      04-20-19 @ 07:01  -  04-21-19 @ 07:00  --------------------------------------------------------  IN: 460 mL / OUT: 3975 mL / NET: -3515 mL    04-21-19 @ 07:01  -  04-21-19 @ 13:31  --------------------------------------------------------  IN: 480 mL / OUT: 860 mL / NET: -380 mL    Physical Exam:  	Gen: NAD  	Pulm: trace bibasilar rales B/L  	CV: RRR, S1S2  	Abd: +BS, soft, nontender/nondistended  	: No suprapubic tenderness + rodney  	UE: Warm  	LE: Warm, + LE edema  	Psych: Normal affect and mood  	Skin: Warm, without rashes  	Vascular access: left IJ non-tunneled HD catheter     LABS/STUDIES  --------------------------------------------------------------------------------              8.5    14.7  >-----------<  357      [04-21-19 @ 05:46]              26.0     137  |  100  |  52  ----------------------------<  82      [04-21-19 @ 05:46]  4.9   |  23  |  6.03        Ca     8.2     [04-21-19 @ 05:46]      Mg     2.2     [04-21-19 @ 05:46]      Phos  5.5     [04-21-19 @ 05:46]    TPro  5.1  /  Alb  2.6  /  TBili  0.7  /  DBili  x   /  AST  39  /  ALT  70  /  AlkPhos  149  [04-21-19 @ 05:46]    Creatinine Trend:  SCr 6.03 [04-21 @ 05:46]  SCr 7.73 [04-20 @ 06:46]  SCr 7.56 [04-20 @ 02:06]  SCr 6.92 [04-19 @ 13:59]  SCr 6.76 [04-19 @ 02:22]    Urinalysis - [04-12-19 @ 01:00]      Color Yellow / Appearance Slightly Turbid / SG 1.012 / pH 6.0      Gluc Negative / Ketone Negative  / Bili Negative / Urobili Negative       Blood Small / Protein 30 mg/dL / Leuk Est Negative / Nitrite Negative      RBC 15 / WBC 8 / Hyaline 5 / Gran  / Sq Epi  / Non Sq Epi 2 / Bacteria Negative    Urine Protein 12      [04-14-19 @ 16:06]    Iron 49, TIBC 312, %sat 16      [04-15-19 @ 09:04]  Ferritin 202      [04-15-19 @ 08:24]  HbA1c 5.5      [04-13-19 @ 09:28]  TSH 0.79      [04-10-19 @ 18:55]  Lipid: chol 140, , HDL 31, LDL 75      [04-15-19 @ 09:04]    HBsAb Nonreact      [04-15-19 @ 09:02]  HBsAg Nonreact      [04-15-19 @ 09:02]  HBcAb Nonreact      [04-15-19 @ 09:02]  HCV 0.06, Nonreact      [04-15-19 @ 09:02]  HIV Nonreact      [04-10-19 @ 18:51]

## 2019-04-21 NOTE — PROGRESS NOTE ADULT - PROBLEM SELECTOR PLAN 2
Creatinine has fluctuated and continues to be elevated. Likely acute ATN from shock. However her urine output has greatly improved.  Nephrology input appreciated. underwent HD session yesterday

## 2019-04-21 NOTE — CONSULT NOTE ADULT - ASSESSMENT
33F with pain in the RUQ, starting at upper arm and shooting into fingers, and R radial artery thrombus found on ultrasound  - Patient has complete arches and open ulnar; symptoms do not appear to be related to vascular compromise   - May need repeat duplex   - D/w Dr. Olivarez, fellow    [id number]

## 2019-04-21 NOTE — PROGRESS NOTE ADULT - PROBLEM SELECTOR PLAN 2
acute ATN from shock  Creatinine continues to be elevated. Nephrology input appreciated  s/p HD 4/20  Continue to monitor renal function closely  jennie/jerry rodney

## 2019-04-21 NOTE — PROGRESS NOTE ADULT - PROBLEM SELECTOR PLAN 1
Monitor off inotropes.   intraoperative BETTY with LVEF approximately 30%. Will likely repeat TTE next week for EF assessment.    hydral increased to 100 mg q8h (hold for SBP <90) per HF   Monitor urine output   toprol increrased to 75 mg qd per HF.   Ambulate  Incentive spirometry

## 2019-04-21 NOTE — PROGRESS NOTE ADULT - ATTENDING COMMENTS
YAJAIRA- ATN    She is much improved clinically today  Urine output improving  No HD today  Will reassess need for HD tomorrow

## 2019-04-21 NOTE — CONSULT NOTE ADULT - CONSULT REQUESTED DATE/TIME
21-Apr-2019 22:54
12-Apr-2019 01:46
12-Apr-2019 05:56
12-Apr-2019 12:58
12-Apr-2019 08:58
12-Apr-2019 12:31

## 2019-04-21 NOTE — PROGRESS NOTE ADULT - PROBLEM SELECTOR PLAN 1
She was successfully decannulated 4/16.   She remains off inotropes. Per handoff intraoperative BETTY with LVEF approximately 30%. Will likely repeat TTE next week for EF assessment.   Will continue to monitor off inotropes  Increase hydral to 100 mg q8h (hold for SBP <90)  Given increase in JVP will give Bumex 2mg IV q12 but given good response, lowered to 1 mg q12  will continue toprol to 12.5 mg qd

## 2019-04-21 NOTE — PROGRESS NOTE ADULT - PROBLEM SELECTOR PLAN 1
Pt with YAJAIRA - ATN from shock/ acute cardiomyopathy. Pt with good urine output in response to IV diuretic therapy of 2.9L. Agree with continuing IV diuretic therapy.  Pt had HD yesterday due to hypervolemia with 1L UF. No plan for IHD today. Monitor for renal recovery daily. Pt remains hypervolemic. Pt with left IJ non-tunneled HD catheter.   continue to monitor urine output and daily weights.

## 2019-04-21 NOTE — PROGRESS NOTE ADULT - SUBJECTIVE AND OBJECTIVE BOX
VITAL SIGNS    Telemetry:    Vital Signs Last 24 Hrs  T(C): 37 (19 @ 07:05), Max: 37 (19 @ 23:29)  T(F): 98.6 (19 @ 07:05), Max: 98.6 (19 @ 23:29)  HR: 93 (19 @ 11:43) (80 - 93)  BP: 140/69 (19 @ 07:05) (129/69 - 140/69)  RR: 18 (19 @ 11:43) (2 - 18)  SpO2: 98% (19 @ 11:43) (90% - 99%)            :01  -   @ 07:00  --------------------------------------------------------  IN: 460 mL / OUT: 3975 mL / NET: -3515 mL     07:01  -   @ 12:07  --------------------------------------------------------  IN: 240 mL / OUT: 485 mL / NET: -245 mL       Daily     Daily Weight in k.4 (2019 07:00)  Admit Wt: Drug Dosing Weight  Height (cm): 165 (2019 06:55)  Weight (kg): 110.4 (2019 07:00)  BMI (kg/m2): 40.6 (2019 07:00)  BSA (m2): 2.15 (2019 07:00)     Daily Weight in k.4 (19 @ 07:00)    [facility]      137  |  100  |  52<H>  ----------------------------<  82  4.9   |  23  |  6.03<H>    Ca    8.2<L>      2019 05:46  Phos  5.5       Mg     2.2         TPro  5.1<L>  /  Alb  2.6<L>  /  TBili  0.7  /  DBili  x   /  AST  39  /  ALT  70<H>  /  AlkPhos  149<H>                                   8.5    14.7  )-----------( 357      ( 2019 05:46 )             26.0                  Bilirubin Total, Serum: 0.7 mg/dL ( @ 05:46)    CAPILLARY BLOOD GLUCOSE              Drains:     MS       [  ]   [  ]            L Pleural [  ]            R Pleural  [  ]            ELSIE  [  ]           Rodney  [  ]    Pacing Wires      [  ]   Settings:                                  Isolated  [  ]                    CXR:      MEDICATIONS  acetaminophen   Tablet .. 650 milliGRAM(s) Oral every 6 hours PRN  ALPRAZolam 0.25 milliGRAM(s) Oral at bedtime PRN  amiodarone    Tablet 200 milliGRAM(s) Oral daily  amiodarone    Tablet   Oral   benzocaine 15 mG/menthol 3.6 mG Lozenge 1 Lozenge Oral every 2 hours PRN  docusate sodium 100 milliGRAM(s) Oral three times a day  guaiFENesin   Syrup  (Sugar-Free) 200 milliGRAM(s) Oral every 6 hours PRN  heparin  Injectable 5000 Unit(s) SubCutaneous every 8 hours  hydrALAZINE 75 milliGRAM(s) Oral every 8 hours  hydrALAZINE Injectable 10 milliGRAM(s) IV Push every 6 hours PRN  metoprolol succinate ER 12.5 milliGRAM(s) Oral daily  ondansetron Injectable 4 milliGRAM(s) IV Push every 8 hours PRN  oxyCODONE    IR 5 milliGRAM(s) Oral every 4 hours PRN  pantoprazole    Tablet 40 milliGRAM(s) Oral before breakfast  senna 2 Tablet(s) Oral at bedtime  sodium chloride 0.65% Nasal 1 Spray(s) Both Nostrils three times a day PRN  sodium chloride 0.9%. 1000 milliLiter(s) IV Continuous <Continuous>      PHYSICAL EXAM      Neurology: alert and oriented x 3, nonfocal, no gross deficits  CV :S1S2  Sternal Wound :  right ecmo sight stable  Lungs: Clear   Abdomen: soft, nontender, nondistended, positive bowel sounds, staples to lower abdomen cdi  :    rodney for TOV today   Extremities:   weakened RUE with c/o tingling and numbness, right groin stitch intact                PAST MEDICAL & SURGICAL HISTORY:  Asthma: as child, no inhaler use&gt;10 years. Denies hospitlizations or intubations. No current inhaler.  History of low transverse  section                 Discussed with Cardiothoracic Team at AM rounds.

## 2019-04-21 NOTE — CONSULT NOTE ADULT - SUBJECTIVE AND OBJECTIVE BOX
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 The pain feels like burning but the patient also feels like the sensation and motor in her hand is decreased. The pain travels from her shoulder down to her fingertips (all 5 equally). No temperature changes. She does not have symptoms on the other side.    HPI:  32 yo  at 10w1d GA by LMP of 8/10/18 with EDC of 18 with known hyperemesis gravidarum presents with nausea and vomiting many times yesterday 4/10/19. She had been taking diclegis for the past week with partial relief. She last held down a full meal yesterday (10/18) afternoon. The only liquid that she tolerates is milk, she vomits even with water. She has persistent heartburn that is relived by antacids or milk. 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 She is worried that her use of antinausea medication in her previous pregnancy caused this congenital defect, so she refuses most antinausea medications. She is comfortable taking diclegis and jaime only. She felt some mild weakness that improved with fluids given by ED team. She currently denies dizziness, weakness, fevers, chills, shortness of breath, chest pain, nausea, vomiting, dysuria, hematuria, diarrhea, or constipation.    Ob/Gyn History:  ,  c/s for craniosynostosis also complicated by hyperemesis gravidarum requiring home zofran pump, SAB x2               LMP -   8/10/18                Cycle Length - regular, monthly  Denies history of ovarian cysts, uterine fibroids, abnormal paps, or STIs  Last Pap Smear - several months ago, normal per pt    HOSPITAL COURSE:  33y female HD#1,  34w5d dated by 1st trimester sonogram, presents to the ED on 4/10 with CC of SOB for past 2 days. Patient was sinus tachy to 200s, adenosine administered, no response, cardioverted with no resolution. OBGYN team proceeded with emergent . Patient intubated in ED and taken to L&D. EBL 800cc, no acute events intraop. SICU consult placed for hemodynamic monitoring and ventilator management.    SICU COURSE:  Patient was sedated with propofol and precedex, became acidotic and retaining. Cards concerned for thyroid storm and advised no CT with contrast to r/o PE, patient was not stable for V/Q scan. Central and arterial line placed overnight for HD monitoring. Coats placed in AM for possible hemodialysis vs CVVH.    During AM rounds on  patient became sinus tachy to 180s, multiple lopressor pushes given with minimal resolution.  Patient amio gtt d/c'd started on esmolol, milrinone with resolution of sinus tachy 120s. Patient weaned off shameka, levo titrating down. Nephro consult placed, recs CVVH for hyperkalemia, low UO. Cardiology, EPS, CT Surg, Interventional cardiology bedside for evaluation determined patient would benefit from ecmo/impella due to decreasing EF 10% from 50% in ED. VA ECMO placed (right SC arterial, right femoral venous) and transferred to [facility] for further workup and management.     On arrival to [facility] at 1130pm on 19, pt received on Levophed 0.23mcg/kg/min (50ml/hr), Vasopressin 0.06units/hr (4ml), Phenylephrine 0.5mcg/kg/min (21ml/hr), VA ECMO 3100RPM with 2.45LPM    HR: 107  CVP: 23  PAP: 41/26 (31) (2019 23:39)      PAST MEDICAL HISTORY:  Asthma  No pertinent past medical history      PAST SURGICAL HISTORY:  History of low transverse  section  No significant past surgical history      MEDICATIONS:  acetaminophen   Tablet .. 650 milliGRAM(s) Oral every 6 hours PRN  ALPRAZolam 0.25 milliGRAM(s) Oral at bedtime PRN  amiodarone    Tablet 200 milliGRAM(s) Oral daily  amiodarone    Tablet   Oral   benzocaine 15 mG/menthol 3.6 mG Lozenge 1 Lozenge Oral every 2 hours PRN  buMETAnide IVPB 3 milliGRAM(s) IV Intermittent every 12 hours  docusate sodium 100 milliGRAM(s) Oral three times a day  guaiFENesin   Syrup  (Sugar-Free) 200 milliGRAM(s) Oral every 6 hours PRN  heparin  Injectable 5000 Unit(s) SubCutaneous every 8 hours  hydrALAZINE 100 milliGRAM(s) Oral every 8 hours  hydrALAZINE Injectable 10 milliGRAM(s) IV Push every 6 hours PRN  metoprolol succinate ER 12.5 milliGRAM(s) Oral daily  ondansetron Injectable 4 milliGRAM(s) IV Push every 8 hours PRN  oxyCODONE    IR 5 milliGRAM(s) Oral every 4 hours PRN  pantoprazole    Tablet 40 milliGRAM(s) Oral before breakfast  senna 2 Tablet(s) Oral at bedtime  sodium chloride 0.65% Nasal 1 Spray(s) Both Nostrils three times a day PRN  sodium chloride 0.9%. 1000 milliLiter(s) IV Continuous <Continuous>      ALLERGIES:  No Known Allergies  Reglan (Other)      VITALS & I/Os:  Vital Signs Last 24 Hrs  T(C): 36.9 (2019 19:08), Max: 37 (2019 23:29)  T(F): 98.5 (2019 19:08), Max: 98.6 (2019 23:29)  HR: 84 (2019 19:12) (83 - 93)  BP: 128/70 (2019 19:08) (128/70 - 140/69)  BP(mean): 93 (2019 19:08) (93 - 99)  RR: 18 (2019 19:08) (17 - 18)  SpO2: 95% (2019 19:08) (90% - 98%)    I&O's Summary    2019 07:  -  2019 07:00  --------------------------------------------------------  IN: 460 mL / OUT: 3975 mL / NET: -3515 mL    2019 07:  -  2019 22:49  --------------------------------------------------------  IN: 750 mL / OUT: 2485 mL / NET: -1735 mL        PHYSICAL EXAM:  General: No acute distress  Respiratory: Nonlabored  Cardiovascular: RRR  Abdominal: Soft, nondistended, nontender. No rebound or guarding. No organomegaly, no palpable mass.  Extremities: RUE: capillary refill under 2 seconds. Signals in radial/ulnar/deep and superficial arches. Motor and sensation 4/5.    LABS:                        8.5    14.7  )-----------( 357      ( 2019 05:46 )             26.0     -    137  |  100  |  52<H>  ----------------------------<  82  4.9   |  23  |  6.03<H>    Ca    8.2<L>      2019 05:46  Phos  5.5       Mg     2.2         TPro  5.1<L>  /  Alb  2.6<L>  /  TBili  0.7  /  DBili  x   /  AST  39  /  ALT  70<H>  /  AlkPhos  149<H>      Lactate:      ABG - ( 2019 08:28 )  pH, Arterial: 7.48  pH, Blood: x     /  pCO2: 32    /  pO2: 93    / HCO3: 23    / Base Excess: .4    /  SaO2: 98                IMAGING:  < from: VA Duplex Upper Extrem Arterial Limited, Right (19 @ 15:17) >    High resistance flow is noted in the proximal radial artery with a flow   velocity of 19.2 cm/s.   No flow is seen within the right mid radial and right distal radial   artery, representing an occlusion. The right axillary artery, right   brachial artery, and right ulnar artery are patent.    The flow reconstituted at the wrists and an anterograde fashion.    Inability to visualize the right innominate and artery, right subclavian   artery, due to overlying bandages.    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    < end of copied text >

## 2019-04-21 NOTE — PROGRESS NOTE ADULT - SUBJECTIVE AND OBJECTIVE BOX
Interval History:  doing well  had HD yesterday with improvement in her symptoms (was reporting chest pressure)  urinating well     Medications:  acetaminophen   Tablet .. 650 milliGRAM(s) Oral every 6 hours PRN  ALPRAZolam 0.25 milliGRAM(s) Oral at bedtime PRN  amiodarone    Tablet 200 milliGRAM(s) Oral daily  amiodarone    Tablet   Oral   benzocaine 15 mG/menthol 3.6 mG Lozenge 1 Lozenge Oral every 2 hours PRN  buMETAnide IVPB 1 milliGRAM(s) IV Intermittent every 12 hours  docusate sodium 100 milliGRAM(s) Oral three times a day  guaiFENesin   Syrup  (Sugar-Free) 200 milliGRAM(s) Oral every 6 hours PRN  heparin  Injectable 5000 Unit(s) SubCutaneous every 8 hours  hydrALAZINE 100 milliGRAM(s) Oral every 8 hours  hydrALAZINE Injectable 10 milliGRAM(s) IV Push every 6 hours PRN  metoprolol succinate ER 12.5 milliGRAM(s) Oral daily  ondansetron Injectable 4 milliGRAM(s) IV Push every 8 hours PRN  oxyCODONE    IR 5 milliGRAM(s) Oral every 4 hours PRN  pantoprazole    Tablet 40 milliGRAM(s) Oral before breakfast  senna 2 Tablet(s) Oral at bedtime  sodium chloride 0.65% Nasal 1 Spray(s) Both Nostrils three times a day PRN  sodium chloride 0.9%. 1000 milliLiter(s) IV Continuous <Continuous>      Vitals:  T(C): 36.9 (19 @ 19:08), Max: 37 (19 @ 07:05)  HR: 84 (19 @ 19:12) (83 - 93)  BP: 128/70 (19 @ 19:08) (128/70 - 140/69)  BP(mean): 93 (19 @ 19:08) (93 - 99)  RR: 18 (19 @ 19:08) (17 - 18)  SpO2: 95% (19 @ 19:08) (90% - 98%)    Daily     Daily Weight in k.4 (2019 07:00)    Weight (kg): 110.4 (04-21 @ 07:00)    I&O's Summary    2019 07:  -  2019 07:00  --------------------------------------------------------  IN: 460 mL / OUT: 3975 mL / NET: -3515 mL    2019 07:  -  2019 23:29  --------------------------------------------------------  IN: 750 mL / OUT: 2485 mL / NET: -1735 mL        Physical Exam:  Appearance: No Acute Distress  HEENT: PERRL  Neck: JVD approx 8-10 cm  Cardiovascular: Normal S1 S2, No murmurs/rubs/gallops  Respiratory: Clear to auscultation bilaterally  Gastrointestinal: Soft, Non-tender	  Skin: No cyanosis	  Neurologic: Non-focal  Extremities: 2+ LE edema  Psychiatry: A & O x 3, Mood & affect appropriate    Labs:                        8.5    14.7  )-----------( 357      ( 2019 05:46 )             26.0         137  |  100  |  52<H>  ----------------------------<  82  4.9   |  23  |  6.03<H>    Ca    8.2<L>      2019 05:46  Phos  5.5       Mg     2.2         TPro  5.1<L>  /  Alb  2.6<L>  /  TBili  0.7  /  DBili  x   /  AST  39  /  ALT  70<H>  /  AlkPhos  149<H>

## 2019-04-21 NOTE — PROGRESS NOTE ADULT - ASSESSMENT
Ms. Jerome is a 33 year old woman with history of hyperemesis gravidarum  initially presented to [facility] (35 weeks gestation) for SOB. She was found in SVT and received adenosinex4 and failed cardioversion x2. Pt then emergently taken for  and post op went into shock. She was found to have reduced EF (10-15%) and placed on VA ECMO and CVVHD. Transferred to [facility] on  for further management. 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 Was successfully decannulated after successful wean on . She has been titrated off pressors and her urine output has improved although her creatinine and BUN remains elevated. She was tachycardic (but now normalized) and notably hypertensive. Upitrating GDMT.     CVP 8 with HJR to 11

## 2019-04-22 DIAGNOSIS — I50.21 ACUTE SYSTOLIC (CONGESTIVE) HEART FAILURE: ICD-10-CM

## 2019-04-22 LAB
ANION GAP SERPL CALC-SCNC: 14 MMOL/L — SIGNIFICANT CHANGE UP (ref 5–17)
BUN SERPL-MCNC: 55 MG/DL — HIGH (ref 7–23)
CALCIUM SERPL-MCNC: 8.7 MG/DL — SIGNIFICANT CHANGE UP (ref 8.4–10.5)
CHLORIDE SERPL-SCNC: 101 MMOL/L — SIGNIFICANT CHANGE UP (ref 96–108)
CO2 SERPL-SCNC: 24 MMOL/L — SIGNIFICANT CHANGE UP (ref 22–31)
CREAT SERPL-MCNC: 6.35 MG/DL — HIGH (ref 0.5–1.3)
GLUCOSE SERPL-MCNC: 87 MG/DL — SIGNIFICANT CHANGE UP (ref 70–99)
HCT VFR BLD CALC: 28.7 % — LOW (ref 34.5–45)
HGB BLD-MCNC: 9.2 G/DL — LOW (ref 11.5–15.5)
MAGNESIUM SERPL-MCNC: 2.1 MG/DL — SIGNIFICANT CHANGE UP (ref 1.6–2.6)
MCHC RBC-ENTMCNC: 28.9 PG — SIGNIFICANT CHANGE UP (ref 27–34)
MCHC RBC-ENTMCNC: 32 GM/DL — SIGNIFICANT CHANGE UP (ref 32–36)
MCV RBC AUTO: 90.3 FL — SIGNIFICANT CHANGE UP (ref 80–100)
PHOSPHATE SERPL-MCNC: 6.1 MG/DL — HIGH (ref 2.5–4.5)
PLATELET # BLD AUTO: 410 K/UL — HIGH (ref 150–400)
POTASSIUM SERPL-MCNC: 5 MMOL/L — SIGNIFICANT CHANGE UP (ref 3.5–5.3)
POTASSIUM SERPL-SCNC: 5 MMOL/L — SIGNIFICANT CHANGE UP (ref 3.5–5.3)
RBC # BLD: 3.18 M/UL — LOW (ref 3.8–5.2)
RBC # FLD: 16.3 % — HIGH (ref 10.3–14.5)
SODIUM SERPL-SCNC: 139 MMOL/L — SIGNIFICANT CHANGE UP (ref 135–145)
WBC # BLD: 15.5 K/UL — HIGH (ref 3.8–10.5)
WBC # FLD AUTO: 15.5 K/UL — HIGH (ref 3.8–10.5)

## 2019-04-22 PROCEDURE — 99232 SBSQ HOSP IP/OBS MODERATE 35: CPT | Mod: GC

## 2019-04-22 PROCEDURE — 93923 UPR/LXTR ART STDY 3+ LVLS: CPT | Mod: 26

## 2019-04-22 PROCEDURE — 99232 SBSQ HOSP IP/OBS MODERATE 35: CPT

## 2019-04-22 PROCEDURE — 71045 X-RAY EXAM CHEST 1 VIEW: CPT | Mod: 26

## 2019-04-22 PROCEDURE — 99233 SBSQ HOSP IP/OBS HIGH 50: CPT

## 2019-04-22 PROCEDURE — 93931 UPPER EXTREMITY STUDY: CPT | Mod: 26

## 2019-04-22 RX ORDER — ONDANSETRON 8 MG/1
4 TABLET, FILM COATED ORAL ONCE
Refills: 0 | Status: COMPLETED | OUTPATIENT
Start: 2019-04-22 | End: 2019-04-22

## 2019-04-22 RX ADMIN — Medication 650 MILLIGRAM(S): at 07:06

## 2019-04-22 RX ADMIN — HEPARIN SODIUM 5000 UNIT(S): 5000 INJECTION INTRAVENOUS; SUBCUTANEOUS at 16:35

## 2019-04-22 RX ADMIN — Medication 100 MILLIGRAM(S): at 21:23

## 2019-04-22 RX ADMIN — Medication 650 MILLIGRAM(S): at 12:29

## 2019-04-22 RX ADMIN — Medication 100 MILLIGRAM(S): at 06:37

## 2019-04-22 RX ADMIN — Medication 100 MILLIGRAM(S): at 16:35

## 2019-04-22 RX ADMIN — HEPARIN SODIUM 5000 UNIT(S): 5000 INJECTION INTRAVENOUS; SUBCUTANEOUS at 06:37

## 2019-04-22 RX ADMIN — Medication 650 MILLIGRAM(S): at 21:24

## 2019-04-22 RX ADMIN — HEPARIN SODIUM 5000 UNIT(S): 5000 INJECTION INTRAVENOUS; SUBCUTANEOUS at 21:23

## 2019-04-22 RX ADMIN — Medication 650 MILLIGRAM(S): at 11:59

## 2019-04-22 RX ADMIN — Medication 650 MILLIGRAM(S): at 06:41

## 2019-04-22 RX ADMIN — PANTOPRAZOLE SODIUM 40 MILLIGRAM(S): 20 TABLET, DELAYED RELEASE ORAL at 06:39

## 2019-04-22 RX ADMIN — ONDANSETRON 4 MILLIGRAM(S): 8 TABLET, FILM COATED ORAL at 09:52

## 2019-04-22 RX ADMIN — BUMETANIDE 108 MILLIGRAM(S): 0.25 INJECTION INTRAMUSCULAR; INTRAVENOUS at 06:36

## 2019-04-22 RX ADMIN — BUMETANIDE 108 MILLIGRAM(S): 0.25 INJECTION INTRAMUSCULAR; INTRAVENOUS at 17:05

## 2019-04-22 RX ADMIN — Medication 650 MILLIGRAM(S): at 21:54

## 2019-04-22 RX ADMIN — Medication 12.5 MILLIGRAM(S): at 06:41

## 2019-04-22 NOTE — PROGRESS NOTE ADULT - PROBLEM SELECTOR PLAN 5
Currently on PO amiodarone load per CTU.   Would recommend no further amiodarone. She is young without current arrythmia and amiodarone has a number of adverse effects.   She is sinus tach at current time.

## 2019-04-22 NOTE — PROGRESS NOTE ADULT - ASSESSMENT
Ms. Jerome is a 33 year old woman with history of hyperemesis gravidarum  initially presented to [facility] (35 weeks gestation) for SOB. She was found in SVT and received adenosinex4 and failed cardioversion x2. Pt then emergently taken for  and post op went into shock. She was found to have reduced EF (10-15%) and placed on VA ECMO and CVVHD. Transferred to [facility] on  for further management. 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 Was successfully decannulated after successful wean on . She has been titrated off pressors and her urine output has improved although her creatinine and BUN remains elevated. She was tachycardic (but now normalized) and notably hypertensive. Upitrating GDMT.     CVP 8 with HJR to 11 Ms. Jerome is a 33 year old woman with history of hyperemesis gravidarum  initially presented to [facility] (35 weeks gestation) for SOB. She was found in SVT and received adenosinex4 and failed cardioversion x2. She was then emergently taken for  and post op went into shock. She was found to have reduced EF (10-15%) and placed on VA ECMO and CVVHD. Transferred to [facility] on  for further management. She wass successfully decannulated on . She has been titrated off pressors and her urine output has improved with slight improvement in estimate of GFR.     Plan discussed in multidisciplinary round with [facility] NP team and CT surgery. Please call me with questions at [phone].

## 2019-04-22 NOTE — OCCUPATIONAL THERAPY INITIAL EVALUATION ADULT - DIAGNOSIS, OT EVAL
Pt presents with pain, limited ROM, strength, endurance, balance, and coordination, all impacting ability to perform ADLs and functional mobility.

## 2019-04-22 NOTE — PROGRESS NOTE ADULT - PROBLEM SELECTOR PLAN 4
Secondary to pulmonary edema from cardiogenic shock in peripartum period.   She has been successfully extubated and is breathing well. Continue to monitor - No recurrence. Continue to monitor off of amiodarone.

## 2019-04-22 NOTE — PROGRESS NOTE ADULT - PROBLEM SELECTOR PLAN 2
Creatinine has fluctuated and continues to be elevated. Likely acute ATN from shock. However her urine output has greatly improved.  Nephrology input appreciated. underwent HD session yesterday - Slightly improved today. Continue to monitor.

## 2019-04-22 NOTE — OCCUPATIONAL THERAPY INITIAL EVALUATION ADULT - FINE MOTOR COORDINATION TRAINING, OT EVAL
Goal: Pt will independently manipulate buttons/zippers/fasteners on shirts/pants in 4 weeks to increase independence for ADL performance & .

## 2019-04-22 NOTE — PROGRESS NOTE ADULT - ASSESSMENT
34 yo lady with no past medical history 35 weeks pregnant transferred to [facility] after she presented with shortness of breath, SVT and acutely decompensated with severe cardiomyopathy, needing ECMO, s/p emergency  now in multiorgan failure and YAJAIRA.

## 2019-04-22 NOTE — PROGRESS NOTE ADULT - PROBLEM SELECTOR PROBLEM 5
SVT (supraventricular tachycardia)

## 2019-04-22 NOTE — OCCUPATIONAL THERAPY INITIAL EVALUATION ADULT - LIVES WITH, PROFILE
Pt lives in a house with her , 8 yr old son, and father who lives in the basement. Pt's house has 10 steps to enter, +1 flight of stairs inside, +shower tub./spouse

## 2019-04-22 NOTE — PROGRESS NOTE ADULT - SUBJECTIVE AND OBJECTIVE BOX
[facility] Division of Kidney Diseases & Hypertension  FOLLOW UP NOTE  [phone]--------------------------------------------------------------------------------  Chief Complaint:History of extracorporeal membrane oxygenation treatment      24 hour events/subjective: Patient dialyzed Saturday. Currently resting comfortably out of bed to chair. Not overly volume overloaded, and lab values stable. Creatinine noted to be 6.35 slightly elevated from previous. Continues to make good urine.        PAST HISTORY  --------------------------------------------------------------------------------  No significant changes to PMH, PSH, FHx, SHx, unless otherwise noted    ALLERGIES & MEDICATIONS  --------------------------------------------------------------------------------  Allergies    No Known Allergies    Intolerances    Reglan (Other)    Standing Inpatient Medications  buMETAnide IVPB 1 milliGRAM(s) IV Intermittent every 12 hours  docusate sodium 100 milliGRAM(s) Oral three times a day  heparin  Injectable 5000 Unit(s) SubCutaneous every 8 hours  hydrALAZINE 100 milliGRAM(s) Oral every 8 hours  metoprolol succinate ER 12.5 milliGRAM(s) Oral daily  pantoprazole    Tablet 40 milliGRAM(s) Oral before breakfast  senna 2 Tablet(s) Oral at bedtime  sodium chloride 0.9%. 1000 milliLiter(s) IV Continuous <Continuous>    PRN Inpatient Medications  acetaminophen   Tablet .. 650 milliGRAM(s) Oral every 6 hours PRN  ALPRAZolam 0.25 milliGRAM(s) Oral at bedtime PRN  benzocaine 15 mG/menthol 3.6 mG Lozenge 1 Lozenge Oral every 2 hours PRN  guaiFENesin   Syrup  (Sugar-Free) 200 milliGRAM(s) Oral every 6 hours PRN  hydrALAZINE Injectable 10 milliGRAM(s) IV Push every 6 hours PRN  oxyCODONE    IR 5 milliGRAM(s) Oral every 4 hours PRN  sodium chloride 0.65% Nasal 1 Spray(s) Both Nostrils three times a day PRN      REVIEW OF SYSTEMS  --------------------------------------------------------------------------------  Gen: No  fevers/chills  Skin: No rashes  Head/Eyes/Ears/Mouth: No headache; Normal hearing; Normal vision w/o blurriness  Respiratory: No dyspnea, cough, wheezing, hemoptysis  CV: No chest pain, PND, orthopnea  GI: No abdominal pain, diarrhea, constipation, nausea, vomiting  : No increased frequency, dysuria, hematuria, nocturia  MSK: No joint pain/swelling; no back pain; no edema  Neuro: No dizziness/lightheadedness, weakness, seizures, numbness, tingling      All other systems were reviewed and are negative, except as noted.    VITALS/PHYSICAL EXAM  --------------------------------------------------------------------------------  T(C): 36.4 (04-22-19 @ 10:49), Max: 37 (04-21-19 @ 23:34)  HR: 94 (04-22-19 @ 10:49) (83 - 94)  BP: 132/65 (04-22-19 @ 10:49) (128/70 - 138/69)  RR: 18 (04-22-19 @ 10:49) (18 - 18)  SpO2: 98% (04-22-19 @ 10:49) (93% - 98%)  Wt(kg): --    Weight (kg): 110.4 (04-21-19 @ 07:00)      04-21-19 @ 07:01  -  04-22-19 @ 07:00  --------------------------------------------------------  IN: 910 mL / OUT: 4785 mL / NET: -3875 mL    04-22-19 @ 07:01  -  04-22-19 @ 13:43  --------------------------------------------------------  IN: 360 mL / OUT: 2350 mL / NET: -1990 mL      Physical Exam:  	Gen: NAD  	Pulm: trace bibasilar rales B/L  	CV: RRR, S1S2  	Abd: +BS, soft, nontender/nondistended  	: No suprapubic tenderness + rodney  	UE: Warm  	LE: Warm, + LE edema  	Psych: Normal affect and mood  	Skin: Warm, without rashes  	Vascular access: left IJ non-tunneled HD catheter     LABS/STUDIES  --------------------------------------------------------------------------------              9.2    15.5  >-----------<  410      [04-22-19 @ 07:00]              28.7     139  |  101  |  55  ----------------------------<  87      [04-22-19 @ 07:00]  5.0   |  24  |  6.35        Ca     8.7     [04-22-19 @ 07:00]      Mg     2.1     [04-22-19 @ 07:00]      Phos  6.1     [04-22-19 @ 07:00]    TPro  5.1  /  Alb  2.6  /  TBili  0.7  /  DBili  x   /  AST  39  /  ALT  70  /  AlkPhos  149  [04-21-19 @ 05:46]          Creatinine Trend:  SCr 6.35 [04-22 @ 07:00]  SCr 6.03 [04-21 @ 05:46]  SCr 7.73 [04-20 @ 06:46]  SCr 7.56 [04-20 @ 02:06]  SCr 6.92 [04-19 @ 13:59]

## 2019-04-22 NOTE — OCCUPATIONAL THERAPY INITIAL EVALUATION ADULT - RANGE OF MOTION EXAMINATION, UPPER EXTREMITY
Pt shoulder flexion limited due to soreness/pain from ECMO and IJ catheters. Pt with R UE/hand swelling, limiting elbow and full digit flexion./bilateral UE Active ROM was WFL  (within functional limits)

## 2019-04-22 NOTE — PROGRESS NOTE ADULT - PROBLEM SELECTOR PLAN 3
There is a component of baseline normocytic anemia; ? RP bleed  Goal to keep Hgb > 7; would transfuse if necessary - Stable. Continue to monitor.

## 2019-04-22 NOTE — OCCUPATIONAL THERAPY INITIAL EVALUATION ADULT - PERTINENT HX OF CURRENT PROBLEM, REHAB EVAL
33y female HD#1,  34w5d dated by 1st trimester sonogram, presents to the ED on 4/10 with CC of SOB for past 2 days. Patient was sinus tachy to 200s, adenosine administered, no response, cardioverted with no resolution. OBGYN team proceeded with emergent . Patient intubated in ED and taken to L&D. EBL 800cc, no acute events intraop. See below.

## 2019-04-22 NOTE — PROGRESS NOTE ADULT - PROBLEM SELECTOR PLAN 1
She was successfully decannulated 4/16.   She remains off inotropes. Per handoff intraoperative BETTY with LVEF approximately 30%. Will likely repeat TTE next week for EF assessment.   Will continue to monitor off inotropes  Increase hydral to 100 mg q8h (hold for SBP <90)  Given increase in JVP will give Bumex 2mg IV q12 but given good response, lowered to 1 mg q12  will continue toprol to 12.5 mg qd Continue hydralazine 100 mg q8h   Continue metoprolol ER 12.5 mg daily  Continue bumex 1 gm IV BID.

## 2019-04-22 NOTE — PROGRESS NOTE ADULT - ASSESSMENT
32 yo  at 10w1d GA by LMP of 8/10/18 with EDC of 18 with known hyperemesis gravidarum presents with nausea and vomiting many times on 4/10/19.  HOSPITAL COURSE:  33y female HD#1,  34w5d dated by 1st trimester sonogram, presents to the ED on 4/10 with CC of SOB for past 2 days. Patient was sinus tachy to 200s, adenosine administered, no response, cardioverted with no resolution. OBGYN team proceeded with emergent . Patient intubated in ED and taken to L&D. EBL 800cc, no acute events intraop. SICU consult placed for hemodynamic monitoring and ventilator management.    SICU COURSE:  Patient was sedated with propofol and precedex, became acidotic and retaining. Cards concerned for thyroid storm and advised no CT with contrast to r/o PE, patient was not stable for V/Q scan. Central and arterial line placed overnight for HD monitoring. [place] placed in AM for possible hemodialysis vs CVVH.    During AM rounds on  patient became sinus tachy to 180s, multiple lopressor pushes given with minimal resolution.  Patient amio gtt d/c'd started on esmolol, milrinone with resolution of sinus tachy 120s. Patient weaned off shameka, levo titrating down. Nephro consult placed, recs CVVH for hyperkalemia, low UO. Cardiology, EPS, CT Surg, Interventional cardiology bedside for evaluation determined patient would benefit from ecmo/impella due to decreasing EF 10% from 50% in ED. VA ECMO placed (right SC arterial, right femoral venous) and transferred to [facility] for further workup and management.     On arrival to [facility] at 1130pm on 19, pt received on Levophed 0.23mcg/kg/min (50ml/hr), Vasopressin 0.06units/hr (4ml), Phenylephrine 0.5mcg/kg/min (21ml/hr), VA ECMO 3100RPM with 2.45LPM   ECMO d/cd  : Arrival to SDU, HD stable.   : [facility] HD placed by [facility]. CXR pending. Creatinine rising today, c/o sob abg appears stable  : S/p HD yesterday with 1Liter taken off yesterday. No immediate plans for another round just yet will monitor for now. D/w Dr. Call Va duplex to right arm to assess flow as pt complains numbness tingling weakness to RUE unable to hold baby safely-likely related to axillary canulation.  OT ordered for Right arm assessment and treatment.   Pt progressing, ambulating well.  Good response to bumex.  Creat stable. Not scheduled for dialysis today.  She complains of numbnness and burning starting from her forearm progressing to her hand.  Her finger tips are numb. + ulnar pulses, the hand is warm and mobile.  D/w Dr Figueredo, should obtain doppler flow of both upper extremities, possibly neuro related.

## 2019-04-22 NOTE — PROGRESS NOTE ADULT - PROBLEM SELECTOR PLAN 1
Pt with YAJAIRA - ATN from shock/ acute cardiomyopathy. Pt with good urine output in response to IV diuretic therapy of 4.0L. Agree with continuing IV diuretic therapy.  Pt had HD yesterday due to hypervolemia with 1L UF. No plan for IHD today. Monitor for renal recovery daily. Pt remains hypervolemic. Pt with left IJ non-tunneled HD catheter.   continue to monitor urine output and daily weights.

## 2019-04-22 NOTE — OCCUPATIONAL THERAPY INITIAL EVALUATION ADULT - ADDITIONAL COMMENTS
Continued. SICU consult placed for hemodynamic monitoring and ventilator management. Course complicated by 's depsite medical therapy, hyperkalemia requiring CVVHD, cardiogenic shock with EF decreaseing from 50% to 30% down to 10% requiring VA ECMO placement and transferred to [facility] for further workup and management. Now in multiorgan failure and YAJAIRA. ECMO circuit decannulated on 4/16.

## 2019-04-22 NOTE — OCCUPATIONAL THERAPY INITIAL EVALUATION ADULT - IADL RETRAINING, OT EVAL
Pt will be able to feed her infant baby by a grasping a bottle in her R hand independently in 4 weeks.

## 2019-04-22 NOTE — PROGRESS NOTE ADULT - SUBJECTIVE AND OBJECTIVE BOX
Subjective:    Medications:  acetaminophen   Tablet .. 650 milliGRAM(s) Oral every 6 hours PRN  ALPRAZolam 0.25 milliGRAM(s) Oral at bedtime PRN  benzocaine 15 mG/menthol 3.6 mG Lozenge 1 Lozenge Oral every 2 hours PRN  buMETAnide IVPB 1 milliGRAM(s) IV Intermittent every 12 hours  docusate sodium 100 milliGRAM(s) Oral three times a day  guaiFENesin   Syrup  (Sugar-Free) 200 milliGRAM(s) Oral every 6 hours PRN  heparin  Injectable 5000 Unit(s) SubCutaneous every 8 hours  hydrALAZINE 100 milliGRAM(s) Oral every 8 hours  hydrALAZINE Injectable 10 milliGRAM(s) IV Push every 6 hours PRN  metoprolol succinate ER 12.5 milliGRAM(s) Oral daily  oxyCODONE    IR 5 milliGRAM(s) Oral every 4 hours PRN  pantoprazole    Tablet 40 milliGRAM(s) Oral before breakfast  senna 2 Tablet(s) Oral at bedtime  sodium chloride 0.65% Nasal 1 Spray(s) Both Nostrils three times a day PRN  sodium chloride 0.9%. 1000 milliLiter(s) IV Continuous <Continuous>    Physical Exam:    Vital Signs Last 24 Hrs  T(C): 36.7 (22 Apr 2019 07:18), Max: 37 (21 Apr 2019 23:34)  T(F): 98.1 (22 Apr 2019 07:18), Max: 98.6 (21 Apr 2019 23:34)  HR: 85 (22 Apr 2019 07:18) (83 - 93)  BP: 132/75 (22 Apr 2019 07:18) (128/70 - 138/69)  BP(mean): 96 (22 Apr 2019 04:19) (93 - 97)  RR: 18 (22 Apr 2019 07:18) (18 - 18)  SpO2: 95% (22 Apr 2019 07:18) (93% - 98%)    I&O's Summary    21 Apr 2019 07:01  -  22 Apr 2019 07:00  --------------------------------------------------------  IN: 910 mL / OUT: 4785 mL / NET: -3875 mL    22 Apr 2019 07:01  -  22 Apr 2019 07:38  --------------------------------------------------------  IN: 0 mL / OUT: 600 mL / NET: -600 mL    General: No distress. Comfortable.  HEENT: EOM intact.  Neck: Neck supple. JVP not elevated. No masses  Chest: Clear to auscultation bilaterally  CV: Normal S1 and S2. No murmurs, rub, or gallops. Radial pulses normal.  Abdomen: Soft, non-distended, non-tender  Skin: No rashes or skin breakdown  Neurology: Alert and oriented times three. Sensation intact  Psych: Affect normal    Labs:                        9.2    15.5  )-----------( 410      ( 22 Apr 2019 07:00 )             28.7     04-22    139  |  101  |  55<H>  ----------------------------<  87  5.0   |  24  |  6.35<H>    Ca    8.7      22 Apr 2019 07:00  Phos  6.1     04-22  Mg     2.1     04-22    TPro  5.1<L>  /  Alb  2.6<L>  /  TBili  0.7  /  DBili  x   /  AST  39  /  ALT  70<H>  /  AlkPhos  149<H>  04-21 Subjective: Her primary complaint at the moment is her right hand numbness and lack of mobility. She is not dyspneic or dizzy. Tolerating her food well. She notes ongoing edema in her ankles and in the backs of her thighs.     Medications:  acetaminophen   Tablet .. 650 milliGRAM(s) Oral every 6 hours PRN  ALPRAZolam 0.25 milliGRAM(s) Oral at bedtime PRN  benzocaine 15 mG/menthol 3.6 mG Lozenge 1 Lozenge Oral every 2 hours PRN  buMETAnide IVPB 1 milliGRAM(s) IV Intermittent every 12 hours  docusate sodium 100 milliGRAM(s) Oral three times a day  guaiFENesin   Syrup  (Sugar-Free) 200 milliGRAM(s) Oral every 6 hours PRN  heparin  Injectable 5000 Unit(s) SubCutaneous every 8 hours  hydrALAZINE 100 milliGRAM(s) Oral every 8 hours  hydrALAZINE Injectable 10 milliGRAM(s) IV Push every 6 hours PRN  metoprolol succinate ER 12.5 milliGRAM(s) Oral daily  oxyCODONE    IR 5 milliGRAM(s) Oral every 4 hours PRN  pantoprazole    Tablet 40 milliGRAM(s) Oral before breakfast  senna 2 Tablet(s) Oral at bedtime  sodium chloride 0.65% Nasal 1 Spray(s) Both Nostrils three times a day PRN  sodium chloride 0.9%. 1000 milliLiter(s) IV Continuous <Continuous>    Physical Exam:    Vital Signs Last 24 Hrs  T(C): 36.7 (22 Apr 2019 07:18), Max: 37 (21 Apr 2019 23:34)  T(F): 98.1 (22 Apr 2019 07:18), Max: 98.6 (21 Apr 2019 23:34)  HR: 85 (22 Apr 2019 07:18) (83 - 93)  BP: 132/75 (22 Apr 2019 07:18) (128/70 - 138/69)  BP(mean): 96 (22 Apr 2019 04:19) (93 - 97)  RR: 18 (22 Apr 2019 07:18) (18 - 18)  SpO2: 95% (22 Apr 2019 07:18) (93% - 98%)    I&O's Summary    21 Apr 2019 07:01  -  22 Apr 2019 07:00  --------------------------------------------------------  IN: 910 mL / OUT: 4785 mL / NET: -3875 mL    22 Apr 2019 07:01  -  22 Apr 2019 07:38  --------------------------------------------------------  IN: 0 mL / OUT: 600 mL / NET: -600 mL    General: No distress. Comfortable.  HEENT: EOM intact.  Neck: Neck supple. JVP moderately elevated. No masses  Chest: Clear to auscultation bilaterally  CV: RRR with normal S1 and S2. No murmurs, rub, or gallops. Radial pulses normal. 2+ edema in legs bilaterally.   Abdomen: Soft, non-distended, non-tender  Skin: No rashes or skin breakdown  Neurology: Alert and oriented times three. Sensation intact  Psych: Affect normal    Labs:                        9.2    15.5  )-----------( 410      ( 22 Apr 2019 07:00 )             28.7     04-22    139  |  101  |  55<H>  ----------------------------<  87  5.0   |  24  |  6.35<H>    Ca    8.7      22 Apr 2019 07:00  Phos  6.1     04-22  Mg     2.1     04-22    TPro  5.1<L>  /  Alb  2.6<L>  /  TBili  0.7  /  DBili  x   /  AST  39  /  ALT  70<H>  /  AlkPhos  149<H>  04-21

## 2019-04-22 NOTE — OCCUPATIONAL THERAPY INITIAL EVALUATION ADULT - RANGE OF MOTION, PT EVAL
Pt will be able to make a full composite fist and full elbow extension to independently be able to perform child rearing tasks within 4 weeks.

## 2019-04-22 NOTE — PROGRESS NOTE ADULT - SUBJECTIVE AND OBJECTIVE BOX
ghassan contreras    VITAL SIGNS    Telemetry:  nsr 82  Vital Signs Last 24 Hrs  T(C): 36.4 (04-22-19 @ 10:49), Max: 37 (04-21-19 @ 23:34)  T(F): 97.5 (04-22-19 @ 10:49), Max: 98.6 (04-21-19 @ 23:34)  HR: 94 (04-22-19 @ 10:49) (83 - 94)  BP: 132/65 (04-22-19 @ 10:49) (128/70 - 138/69)  RR: 18 (04-22-19 @ 10:49) (18 - 18)  SpO2: 98% (04-22-19 @ 10:49) (93% - 98%)                       9.2<L>                139  | 24   | 55<H>        15.5<H> >-----------< 410<H>   ------------------------< 87                    28.7<L>                5.0  | 101  | 6.35<H>                                                                     Ca 8.7   Mg 2.1   Ph 6.1<H>    ,             8.5<L>                137  | 23   | 52<H>        14.7<H> >-----------< 357     ------------------------< 82                    26.0<L>                4.9  | 100  | 6.03<H>                                                                     Ca 8.2<L> Mg 2.2   Ph 5.5<H>            04-21-19 @ 07:01  -  04-22-19 @ 07:00  --------------------------------------------------------  IN: 910 mL / OUT: 4785 mL / NET: -3875 mL    04-22-19 @ 07:01  -  04-22-19 @ 12:19  --------------------------------------------------------  IN: 360 mL / OUT: 2350 mL / NET: -1990 mL        Daily     Daily         CAPILLARY BLOOD GLUCOSE                    Coumadin    [ ] YES          [ x]      NO                                   PHYSICAL EXAM        Neurology: alert and oriented x 3, R hand with decreased  strenth  CV : .S1S2 RRR  Sternal Wound :  CDI , Stable, r lateral sc inc cdi  Pacing Wires        [  ]   Settings:                                  Isolated  [  ]  Lungs: bibasilar cta  Drains:     MS         [  ] Drainage:                 L Pleural  [  ]  Drainage:                R Pleural  [  ]  Drainage:  Abdomen: soft, nontender, nondistended, positive bowel sounds, last bowel movement +  :         voiding    Extremities:     _+_ edema, __neg_calf tenderness.                                 acetaminophen   Tablet .. 650 milliGRAM(s) Oral every 6 hours PRN  ALPRAZolam 0.25 milliGRAM(s) Oral at bedtime PRN  benzocaine 15 mG/menthol 3.6 mG Lozenge 1 Lozenge Oral every 2 hours PRN  buMETAnide IVPB 1 milliGRAM(s) IV Intermittent every 12 hours  docusate sodium 100 milliGRAM(s) Oral three times a day  guaiFENesin   Syrup  (Sugar-Free) 200 milliGRAM(s) Oral every 6 hours PRN  heparin  Injectable 5000 Unit(s) SubCutaneous every 8 hours  hydrALAZINE 100 milliGRAM(s) Oral every 8 hours  hydrALAZINE Injectable 10 milliGRAM(s) IV Push every 6 hours PRN  metoprolol succinate ER 12.5 milliGRAM(s) Oral daily  oxyCODONE    IR 5 milliGRAM(s) Oral every 4 hours PRN  pantoprazole    Tablet 40 milliGRAM(s) Oral before breakfast  senna 2 Tablet(s) Oral at bedtime  sodium chloride 0.65% Nasal 1 Spray(s) Both Nostrils three times a day PRN  sodium chloride 0.9%. 1000 milliLiter(s) IV Continuous <Continuous>                    Physical Therapy Rec:   Home  [  ]   Home w/ PT  [  ]  Rehab  [  ]  Discussed with Cardiothoracic Team at AM rounds.

## 2019-04-23 LAB
ANION GAP SERPL CALC-SCNC: 15 MMOL/L — SIGNIFICANT CHANGE UP (ref 5–17)
BUN SERPL-MCNC: 52 MG/DL — HIGH (ref 7–23)
CALCIUM SERPL-MCNC: 8.4 MG/DL — SIGNIFICANT CHANGE UP (ref 8.4–10.5)
CHLORIDE SERPL-SCNC: 102 MMOL/L — SIGNIFICANT CHANGE UP (ref 96–108)
CO2 SERPL-SCNC: 23 MMOL/L — SIGNIFICANT CHANGE UP (ref 22–31)
CREAT SERPL-MCNC: 5.83 MG/DL — HIGH (ref 0.5–1.3)
GLUCOSE SERPL-MCNC: 87 MG/DL — SIGNIFICANT CHANGE UP (ref 70–99)
HCT VFR BLD CALC: 29.6 % — LOW (ref 34.5–45)
HGB BLD-MCNC: 9.6 G/DL — LOW (ref 11.5–15.5)
MCHC RBC-ENTMCNC: 28.9 PG — SIGNIFICANT CHANGE UP (ref 27–34)
MCHC RBC-ENTMCNC: 32.3 GM/DL — SIGNIFICANT CHANGE UP (ref 32–36)
MCV RBC AUTO: 89.3 FL — SIGNIFICANT CHANGE UP (ref 80–100)
METANEPHRINE, PL: SIGNIFICANT CHANGE UP PG/ML
NORMETANEPHRINE, PL: SIGNIFICANT CHANGE UP PG/ML
PLATELET # BLD AUTO: 423 K/UL — HIGH (ref 150–400)
POTASSIUM SERPL-MCNC: 4.6 MMOL/L — SIGNIFICANT CHANGE UP (ref 3.5–5.3)
POTASSIUM SERPL-SCNC: 4.6 MMOL/L — SIGNIFICANT CHANGE UP (ref 3.5–5.3)
RBC # BLD: 3.31 M/UL — LOW (ref 3.8–5.2)
RBC # FLD: 15.9 % — HIGH (ref 10.3–14.5)
SODIUM SERPL-SCNC: 140 MMOL/L — SIGNIFICANT CHANGE UP (ref 135–145)
WBC # BLD: 14.4 K/UL — HIGH (ref 3.8–10.5)
WBC # FLD AUTO: 14.4 K/UL — HIGH (ref 3.8–10.5)

## 2019-04-23 PROCEDURE — 99232 SBSQ HOSP IP/OBS MODERATE 35: CPT

## 2019-04-23 PROCEDURE — 99233 SBSQ HOSP IP/OBS HIGH 50: CPT

## 2019-04-23 PROCEDURE — 71045 X-RAY EXAM CHEST 1 VIEW: CPT | Mod: 26

## 2019-04-23 RX ORDER — ACETAMINOPHEN 500 MG
1000 TABLET ORAL ONCE
Refills: 0 | Status: COMPLETED | OUTPATIENT
Start: 2019-04-23 | End: 2019-04-23

## 2019-04-23 RX ADMIN — HEPARIN SODIUM 5000 UNIT(S): 5000 INJECTION INTRAVENOUS; SUBCUTANEOUS at 21:23

## 2019-04-23 RX ADMIN — Medication 650 MILLIGRAM(S): at 15:10

## 2019-04-23 RX ADMIN — Medication 0.25 MILLIGRAM(S): at 03:00

## 2019-04-23 RX ADMIN — Medication 650 MILLIGRAM(S): at 14:40

## 2019-04-23 RX ADMIN — Medication 100 MILLIGRAM(S): at 21:23

## 2019-04-23 RX ADMIN — Medication 1000 MILLIGRAM(S): at 21:54

## 2019-04-23 RX ADMIN — HEPARIN SODIUM 5000 UNIT(S): 5000 INJECTION INTRAVENOUS; SUBCUTANEOUS at 06:38

## 2019-04-23 RX ADMIN — HEPARIN SODIUM 5000 UNIT(S): 5000 INJECTION INTRAVENOUS; SUBCUTANEOUS at 14:40

## 2019-04-23 RX ADMIN — BUMETANIDE 108 MILLIGRAM(S): 0.25 INJECTION INTRAMUSCULAR; INTRAVENOUS at 06:38

## 2019-04-23 RX ADMIN — Medication 400 MILLIGRAM(S): at 21:24

## 2019-04-23 RX ADMIN — PANTOPRAZOLE SODIUM 40 MILLIGRAM(S): 20 TABLET, DELAYED RELEASE ORAL at 06:38

## 2019-04-23 RX ADMIN — Medication 650 MILLIGRAM(S): at 06:38

## 2019-04-23 RX ADMIN — Medication 100 MILLIGRAM(S): at 06:38

## 2019-04-23 RX ADMIN — Medication 100 MILLIGRAM(S): at 14:40

## 2019-04-23 RX ADMIN — Medication 12.5 MILLIGRAM(S): at 06:38

## 2019-04-23 RX ADMIN — Medication 650 MILLIGRAM(S): at 07:08

## 2019-04-23 RX ADMIN — BUMETANIDE 108 MILLIGRAM(S): 0.25 INJECTION INTRAMUSCULAR; INTRAVENOUS at 17:26

## 2019-04-23 NOTE — PROGRESS NOTE ADULT - SUBJECTIVE AND OBJECTIVE BOX
Subjective:    Medications:  acetaminophen   Tablet .. 650 milliGRAM(s) Oral every 6 hours PRN  ALPRAZolam 0.25 milliGRAM(s) Oral at bedtime PRN  benzocaine 15 mG/menthol 3.6 mG Lozenge 1 Lozenge Oral every 2 hours PRN  buMETAnide IVPB 1 milliGRAM(s) IV Intermittent every 12 hours  docusate sodium 100 milliGRAM(s) Oral three times a day  guaiFENesin   Syrup  (Sugar-Free) 200 milliGRAM(s) Oral every 6 hours PRN  heparin  Injectable 5000 Unit(s) SubCutaneous every 8 hours  hydrALAZINE 100 milliGRAM(s) Oral every 8 hours  hydrALAZINE Injectable 10 milliGRAM(s) IV Push every 6 hours PRN  metoprolol succinate ER 12.5 milliGRAM(s) Oral daily  oxyCODONE    IR 5 milliGRAM(s) Oral every 4 hours PRN  pantoprazole    Tablet 40 milliGRAM(s) Oral before breakfast  senna 2 Tablet(s) Oral at bedtime  sodium chloride 0.65% Nasal 1 Spray(s) Both Nostrils three times a day PRN  sodium chloride 0.9%. 1000 milliLiter(s) IV Continuous <Continuous>      Physical Exam:    Vitals:  T(C): 36.7 (19 @ 07:12), Max: 37.1 (19 @ 21:32)  HR: 86 (19 @ 06:45) (79 - 94)  BP: 129/75 (19 @ 06:45) (129/75 - 140/80)  BP(mean): 82 (19 @ 21:32) (82 - 96)  ABP: --  ABP(mean): --  RR: 18 (19 @ 07:12) (18 - 19)  SpO2: 98% (19 @ 07:12) (97% - 99%)  Wt(kg): --  CVP(cm H2O): --  CO: --  CI: --  PA: --  PA(mean): --  PCWP: --  SVR: --  PVR: --  Vital Signs Last 24 Hrs  T(C): 36.7 (2019 07:12), Max: 37.1 (2019 21:32)  T(F): 98.1 (2019 07:12), Max: 98.7 (2019 21:32)  HR: 86 (2019 06:45) (79 - 94)  BP: 129/75 (2019 06:45) (129/75 - 140/80)  BP(mean): 82 (2019 21:32) (82 - 96)  RR: 18 (2019 07:12) (18 - 19)  SpO2: 98% (2019 07:12) (97% - 99%)    Daily     Daily Weight in k.3 (2019 14:01)    I&O's Summary    2019 07:01  -  2019 07:00  --------------------------------------------------------  IN: 1190 mL / OUT: 6150 mL / NET: -4960 mL        General: No distress. Comfortable.  HEENT: EOM intact.  Neck: Neck supple. JVP not elevated. No masses  Chest: Clear to auscultation bilaterally  CV: Normal S1 and S2. No murmurs, rub, or gallops. Radial pulses normal.  Abdomen: Soft, non-distended, non-tender  Skin: No rashes or skin breakdown  Neurology: Alert and oriented times three. Sensation intact  Psych: Affect normal    Labs:                        9.2    15.5  )-----------( 410      ( 2019 07:00 )             28.7     04-23    140  |  102  |  52<H>  ----------------------------<  87  4.6   |  23  |  5.83<H>    Ca    8.4      2019 06:54  Phos  6.1     04-22  Mg     2.1     22 Subjective: She notes that her right hand is a little better today, but still numb and painful.     Medications:  acetaminophen   Tablet .. 650 milliGRAM(s) Oral every 6 hours PRN  ALPRAZolam 0.25 milliGRAM(s) Oral at bedtime PRN  benzocaine 15 mG/menthol 3.6 mG Lozenge 1 Lozenge Oral every 2 hours PRN  buMETAnide IVPB 1 milliGRAM(s) IV Intermittent every 12 hours  docusate sodium 100 milliGRAM(s) Oral three times a day  guaiFENesin   Syrup  (Sugar-Free) 200 milliGRAM(s) Oral every 6 hours PRN  heparin  Injectable 5000 Unit(s) SubCutaneous every 8 hours  hydrALAZINE 100 milliGRAM(s) Oral every 8 hours  hydrALAZINE Injectable 10 milliGRAM(s) IV Push every 6 hours PRN  metoprolol succinate ER 12.5 milliGRAM(s) Oral daily  oxyCODONE    IR 5 milliGRAM(s) Oral every 4 hours PRN  pantoprazole    Tablet 40 milliGRAM(s) Oral before breakfast  senna 2 Tablet(s) Oral at bedtime  sodium chloride 0.65% Nasal 1 Spray(s) Both Nostrils three times a day PRN  sodium chloride 0.9%. 1000 milliLiter(s) IV Continuous <Continuous>      Physical Exam:    Vital Signs Last 24 Hrs  T(C): 36.7 (2019 07:12), Max: 37.1 (2019 21:32)  T(F): 98.1 (2019 07:12), Max: 98.7 (2019 21:32)  HR: 86 (2019 06:45) (79 - 94)  BP: 129/75 (2019 06:45) (129/75 - 140/80)  BP(mean): 82 (2019 21:32) (82 - 96)  RR: 18 (2019 07:12) (18 - 19)  SpO2: 98% (2019 07:12) (97% - 99%)     Daily Weight in k.3 (2019 14:01)    I&O's Summary    2019 07:01  -  2019 07:00  --------------------------------------------------------  IN: 1190 mL / OUT: 6150 mL / NET: -4960 mL    General: No distress. Comfortable.  HEENT: EOM intact.  Neck: Neck supple. JVP mildly elevated. No masses  Chest: Clear to auscultation bilaterally  CV: RRR with normal S1 and S2. No  rubs or gallops. Radial pulses normal. 2+ edema in legs bilaterally.   Abdomen: Soft, non-distended, non-tender  Skin: No rashes  Neurology: Alert and oriented times three. Sensation intact  Psych: Affect normal    Labs:                        9.2    15.5  )-----------( 410      ( 2019 07:00 )             28.7     -    140  |  102  |  52<H>  ----------------------------<  87  4.6   |  23  |  5.83<H>    Ca    8.4      2019 06:54  Phos  6.1       Mg     2.1

## 2019-04-23 NOTE — PROGRESS NOTE ADULT - ASSESSMENT
Ms. Jerome is a 33 year old woman with history of hyperemesis gravidarum  initially presented to [facility] (35 weeks gestation) for SOB. She was found in SVT and received adenosinex4 and failed cardioversion x2. She was then emergently taken for  and post op went into shock. She was found to have reduced EF (10-15%) and placed on VA ECMO and CVVHD. Transferred to [facility] on  for further management. She wass successfully decannulated on . She has been titrated off pressors and her urine output has improved with slight improvement in estimate of GFR.     Plan discussed in multidisciplinary round with [facility] NP team and CT surgery. Please call me with questions at [phone]. Ms. Jerome is a 33 year old woman with history of hyperemesis gravidarum  initially presented to [facility] (35 weeks gestation) for SOB. She was found in SVT and received adenosinex4 and failed cardioversion x2. She was then emergently taken for  and post op went into shock. She was found to have reduced EF (10-15%) and placed on VA ECMO and CVVHD. Transferred to [facility] on  for further management. She was successfully decannulated on . Her urine output has improved with slight improvement in estimate of GFR.     Plan discussed in multidisciplinary round with [facility] NP team and CT surgery. Please call me with questions at [phone].

## 2019-04-23 NOTE — PROGRESS NOTE ADULT - SUBJECTIVE AND OBJECTIVE BOX
im ok    VITAL SIGNS    Telemetry:  nsr 60  Vital Signs Last 24 Hrs  T(C): 36.8 (19 @ 10:59), Max: 37.1 (19 @ 21:32)  T(F): 98.3 (19 @ 10:59), Max: 98.7 (19 @ 21:32)  HR: 93 (19 @ 10:01) (79 - 93)  BP: 136/81 (19 @ 10:01) (129/75 - 140/80)  RR: 18 (19 @ 10:59) (18 - 19)  SpO2: 98% (19 @ 10:59) (97% - 99%)                       9.6<L>                140  | 23   | 52<H>        14.4<H> >-----------< 423<H>   ------------------------< 87                    29.6<L>                4.6  | 102  | 5.83<H>                                                                     Ca 8.4   Mg x     Ph x        ,             9.2<L>                139  | 24   | 55<H>        15.5<H> >-----------< 410<H>   ------------------------< 87                    28.7<L>                5.0  | 101  | 6.35<H>                                                                     Ca 8.7   Mg 2.1   Ph 6.1<H>            19 @ 07:01  -  19 @ 07:00  --------------------------------------------------------  IN: 1190 mL / OUT: 6150 mL / NET: -4960 mL    19 @ 07:01  -  19 @ 11:11  --------------------------------------------------------  IN: 360 mL / OUT: 1600 mL / NET: -1240 mL        Daily     Daily Weight in k.2 (2019 10:20)        CAPILLARY BLOOD GLUCOSE                    Coumadin    [ ] YES          [x ]      NO                                   PHYSICAL EXAM        Neurology: alert and oriented x 3, nonfocal, no gross deficits  CV : .S1S2 RRR  Sternal Wound :  CDI , Stable  R intro cdi.  L dialysis catheter cdi    Lungs: cta  Drains:      Abdomen: soft, nontender, nondistended, positive bowel sounds, last bowel movement +  :         voiding    Extremities:     _+_ edema, __-_calf tenderness.                                    acetaminophen   Tablet .. 650 milliGRAM(s) Oral every 6 hours PRN  ALPRAZolam 0.25 milliGRAM(s) Oral at bedtime PRN  benzocaine 15 mG/menthol 3.6 mG Lozenge 1 Lozenge Oral every 2 hours PRN  buMETAnide IVPB 1 milliGRAM(s) IV Intermittent every 12 hours  docusate sodium 100 milliGRAM(s) Oral three times a day  guaiFENesin   Syrup  (Sugar-Free) 200 milliGRAM(s) Oral every 6 hours PRN  heparin  Injectable 5000 Unit(s) SubCutaneous every 8 hours  hydrALAZINE 100 milliGRAM(s) Oral every 8 hours  hydrALAZINE Injectable 10 milliGRAM(s) IV Push every 6 hours PRN  metoprolol succinate ER 12.5 milliGRAM(s) Oral daily  oxyCODONE    IR 5 milliGRAM(s) Oral every 4 hours PRN  pantoprazole    Tablet 40 milliGRAM(s) Oral before breakfast  senna 2 Tablet(s) Oral at bedtime  sodium chloride 0.65% Nasal 1 Spray(s) Both Nostrils three times a day PRN  sodium chloride 0.9%. 1000 milliLiter(s) IV Continuous <Continuous>                    Physical Therapy Rec:   Home  [  ]   Home w/ PT  [  ]  Rehab  [  ]  Discussed with Cardiothoracic Team at AM rounds.

## 2019-04-23 NOTE — PROGRESS NOTE ADULT - ASSESSMENT
34 yo lady with no past medical history 35 weeks pregnant transferred to [facility] after she presented with shortness of breath, SVT and acutely decompensated with severe cardiomyopathy, needing ECMO, s/p emergency  now in multiorgan failure and YAJAIRA.

## 2019-04-23 NOTE — PROGRESS NOTE ADULT - PROBLEM SELECTOR PLAN 1
Continue hydralazine 100 mg q8h   Continue metoprolol ER 12.5 mg daily  Continue bumex 1 gm IV BID. Continue hydralazine 100 mg q8h   Continue metoprolol ER 12.5 mg daily  Continue bumex 1 gm IV BID.  Repeat echocardiogram tomorrow.

## 2019-04-23 NOTE — PROGRESS NOTE ADULT - ASSESSMENT
34 yo  at 10w1d GA by LMP of 8/10/18 with EDC of 18 with known hyperemesis gravidarum presents with nausea and vomiting many times on 4/10/19.  HOSPITAL COURSE:  33y female HD#1,  34w5d dated by 1st trimester sonogram, presents to the ED on 4/10 with CC of SOB for past 2 days. Patient was sinus tachy to 200s, adenosine administered, no response, cardioverted with no resolution. OBGYN team proceeded with emergent . Patient intubated in ED and taken to L&D. EBL 800cc, no acute events intraop. SICU consult placed for hemodynamic monitoring and ventilator management.    SICU COURSE:  Patient was sedated with propofol and precedex, became acidotic and retaining. Cards concerned for thyroid storm and advised no CT with contrast to r/o PE, patient was not stable for V/Q scan. Central and arterial line placed overnight for HD monitoring. [place] placed in AM for possible hemodialysis vs CVVH.    During AM rounds on  patient became sinus tachy to 180s, multiple lopressor pushes given with minimal resolution.  Patient amio gtt d/c'd started on esmolol, milrinone with resolution of sinus tachy 120s. Patient weaned off shameka, levo titrating down. Nephro consult placed, recs CVVH for hyperkalemia, low UO. Cardiology, EPS, CT Surg, Interventional cardiology bedside for evaluation determined patient would benefit from ecmo/impella due to decreasing EF 10% from 50% in ED. VA ECMO placed (right SC arterial, right femoral venous) and transferred to [facility] for further workup and management.     On arrival to [facility] at 1130pm on 19, pt received on Levophed 0.23mcg/kg/min (50ml/hr), Vasopressin 0.06units/hr (4ml), Phenylephrine 0.5mcg/kg/min (21ml/hr), VA ECMO 3100RPM with 2.45LPM   ECMO d/cd  : Arrival to SDU, HD stable.   : [facility] HD placed by [facility]. CXR pending. Creatinine rising today, c/o sob abg appears stable  : S/p HD yesterday with 1Liter taken off yesterday. No immediate plans for another round just yet will monitor for now. D/w Dr. Call Va duplex to right arm to assess flow as pt complains numbness tingling weakness to RUE unable to hold baby safely-likely related to axillary canulation.  OT ordered for Right arm assessment and treatment.   Pt progressing, ambulating well.  Good response to bumex.  Creat stable. Not scheduled for dialysis today.  She complains of numbnness and burning starting from her forearm progressing to her hand.  Her finger tips are numb. + ulnar pulses, the hand is warm and mobile.  D/w Dr Figueredo, should obtain doppler flow of both upper extremities, possibly neuro related.   Mild improvement in hand doppler studies with flow bilat, repeat duplex performed  :< from: VA Duplex Upper Extrem Arterial Limited, Right (19 @ 16:03) >  Impression: There is a hematoma in the right supraclavicular area.  The right radial artery remains occluded in the mid and distal forearm    < end of copied text >  < from: VA Physiol Extremity Upper 3+ Level, BI (19 @ 15:59) >  Impression: Significant upper extremity arterial disease is not revealed   by this examination.    < end of copied text >    Symptoms possibly r/t hematoma.  May transfer to floor.  Creat improving, good UO , off dialysis at present

## 2019-04-23 NOTE — PROGRESS NOTE ADULT - PROBLEM SELECTOR PLAN 1
Pt with YAJAIRA - ATN from shock/ acute cardiomyopathy. Pt with good urine output in response to IV diuretic therapy of 6.1L. Agree with continuing IV diuretic therapy for now and creatinine downtrending. No plan for IHD today. Monitor for renal recovery daily. Pt remains hypervolemic. Pt with left IJ non-tunneled HD catheter.   continue to monitor urine output and daily weights.

## 2019-04-24 LAB
ANION GAP SERPL CALC-SCNC: 14 MMOL/L — SIGNIFICANT CHANGE UP (ref 5–17)
BUN SERPL-MCNC: 48 MG/DL — HIGH (ref 7–23)
CALCIUM SERPL-MCNC: 8.8 MG/DL — SIGNIFICANT CHANGE UP (ref 8.4–10.5)
CHLORIDE SERPL-SCNC: 103 MMOL/L — SIGNIFICANT CHANGE UP (ref 96–108)
CO2 SERPL-SCNC: 22 MMOL/L — SIGNIFICANT CHANGE UP (ref 22–31)
CREAT SERPL-MCNC: 5.26 MG/DL — HIGH (ref 0.5–1.3)
GLUCOSE SERPL-MCNC: 122 MG/DL — HIGH (ref 70–99)
HCT VFR BLD CALC: 32.7 % — LOW (ref 34.5–45)
HGB BLD-MCNC: 10.2 G/DL — LOW (ref 11.5–15.5)
MCHC RBC-ENTMCNC: 28 PG — SIGNIFICANT CHANGE UP (ref 27–34)
MCHC RBC-ENTMCNC: 31.3 GM/DL — LOW (ref 32–36)
MCV RBC AUTO: 89.5 FL — SIGNIFICANT CHANGE UP (ref 80–100)
PLATELET # BLD AUTO: 381 K/UL — SIGNIFICANT CHANGE UP (ref 150–400)
POTASSIUM SERPL-MCNC: 4.4 MMOL/L — SIGNIFICANT CHANGE UP (ref 3.5–5.3)
POTASSIUM SERPL-SCNC: 4.4 MMOL/L — SIGNIFICANT CHANGE UP (ref 3.5–5.3)
RBC # BLD: 3.65 M/UL — LOW (ref 3.8–5.2)
RBC # FLD: 16.3 % — HIGH (ref 10.3–14.5)
SODIUM SERPL-SCNC: 139 MMOL/L — SIGNIFICANT CHANGE UP (ref 135–145)
WBC # BLD: 16 K/UL — HIGH (ref 3.8–10.5)
WBC # FLD AUTO: 16 K/UL — HIGH (ref 3.8–10.5)

## 2019-04-24 PROCEDURE — 71045 X-RAY EXAM CHEST 1 VIEW: CPT | Mod: 26

## 2019-04-24 PROCEDURE — 99233 SBSQ HOSP IP/OBS HIGH 50: CPT

## 2019-04-24 PROCEDURE — 99232 SBSQ HOSP IP/OBS MODERATE 35: CPT

## 2019-04-24 RX ADMIN — Medication 12.5 MILLIGRAM(S): at 06:02

## 2019-04-24 RX ADMIN — HEPARIN SODIUM 5000 UNIT(S): 5000 INJECTION INTRAVENOUS; SUBCUTANEOUS at 13:53

## 2019-04-24 RX ADMIN — PANTOPRAZOLE SODIUM 40 MILLIGRAM(S): 20 TABLET, DELAYED RELEASE ORAL at 06:02

## 2019-04-24 RX ADMIN — Medication 100 MILLIGRAM(S): at 22:11

## 2019-04-24 RX ADMIN — Medication 100 MILLIGRAM(S): at 06:02

## 2019-04-24 RX ADMIN — HEPARIN SODIUM 5000 UNIT(S): 5000 INJECTION INTRAVENOUS; SUBCUTANEOUS at 22:11

## 2019-04-24 RX ADMIN — Medication 100 MILLIGRAM(S): at 13:52

## 2019-04-24 RX ADMIN — HEPARIN SODIUM 5000 UNIT(S): 5000 INJECTION INTRAVENOUS; SUBCUTANEOUS at 06:03

## 2019-04-24 RX ADMIN — BUMETANIDE 108 MILLIGRAM(S): 0.25 INJECTION INTRAMUSCULAR; INTRAVENOUS at 06:05

## 2019-04-24 RX ADMIN — BUMETANIDE 108 MILLIGRAM(S): 0.25 INJECTION INTRAMUSCULAR; INTRAVENOUS at 17:34

## 2019-04-24 NOTE — PROGRESS NOTE ADULT - PROBLEM SELECTOR PLAN 1
Continue hydralazine 100 mg q8h   Continue metoprolol ER 12.5 mg daily  Continue bumex 1 gm IV BID. Possible transition to oral diuretics tomorrow.   Repeat echocardiogram today.

## 2019-04-24 NOTE — PROGRESS NOTE ADULT - SUBJECTIVE AND OBJECTIVE BOX
VITAL SIGNS    Telemetry: nsr 70   Vital Signs Last 24 Hrs  T(C): 36.8 (04-24-19 @ 11:11), Max: 37 (04-24-19 @ 03:08)  T(F): 98.3 (04-24-19 @ 11:11), Max: 98.6 (04-24-19 @ 03:08)  HR: 129 (04-24-19 @ 11:11) (90 - 129)  BP: 131/73 (04-24-19 @ 11:11) (126/66 - 131/81)  RR: 18 (04-24-19 @ 11:11) (18 - 18)  SpO2: 99% (04-24-19 @ 11:11) (95% - 99%)                       10.2<L>                x    | x    | x            16.0<H> >-----------< 381     ------------------------< x                     32.7<L>                x    | x    | x                                                                         Ca x     Mg x     Ph x        ,             x                    139  | 22   | 48<H>        x     >-----------< x       ------------------------< 122<H>                 x                    4.4  | 103  | 5.26<H>                                                                     Ca 8.8   Mg x     Ph x                04-23-19 @ 07:01  -  04-24-19 @ 07:00  --------------------------------------------------------  IN: 1090 mL / OUT: 6600 mL / NET: -5510 mL    04-24-19 @ 07:01  -  04-24-19 @ 11:39  --------------------------------------------------------  IN: 340 mL / OUT: 2000 mL / NET: -1660 mL        Daily     Daily         CAPILLARY BLOOD GLUCOSE                    Coumadin    [ ] YES          [  x]      NO                                   PHYSICAL EXAM        Neurology: alert and oriented x 3, nonfocal, no gross deficits  CV : .S1S2 RRR  Sternal Wound : cdi  Pacing Wires       Lungs: cta  Drains:     MS         [  ] Drainage:                 L Pleural  [  ]  Drainage:                R Pleural  [  ]  Drainage:  Abdomen: soft, nontender, nondistended, positive bowel sounds, last bowel movement +  :         voiding    Extremities:     _+edema, -___calf tenderness.                                     acetaminophen   Tablet .. 650 milliGRAM(s) Oral every 6 hours PRN  ALPRAZolam 0.25 milliGRAM(s) Oral at bedtime PRN  benzocaine 15 mG/menthol 3.6 mG Lozenge 1 Lozenge Oral every 2 hours PRN  buMETAnide IVPB 1 milliGRAM(s) IV Intermittent every 12 hours  docusate sodium 100 milliGRAM(s) Oral three times a day  guaiFENesin   Syrup  (Sugar-Free) 200 milliGRAM(s) Oral every 6 hours PRN  heparin  Injectable 5000 Unit(s) SubCutaneous every 8 hours  hydrALAZINE 100 milliGRAM(s) Oral every 8 hours  metoprolol succinate ER 12.5 milliGRAM(s) Oral daily  oxyCODONE    IR 5 milliGRAM(s) Oral every 4 hours PRN  pantoprazole    Tablet 40 milliGRAM(s) Oral before breakfast  senna 2 Tablet(s) Oral at bedtime  sodium chloride 0.65% Nasal 1 Spray(s) Both Nostrils three times a day PRN  sodium chloride 0.9%. 1000 milliLiter(s) IV Continuous <Continuous>                    Physical Therapy Rec:   Home  [  ]   Home w/ PT  [  ]  Rehab  [  ]  Discussed with Cardiothoracic Team at AM rounds.

## 2019-04-24 NOTE — PROGRESS NOTE ADULT - ASSESSMENT
34 yo  at 10w1d GA by LMP of 8/10/18 with EDC of 18 with known hyperemesis gravidarum presents with nausea and vomiting many times on 4/10/19.  HOSPITAL COURSE:  33y female HD#1,  34w5d dated by 1st trimester sonogram, presents to the ED on 4/10 with CC of SOB for past 2 days. Patient was sinus tachy to 200s, adenosine administered, no response, cardioverted with no resolution. OBGYN team proceeded with emergent . Patient intubated in ED and taken to L&D. EBL 800cc, no acute events intraop. SICU consult placed for hemodynamic monitoring and ventilator management.    SICU COURSE:  Patient was sedated with propofol and precedex, became acidotic and retaining. Cards concerned for thyroid storm and advised no CT with contrast to r/o PE, patient was not stable for V/Q scan. Central and arterial line placed overnight for HD monitoring. [place] placed in AM for possible hemodialysis vs CVVH.    During AM rounds on  patient became sinus tachy to 180s, multiple lopressor pushes given with minimal resolution.  Patient amio gtt d/c'd started on esmolol, milrinone with resolution of sinus tachy 120s. Patient weaned off shameka, levo titrating down. Nephro consult placed, recs CVVH for hyperkalemia, low UO. Cardiology, EPS, CT Surg, Interventional cardiology bedside for evaluation determined patient would benefit from ecmo/impella due to decreasing EF 10% from 50% in ED. VA ECMO placed (right SC arterial, right femoral venous) and transferred to [facility] for further workup and management.     On arrival to [facility] at 1130pm on 19, pt received on Levophed 0.23mcg/kg/min (50ml/hr), Vasopressin 0.06units/hr (4ml), Phenylephrine 0.5mcg/kg/min (21ml/hr), VA ECMO 3100RPM with 2.45LPM   ECMO d/cd  : Arrival to SDU, HD stable.   : [facility] HD placed by [facility]. CXR pending. Creatinine rising today, c/o sob abg appears stable  : S/p HD yesterday with 1Liter taken off yesterday. No immediate plans for another round just yet will monitor for now. D/w Dr. Call Va duplex to right arm to assess flow as pt complains numbness tingling weakness to RUE unable to hold baby safely-likely related to axillary canulation.  OT ordered for Right arm assessment and treatment.   Pt progressing, ambulating well.  Good response to bumex.  Creat stable. Not scheduled for dialysis today.  She complains of numbnness and burning starting from her forearm progressing to her hand.  Her finger tips are numb. + ulnar pulses, the hand is warm and mobile.  D/w Dr Figueredo, should obtain doppler flow of both upper extremities, possibly neuro related.   Mild improvement in hand doppler studies with flow bilat, repeat duplex performed  :< from: VA Duplex Upper Extrem Arterial Limited, Right (19 @ 16:03) >  Impression: There is a hematoma in the right supraclavicular area.  The right radial artery remains occluded in the mid and distal forearm    < end of copied text >  < from: VA Physiol Extremity Upper 3+ Level, BI (19 @ 15:59) >  Impression: Significant upper extremity arterial disease is not revealed   by this examination.    < end of copied text >    Symptoms possibly r/t hematoma.  May transfer to floor.  Creat improving, good UO , off dialysis at present    Pt 's creat continues to improve.  D/w Renal, d/c dialysis catheter  when creat <5  Ambulate, may transfer to floor

## 2019-04-24 NOTE — PROGRESS NOTE ADULT - SUBJECTIVE AND OBJECTIVE BOX
Subjective: No overnight events. She notes improvement in swelling of the legs. Her right hand and arm remain numb, but movement in hand is improved.     Medications:  acetaminophen   Tablet .. 650 milliGRAM(s) Oral every 6 hours PRN  ALPRAZolam 0.25 milliGRAM(s) Oral at bedtime PRN  benzocaine 15 mG/menthol 3.6 mG Lozenge 1 Lozenge Oral every 2 hours PRN  buMETAnide IVPB 1 milliGRAM(s) IV Intermittent every 12 hours  docusate sodium 100 milliGRAM(s) Oral three times a day  guaiFENesin   Syrup  (Sugar-Free) 200 milliGRAM(s) Oral every 6 hours PRN  heparin  Injectable 5000 Unit(s) SubCutaneous every 8 hours  hydrALAZINE 100 milliGRAM(s) Oral every 8 hours  metoprolol succinate ER 12.5 milliGRAM(s) Oral daily  oxyCODONE    IR 5 milliGRAM(s) Oral every 4 hours PRN  pantoprazole    Tablet 40 milliGRAM(s) Oral before breakfast  senna 2 Tablet(s) Oral at bedtime  sodium chloride 0.65% Nasal 1 Spray(s) Both Nostrils three times a day PRN  sodium chloride 0.9%. 1000 milliLiter(s) IV Continuous <Continuous>      Physical Exam:    Vital Signs Last 24 Hrs  T(C): 36.8 (24 Apr 2019 07:04), Max: 37 (24 Apr 2019 03:08)  T(F): 98.3 (24 Apr 2019 07:04), Max: 98.6 (24 Apr 2019 03:08)  HR: 101 (24 Apr 2019 07:04) (90 - 110)  BP: 126/66 (24 Apr 2019 07:04) (126/66 - 131/81)  BP(mean): 101 (24 Apr 2019 06:00) (95 - 101)  RR: 18 (24 Apr 2019 07:04) (18 - 18)  SpO2: 96% (24 Apr 2019 07:04) (95% - 98%)    I&O's Summary    23 Apr 2019 07:01  -  24 Apr 2019 07:00  --------------------------------------------------------  IN: 1090 mL / OUT: 6600 mL / NET: -5510 mL    24 Apr 2019 07:01  -  24 Apr 2019 10:27  --------------------------------------------------------  IN: 240 mL / OUT: 1400 mL / NET: -1160 mL    General: No distress. Comfortable.  HEENT: EOM intact.  Neck: Neck supple. JVP mildly elevated. No masses  Chest: Clear to auscultation bilaterally  CV: Tachycardic, regular. Normal S1 and S2. No rubs or gallops. Radial pulses normal. Trace edema in ankles.   Abdomen: Soft, non-distended, non-tender  Skin: No rashes  Neurology: Alert and oriented times three. Sensation intact  Psych: Affect normal    Labs:                        10.2   16.0  )-----------( 381      ( 24 Apr 2019 07:14 )             32.7     04-24    139  |  103  |  48<H>  ----------------------------<  122<H>  4.4   |  22  |  5.26<H>    Ca    8.8      24 Apr 2019 05:51

## 2019-04-24 NOTE — PROGRESS NOTE ADULT - ASSESSMENT
Ms. Jerome is a 33 year old woman with history of hyperemesis gravidarum  initially presented to [facility] (35 weeks gestation) for SOB. She was found in SVT and received adenosinex4 and failed cardioversion x2. She was then emergently taken for  and post op went into shock. She was found to have reduced EF (10-15%) and placed on VA ECMO and CVVHD. Transferred to [facility] on  for further management. She was successfully decannulated on . Her urine output has improved with continued improvement in estimate of GFR.     Plan discussed in multidisciplinary round with [facility] NP team and CT surgery. Please call me with questions at [phone].

## 2019-04-24 NOTE — PROGRESS NOTE ADULT - ASSESSMENT
32 yo lady with no past medical history 35 weeks pregnant transferred to [facility] after she presented with shortness of breath, SVT and acutely decompensated with severe cardiomyopathy, needing ECMO, s/p emergency  now in multiorgan failure and YAJAIRA.

## 2019-04-24 NOTE — PROGRESS NOTE ADULT - SUBJECTIVE AND OBJECTIVE BOX
[facility] Division of Kidney Diseases & Hypertension  FOLLOW UP NOTE  [phone]--------------------------------------------------------------------------------  Chief Complaint:History of extracorporeal membrane oxygenation treatment      24 hour events/subjective: Patient seen and examined at bedside. She is resting comfortably. Creatinine noted to be trending down today as well.        PAST HISTORY  --------------------------------------------------------------------------------  No significant changes to PMH, PSH, FHx, SHx, unless otherwise noted    ALLERGIES & MEDICATIONS  --------------------------------------------------------------------------------  Allergies    No Known Allergies    Intolerances    Reglan (Other)    Standing Inpatient Medications  buMETAnide IVPB 1 milliGRAM(s) IV Intermittent every 12 hours  docusate sodium 100 milliGRAM(s) Oral three times a day  heparin  Injectable 5000 Unit(s) SubCutaneous every 8 hours  hydrALAZINE 100 milliGRAM(s) Oral every 8 hours  metoprolol succinate ER 12.5 milliGRAM(s) Oral daily  pantoprazole    Tablet 40 milliGRAM(s) Oral before breakfast  senna 2 Tablet(s) Oral at bedtime  sodium chloride 0.9%. 1000 milliLiter(s) IV Continuous <Continuous>    PRN Inpatient Medications  acetaminophen   Tablet .. 650 milliGRAM(s) Oral every 6 hours PRN  ALPRAZolam 0.25 milliGRAM(s) Oral at bedtime PRN  benzocaine 15 mG/menthol 3.6 mG Lozenge 1 Lozenge Oral every 2 hours PRN  guaiFENesin   Syrup  (Sugar-Free) 200 milliGRAM(s) Oral every 6 hours PRN  oxyCODONE    IR 5 milliGRAM(s) Oral every 4 hours PRN  sodium chloride 0.65% Nasal 1 Spray(s) Both Nostrils three times a day PRN      REVIEW OF SYSTEMS  --------------------------------------------------------------------------------  Gen: No  fevers/chills  Skin: No rashes  Head/Eyes/Ears/Mouth: No headache; Normal hearing; Normal vision w/o blurriness  Respiratory: No dyspnea, cough, wheezing, hemoptysis  CV: No chest pain, PND, orthopnea  GI: No abdominal pain, diarrhea, constipation, nausea, vomiting  : No increased frequency, dysuria, hematuria, nocturia  MSK: No joint pain/swelling; no back pain; no edema  Neuro: No dizziness/lightheadedness, weakness, seizures, numbness, tingling      All other systems were reviewed and are negative, except as noted.    VITALS/PHYSICAL EXAM  --------------------------------------------------------------------------------  T(C): 36.8 (04-24-19 @ 11:11), Max: 37 (04-24-19 @ 03:08)  HR: 129 (04-24-19 @ 11:11) (90 - 129)  BP: 131/73 (04-24-19 @ 11:11) (126/66 - 131/81)  RR: 18 (04-24-19 @ 11:11) (18 - 18)  SpO2: 99% (04-24-19 @ 11:11) (95% - 99%)  Wt(kg): --        04-23-19 @ 07:01  -  04-24-19 @ 07:00  --------------------------------------------------------  IN: 1090 mL / OUT: 6600 mL / NET: -5510 mL    04-24-19 @ 07:01  -  04-24-19 @ 18:35  --------------------------------------------------------  IN: 1140 mL / OUT: 2600 mL / NET: -1460 mL      Physical Exam:  	Gen: NAD  	Pulm: trace bibasilar rales B/L  	CV: RRR, S1S2  	Abd: +BS, soft, nontender/nondistended  	: No suprapubic tenderness  	UE: Warm  	LE: Warm, + LE edema, improving  	Psych: Normal affect and mood  	Skin: Warm, without rashes  	Vascular access: left IJ non-tunneled HD catheter    LABS/STUDIES  --------------------------------------------------------------------------------              10.2   16.0  >-----------<  381      [04-24-19 @ 07:14]              32.7     139  |  103  |  48  ----------------------------<  122      [04-24-19 @ 05:51]  4.4   |  22  |  5.26        Ca     8.8     [04-24-19 @ 05:51]            Creatinine Trend:  SCr 5.26 [04-24 @ 05:51]  SCr 5.83 [04-23 @ 06:54]  SCr 6.35 [04-22 @ 07:00]  SCr 6.03 [04-21 @ 05:46]  SCr 7.73 [04-20 @ 06:46]

## 2019-04-25 LAB
ANION GAP SERPL CALC-SCNC: 17 MMOL/L — SIGNIFICANT CHANGE UP (ref 5–17)
BUN SERPL-MCNC: 42 MG/DL — HIGH (ref 7–23)
CALCIUM SERPL-MCNC: 9.8 MG/DL — SIGNIFICANT CHANGE UP (ref 8.4–10.5)
CHLORIDE SERPL-SCNC: 104 MMOL/L — SIGNIFICANT CHANGE UP (ref 96–108)
CO2 SERPL-SCNC: 19 MMOL/L — LOW (ref 22–31)
CREAT SERPL-MCNC: 4.11 MG/DL — HIGH (ref 0.5–1.3)
GLUCOSE SERPL-MCNC: 93 MG/DL — SIGNIFICANT CHANGE UP (ref 70–99)
HCT VFR BLD CALC: 36.5 % — SIGNIFICANT CHANGE UP (ref 34.5–45)
HGB BLD-MCNC: 11.9 G/DL — SIGNIFICANT CHANGE UP (ref 11.5–15.5)
MCHC RBC-ENTMCNC: 29.1 PG — SIGNIFICANT CHANGE UP (ref 27–34)
MCHC RBC-ENTMCNC: 32.7 GM/DL — SIGNIFICANT CHANGE UP (ref 32–36)
MCV RBC AUTO: 88.9 FL — SIGNIFICANT CHANGE UP (ref 80–100)
PLATELET # BLD AUTO: 379 K/UL — SIGNIFICANT CHANGE UP (ref 150–400)
POTASSIUM SERPL-MCNC: 4.9 MMOL/L — SIGNIFICANT CHANGE UP (ref 3.5–5.3)
POTASSIUM SERPL-SCNC: 4.9 MMOL/L — SIGNIFICANT CHANGE UP (ref 3.5–5.3)
RBC # BLD: 4.11 M/UL — SIGNIFICANT CHANGE UP (ref 3.8–5.2)
RBC # FLD: 16.4 % — HIGH (ref 10.3–14.5)
SODIUM SERPL-SCNC: 140 MMOL/L — SIGNIFICANT CHANGE UP (ref 135–145)
WBC # BLD: 16 K/UL — HIGH (ref 3.8–10.5)
WBC # FLD AUTO: 16 K/UL — HIGH (ref 3.8–10.5)

## 2019-04-25 PROCEDURE — 99232 SBSQ HOSP IP/OBS MODERATE 35: CPT

## 2019-04-25 PROCEDURE — 93306 TTE W/DOPPLER COMPLETE: CPT | Mod: 26

## 2019-04-25 PROCEDURE — 99233 SBSQ HOSP IP/OBS HIGH 50: CPT

## 2019-04-25 PROCEDURE — 71045 X-RAY EXAM CHEST 1 VIEW: CPT | Mod: 26

## 2019-04-25 RX ORDER — METOPROLOL TARTRATE 50 MG
12.5 TABLET ORAL DAILY
Refills: 0 | Status: DISCONTINUED | OUTPATIENT
Start: 2019-04-25 | End: 2019-04-25

## 2019-04-25 RX ORDER — METOPROLOL TARTRATE 50 MG
12.5 TABLET ORAL
Refills: 0 | Status: DISCONTINUED | OUTPATIENT
Start: 2019-04-25 | End: 2019-04-26

## 2019-04-25 RX ORDER — ACETAMINOPHEN 500 MG
1000 TABLET ORAL ONCE
Refills: 0 | Status: COMPLETED | OUTPATIENT
Start: 2019-04-25 | End: 2019-04-25

## 2019-04-25 RX ADMIN — HEPARIN SODIUM 5000 UNIT(S): 5000 INJECTION INTRAVENOUS; SUBCUTANEOUS at 21:59

## 2019-04-25 RX ADMIN — Medication 1000 MILLIGRAM(S): at 22:30

## 2019-04-25 RX ADMIN — HEPARIN SODIUM 5000 UNIT(S): 5000 INJECTION INTRAVENOUS; SUBCUTANEOUS at 06:09

## 2019-04-25 RX ADMIN — Medication 100 MILLIGRAM(S): at 06:09

## 2019-04-25 RX ADMIN — HEPARIN SODIUM 5000 UNIT(S): 5000 INJECTION INTRAVENOUS; SUBCUTANEOUS at 13:37

## 2019-04-25 RX ADMIN — Medication 650 MILLIGRAM(S): at 06:48

## 2019-04-25 RX ADMIN — Medication 12.5 MILLIGRAM(S): at 18:27

## 2019-04-25 RX ADMIN — Medication 650 MILLIGRAM(S): at 06:18

## 2019-04-25 RX ADMIN — Medication 100 MILLIGRAM(S): at 13:37

## 2019-04-25 RX ADMIN — PANTOPRAZOLE SODIUM 40 MILLIGRAM(S): 20 TABLET, DELAYED RELEASE ORAL at 06:10

## 2019-04-25 RX ADMIN — Medication 100 MILLIGRAM(S): at 21:59

## 2019-04-25 RX ADMIN — Medication 12.5 MILLIGRAM(S): at 06:09

## 2019-04-25 RX ADMIN — Medication 400 MILLIGRAM(S): at 21:59

## 2019-04-25 NOTE — CHART NOTE - NSCHARTNOTEFT_GEN_A_CORE
Pt seen for LOS follow-up.    Pt is a 33 year old female  with known hyperemesis gravidarum presents to the ED on 4/10 with SOB for x 2 days. Patient was sinus tachy to 200s, OBGYN team proceeded with emergent . Pt required intubation and sedation and was taken to SICU. [place] pt would benefit from ecmo/impella due to decreasing EF 10% from 50% in ED. ECMO placed and pt was transferred to [facility]. On  ECMO d/c. Received HD for YAJAIRA-ATN from shock/acute cardiomyopathy. Per Nephrology no plan for IHD as of yesterday. Noted with good urine output.    Source: Patient [x]    Family [ ]     other [x] electronic medical records    Diet: Low Sodium    Patient reports [ ] nausea  [ ] vomiting [ ] diarrhea [ ] constipation  [ ]chewing problems [ ] swallowing issues  [x] other: Denies GI distress    PO intake:  < 50% [ ] 50-75% [ ]   % [x]  other :    Source for PO intake [x] Patient [ ] family [x] chart [ ] staff [ ] other:     Enteral/Parenteral Nutrition: n/a    Current Weight: 211.4 pounds (current, standing, +1 B/L foot edema noted). 175 pounds pre-pregnancy wt noted.    Pertinent Medications: MEDICATIONS  (STANDING):  docusate sodium 100 milliGRAM(s) Oral three times a day  heparin  Injectable 5000 Unit(s) SubCutaneous every 8 hours  hydrALAZINE 100 milliGRAM(s) Oral every 8 hours  metoprolol succinate ER 12.5 milliGRAM(s) Oral daily  pantoprazole    Tablet 40 milliGRAM(s) Oral before breakfast  senna 2 Tablet(s) Oral at bedtime  sodium chloride 0.9%. 1000 milliLiter(s) (10 mL/Hr) IV Continuous <Continuous>    MEDICATIONS  (PRN):  acetaminophen   Tablet .. 650 milliGRAM(s) Oral every 6 hours PRN Mild Pain (1 - 3)  ALPRAZolam 0.25 milliGRAM(s) Oral at bedtime PRN insomnia  benzocaine 15 mG/menthol 3.6 mG Lozenge 1 Lozenge Oral every 2 hours PRN Sore Throat  guaiFENesin   Syrup  (Sugar-Free) 200 milliGRAM(s) Oral every 6 hours PRN Cough  oxyCODONE    IR 5 milliGRAM(s) Oral every 4 hours PRN Moderate Pain (4 - 6)  sodium chloride 0.65% Nasal 1 Spray(s) Both Nostrils three times a day PRN Nasal Congestion    Pertinent Labs:   Na140 mmol/L Glu 93 mg/dL K+ 4.9 mmol/L Cr  4.11 mg/dL<H> BUN 42 mg/dL<H>  Phos 6.1 mg/dL<H>  Alb 2.6 g/dL<L> 04-15 PAB 9 mg/dL<L>  [id number] 5.5 % 04-15 Chol 140 mg/dL LDL 75 mg/dL HDL 31 mg/dL<L> Trig 169 mg/dL<H>      Skin: Surgical incision noted      Estimated Needs:   [x] no change since previous assessment  [ ] recalculated:       Previous Nutrition Diagnosis: Increased nutrient needs       New Nutrition Diagnosis: [x] not applicable       Interventions:     1) Recommend continue current low sodium diet order.  -Recommend add no concentrated K+, no concentrated Phos to diet order in setting of hyperphosphatemia and uptrend in K+  2) Educated pt on low sodium and no concentrated K+, no concentrated Phos diet recommendations.        Monitoring and Evaluation:     [x] PO intake [x] Tolerance to diet prescription [x] weights [x] follow up per protocol    [x] other: RD remains available: Niesha Collins MS, RDN, CDN, CDE. #[phone] Pt seen for LOS follow-up. Pt reports good po intake/appetite at this time. Admits to not eating much of the food in-house, getting most foods brought in at home. Remains on fluid restrict 2L, reports good adherence, using ice chips.    Pt is a 33 year old female  with known hyperemesis gravidarum presents to the ED on 4/10 with SOB for x 2 days. Patient was sinus tachy to 200s, OBGYN team proceeded with emergent . Pt required intubation and sedation and was taken to SICU. [place] pt would benefit from ecmo/impella due to decreasing EF 10% from 50% in ED. ECMO placed and pt was transferred to [facility]. On  ECMO d/c. Received HD for YAJAIRA-ATN from shock/acute cardiomyopathy. Per Nephrology no plan for IHD as of yesterday. Noted with good urine output.    Source: Patient [x]    Family [ ]     other [x] electronic medical records    Diet: Low Sodium, fluid restrict 2L    Patient reports [ ] nausea  [ ] vomiting [ ] diarrhea [ ] constipation  [ ]chewing problems [ ] swallowing issues  [x] other: Denies GI distress at this time    PO intake:  < 50% [ ] 50-75% [ ]   % [x]  other :    Source for PO intake [x] Patient [ ] family [x] chart [ ] staff [ ] other:     Enteral/Parenteral Nutrition: n/a    Current Weight: 211.4 pounds (current, standing, +1 B/L foot edema noted). 175 pounds pre-pregnancy wt noted.    Pertinent Medications: MEDICATIONS  (STANDING):  docusate sodium 100 milliGRAM(s) Oral three times a day  heparin  Injectable 5000 Unit(s) SubCutaneous every 8 hours  hydrALAZINE 100 milliGRAM(s) Oral every 8 hours  metoprolol succinate ER 12.5 milliGRAM(s) Oral daily  pantoprazole    Tablet 40 milliGRAM(s) Oral before breakfast  senna 2 Tablet(s) Oral at bedtime  sodium chloride 0.9%. 1000 milliLiter(s) (10 mL/Hr) IV Continuous <Continuous>    MEDICATIONS  (PRN):  acetaminophen   Tablet .. 650 milliGRAM(s) Oral every 6 hours PRN Mild Pain (1 - 3)  ALPRAZolam 0.25 milliGRAM(s) Oral at bedtime PRN insomnia  benzocaine 15 mG/menthol 3.6 mG Lozenge 1 Lozenge Oral every 2 hours PRN Sore Throat  guaiFENesin   Syrup  (Sugar-Free) 200 milliGRAM(s) Oral every 6 hours PRN Cough  oxyCODONE    IR 5 milliGRAM(s) Oral every 4 hours PRN Moderate Pain (4 - 6)  sodium chloride 0.65% Nasal 1 Spray(s) Both Nostrils three times a day PRN Nasal Congestion    Pertinent Labs:   Na140 mmol/L Glu 93 mg/dL K+ 4.9 mmol/L Cr  4.11 mg/dL<H> BUN 42 mg/dL<H>  Phos 6.1 mg/dL<H>  Alb 2.6 g/dL<L> 04-15 PAB 9 mg/dL<L>  [id number] 5.5 % 04-15 Chol 140 mg/dL LDL 75 mg/dL HDL 31 mg/dL<L> Trig 169 mg/dL<H>      Skin: Surgical incision noted      Estimated Needs:   [x] no change since previous assessment  [ ] recalculated:       Previous Nutrition Diagnosis: Increased nutrient needs       New Nutrition Diagnosis: [x] not applicable       Interventions:     1) Recommend continue current low sodium diet order.  -Recommend add no concentrated K+, no concentrated Phos to diet order in setting of hyperphosphatemia and uptrend in K+  -Fluid restrict per team  2) Educated pt on low sodium, no concentrated K+, no concentrated Phos diet recommendations. Discussed food sources of nutrients discussed, suitable alternatives. Pt voiced understanding and accepted K+. Phosphorous and Sodium nutrient lists for reference.       Monitoring and Evaluation:     [x] PO intake [x] Tolerance to diet prescription [x] weights [x] follow up per protocol    [x] other: RD remains available: Niesha Colilns MS, RDN, CDN, CDE. #[phone]

## 2019-04-25 NOTE — CHART NOTE - NSCHARTNOTESELECT_GEN_ALL_CORE
Nutrition Services
OB follow up
OB follow up note/Event Note
Ob R3 follow up note/Event Note

## 2019-04-25 NOTE — PROGRESS NOTE ADULT - SUBJECTIVE AND OBJECTIVE BOX
VITAL SIGNS-Telemetry:  SR/St   Vital Signs Last 24 Hrs  T(C): 36.6 (19 @ 18:53), Max: 36.8 (19 @ 11:11)  T(F): 97.9 (19 @ 18:53), Max: 98.3 (19 @ 11:11)  HR: 127 (19 @ 06:06) (114 - 131)  BP: 135/87 (19 @ 06:06) (117/73 - 135/87)  RR: 18 (19 @ 06:06) (18 - 18)  SpO2: 96% (19 @ 06:06) (90% - 99%)          @ 07:01  -   @ 07:00  --------------------------------------------------------  IN: 1140 mL / OUT: 5000 mL / NET: -3860 mL     @ 07:01  -   @ 09:58  --------------------------------------------------------  IN: 420 mL / OUT: 700 mL / NET: -280 mL    Daily     Daily Weight in k.9 (2019 09:25)       PHYSICAL EXAM:  Neurology: alert and oriented x 3, nonfocal, no gross deficits  CV : S1S2  Sternal Wound :  CDI , Stable  Lungs: CTA  Abdomen: soft, nontender, nondistended, positive bowel sounds, last bowel movement         Extremities:         acetaminophen   Tablet .. 650 milliGRAM(s) Oral every 6 hours PRN  ALPRAZolam 0.25 milliGRAM(s) Oral at bedtime PRN  benzocaine 15 mG/menthol 3.6 mG Lozenge 1 Lozenge Oral every 2 hours PRN  docusate sodium 100 milliGRAM(s) Oral three times a day  guaiFENesin   Syrup  (Sugar-Free) 200 milliGRAM(s) Oral every 6 hours PRN  heparin  Injectable 5000 Unit(s) SubCutaneous every 8 hours  hydrALAZINE 100 milliGRAM(s) Oral every 8 hours  metoprolol succinate ER 12.5 milliGRAM(s) Oral daily  oxyCODONE    IR 5 milliGRAM(s) Oral every 4 hours PRN  pantoprazole    Tablet 40 milliGRAM(s) Oral before breakfast  senna 2 Tablet(s) Oral at bedtime  sodium chloride 0.65% Nasal 1 Spray(s) Both Nostrils three times a day PRN  sodium chloride 0.9%. 1000 milliLiter(s) IV Continuous <Continuous>    Physical Therapy Rec:   Home  [  ]   Home w/ PT  [  ]  Rehab  [  ]  Discussed with Cardiothoracic Team at AM rounds. VITAL SIGNS-Telemetry:  SR/St   Vital Signs Last 24 Hrs  T(C): 36.6 (19 @ 18:53), Max: 36.8 (19 @ 11:11)  T(F): 97.9 (19 @ 18:53), Max: 98.3 (19 @ 11:11)  HR: 127 (19 @ 06:06) (114 - 131)  BP: 135/87 (19 @ 06:06) (117/73 - 135/87)  RR: 18 (19 @ 06:06) (18 - 18)  SpO2: 96% (19 @ 06:06) (90% - 99%)          @ 07:01  -   @ 07:00  --------------------------------------------------------  IN: 1140 mL / OUT: 5000 mL / NET: -3860 mL     @ 07:01  -   @ 09:58  --------------------------------------------------------  IN: 420 mL / OUT: 700 mL / NET: -280 mL    Daily     Daily Weight in k.9 (2019 09:25)       PHYSICAL EXAM:  Neurology: alert and oriented x 3, nonfocal, no gross deficits  CV : S1S2  Lungs: CTA  Abdomen: soft, nontender, nondistended, positive bowel sounds, last bowel movement         Extremities:     + edema, no calf tenderness, right groin stitch removed - site cdi    acetaminophen   Tablet .. 650 milliGRAM(s) Oral every 6 hours PRN  ALPRAZolam 0.25 milliGRAM(s) Oral at bedtime PRN  benzocaine 15 mG/menthol 3.6 mG Lozenge 1 Lozenge Oral every 2 hours PRN  docusate sodium 100 milliGRAM(s) Oral three times a day  guaiFENesin   Syrup  (Sugar-Free) 200 milliGRAM(s) Oral every 6 hours PRN  heparin  Injectable 5000 Unit(s) SubCutaneous every 8 hours  hydrALAZINE 100 milliGRAM(s) Oral every 8 hours  metoprolol succinate ER 12.5 milliGRAM(s) Oral daily  oxyCODONE    IR 5 milliGRAM(s) Oral every 4 hours PRN  pantoprazole    Tablet 40 milliGRAM(s) Oral before breakfast  senna 2 Tablet(s) Oral at bedtime  sodium chloride 0.65% Nasal 1 Spray(s) Both Nostrils three times a day PRN  sodium chloride 0.9%. 1000 milliLiter(s) IV Continuous <Continuous>    Physical Therapy Rec:   Home  [x  ]   Home w/ PT  [  ]  Rehab  [  ]  Discussed with Cardiothoracic Team at AM rounds.

## 2019-04-25 NOTE — PROGRESS NOTE ADULT - PROBLEM SELECTOR PLAN 1
Pt with YAJAIRA - ATN from shock/ acute cardiomyopathy. Pt with good urine output in response to IV diuretic therapy. Made 5L urine in the last 24 hrs. Agree with continuing IV diuretic therapy for now and creatinine downtrending.  continue to monitor urine output and daily weights.

## 2019-04-25 NOTE — PROGRESS NOTE ADULT - ASSESSMENT
Ms. Jerome is a 33 year old woman with history of hyperemesis gravidarum  initially presented to [facility] (35 weeks gestation) for SOB. She was found in SVT and received adenosinex4 and failed cardioversion x2. She was then emergently taken for  and post op went into shock. She was found to have reduced EF (10-15%) and placed on [facility] ECMO and CVVHD. Transferred to [facility] on  for further management. She was successfully decannulated on . Her urine output has improved with continued improvement in estimate of GFR.     Plan discussed in multidisciplinary round with [facility] NP team and CT surgery. Please call me with questions at [phone].

## 2019-04-25 NOTE — PROGRESS NOTE ADULT - SUBJECTIVE AND OBJECTIVE BOX
[facility] Division of Kidney Diseases & Hypertension  FOLLOW UP NOTE  [phone]--------------------------------------------------------------------------------  Chief Complaint:History of extracorporeal membrane oxygenation treatment      24 hour events/subjective: Patient seen at bedside. Resting comfortably. Creatinine noted to be 4.11 today. Made 5L of urine in the last 24 hrs.        PAST HISTORY  --------------------------------------------------------------------------------  No significant changes to PMH, PSH, FHx, SHx, unless otherwise noted    ALLERGIES & MEDICATIONS  --------------------------------------------------------------------------------  Allergies    No Known Allergies    Intolerances    Reglan (Other)    Standing Inpatient Medications  docusate sodium 100 milliGRAM(s) Oral three times a day  heparin  Injectable 5000 Unit(s) SubCutaneous every 8 hours  hydrALAZINE 100 milliGRAM(s) Oral every 8 hours  metoprolol succinate ER 12.5 milliGRAM(s) Oral daily  pantoprazole    Tablet 40 milliGRAM(s) Oral before breakfast  senna 2 Tablet(s) Oral at bedtime  sodium chloride 0.9%. 1000 milliLiter(s) IV Continuous <Continuous>    PRN Inpatient Medications  acetaminophen   Tablet .. 650 milliGRAM(s) Oral every 6 hours PRN  ALPRAZolam 0.25 milliGRAM(s) Oral at bedtime PRN  benzocaine 15 mG/menthol 3.6 mG Lozenge 1 Lozenge Oral every 2 hours PRN  guaiFENesin   Syrup  (Sugar-Free) 200 milliGRAM(s) Oral every 6 hours PRN  oxyCODONE    IR 5 milliGRAM(s) Oral every 4 hours PRN  sodium chloride 0.65% Nasal 1 Spray(s) Both Nostrils three times a day PRN      REVIEW OF SYSTEMS  --------------------------------------------------------------------------------  Gen: No  fevers/chills  Skin: No rashes  Head/Eyes/Ears/Mouth: No headache; Normal hearing; Normal vision w/o blurriness  Respiratory: No dyspnea, cough, wheezing, hemoptysis  CV: No chest pain, PND, orthopnea  GI: No abdominal pain, diarrhea, constipation, nausea, vomiting  : No increased frequency, dysuria, hematuria, nocturia  MSK: No joint pain/swelling; no back pain; no edema  Neuro: No dizziness/lightheadedness, weakness, seizures, numbness, tingling      All other systems were reviewed and are negative, except as noted.    VITALS/PHYSICAL EXAM  --------------------------------------------------------------------------------  T(C): 36.6 (04-24-19 @ 18:53), Max: 36.6 (04-24-19 @ 18:53)  HR: 127 (04-25-19 @ 06:06) (114 - 131)  BP: 135/87 (04-25-19 @ 06:06) (117/73 - 135/87)  RR: 18 (04-25-19 @ 06:06) (18 - 18)  SpO2: 96% (04-25-19 @ 06:06) (90% - 98%)  Wt(kg): --        04-24-19 @ 07:01  -  04-25-19 @ 07:00  --------------------------------------------------------  IN: 1140 mL / OUT: 5000 mL / NET: -3860 mL    04-25-19 @ 07:01  -  04-25-19 @ 11:36  --------------------------------------------------------  IN: 420 mL / OUT: 700 mL / NET: -280 mL      Physical Exam:  	Gen: NAD  	Pulm: trace bibasilar rales B/L  	CV: RRR, S1S2  	Abd: +BS, soft, nontender/nondistended  	: No suprapubic tenderness  	UE: Warm  	LE: Warm, + LE edema, improving  	Psych: Normal affect and mood  	Skin: Warm, without rashes  	Vascular access: left IJ non-tunneled HD catheter    LABS/STUDIES  --------------------------------------------------------------------------------              11.9   16.0  >-----------<  379      [04-25-19 @ 09:18]              36.5     140  |  104  |  42  ----------------------------<  93      [04-25-19 @ 09:18]  4.9   |  19  |  4.11        Ca     9.8     [04-25-19 @ 09:18]            Creatinine Trend:  SCr 4.11 [04-25 @ 09:18]  SCr 5.26 [04-24 @ 05:51]  SCr 5.83 [04-23 @ 06:54]  SCr 6.35 [04-22 @ 07:00]  SCr 6.03 [04-21 @ 05:46]

## 2019-04-25 NOTE — PROGRESS NOTE ADULT - SUBJECTIVE AND OBJECTIVE BOX
Subjective: She is fatigued. No new complaints.     Medications:  acetaminophen   Tablet .. 650 milliGRAM(s) Oral every 6 hours PRN  ALPRAZolam 0.25 milliGRAM(s) Oral at bedtime PRN  benzocaine 15 mG/menthol 3.6 mG Lozenge 1 Lozenge Oral every 2 hours PRN  docusate sodium 100 milliGRAM(s) Oral three times a day  guaiFENesin   Syrup  (Sugar-Free) 200 milliGRAM(s) Oral every 6 hours PRN  heparin  Injectable 5000 Unit(s) SubCutaneous every 8 hours  hydrALAZINE 100 milliGRAM(s) Oral every 8 hours  metoprolol succinate ER 12.5 milliGRAM(s) Oral two times a day  oxyCODONE    IR 5 milliGRAM(s) Oral every 4 hours PRN  pantoprazole    Tablet 40 milliGRAM(s) Oral before breakfast  senna 2 Tablet(s) Oral at bedtime  sodium chloride 0.65% Nasal 1 Spray(s) Both Nostrils three times a day PRN  sodium chloride 0.9%. 1000 milliLiter(s) IV Continuous <Continuous>      Physical Exam:    Vital Signs Last 24 Hrs  T(C): 36.7 (2019 15:02), Max: 36.7 (2019 15:02)  T(F): 98 (2019 15:02), Max: 98 (2019 15:02)  HR: 126 (2019 15:02) (114 - 131)  BP: 132/73 (2019 15:02) (117/73 - 135/87)  BP(mean): 95 (2019 15:02) (90 - 103)  RR: 18 (2019 15:02) (18 - 18)  SpO2: 96% (2019 06:06) (90% - 98%)      Daily Weight in k.9 (2019 09:25)    I&O's Summary    2019 07:01  -  2019 07:00  --------------------------------------------------------  IN: 1140 mL / OUT: 5000 mL / NET: -3860 mL    2019 07:01  -  2019 18:49  --------------------------------------------------------  IN: 1170 mL / OUT: 1500 mL / NET: -330 mL    General: No distress. Comfortable.  HEENT: EOM intact.  Neck: Neck supple. JVP not elevated. No masses  Chest: Clear to auscultation bilaterally  CV: Tachycardic, regular, Normal S1 and S2. No murmurs, rub, or gallops. Radial pulses normal. No edema.   Abdomen: Soft, non-distended, non-tender  Skin: No rashes or skin breakdown  Neurology: Alert and oriented times three. Sensation intact  Psych: Affect normal    Labs:                        11.9   16.0  )-----------( 379      ( 2019 09:18 )             36.5         140  |  104  |  42<H>  ----------------------------<  93  4.9   |  19<L>  |  4.11<H>    Ca    9.8      2019 09:18    < from: Transthoracic Echocardiogram (19 @ 14:13) >  Dimensions:    Normal Values:  LA:     3.6    2.0 - 4.0 cm  Ao:     3.1    2.0 - 3.8 cm  SEPTUM: 1.1    0.6 - 1.2 cm  PWT:  1.3    0.6 - 1.1 cm  LVIDd:  5.5    3.0 - 5.6 cm  LVIDs:  4.1    1.8 - 4.0 cm  Derived variables:  LVMI: 136 g/m2  RWT: 0.47  Fractional short: 24 %  EF (Visual Estimate): 40-45 %  ------------------------------------------------------------------------  Observations:  Mitral Valve: Normal mitral valve. Mild mitral  regurgitation.  Aortic Valve/Aorta: Normal trileaflet aortic valve.  Aortic Root: 3.1 cm.  Left Atrium: Normal left atrium.  LA volume index = 28  cc/m2.  Left Ventricle: Mild-moderate segmental left ventricular  systolic dysfunction. The anterior wall and septum appear  hypokinetic.  Increased relative wall thickness with normal  left ventricular mass index, consistent with concentric  left ventricular remodeling.  Right Heart:Normal right atrium. Normal right ventricular  size and function. Normal tricuspid valve. Mild tricuspid  regurgitation. Normal pulmonic valve. Minimal pulmonic  regurgitation.  Pericardium/Pleura: Normal pericardium with no pericardial  effusion.  Hemodynamic: Estimated right atrial pressure is 8 mm Hg.  Estimated right ventricular systolic pressure equals 27 mm  Hg, assuming right atrial pressure equals 8 mm Hg,  consistent with normal pulmonary pressures.  ------------------------------------------------------------------------  Conclusions:  1. Normal mitral valve. Mild mitral regurgitation.  2. Normal trileaflet aortic valve.  3. Increased relative wall thickness with normal left  ventricular mass index, consistent with concentric left  ventricular remodeling.  4. Mild-moderate segmental left ventricular systolic  dysfunction. The anterior wall and septum appear  hypokinetic.  5. Normal right ventricular size and function.  *** Compared with echocardiogram of 4/15/2019, LV systolic  function appears improved.    < end of copied text >

## 2019-04-25 NOTE — PROGRESS NOTE ADULT - ASSESSMENT
34 yo  at 10w1d GA by LMP of 8/10/18 with EDC of 18 with known hyperemesis gravidarum presents with nausea and vomiting many times on 4/10/19.  HOSPITAL COURSE:  33y female HD#1,  34w5d dated by 1st trimester sonogram, presents to the ED on 4/10 with CC of SOB for past 2 days. Patient was sinus tachy to 200s, adenosine administered, no response, cardioverted with no resolution. OBGYN team proceeded with emergent . Patient intubated in ED and taken to L&D. EBL 800cc, no acute events intraop. SICU consult placed for hemodynamic monitoring and ventilator management.    SICU COURSE:  Patient was sedated with propofol and precedex, became acidotic and retaining. Cards concerned for thyroid storm and advised no CT with contrast to r/o PE, patient was not stable for V/Q scan. Central and arterial line placed overnight for HD monitoring. [place] placed in AM for possible hemodialysis vs CVVH.    During AM rounds on  patient became sinus tachy to 180s, multiple lopressor pushes given with minimal resolution.  Patient amio gtt d/c'd started on esmolol, milrinone with resolution of sinus tachy 120s. Patient weaned off shameka, levo titrating down. Nephro consult placed, recs CVVH for hyperkalemia, low UO. Cardiology, EPS, CT Surg, Interventional cardiology bedside for evaluation determined patient would benefit from ecmo/impella due to decreasing EF 10% from 50% in ED. VA ECMO placed (right SC arterial, right femoral venous) and transferred to [facility] for further workup and management.     On arrival to [facility] at 1130pm on 19, pt received on Levophed 0.23mcg/kg/min (50ml/hr), Vasopressin 0.06units/hr (4ml), Phenylephrine 0.5mcg/kg/min (21ml/hr), VA ECMO 3100RPM with 2.45LPM   ECMO d/cd  : Arrival to SDU, HD stable.   : [facility] HD placed by [facility]. CXR pending. Creatinine rising today, c/o sob abg appears stable  : S/p HD yesterday with 1Liter taken off yesterday. No immediate plans for another round just yet will monitor for now. D/w Dr. Lehman Va duplex to right arm to assess flow as pt complains numbness tingling weakness to RUE unable to hold baby safely-likely related to axillary canulation.  OT ordered for Right arm assessment and treatment.   Pt progressing, ambulating well.  Good response to bumex.  Creat stable. Not scheduled for dialysis today.  She complains of numbnness and burning starting from her forearm progressing to her hand.  Her finger tips are numb. + ulnar pulses, the hand is warm and mobile.  D/w Dr Figueredo, should obtain doppler flow of both upper extremities, possibly neuro related.   Mild improvement in hand doppler studies with flow bilat, repeat duplex performed  :< from: VA Duplex Upper Extrem Arterial Limited, Right (19 @ 16:03) >  Impression: There is a hematoma in the right supraclavicular area.  The right radial artery remains occluded in the mid and distal forearm    < from: VA Physiol Extremity Upper 3+ Level, BI (19 @ 15:59) >  Impression: Significant upper extremity arterial disease is not revealed   by this examination.    Symptoms possibly r/t hematoma.  May transfer to floor.  Creat improving, good UO , off dialysis at present    Pt 's creat continues to improve.  D/w Renal, d/c dialysis catheter  when creat <5  Ambulate, may transfer to floor   - VSS will d/c right groin suture this am - d/w dr. lehman & d/c HD cath as creatinine < 5 per renal  tr. to floor 32 yo  at 10w1d GA by LMP of 8/10/18 with EDC of 18 with known hyperemesis gravidarum presents with nausea and vomiting many times on 4/10/19.  HOSPITAL COURSE:  33y female HD#1,  34w5d dated by 1st trimester sonogram, presents to the ED on 4/10 with CC of SOB for past 2 days. Patient was sinus tachy to 200s, adenosine administered, no response, cardioverted with no resolution. OBGYN team proceeded with emergent . Patient intubated in ED and taken to L&D. EBL 800cc, no acute events intraop. SICU consult placed for hemodynamic monitoring and ventilator management.    SICU COURSE:  Patient was sedated with propofol and precedex, became acidotic and retaining. Cards concerned for thyroid storm and advised no CT with contrast to r/o PE, patient was not stable for V/Q scan. Central and arterial line placed overnight for HD monitoring. [place] placed in AM for possible hemodialysis vs CVVH.    During AM rounds on  patient became sinus tachy to 180s, multiple lopressor pushes given with minimal resolution.  Patient amio gtt d/c'd started on esmolol, milrinone with resolution of sinus tachy 120s. Patient weaned off shameka, levo titrating down. Nephro consult placed, recs CVVH for hyperkalemia, low UO. Cardiology, EPS, CT Surg, Interventional cardiology bedside for evaluation determined patient would benefit from ecmo/impella due to decreasing EF 10% from 50% in ED. VA ECMO placed (right SC arterial, right femoral venous) and transferred to [facility] for further workup and management.     On arrival to [facility] at 1130pm on 19, pt received on Levophed 0.23mcg/kg/min (50ml/hr), Vasopressin 0.06units/hr (4ml), Phenylephrine 0.5mcg/kg/min (21ml/hr), VA ECMO 3100RPM with 2.45LPM   ECMO d/cd  : Arrival to SDU, HD stable.   : [facility] HD placed by [facility]. CXR pending. Creatinine rising today, c/o sob abg appears stable  : S/p HD yesterday with 1Liter taken off yesterday. No immediate plans for another round just yet will monitor for now. D/w Dr. Lehman Va duplex to right arm to assess flow as pt complains numbness tingling weakness to RUE unable to hold baby safely-likely related to axillary canulation.  OT ordered for Right arm assessment and treatment.   Pt progressing, ambulating well.  Good response to bumex.  Creat stable. Not scheduled for dialysis today.  She complains of numbnness and burning starting from her forearm progressing to her hand.  Her finger tips are numb. + ulnar pulses, the hand is warm and mobile.  D/w Dr Figueredo, should obtain doppler flow of both upper extremities, possibly neuro related.   Mild improvement in hand doppler studies with flow bilat, repeat duplex performed  :< from: VA Duplex Upper Extrem Arterial Limited, Right (19 @ 16:03) >  Impression: There is a hematoma in the right supraclavicular area.  The right radial artery remains occluded in the mid and distal forearm    < from: VA Physiol Extremity Upper 3+ Level, BI (19 @ 15:59) >  Impression: Significant upper extremity arterial disease is not revealed   by this examination.    Symptoms possibly r/t hematoma.  May transfer to floor.  Creat improving, good UO , off dialysis at present    Pt 's creat continues to improve.  D/w Renal, d/c dialysis catheter  when creat <5  Ambulate, may transfer to floor   - VSS will d/c right groin suture this am - d/w dr. lehman & d/c HD cath as creatinine < 5 per renal  pt showered - & visited her  in NICU on CM w/ RN- rpt echo EF = 45% - possible d/c home in am as baby is being discharged from NICU   tr. to floor

## 2019-04-25 NOTE — PROGRESS NOTE ADULT - ASSESSMENT
32 yo lady with no past medical history 35 weeks pregnant transferred to [facility] after she presented with shortness of breath, SVT and acutely decompensated with severe cardiomyopathy, needing ECMO, s/p emergency  now in multiorgan failure and YAJAIRA.

## 2019-04-25 NOTE — PROGRESS NOTE ADULT - PROBLEM SELECTOR PLAN 2
acute ATN from shock  Creatinine continues to be elevated. Nephrology input appreciated  s/p HD 4/20-will d/c hd cath this am 4/25 creat. < 5  Continue to monitor renal function closely  d/c rashaun

## 2019-04-25 NOTE — PROGRESS NOTE ADULT - PROBLEM SELECTOR PLAN 1
Monitor off inotropes.   intraoperative BETTY with LVEF approximately 30%. Will likely repeat TTE next week for EF assessment.   d/c right groin suture   hydral increased to 100 mg q8h (hold for SBP <90) per HF   Monitor urine output   toprol increrased to 75 mg qd per HF.   Ambulate  Incentive spirometry Monitor off inotropes.   intraoperative BETTY with LVEF approximately 30%. Will likely repeat TTE 4/25  shows EF=45%   d/c right groin suture   hydral increased to 100 mg q8h (hold for SBP <90) per HF   Monitor urine output   toprol increrased to 75 mg qd per HF.   Ambulate  Incentive spirometry

## 2019-04-26 LAB
ANION GAP SERPL CALC-SCNC: 15 MMOL/L — SIGNIFICANT CHANGE UP (ref 5–17)
BUN SERPL-MCNC: 36 MG/DL — HIGH (ref 7–23)
CALCIUM SERPL-MCNC: 9.7 MG/DL — SIGNIFICANT CHANGE UP (ref 8.4–10.5)
CHLORIDE SERPL-SCNC: 102 MMOL/L — SIGNIFICANT CHANGE UP (ref 96–108)
CO2 SERPL-SCNC: 21 MMOL/L — LOW (ref 22–31)
CREAT SERPL-MCNC: 3.42 MG/DL — HIGH (ref 0.5–1.3)
GLUCOSE SERPL-MCNC: 91 MG/DL — SIGNIFICANT CHANGE UP (ref 70–99)
HCT VFR BLD CALC: 35.6 % — SIGNIFICANT CHANGE UP (ref 34.5–45)
HGB BLD-MCNC: 11.5 G/DL — SIGNIFICANT CHANGE UP (ref 11.5–15.5)
MCHC RBC-ENTMCNC: 28.7 PG — SIGNIFICANT CHANGE UP (ref 27–34)
MCHC RBC-ENTMCNC: 32.2 GM/DL — SIGNIFICANT CHANGE UP (ref 32–36)
MCV RBC AUTO: 89.2 FL — SIGNIFICANT CHANGE UP (ref 80–100)
PLATELET # BLD AUTO: 364 K/UL — SIGNIFICANT CHANGE UP (ref 150–400)
POTASSIUM SERPL-MCNC: 4.4 MMOL/L — SIGNIFICANT CHANGE UP (ref 3.5–5.3)
POTASSIUM SERPL-SCNC: 4.4 MMOL/L — SIGNIFICANT CHANGE UP (ref 3.5–5.3)
RBC # BLD: 3.99 M/UL — SIGNIFICANT CHANGE UP (ref 3.8–5.2)
RBC # FLD: 16.5 % — HIGH (ref 10.3–14.5)
SODIUM SERPL-SCNC: 138 MMOL/L — SIGNIFICANT CHANGE UP (ref 135–145)
WBC # BLD: 13.5 K/UL — HIGH (ref 3.8–10.5)
WBC # FLD AUTO: 13.5 K/UL — HIGH (ref 3.8–10.5)

## 2019-04-26 PROCEDURE — 99233 SBSQ HOSP IP/OBS HIGH 50: CPT

## 2019-04-26 PROCEDURE — 99232 SBSQ HOSP IP/OBS MODERATE 35: CPT | Mod: 24

## 2019-04-26 RX ORDER — METOPROLOL TARTRATE 50 MG
25 TABLET ORAL EVERY 12 HOURS
Refills: 0 | Status: DISCONTINUED | OUTPATIENT
Start: 2019-04-26 | End: 2019-04-27

## 2019-04-26 RX ADMIN — HEPARIN SODIUM 5000 UNIT(S): 5000 INJECTION INTRAVENOUS; SUBCUTANEOUS at 21:26

## 2019-04-26 RX ADMIN — Medication 650 MILLIGRAM(S): at 06:46

## 2019-04-26 RX ADMIN — Medication 100 MILLIGRAM(S): at 21:26

## 2019-04-26 RX ADMIN — PANTOPRAZOLE SODIUM 40 MILLIGRAM(S): 20 TABLET, DELAYED RELEASE ORAL at 06:41

## 2019-04-26 RX ADMIN — HEPARIN SODIUM 5000 UNIT(S): 5000 INJECTION INTRAVENOUS; SUBCUTANEOUS at 13:02

## 2019-04-26 RX ADMIN — Medication 0.25 MILLIGRAM(S): at 04:07

## 2019-04-26 RX ADMIN — Medication 12.5 MILLIGRAM(S): at 06:41

## 2019-04-26 RX ADMIN — Medication 650 MILLIGRAM(S): at 07:24

## 2019-04-26 RX ADMIN — Medication 100 MILLIGRAM(S): at 06:41

## 2019-04-26 RX ADMIN — Medication 25 MILLIGRAM(S): at 17:27

## 2019-04-26 RX ADMIN — Medication 100 MILLIGRAM(S): at 13:02

## 2019-04-26 RX ADMIN — HEPARIN SODIUM 5000 UNIT(S): 5000 INJECTION INTRAVENOUS; SUBCUTANEOUS at 06:40

## 2019-04-26 NOTE — PROGRESS NOTE ADULT - ASSESSMENT
34 yo  at 10w1d GA by LMP of 8/10/18 with EDC of 18 with known hyperemesis gravidarum presents with nausea and vomiting many times on 4/10/19.  HOSPITAL COURSE:  33y female HD#1,  34w5d dated by 1st trimester sonogram, presents to the ED on 4/10 with CC of SOB for past 2 days. Patient was sinus tachy to 200s, adenosine administered, no response, cardioverted with no resolution. OBGYN team proceeded with emergent . Patient intubated in ED and taken to L&D. EBL 800cc, no acute events intraop. SICU consult placed for hemodynamic monitoring and ventilator management.    SICU COURSE:  Patient was sedated with propofol and precedex, became acidotic and retaining. Cards concerned for thyroid storm and advised no CT with contrast to r/o PE, patient was not stable for V/Q scan. Central and arterial line placed overnight for HD monitoring. [place] placed in AM for possible hemodialysis vs CVVH.    During AM rounds on  patient became sinus tachy to 180s, multiple lopressor pushes given with minimal resolution.  Patient amio gtt d/c'd started on esmolol, milrinone with resolution of sinus tachy 120s. Patient weaned off shameka, levo titrating down. Nephro consult placed, recs CVVH for hyperkalemia, low UO. Cardiology, EPS, CT Surg, Interventional cardiology bedside for evaluation determined patient would benefit from ecmo/impella due to decreasing EF 10% from 50% in ED. VA ECMO placed (right SC arterial, right femoral venous) and transferred to [facility] for further workup and management.     On arrival to [facility] at 1130pm on 19, pt received on Levophed 0.23mcg/kg/min (50ml/hr), Vasopressin 0.06units/hr (4ml), Phenylephrine 0.5mcg/kg/min (21ml/hr), VA ECMO 3100RPM with 2.45LPM   ECMO d/cd  : Arrival to SDU, HD stable.   : [facility] HD placed by [facility]. CXR pending. Creatinine rising today, c/o sob abg appears stable  : S/p HD yesterday with 1Liter taken off yesterday. No immediate plans for another round just yet will monitor for now. D/w Dr. Lehman Va duplex to right arm to assess flow as pt complains numbness tingling weakness to RUE unable to hold baby safely-likely related to axillary canulation.  OT ordered for Right arm assessment and treatment.   Pt progressing, ambulating well.  Good response to bumex.  Creat stable. Not scheduled for dialysis today.  She complains of numbnness and burning starting from her forearm progressing to her hand.  Her finger tips are numb. + ulnar pulses, the hand is warm and mobile.  D/w Dr Figueredo, should obtain doppler flow of both upper extremities, possibly neuro related.   Mild improvement in hand doppler studies with flow bilat, repeat duplex performed  :< from: VA Duplex Upper Extrem Arterial Limited, Right (19 @ 16:03) >  Impression: There is a hematoma in the right supraclavicular area.  The right radial artery remains occluded in the mid and distal forearm    < from: VA Physiol Extremity Upper 3+ Level, BI (19 @ 15:59) >  Impression: Significant upper extremity arterial disease is not revealed   by this examination.    Symptoms possibly r/t hematoma.  May transfer to floor.  Creat improving, good UO , off dialysis at present    Pt 's creat continues to improve.  D/w Renal, d/c dialysis catheter  when creat <5  Ambulate, may transfer to floor   - VSS will d/c right groin suture this am - d/w dr. lehman & d/c HD cath as creatinine < 5 per renal  pt showered - & visited her  in NICU on CM w/ RN- rpt echo EF = 45% - possible d/c home in am as baby is being discharged from NICU   tr. to floor   ECho completed yesterday:  < from: Transthoracic Echocardiogram (19 @ 14:13) >  1. Normal mitral valve. Mild mitral regurgitation.  2. Normal trileaflet aortic valve.  3. Increased relative wall thickness with normal left  ventricular mass index, consistent with concentric left  ventricular remodeling.  4. Mild-moderate segmental left ventricular systolic  dysfunction. The anterior wall and septum appear  hypokinetic.  5. Normal right ventricular size and function.  *** Compared with echocardiogram of 4/15/2019, LV systolic  function appears improved.    < end of copied text >    Toprol started, 12.5 bid.    D/w Dr Orosco, d/c planning for Monday  Creat continues to improve

## 2019-04-26 NOTE — PROGRESS NOTE ADULT - ASSESSMENT
Ms. Jerome is a 33 year old woman with history of hyperemesis gravidarum  initially presented to [facility] (35 weeks gestation) for SOB. She was found in SVT and received adenosinex4 and failed cardioversion x2. She was then emergently taken for  and post op went into shock. She was found to have reduced EF (10-15%) and placed on VA ECMO and CVVHD. Transferred to [facility] on  for further management. She was successfully decannulated on . Her urine output has improved with continued improvement in estimate of GFR. Her echocardiogram yesterday showed improvement in her LVEF, but her systolic function is still significantly reduced. Given her tachycardia, I would like to have her stabilized on slightly higher doses of neurohormonal antagonists prior to discharge.     Plan discussed in multidisciplinary round with [facility] NP team and CT surgery. Please call me with questions at [phone].

## 2019-04-26 NOTE — PROGRESS NOTE ADULT - SUBJECTIVE AND OBJECTIVE BOX
im ok, upset I cant leave    VITAL SIGNS    Telemetry:   st 104  Vital Signs Last 24 Hrs  T(C): 36.9 (04-26-19 @ 04:00), Max: 36.9 (04-25-19 @ 23:37)  T(F): 98.5 (04-26-19 @ 04:00), Max: 98.5 (04-26-19 @ 04:00)  HR: 123 (04-26-19 @ 04:00) (120 - 126)  BP: 129/80 (04-26-19 @ 04:00) (127/72 - 132/73)  RR: 18 (04-26-19 @ 04:00) (18 - 18)  SpO2: --                       11.5                 138  | 21<L>| 36<H>        13.5<H> >-----------< 364     ------------------------< 91                    35.6                 4.4  | 102  | 3.42<H>                                                                     Ca 9.7   Mg x     Ph x        ,             11.9                 140  | 19<L>| 42<H>        16.0<H> >-----------< 379     ------------------------< 93                    36.5                 4.9  | 104  | 4.11<H>                                                                     Ca 9.8   Mg x     Ph x                04-25-19 @ 07:01  -  04-26-19 @ 07:00  --------------------------------------------------------  IN: 1170 mL / OUT: 2800 mL / NET: -1630 mL    04-26-19 @ 07:01  -  04-26-19 @ 11:15  --------------------------------------------------------  IN: 360 mL / OUT: 1175 mL / NET: -815 mL        Daily     Daily         CAPILLARY BLOOD GLUCOSE                    Coumadin    [ ] YES          [ x ]      NO                                   PHYSICAL EXAM        Neurology: alert and oriented x 3, nonfocal, no gross deficits  CV : .S1S2 RRR  Lungs: cta  Abdomen: soft, nontender, nondistended, positive bowel sounds, last bowel movement +  :         voiding    Extremities:    - __ edema, __-_calf tenderness.                                     acetaminophen   Tablet .. 650 milliGRAM(s) Oral every 6 hours PRN  ALPRAZolam 0.25 milliGRAM(s) Oral at bedtime PRN  benzocaine 15 mG/menthol 3.6 mG Lozenge 1 Lozenge Oral every 2 hours PRN  docusate sodium 100 milliGRAM(s) Oral three times a day  guaiFENesin   Syrup  (Sugar-Free) 200 milliGRAM(s) Oral every 6 hours PRN  heparin  Injectable 5000 Unit(s) SubCutaneous every 8 hours  hydrALAZINE 100 milliGRAM(s) Oral every 8 hours  metoprolol succinate ER 12.5 milliGRAM(s) Oral two times a day  pantoprazole    Tablet 40 milliGRAM(s) Oral before breakfast  senna 2 Tablet(s) Oral at bedtime  sodium chloride 0.65% Nasal 1 Spray(s) Both Nostrils three times a day PRN  sodium chloride 0.9%. 1000 milliLiter(s) IV Continuous <Continuous>                    Physical Therapy Rec:   Home  [  ]   Home w/ PT  [  ]  Rehab  [  ]  Discussed with Cardiothoracic Team at AM rounds.

## 2019-04-26 NOTE — PROGRESS NOTE ADULT - PROBLEM SELECTOR PLAN 1
Monitor off inotropes.   intraoperative BETTY with LVEF approximately 30%. Repeat TTE 4/25  shows EF=45%   d/c right groin suture   hydral increased to 100 mg q8h (hold for SBP <90) per HF   Monitor urine output   toprol increrased to 75 mg qd per HF.   Ambulate  Incentive spirometry

## 2019-04-26 NOTE — PROGRESS NOTE ADULT - PROBLEM SELECTOR PLAN 1
If she does well over the course of the day, increase the metoprolol ER to 25 mg PO BID.  Continue hydralazine 100 mg q8h   Discontinue diuretics.

## 2019-04-26 NOTE — PROGRESS NOTE ADULT - SUBJECTIVE AND OBJECTIVE BOX
Subjective: No overnight events. She feels well, but while walking in the hallways still feels shortness of breath. She is not dizzy. She has no swelling in her legs.     Medications:  acetaminophen   Tablet .. 650 milliGRAM(s) Oral every 6 hours PRN  ALPRAZolam 0.25 milliGRAM(s) Oral at bedtime PRN  benzocaine 15 mG/menthol 3.6 mG Lozenge 1 Lozenge Oral every 2 hours PRN  docusate sodium 100 milliGRAM(s) Oral three times a day  guaiFENesin   Syrup  (Sugar-Free) 200 milliGRAM(s) Oral every 6 hours PRN  heparin  Injectable 5000 Unit(s) SubCutaneous every 8 hours  hydrALAZINE 100 milliGRAM(s) Oral every 8 hours  metoprolol succinate ER 12.5 milliGRAM(s) Oral two times a day  pantoprazole    Tablet 40 milliGRAM(s) Oral before breakfast  senna 2 Tablet(s) Oral at bedtime  sodium chloride 0.65% Nasal 1 Spray(s) Both Nostrils three times a day PRN  sodium chloride 0.9%. 1000 milliLiter(s) IV Continuous <Continuous>      Physical Exam:    Vital Signs Last 24 Hrs  T(C): 36.9 (26 Apr 2019 04:00), Max: 36.9 (25 Apr 2019 23:37)  T(F): 98.5 (26 Apr 2019 04:00), Max: 98.5 (26 Apr 2019 04:00)  HR: 123 (26 Apr 2019 04:00) (120 - 126)  BP: 129/80 (26 Apr 2019 04:00) (127/72 - 132/73)  BP(mean): 97 (26 Apr 2019 04:00) (91 - 97)  RR: 18 (26 Apr 2019 04:00) (18 - 18)    I&O's Summary    25 Apr 2019 07:01  -  26 Apr 2019 07:00  --------------------------------------------------------  IN: 1170 mL / OUT: 2800 mL / NET: -1630 mL    26 Apr 2019 07:01  -  26 Apr 2019 10:25  --------------------------------------------------------  IN: 0 mL / OUT: 1175 mL / NET: -1175 mL    General: No distress. Comfortable.  HEENT: EOM intact.  Neck: Neck supple. JVP not elevated. No masses  Chest: Clear to auscultation bilaterally  CV: Tachycardic. Normal S1 and S2. No murmurs, rub, or gallops. Radial pulses normal.  Abdomen: Soft, non-distended, non-tender  Skin: No rashes or skin breakdown  Neurology: Alert and oriented times three. Sensation intact  Psych: Affect normal    Labs:                        11.5   13.5  )-----------( 364      ( 26 Apr 2019 07:10 )             35.6     04-26    138  |  102  |  36<H>  ----------------------------<  91  4.4   |  21<L>  |  3.42<H>    Ca    9.7      26 Apr 2019 07:10

## 2019-04-27 LAB
ANION GAP SERPL CALC-SCNC: 14 MMOL/L — SIGNIFICANT CHANGE UP (ref 5–17)
BUN SERPL-MCNC: 29 MG/DL — HIGH (ref 7–23)
CALCIUM SERPL-MCNC: 10.1 MG/DL — SIGNIFICANT CHANGE UP (ref 8.4–10.5)
CHLORIDE SERPL-SCNC: 102 MMOL/L — SIGNIFICANT CHANGE UP (ref 96–108)
CO2 SERPL-SCNC: 23 MMOL/L — SIGNIFICANT CHANGE UP (ref 22–31)
CREAT SERPL-MCNC: 2.8 MG/DL — HIGH (ref 0.5–1.3)
GLUCOSE SERPL-MCNC: 95 MG/DL — SIGNIFICANT CHANGE UP (ref 70–99)
HCT VFR BLD CALC: 42.1 % — SIGNIFICANT CHANGE UP (ref 34.5–45)
HGB BLD-MCNC: 12.3 G/DL — SIGNIFICANT CHANGE UP (ref 11.5–15.5)
MCHC RBC-ENTMCNC: 26.2 PG — LOW (ref 27–34)
MCHC RBC-ENTMCNC: 29.3 GM/DL — LOW (ref 32–36)
MCV RBC AUTO: 89.5 FL — SIGNIFICANT CHANGE UP (ref 80–100)
PLATELET # BLD AUTO: 396 K/UL — SIGNIFICANT CHANGE UP (ref 150–400)
POTASSIUM SERPL-MCNC: 4.1 MMOL/L — SIGNIFICANT CHANGE UP (ref 3.5–5.3)
POTASSIUM SERPL-SCNC: 4.1 MMOL/L — SIGNIFICANT CHANGE UP (ref 3.5–5.3)
RBC # BLD: 4.7 M/UL — SIGNIFICANT CHANGE UP (ref 3.8–5.2)
RBC # FLD: 16.4 % — HIGH (ref 10.3–14.5)
SODIUM SERPL-SCNC: 139 MMOL/L — SIGNIFICANT CHANGE UP (ref 135–145)
WBC # BLD: 14.6 K/UL — HIGH (ref 3.8–10.5)
WBC # FLD AUTO: 14.6 K/UL — HIGH (ref 3.8–10.5)

## 2019-04-27 PROCEDURE — 99233 SBSQ HOSP IP/OBS HIGH 50: CPT

## 2019-04-27 PROCEDURE — 99232 SBSQ HOSP IP/OBS MODERATE 35: CPT

## 2019-04-27 RX ORDER — METOPROLOL TARTRATE 50 MG
37.5 TABLET ORAL
Refills: 0 | Status: DISCONTINUED | OUTPATIENT
Start: 2019-04-27 | End: 2019-04-28

## 2019-04-27 RX ADMIN — HEPARIN SODIUM 5000 UNIT(S): 5000 INJECTION INTRAVENOUS; SUBCUTANEOUS at 21:55

## 2019-04-27 RX ADMIN — Medication 25 MILLIGRAM(S): at 06:07

## 2019-04-27 RX ADMIN — Medication 37.5 MILLIGRAM(S): at 18:01

## 2019-04-27 RX ADMIN — Medication 100 MILLIGRAM(S): at 13:04

## 2019-04-27 RX ADMIN — Medication 100 MILLIGRAM(S): at 21:51

## 2019-04-27 RX ADMIN — Medication 650 MILLIGRAM(S): at 06:37

## 2019-04-27 RX ADMIN — PANTOPRAZOLE SODIUM 40 MILLIGRAM(S): 20 TABLET, DELAYED RELEASE ORAL at 06:07

## 2019-04-27 RX ADMIN — HEPARIN SODIUM 5000 UNIT(S): 5000 INJECTION INTRAVENOUS; SUBCUTANEOUS at 13:04

## 2019-04-27 RX ADMIN — Medication 100 MILLIGRAM(S): at 06:07

## 2019-04-27 RX ADMIN — SENNA PLUS 2 TABLET(S): 8.6 TABLET ORAL at 21:55

## 2019-04-27 RX ADMIN — Medication 650 MILLIGRAM(S): at 06:07

## 2019-04-27 RX ADMIN — HEPARIN SODIUM 5000 UNIT(S): 5000 INJECTION INTRAVENOUS; SUBCUTANEOUS at 06:07

## 2019-04-27 NOTE — PROGRESS NOTE ADULT - PROBLEM SELECTOR PLAN 1
If she does well over the course of the day without significant symptoms when walking, increase the metoprolol ER to 37.5 mg PO BID.  Continue hydralazine 100 mg q8h   No indication for diuretics at the moment.

## 2019-04-27 NOTE — PROGRESS NOTE ADULT - SUBJECTIVE AND OBJECTIVE BOX
Subjective:  "Im better, really want to go home tomorrow, think they will let me ?"  Family visiting  Ambulating> reports no dizziness      Tele:  [id number]           V/S:                    T(F): 97.9 (19 @ 03:28), Max: 98.5 (19 @ 15:55)  HR: 108 (19 @ 06:03) (108 - 123)  BP: 130/85 (19 @ 06:03) (121/70 - 130/85)  RR: 18 (19 @ 06:03) (18 - 18)  SpO2: 99% (19 @ 06:03) (99% - 99%)       LV EF:  45%      Labs:      139  |  102  |  29<H>  ----------------------------<  95  4.1   |  23  |  2.80<H>    Ca    10.1      2019 06:03                                 12.3   14.6  )-----------( 396      ( 2019 06:03 )             42.1             CAPILLARY BLOOD GLUCOSE               CXR:    I&O's Detail    2019 07:  -  2019 07:00  --------------------------------------------------------  IN:    Oral Fluid: 840 mL  Total IN: 840 mL    OUT:    Voided: 3875 mL  Total OUT: 3875 mL    Total NET: -3035 mL      2019 07:  -  2019 13:05  --------------------------------------------------------  IN:    Oral Fluid: 240 mL  Total IN: 240 mL    OUT:    Voided: 1050 mL  Total OUT: 1050 mL    Total NET: -810 mL          CHEST TUBE:  [ ] YES [x ] NO  OUTPUT:     per 24 hours    AIR LEAKS:  [ ] YES [ ] NO      DWAYNE DRAIN:   [ ] YES [x ] NO  OUTPUT:     per 24 hours    EPICARDIAL WIRES:  [ ] YES [x ] NO      BOWEL MOVEMENT:  [ x] YES [ ] NO      Daily     Daily Weight in k.9 (2019 09:21)  Medications:  acetaminophen   Tablet .. 650 milliGRAM(s) Oral every 6 hours PRN  ALPRAZolam 0.25 milliGRAM(s) Oral at bedtime PRN  benzocaine 15 mG/menthol 3.6 mG Lozenge 1 Lozenge Oral every 2 hours PRN  docusate sodium 100 milliGRAM(s) Oral three times a day  guaiFENesin   Syrup  (Sugar-Free) 200 milliGRAM(s) Oral every 6 hours PRN  heparin  Injectable 5000 Unit(s) SubCutaneous every 8 hours  hydrALAZINE 100 milliGRAM(s) Oral every 8 hours  metoprolol succinate ER 37.5 milliGRAM(s) Oral two times a day  pantoprazole    Tablet 40 milliGRAM(s) Oral before breakfast  senna 2 Tablet(s) Oral at bedtime  sodium chloride 0.65% Nasal 1 Spray(s) Both Nostrils three times a day PRN  sodium chloride 0.9%. 1000 milliLiter(s) IV Continuous <Continuous>        Physical Exam:    Neurology: alert and oriented x 3, nonfocal, no gross deficits    CV : .S1S2 RRR    Lungs: cta    Abdomen: soft, nontender, nondistended, positive bowel sounds, last bowel movement this am    :         voiding      Extremities:   no edema,     Inc: R subcl ecmo site w/ dermabond                                            PAST MEDICAL & SURGICAL HISTORY:  Asthma: as child, no inhaler use&gt;10 years. Denies hospitlizations or intubations. No current inhaler.  History of low transverse  section

## 2019-04-27 NOTE — PROGRESS NOTE ADULT - ASSESSMENT
Ms. Jerome is a 33 year old woman with history of hyperemesis gravidarum  initially presented to [facility] (35 weeks gestation) for SOB. She was found in SVT and received adenosinex4 and failed cardioversion x2. She was then emergently taken for  and post op went into shock. She was found to have reduced EF (10-15%) and placed on VA ECMO and CVVHD. Transferred to [facility] on  for further management. She was successfully decannulated on . Her urine output has improved with continued improvement in estimate of GFR. Her echocardiogram yesterday showed improvement in her LVEF, but her systolic function is still significantly reduced. Given her tachycardia, I would like to have her stabilized on slightly higher doses of neurohormonal antagonists prior to discharge.     Plan discussed in multidisciplinary round with [facility] NP team and CT surgery. Please call me with questions at [phone].

## 2019-04-27 NOTE — PROGRESS NOTE ADULT - PROBLEM SELECTOR PLAN 1
off inotropes.   intraoperative BETTY with LVEF approximately 30%.   Repeat TTE 4/25   EF=45%    hydral  100 mg q8h (hold for SBP <90) per HF   Monitor urine output   toprol increased to 37.5 bid per HF.   Ambulate  Incentive spirometry

## 2019-04-27 NOTE — PROGRESS NOTE ADULT - SUBJECTIVE AND OBJECTIVE BOX
Subjective: No overnight events. She feels well and is not dizzy or lightheaded.     Medications:  acetaminophen   Tablet .. 650 milliGRAM(s) Oral every 6 hours PRN  ALPRAZolam 0.25 milliGRAM(s) Oral at bedtime PRN  benzocaine 15 mG/menthol 3.6 mG Lozenge 1 Lozenge Oral every 2 hours PRN  docusate sodium 100 milliGRAM(s) Oral three times a day  guaiFENesin   Syrup  (Sugar-Free) 200 milliGRAM(s) Oral every 6 hours PRN  heparin  Injectable 5000 Unit(s) SubCutaneous every 8 hours  hydrALAZINE 100 milliGRAM(s) Oral every 8 hours  metoprolol succinate ER 25 milliGRAM(s) Oral every 12 hours  pantoprazole    Tablet 40 milliGRAM(s) Oral before breakfast  senna 2 Tablet(s) Oral at bedtime  sodium chloride 0.65% Nasal 1 Spray(s) Both Nostrils three times a day PRN  sodium chloride 0.9%. 1000 milliLiter(s) IV Continuous <Continuous>    Physical Exam:    Vital Signs Last 24 Hrs  T(C): 36.6 (2019 03:28), Max: 36.9 (2019 11:37)  T(F): 97.9 (2019 03:28), Max: 98.5 (2019 15:55)  HR: 108 (2019 06:03) (108 - 123)  BP: 130/85 (2019 06:03) (119/68 - 130/85)  BP(mean): 102 (2019 06:03) (90 - 102)  RR: 18 (2019 06:03) (18 - 18)  SpO2: 99% (2019 06:03) (98% - 99%)    Daily Weight in k.9 (2019 09:21)    I&O's Summary    2019 07:01  -  2019 07:00  --------------------------------------------------------  IN: 840 mL / OUT: 3875 mL / NET: -3035 mL    General: No distress. Comfortable.  HEENT: EOM intact.  Neck: Neck supple. JVP not elevated. No masses  Chest: Clear to auscultation bilaterally  CV: Tachycardic. Normal S1 and S2. No murmurs, rub, or gallops. Radial pulses normal. No edema.   Abdomen: Soft, non-distended, non-tender  Skin: No rashes  Neurology: Alert and oriented times three. Sensation intact  Psych: Affect normal    Labs:                        12.3   14.6  )-----------( 396      ( 2019 06:03 )             42.1         139  |  102  |  29<H>  ----------------------------<  95  4.1   |  23  |  2.80<H>    Ca    10.1      2019 06:03

## 2019-04-27 NOTE — PROGRESS NOTE ADULT - ASSESSMENT
32 yo  at 10w1d GA by LMP of 8/10/18 with EDC of 18 with known hyperemesis gravidarum presents with nausea and vomiting many times on 4/10/19.  HOSPITAL COURSE:  33y female HD#1,  34w5d dated by 1st trimester sonogram, presents to the ED on 4/10 with CC of SOB for past 2 days. Patient was sinus tachy to 200s, adenosine administered, no response, cardioverted with no resolution. OBGYN team proceeded with emergent . Patient intubated in ED and taken to L&D. EBL 800cc, no acute events intraop. SICU consult placed for hemodynamic monitoring and ventilator management.    SICU COURSE:  Patient was sedated with propofol and precedex, became acidotic and retaining. Cards concerned for thyroid storm and advised no CT with contrast to r/o PE, patient was not stable for V/Q scan. Central and arterial line placed overnight for HD monitoring. [place] placed in AM for possible hemodialysis vs CVVH.    During AM rounds on  patient became sinus tachy to 180s, multiple lopressor pushes given with minimal resolution.  Patient amio gtt d/c'd started on esmolol, milrinone with resolution of sinus tachy 120s. Patient weaned off shameka, levo titrating down. Nephro consult placed, recs CVVH for hyperkalemia, low UO. Cardiology, EPS, CT Surg, Interventional cardiology bedside for evaluation determined patient would benefit from ecmo/impella due to decreasing EF 10% from 50% in ED. VA ECMO placed (right SC arterial, right femoral venous) and transferred to [facility] for further workup and management.     On arrival to [facility] at 1130pm on 19, pt received on Levophed 0.23mcg/kg/min (50ml/hr), Vasopressin 0.06units/hr (4ml), Phenylephrine 0.5mcg/kg/min (21ml/hr), VA ECMO 3100RPM with 2.45LPM   ECMO d/cd  : Arrival to SDU, HD stable.   : [facility] HD placed by [facility]. CXR pending. Creatinine rising today, c/o sob abg appears stable  : S/p HD yesterday with 1Liter taken off yesterday. No immediate plans for another round just yet will monitor for now. D/w Dr. Lehman Va duplex to right arm to assess flow as pt complains numbness tingling weakness to RUE unable to hold baby safely-likely related to axillary canulation.  OT ordered for Right arm assessment and treatment.   Pt progressing, ambulating well.  Good response to bumex.  Creat stable. Not scheduled for dialysis today.  She complains of numbnness and burning starting from her forearm progressing to her hand.  Her finger tips are numb. + ulnar pulses, the hand is warm and mobile.  D/w Dr Figueredo, should obtain doppler flow of both upper extremities, possibly neuro related.   Mild improvement in hand doppler studies with flow bilat, repeat duplex performed  :< from: VA Duplex Upper Extrem Arterial Limited, Right (19 @ 16:03) >  Impression: There is a hematoma in the right supraclavicular area.  The right radial artery remains occluded in the mid and distal forearm    < from: VA Physiol Extremity Upper 3+ Level, BI (19 @ 15:59) >  Impression: Significant upper extremity arterial disease is not revealed   by this examination.    Symptoms possibly r/t hematoma.  May transfer to floor.  Creat improving, good UO , off dialysis at present    Pt 's creat continues to improve.  D/w Renal, d/c dialysis catheter  when creat <5  Ambulate, may transfer to floor   - VSS will d/c right groin suture this am - d/w dr. lehman & d/c HD cath as creatinine < 5 per renal  pt showered - & visited her  in NICU on CM w/ RN- rpt echo EF = 45% - possible d/c home in am as baby is being discharged from NICU   tr. to floor   ECho completed yesterday:  < from: Transthoracic Echocardiogram (19 @ 14:13) >  1. Normal mitral valve. Mild mitral regurgitation.  2. Normal trileaflet aortic valve.  3. Increased relative wall thickness with normal left  ventricular mass index, consistent with concentric left  ventricular remodeling.  4. Mild-moderate segmental left ventricular systolic  dysfunction. The anterior wall and septum appear  hypokinetic.  5. Normal right ventricular size and function.  *** Compared with echocardiogram of 4/15/2019, LV systolic  function appears improved.     Toprol increased, creat slowly improving, defer diuretics

## 2019-04-27 NOTE — PROGRESS NOTE ADULT - PROBLEM SELECTOR PLAN 2
acute ATN from shock  Creatinine steadily improving  Nephrology input appreciated  s/p HD 4/20-   Continue to monitor renal function closely

## 2019-04-28 ENCOUNTER — TRANSCRIPTION ENCOUNTER (OUTPATIENT)
Age: 34
End: 2019-04-28

## 2019-04-28 VITALS — WEIGHT: 205.03 LBS

## 2019-04-28 LAB
ANION GAP SERPL CALC-SCNC: 14 MMOL/L — SIGNIFICANT CHANGE UP (ref 5–17)
BUN SERPL-MCNC: 27 MG/DL — HIGH (ref 7–23)
CALCIUM SERPL-MCNC: 9.7 MG/DL — SIGNIFICANT CHANGE UP (ref 8.4–10.5)
CHLORIDE SERPL-SCNC: 104 MMOL/L — SIGNIFICANT CHANGE UP (ref 96–108)
CO2 SERPL-SCNC: 21 MMOL/L — LOW (ref 22–31)
CREAT SERPL-MCNC: 2.34 MG/DL — HIGH (ref 0.5–1.3)
GLUCOSE SERPL-MCNC: 96 MG/DL — SIGNIFICANT CHANGE UP (ref 70–99)
HCT VFR BLD CALC: 36.5 % — SIGNIFICANT CHANGE UP (ref 34.5–45)
HGB BLD-MCNC: 12.2 G/DL — SIGNIFICANT CHANGE UP (ref 11.5–15.5)
MCHC RBC-ENTMCNC: 29.6 PG — SIGNIFICANT CHANGE UP (ref 27–34)
MCHC RBC-ENTMCNC: 33.3 GM/DL — SIGNIFICANT CHANGE UP (ref 32–36)
MCV RBC AUTO: 88.7 FL — SIGNIFICANT CHANGE UP (ref 80–100)
PLATELET # BLD AUTO: 345 K/UL — SIGNIFICANT CHANGE UP (ref 150–400)
POTASSIUM SERPL-MCNC: 4.5 MMOL/L — SIGNIFICANT CHANGE UP (ref 3.5–5.3)
POTASSIUM SERPL-SCNC: 4.5 MMOL/L — SIGNIFICANT CHANGE UP (ref 3.5–5.3)
RBC # BLD: 4.11 M/UL — SIGNIFICANT CHANGE UP (ref 3.8–5.2)
RBC # FLD: 16.3 % — HIGH (ref 10.3–14.5)
SODIUM SERPL-SCNC: 139 MMOL/L — SIGNIFICANT CHANGE UP (ref 135–145)
WBC # BLD: 13.7 K/UL — HIGH (ref 3.8–10.5)
WBC # FLD AUTO: 13.7 K/UL — HIGH (ref 3.8–10.5)

## 2019-04-28 PROCEDURE — 86658 ENTEROVIRUS ANTIBODY: CPT

## 2019-04-28 PROCEDURE — 84702 CHORIONIC GONADOTROPIN TEST: CPT

## 2019-04-28 PROCEDURE — 99261: CPT

## 2019-04-28 PROCEDURE — 84480 ASSAY TRIIODOTHYRONINE (T3): CPT

## 2019-04-28 PROCEDURE — 86696 HERPES SIMPLEX TYPE 2 TEST: CPT

## 2019-04-28 PROCEDURE — 87641 MR-STAPH DNA AMP PROBE: CPT

## 2019-04-28 PROCEDURE — 97165 OT EVAL LOW COMPLEX 30 MIN: CPT

## 2019-04-28 PROCEDURE — 97161 PT EVAL LOW COMPLEX 20 MIN: CPT

## 2019-04-28 PROCEDURE — 87631 RESP VIRUS 3-5 TARGETS: CPT

## 2019-04-28 PROCEDURE — 85384 FIBRINOGEN ACTIVITY: CPT

## 2019-04-28 PROCEDURE — 83605 ASSAY OF LACTIC ACID: CPT

## 2019-04-28 PROCEDURE — 85610 PROTHROMBIN TIME: CPT

## 2019-04-28 PROCEDURE — 86665 EPSTEIN-BARR CAPSID VCA: CPT

## 2019-04-28 PROCEDURE — 83615 LACTATE (LD) (LDH) ENZYME: CPT

## 2019-04-28 PROCEDURE — 82435 ASSAY OF BLOOD CHLORIDE: CPT

## 2019-04-28 PROCEDURE — P9016: CPT

## 2019-04-28 PROCEDURE — 86140 C-REACTIVE PROTEIN: CPT

## 2019-04-28 PROCEDURE — 82962 GLUCOSE BLOOD TEST: CPT

## 2019-04-28 PROCEDURE — 82803 BLOOD GASES ANY COMBINATION: CPT

## 2019-04-28 PROCEDURE — 93306 TTE W/DOPPLER COMPLETE: CPT

## 2019-04-28 PROCEDURE — 82728 ASSAY OF FERRITIN: CPT

## 2019-04-28 PROCEDURE — 86663 EPSTEIN-BARR ANTIBODY: CPT

## 2019-04-28 PROCEDURE — 87340 HEPATITIS B SURFACE AG IA: CPT

## 2019-04-28 PROCEDURE — 82565 ASSAY OF CREATININE: CPT

## 2019-04-28 PROCEDURE — 83735 ASSAY OF MAGNESIUM: CPT

## 2019-04-28 PROCEDURE — C1889: CPT

## 2019-04-28 PROCEDURE — 86762 RUBELLA ANTIBODY: CPT

## 2019-04-28 PROCEDURE — 83835 ASSAY OF METANEPHRINES: CPT

## 2019-04-28 PROCEDURE — 82947 ASSAY GLUCOSE BLOOD QUANT: CPT

## 2019-04-28 PROCEDURE — 87070 CULTURE OTHR SPECIMN AEROBIC: CPT

## 2019-04-28 PROCEDURE — 99239 HOSP IP/OBS DSCHRG MGMT >30: CPT

## 2019-04-28 PROCEDURE — 93005 ELECTROCARDIOGRAM TRACING: CPT

## 2019-04-28 PROCEDURE — 86923 COMPATIBILITY TEST ELECTRIC: CPT

## 2019-04-28 PROCEDURE — 84134 ASSAY OF PREALBUMIN: CPT

## 2019-04-28 PROCEDURE — 71045 X-RAY EXAM CHEST 1 VIEW: CPT

## 2019-04-28 PROCEDURE — 86765 RUBEOLA ANTIBODY: CPT

## 2019-04-28 PROCEDURE — 99233 SBSQ HOSP IP/OBS HIGH 50: CPT

## 2019-04-28 PROCEDURE — 86704 HEP B CORE ANTIBODY TOTAL: CPT

## 2019-04-28 PROCEDURE — 87040 BLOOD CULTURE FOR BACTERIA: CPT

## 2019-04-28 PROCEDURE — 86787 VARICELLA-ZOSTER ANTIBODY: CPT

## 2019-04-28 PROCEDURE — 86706 HEP B SURFACE ANTIBODY: CPT

## 2019-04-28 PROCEDURE — 93923 UPR/LXTR ART STDY 3+ LVLS: CPT

## 2019-04-28 PROCEDURE — 80048 BASIC METABOLIC PNL TOTAL CA: CPT

## 2019-04-28 PROCEDURE — G0480: CPT

## 2019-04-28 PROCEDURE — 85014 HEMATOCRIT: CPT

## 2019-04-28 PROCEDURE — 82570 ASSAY OF URINE CREATININE: CPT

## 2019-04-28 PROCEDURE — 87640 STAPH A DNA AMP PROBE: CPT

## 2019-04-28 PROCEDURE — 82550 ASSAY OF CK (CPK): CPT

## 2019-04-28 PROCEDURE — 86664 EPSTEIN-BARR NUCLEAR ANTIGEN: CPT

## 2019-04-28 PROCEDURE — 83010 ASSAY OF HAPTOGLOBIN QUANT: CPT

## 2019-04-28 PROCEDURE — 84100 ASSAY OF PHOSPHORUS: CPT

## 2019-04-28 PROCEDURE — 94002 VENT MGMT INPAT INIT DAY: CPT

## 2019-04-28 PROCEDURE — P9045: CPT

## 2019-04-28 PROCEDURE — 86870 RBC ANTIBODY IDENTIFICATION: CPT

## 2019-04-28 PROCEDURE — 97116 GAIT TRAINING THERAPY: CPT

## 2019-04-28 PROCEDURE — 85027 COMPLETE CBC AUTOMATED: CPT

## 2019-04-28 PROCEDURE — 84295 ASSAY OF SERUM SODIUM: CPT

## 2019-04-28 PROCEDURE — 94003 VENT MGMT INPAT SUBQ DAY: CPT

## 2019-04-28 PROCEDURE — C8929: CPT

## 2019-04-28 PROCEDURE — 86735 MUMPS ANTIBODY: CPT

## 2019-04-28 PROCEDURE — 85730 THROMBOPLASTIN TIME PARTIAL: CPT

## 2019-04-28 PROCEDURE — 83036 HEMOGLOBIN GLYCOSYLATED A1C: CPT

## 2019-04-28 PROCEDURE — 86900 BLOOD TYPING SEROLOGIC ABO: CPT

## 2019-04-28 PROCEDURE — 86695 HERPES SIMPLEX TYPE 1 TEST: CPT

## 2019-04-28 PROCEDURE — 86803 HEPATITIS C AB TEST: CPT

## 2019-04-28 PROCEDURE — 85652 RBC SED RATE AUTOMATED: CPT

## 2019-04-28 PROCEDURE — P9047: CPT

## 2019-04-28 PROCEDURE — 86777 TOXOPLASMA ANTIBODY: CPT

## 2019-04-28 PROCEDURE — 80061 LIPID PANEL: CPT

## 2019-04-28 PROCEDURE — 82330 ASSAY OF CALCIUM: CPT

## 2019-04-28 PROCEDURE — 83550 IRON BINDING TEST: CPT

## 2019-04-28 PROCEDURE — 80202 ASSAY OF VANCOMYCIN: CPT

## 2019-04-28 PROCEDURE — 80053 COMPREHEN METABOLIC PANEL: CPT

## 2019-04-28 PROCEDURE — 86022 PLATELET ANTIBODIES: CPT

## 2019-04-28 PROCEDURE — 86850 RBC ANTIBODY SCREEN: CPT

## 2019-04-28 PROCEDURE — 86644 CMV ANTIBODY: CPT

## 2019-04-28 PROCEDURE — 84132 ASSAY OF SERUM POTASSIUM: CPT

## 2019-04-28 PROCEDURE — 80307 DRUG TEST PRSMV CHEM ANLYZR: CPT

## 2019-04-28 PROCEDURE — 83540 ASSAY OF IRON: CPT

## 2019-04-28 PROCEDURE — 84156 ASSAY OF PROTEIN URINE: CPT

## 2019-04-28 PROCEDURE — 86901 BLOOD TYPING SEROLOGIC RH(D): CPT

## 2019-04-28 PROCEDURE — 93931 UPPER EXTREMITY STUDY: CPT

## 2019-04-28 PROCEDURE — 81001 URINALYSIS AUTO W/SCOPE: CPT

## 2019-04-28 PROCEDURE — 76700 US EXAM ABDOM COMPLETE: CPT

## 2019-04-28 PROCEDURE — 97530 THERAPEUTIC ACTIVITIES: CPT

## 2019-04-28 PROCEDURE — 36430 TRANSFUSION BLD/BLD COMPNT: CPT

## 2019-04-28 RX ORDER — HYDRALAZINE HCL 50 MG
1 TABLET ORAL
Qty: 90 | Refills: 0
Start: 2019-04-28 | End: 2019-05-27

## 2019-04-28 RX ORDER — METOPROLOL TARTRATE 50 MG
1.5 TABLET ORAL
Qty: 90 | Refills: 0
Start: 2019-04-28 | End: 2019-05-27

## 2019-04-28 RX ORDER — ACETAMINOPHEN 500 MG
2 TABLET ORAL
Qty: 0 | Refills: 0 | DISCHARGE
Start: 2019-04-28

## 2019-04-28 RX ORDER — ALPRAZOLAM 0.25 MG
0.25 TABLET ORAL AT BEDTIME
Refills: 0 | Status: DISCONTINUED | OUTPATIENT
Start: 2019-04-28 | End: 2019-04-28

## 2019-04-28 RX ADMIN — PANTOPRAZOLE SODIUM 40 MILLIGRAM(S): 20 TABLET, DELAYED RELEASE ORAL at 06:33

## 2019-04-28 RX ADMIN — Medication 0.25 MILLIGRAM(S): at 02:53

## 2019-04-28 RX ADMIN — Medication 100 MILLIGRAM(S): at 06:32

## 2019-04-28 RX ADMIN — Medication 650 MILLIGRAM(S): at 03:30

## 2019-04-28 RX ADMIN — Medication 650 MILLIGRAM(S): at 02:53

## 2019-04-28 RX ADMIN — Medication 37.5 MILLIGRAM(S): at 06:33

## 2019-04-28 RX ADMIN — HEPARIN SODIUM 5000 UNIT(S): 5000 INJECTION INTRAVENOUS; SUBCUTANEOUS at 06:33

## 2019-04-28 NOTE — PROGRESS NOTE ADULT - PROBLEM SELECTOR PLAN 1
Discharge on metoprolol ER to 37.5 mg PO BID.  Continue hydralazine 100 mg q8h   No indication for diuretics at the moment.

## 2019-04-28 NOTE — PROGRESS NOTE ADULT - PROBLEM SELECTOR PROBLEM 3
SVT (supraventricular tachycardia)
Anemia
SVT (supraventricular tachycardia)
Anemia
SVT (supraventricular tachycardia)
Acute hypoxemic respiratory failure
Anemia
SVT (supraventricular tachycardia)
Acute hypoxemic respiratory failure
SVT (supraventricular tachycardia)
SVT (supraventricular tachycardia)
Anemia

## 2019-04-28 NOTE — DISCHARGE NOTE PROVIDER - NSDCFUADDAPPT_GEN_ALL_CORE_FT
follow up appt with Heart failure team - [phone] - the office will call you in am to schedule your appt.

## 2019-04-28 NOTE — PROGRESS NOTE ADULT - REASON FOR ADMISSION
VA ECMO

## 2019-04-28 NOTE — DISCHARGE NOTE PROVIDER - NSDCPNSUBOBJ_GEN_ALL_CORE
37 minutes spent on this discharge        Physical Exam:        Neurology: alert and oriented x 3, nonfocal, no gross deficits    CV : .S1S2 RRR    Lungs: cta    Abdomen: soft, nontender, nondistended, positive bowel sounds, last bowel movement this am    :         voiding      Extremities:   no edema,     Inc: R subcl ecmo site w/ dermabond

## 2019-04-28 NOTE — DISCHARGE NOTE PROVIDER - HOSPITAL COURSE
HOSPITAL COURSE:    33y female HD#1,  34w5d dated by 1st trimester sonogram, presents to the ED on 4/10 with CC of SOB for past 2 days. Patient was sinus tachy to 200s, adenosine administered, no response, cardioverted with no resolution. OBGYN team proceeded with emergent . Patient intubated in ED and taken to L&D. EBL 800cc, no acute events intraop. SICU consult placed for hemodynamic monitoring and ventilator management.        SICU COURSE:    Patient was sedated with propofol and precedex, became acidotic and retaining. Cards concerned for thyroid storm and advised no CT with contrast to r/o PE, patient was not stable for V/Q scan. Central and arterial line placed overnight for HD monitoring. [place] placed in AM for possible hemodialysis vs CVVH.        During AM rounds on  patient became sinus tachy to 180s, multiple lopressor pushes given with minimal resolution.  Patient amio gtt d/c'd started on esmolol, milrinone with resolution of sinus tachy 120s. Patient weaned off shameka, levo titrating down. Nephro consult placed, recs CVVH for hyperkalemia, low UO. Cardiology, EPS, CT Surg, Interventional cardiology bedside for evaluation determined patient would benefit from ecmo/impella due to decreasing EF 10% from 50% in ED. VA ECMO placed (right SC arterial, right femoral venous) and transferred to [facility] for further workup and management.         On arrival to [facility] at 1130pm on 19, pt received on multiple pressors, VA ECMO 3100RPM with 2.45LPM     ECMO d/cd    : [facility] HD placed by [facility]. CXR pending. Creatinine rising today    : S/p HD yesterday with 1L removed. Va duplex to right arm to assess flow as pt complains numbness tingling weakness to RUE unable to hold baby safely-likely related to axillary cannulation.  OT ordered for Right arm assessment and treatment.     Good response to bumex.  Creat stable. Not scheduled for dialysis today.    She complains her finger tips are numb. + ulnar pulses, the hand is warm and mobile.    D/w Dr Figueredo, should obtain doppler flow of both upper extremities, possibly neuro related.    4/23 Mild improvement in hand doppler studies with flow bilat, repeat duplex performed    VA Duplex Upper Extrem Arterial Limited, Right (19 @ 16:03) >    Impression: There is a hematoma in the right supraclavicular area.    The right radial artery remains occluded in the mid and distal forearm    Impression: Significant upper extremity arterial disease is not revealed     by this examination.-Symptoms possibly r/t hematoma.      Pt 's creat continues to improve.  D/w Renal, d/c dialysis catheter  when creat <5     - VSS will d/c right groin suture this am - d/w dr. lehman & d/c HD cath as creatinine < 5 per renal    rpt echo EF = 45% -    Transthoracic Echocardiogram (19 @ 14:13) >    1. Normal mitral valve. Mild mitral regurgitation.    2. Normal trileaflet aortic valve.    3. Increased relative wall thickness with normal left    ventricular mass index, consistent with concentric left    ventricular remodeling.    4. Mild-moderate segmental left ventricular systolic    dysfunction. The anterior wall and septum appear    hypokinetic.    5. Normal right ventricular size and function.    *** Compared with echocardiogram of 4/15/2019, LV systolic    function appears improved.         Toprol increased, creat slowly improving, defer diuretics     pt ambulating -  st - as per dr. addison - pt is cleared for discharge home -

## 2019-04-28 NOTE — DISCHARGE NOTE PROVIDER - CARE PROVIDER_API CALL
Brendon Call)  Thoracic and Cardiac Surgery  [address] [place] [address]  Phone: [phone]  Fax: [phone]  Follow Up Time:     Brien Orosco (MD; MPH)  Adv Heart Fail Trnspl[facility] Cardio; Cardiology  [address] [place] [address]  Phone: [phone]  Fax: [phone]  Follow Up Time:

## 2019-04-28 NOTE — PROGRESS NOTE ADULT - ASSESSMENT
Ms. Jerome is a 33 year old woman with history of hyperemesis gravidarum  initially presented to [facility] (35 weeks gestation) for SOB. She was found in SVT and received adenosinex4 and failed cardioversion x2. She was then emergently taken for  and post op went into shock. She was found to have reduced EF (10-15%) and placed on VA ECMO and CVVHD. Transferred to [facility] on  for further management. She was successfully decannulated on . Her urine output has improved with continued improvement in estimate of GFR. Her echocardiogram showed improvement in her LVEF, but her systolic function is still significantly reduced.  She feels well and is stable for discharge.      Plan discussed in multidisciplinary round with [facility] NP team and CT surgery. Please call me with questions at [phone].

## 2019-04-28 NOTE — DISCHARGE NOTE NURSING/CASE MANAGEMENT/SOCIAL WORK - NSDCDPATPORTLINK_GEN_ALL_CORE
You can access the [facility] Patient Portal, offered by [facility], by registering with the following website: [email or web address]

## 2019-04-28 NOTE — PROGRESS NOTE ADULT - SUBJECTIVE AND OBJECTIVE BOX
Subjective: No overnight events. She feels well. No dizziness or fatigue.     Medications:  acetaminophen   Tablet .. 650 milliGRAM(s) Oral every 6 hours PRN  ALPRAZolam 0.25 milliGRAM(s) Oral at bedtime PRN  benzocaine 15 mG/menthol 3.6 mG Lozenge 1 Lozenge Oral every 2 hours PRN  docusate sodium 100 milliGRAM(s) Oral three times a day  guaiFENesin   Syrup  (Sugar-Free) 200 milliGRAM(s) Oral every 6 hours PRN  heparin  Injectable 5000 Unit(s) SubCutaneous every 8 hours  hydrALAZINE 100 milliGRAM(s) Oral every 8 hours  metoprolol succinate ER 37.5 milliGRAM(s) Oral two times a day  pantoprazole    Tablet 40 milliGRAM(s) Oral before breakfast  senna 2 Tablet(s) Oral at bedtime  sodium chloride 0.65% Nasal 1 Spray(s) Both Nostrils three times a day PRN  sodium chloride 0.9%. 1000 milliLiter(s) IV Continuous <Continuous>    Physical Exam:    Vital Signs Last 24 Hrs  T(C): 36.2 (2019 06:09), Max: 36.9 (2019 15:10)  T(F): 97.1 (2019 06:09), Max: 98.5 (2019 15:10)  HR: 116 (2019 06:09) (108 - 122)  BP: 116/79 (2019 06:09) (116/79 - 126/83)  BP(mean): 90 (2019 15:10) (90 - 99)  RR: 18 (2019 06:09) (18 - 18)  SpO2: 94% (2019 06:09) (94% - 99%)    Daily     Daily Weight in k (2019 08:55)    I&O's Summary    2019 07:  -  2019 07:00  --------------------------------------------------------  IN: 340 mL / OUT: 2400 mL / NET: -2060 mL    2019 07:01  -  2019 12:30  --------------------------------------------------------  IN: 240 mL / OUT: 800 mL / NET: -560 mL        General: No distress. Comfortable.  HEENT: EOM intact.  Neck: Neck supple. JVP not elevated. No masses  Chest: Clear to auscultation bilaterally  CV: Tachycardic. Normal S1 and S2. No rubs or gallops. Radial pulses normal. No edema.   Abdomen: Soft, non-distended, non-tender  Skin: No rashes or skin breakdown  Neurology: Alert and oriented times three. Sensation intact  Psych: Affect normal    Labs:                        12.2   13.7  )-----------( 345      ( 2019 06:40 )             36.5     -    139  |  104  |  27<H>  ----------------------------<  96  4.5   |  21<L>  |  2.34<H>    Ca    9.7      2019 06:40

## 2019-04-28 NOTE — DISCHARGE NOTE PROVIDER - NSDCCPCAREPLAN_GEN_ALL_CORE_FT
PRINCIPAL DISCHARGE DIAGNOSIS  Diagnosis: Acute systolic congestive heart failure  Assessment and Plan of Treatment: continue current medications as prescribed  weigh yourself daily  fluid restriction to 2 liters daily  follow up appt with Heart failure team - Dr. Orosco & NP staff [phone]  report decrease in urine output to Heart failure team

## 2019-04-28 NOTE — PROGRESS NOTE ADULT - PROBLEM SELECTOR PROBLEM 2
YAJAIRA (acute kidney injury)

## 2019-04-28 NOTE — DISCHARGE NOTE PROVIDER - NSDCCAREPROVSEEN_GEN_ALL_CORE_FT
SUBJECTIVE:   This is a pleasant 59-year-old female who presents today for follow up of medication and ongoing medical problems.     Patient was last seen by writer 5/9/18. At that time she reported worsening depressed moods, excessive worrying and anxiety. We increased her sertraline to 75 mg. Patient also was reporting forgetfulness and there was concern about memory decline. We recommended she look into rescheduling neuropsychological testing. Patient went in for appointment with Carin Lieberman on 5/18/18 and diagnosed with minor neurocognitive disorder. She reported severe symptoms of depression and anxiety at that time as well as suicidal ideation. It was recommended she see psychotherapist which patient has not set up but is open to it. Currently, states her moods have been better, not great, but improved on the higher dose of sertraline. She is also on buspirone. Denies any suicidal thoughts or ideations today. She continues to worry a lot about her physical and cognitive health and has a lot of stress at home with caring for her boyfriend who lives with her. She is interested in increasing her dose of sertraline. She is agreeable to see behavioral health. She is not interested in more medications.     She has chronic pain and is still working with pain clinic. Was on intrathecal opioid pain pump but was weaned off and is taking Oxycodone ER and percocet managed [facility]. She requested refill of percocet today and is aware I am unable to prescribe any narcotics for her due to contract with pain management and recommend she contact them.     She has h/o urge incontinence. Reports doing well on Vesicare. Has h/o yeast infection in bilateral groin and has been using Nystatin powder BID. States for about the past 1 week she's had foul smelling urine and increased urinary frequency. Denies dysuria or burning with urination. Denies any gross hematuria, fever, chills, sweats, nausea,  or vomiting.  She has chronic back pain which does not seem worse than normal. Denies any chance of STD. No abnormal vaginal discharge or irritation. Patient feels like she has another UTI and wants urine checked today. She has been trying to drink more water and keep up with good hygiene.     The patient currently denies any other complaints or new concerns.     Past Medical History:   Diagnosis Date   • Arthritis    • Cerebral palsy (CMS/HCC)    • Chronic pain    • GERD (gastroesophageal reflux disease)    • Hyperlipidemia    • Hypertension    • Impaired mobility     wheelchair bound   • Irritable bowel syndrome    • LGSIL on Pap smear of cervix 05/11/2015    high risk HPV negative   • Major depressive disorder    • Muscle contracture     lower extremity     Past Surgical History:   Procedure Laterality Date   • Colonoscopy diagnostic  02/25/2013    Normal except for small hemorrhoids. Repeat in 5 years due to family history of colon CA per Dr. Sheldon S[facility] @ [facility].   • Hb port/reservoir, implanted 1      pain pump     Social History   Substance Use Topics   • Smoking status: Never Smoker   • Smokeless tobacco: Never Used   • Alcohol use Yes      Comment: beer 5-6 per week     ALLERGIES:   Allergen Reactions   • Fentanyl      Warm and sweaty   • Morphine RASH and DIZZINESS     Current Outpatient Prescriptions   Medication Sig Dispense Refill   • sertraline (ZOLOFT) 100 MG tablet Take 1 tablet by mouth daily. 28 tablet 3   • amLODIPine (NORVASC) 5 MG tablet TAKE 1 TABLET BY MOUTH DAILY 28 tablet 5   • busPIRone (BUSPAR) 10 MG tablet TAKE 1 TABLET BY MOUTH TWICE A DAY 56 tablet 5   • dicyclomine (BENTYL) 20 MG tablet TAKE 1 TABLET BY MOUTH FOUR TIMES A  tablet 5   • ferrous sulfate 325 (65 FE) MG tablet TAKE 1 TABLET BY MOUTH TWICE A DAY 56 tablet 5   • furosemide (LASIX) 40 MG tablet TAKE 1 TABLET BY MOUTH DAILY 28 tablet 5   • loratadine (CLARITIN) 10 MG tablet TAKE 1 TABLET BY MOUTH DAILY 28  tablet 5   • losartan (COZAAR) 100 MG tablet TAKE 1 TABLET BY MOUTH DAILY 28 tablet 5   • omeprazole (PRILOSEC) 20 MG capsule TAKE 1 CAPSULE BY MOUTH DAILY 28 capsule 5   • potassium chloride (KLOR-CON, K-TAB) 10 MEQ ER tablet TAKE 1 TABLET BY MOUTH DAILY 28 tablet 5   • NYAMYC 618634 UNIT/GM powder APPLY TOPICALLY THREE TIMES A DAY 30 g 0   • Cholecalciferol (VITAMIN D3) 2000 units capsule Take 2,000 Units by mouth daily. 90 capsule 1   • nystatin (MYCOSTATIN) 758344 UNIT/GM cream Apply topically 2 times daily. 30 g 0   • topiramate (TOPAMAX) 50 MG tablet TAKE 1 TABLET BY MOUTH NIGHTLY 28 tablet 5   • ibuprofen (MOTRIN) 800 MG tablet Take 1 tablet by mouth every 8 hours as needed for Pain. With Food. 90 tablet 0   • VESICARE 5 MG tablet TAKE 1 TABLET BY MOUTH DAILY 30 tablet 6   • polyethylene glycol (MIRALAX) powder MIX 17 GRAMS IN 8 OUNCES OF WATER OR JUICE AND DRINK TWICE DAILY 527 g 3   • zolpidem (AMBIEN) 10 MG tablet Take 10 mg by mouth nightly as needed for Sleep.     • OxyMORphone HCl ER (OPANA ER) 10 MG 12 hr tablet Take 10 mg by mouth every 12 hours.     • baclofen by Intrathecal route continuous.     • tiZANidine (ZANAFLEX) 4 MG tablet Take 1 tablet by mouth every 8 hours as needed (Muscle spasms). (Patient taking differently: Take 2 mg by mouth every 8 hours as needed (Muscle spasms). Take 2 tablets every 8 hours as needed, not to exceed 3 doses in 24 hour period.) 90 tablet 1   • hydromorphone by Intrathecal route continuous.       • oxyCODONE/APAP (PERCOCET)  MG per tablet Take 2 tablets by mouth every 6 hours as needed. 2 tabs q6h     • [START ON 6/18/2018] fluconazole (DIFLUCAN) 150 MG tablet Take 1 tablet by mouth once a week for 3 doses. On Thursday. 3 tablet 0     No current facility-administered medications for this visit.      OBJECTIVE:   Visit Vitals  /76 (BP Location: [facility], Patient Position: Sitting, Cuff Size: Large Adult)   Pulse 66   Temp 97.4 °F (36.3 °C) (Temporal Artery)    Wt 81.1 kg   LMP 02/09/2014   SpO2 96%   BMI 28.86 kg/m²     GENERAL: Awake, alert, well-nourished female in no acute distress. Appears older than stated age.  HEENT: Sclerae anicteric. Lids and lashes normal. TMs, nose and throat are clear.   NECK: Supple, no adenopathy or masses. No JVD. No thyromegaly.  CARDIOVASCULAR: Heart regular rate and rhythm, S1, S2, no murmur.  LUNGS: Clear to auscultation, no crackles, rhonchi or wheezes. No accessory muscle use. Good air entry.  GI: Abdomen is soft, non-tender. No distention, masses or organomegaly. Normoactive bowel sounds.   EXTREMITIES: She has no LE edema. No calf pain. Calves are soft, non-tender. Negative Homans. She does report sensation is decreased in both lower extremities. Significant muscle atrophy and some contractures in bilateral lower extremities.   SKIN: Bilateral lower extremities are pink, cool to touch; no open lesions; no rashes, no erythema. Circulation is intact. Bilateral groin folds appear moist with mild erythematous rash with a few satellite papules. No pustules, open areas, or drainage.  NEURO: Alert, oriented ×3. Recent memory intact, remote memory decreased. Gait not tested; patient unable to ambulate and currently in w/c.  PSYCH: She is cooperative. Affect is normal. Mood seems better; she is not tearful today and is positive/smiling throughout our visit.    ASSESSMENT:  1. Anxiety and depression, improved.  2. Recurrent skin yeast infections.  3.  symptoms - Foul smelling urine. Hx OAB, frequency, urgency.  4. Chronic pain- managed by pain clinic.    PLAN:  1. Check UA. We will contact patient with results. Strongly encouraged patient to drink more fluids patti on Lasix.  2. Moods are improved on medication but not well controlled. Will increase Zoloft to 100 mg daily. Risks, benefits, side effects discussed including potential for worsening mood. Patient will monitor and call if she has any problems. Also on continue Buspar 10 mg BID.  Azul Cruz She has agreed to see behavioral health and will look into scheduling with [facility] in [place] since this location is closer to home. May need to see psychiatry for medication management if symptoms fail to improve. She will continue to work with  to help identify additional resources at home to care for herself and boyfriend.  3. C/w Nystatin powder BID in her groin folds for the next 1-2 weeks. Will also have her take Diflucan 150 mg weekly for 3 weeks. She was encouraged to keep the skin clean and dry by washing area twice per day, then applying the medication. I did recommend putting dry gauze pads in the groin folds to help absorb moisture. She should wear loose cotton or linen clothing and try to stay cool to decrease sweating. If things do not improve, will consider obtaining culture and she will be referred to dermatology.   4. Continue with pain management for chronic pain and patient reminded all refills for narcotics must come from 1 provider. Declined request for Percocet.  5. Follow up in 1-2 months for re-evaluation. Call or RTC sooner if any new or worsening concerns.    Patient verbalized understanding, agreement, and satisfaction with this plan.    QI Campos  Collaborating physician Dr. Anastacio House

## 2019-04-28 NOTE — DISCHARGE NOTE PROVIDER - CARE PROVIDERS DIRECT ADDRESSES
[email or web address]

## 2019-05-07 ENCOUNTER — APPOINTMENT (OUTPATIENT)
Dept: CARDIOLOGY | Facility: CLINIC | Age: 34
End: 2019-05-07
Payer: COMMERCIAL

## 2019-05-07 VITALS
SYSTOLIC BLOOD PRESSURE: 111 MMHG | HEART RATE: 110 BPM | WEIGHT: 206 LBS | HEIGHT: 71 IN | BODY MASS INDEX: 28.84 KG/M2 | DIASTOLIC BLOOD PRESSURE: 75 MMHG

## 2019-05-07 DIAGNOSIS — Z3A.22 22 WEEKS GESTATION OF PREGNANCY: ICD-10-CM

## 2019-05-07 DIAGNOSIS — D64.9 ANEMIA, UNSPECIFIED: ICD-10-CM

## 2019-05-07 PROCEDURE — 99214 OFFICE O/P EST MOD 30 MIN: CPT

## 2019-05-07 NOTE — H&P ADULT - NSHPREVIEWOFSYSTEMS_GEN_ALL_CORE
History of total left knee replacement Denies the following: constitutional symptoms, visual symptoms, cardiovascular symptoms, respiratory symptoms, GI symptoms, musculoskeletal symptoms, skin symptoms, neurologic symptoms, hematologic symptoms, allergic symptoms, psychiatric symptoms  Except any pertinent positives listed.

## 2019-05-07 NOTE — REASON FOR VISIT
[Follow-Up - From Hospitalization] : follow-up of a recent hospitalization for [Discharge Date: ___] : Discharge Date: [unfilled]

## 2019-05-08 ENCOUNTER — EMERGENCY (EMERGENCY)
Facility: HOSPITAL | Age: 34
LOS: 0 days | Discharge: HOME | End: 2019-05-08
Admitting: EMERGENCY MEDICINE

## 2019-05-08 DIAGNOSIS — Z79.891 LONG TERM (CURRENT) USE OF OPIATE ANALGESIC: ICD-10-CM

## 2019-05-08 DIAGNOSIS — Z98.891 HISTORY OF UTERINE SCAR FROM PREVIOUS SURGERY: Chronic | ICD-10-CM

## 2019-05-08 DIAGNOSIS — J45.909 UNSPECIFIED ASTHMA, UNCOMPLICATED: ICD-10-CM

## 2019-05-08 DIAGNOSIS — M54.9 DORSALGIA, UNSPECIFIED: ICD-10-CM

## 2019-05-08 DIAGNOSIS — Z79.1 LONG TERM (CURRENT) USE OF NON-STEROIDAL ANTI-INFLAMMATORIES (NSAID): ICD-10-CM

## 2019-05-08 DIAGNOSIS — Z79.52 LONG TERM (CURRENT) USE OF SYSTEMIC STEROIDS: ICD-10-CM

## 2019-05-08 LAB
ALBUMIN SERPL ELPH-MCNC: 4.7 G/DL
ALP BLD-CCNC: 148 U/L
ALT SERPL-CCNC: 25 U/L
ANION GAP SERPL CALC-SCNC: 13 MMOL/L
AST SERPL-CCNC: 25 U/L
BASOPHILS # BLD AUTO: 0.06 K/UL
BASOPHILS NFR BLD AUTO: 0.9 %
BILIRUB SERPL-MCNC: 0.6 MG/DL
BUN SERPL-MCNC: 21 MG/DL
CALCIUM SERPL-MCNC: 9.8 MG/DL
CHLORIDE SERPL-SCNC: 105 MMOL/L
CO2 SERPL-SCNC: 23 MMOL/L
CREAT SERPL-MCNC: 1.26 MG/DL
EOSINOPHIL # BLD AUTO: 0.18 K/UL
EOSINOPHIL NFR BLD AUTO: 2.6 %
GLUCOSE SERPL-MCNC: 89 MG/DL
HCT VFR BLD CALC: 38.4 %
HGB BLD-MCNC: 11.5 G/DL
IMM GRANULOCYTES NFR BLD AUTO: 0.1 %
LYMPHOCYTES # BLD AUTO: 2.05 K/UL
LYMPHOCYTES NFR BLD AUTO: 29.9 %
MAGNESIUM SERPL-MCNC: 1.8 MG/DL
MAN DIFF?: NORMAL
MCHC RBC-ENTMCNC: 28 PG
MCHC RBC-ENTMCNC: 29.9 GM/DL
MCV RBC AUTO: 93.7 FL
MONOCYTES # BLD AUTO: 0.45 K/UL
MONOCYTES NFR BLD AUTO: 6.6 %
NEUTROPHILS # BLD AUTO: 4.11 K/UL
NEUTROPHILS NFR BLD AUTO: 59.9 %
NT-PROBNP SERPL-MCNC: 1113 PG/ML
PLATELET # BLD AUTO: 362 K/UL
POTASSIUM SERPL-SCNC: 4.1 MMOL/L
PROT SERPL-MCNC: 7 G/DL
RBC # BLD: 4.1 M/UL
RBC # FLD: 17.2 %
SODIUM SERPL-SCNC: 141 MMOL/L
WBC # FLD AUTO: 6.86 K/UL

## 2019-05-09 NOTE — ASSESSMENT
[FreeTextEntry1] : Ms. Jerome is a 33yr old female who at 35 weeks gestation presented to [facility] with sob and was noted to be in  SVT which could not be controlled, requiring an emergent . She went into cardiogenic shock and was placed on VA ECMO and CVVHD.  She cardiac function recovered and was successfully decannulated, her renal function improved as well and did not require long term HD. Today she is euvolemic and normotensive, HR remains elevated.

## 2019-05-09 NOTE — PHYSICAL EXAM
[General Appearance - Well Developed] : well developed [Normal Appearance] : normal appearance [Normal Conjunctiva] : the conjunctiva exhibited no abnormalities [Eyelids - No Xanthelasma] : the eyelids demonstrated no xanthelasmas [Normal Oral Mucosa] : normal oral mucosa [] : no respiratory distress [Respiration, Rhythm And Depth] : normal respiratory rhythm and effort [Heart Rate And Rhythm] : heart rate and rhythm were normal [Heart Sounds] : normal S1 and S2 [Edema] : no peripheral edema present [Arterial Pulses Normal] : the arterial pulses were normal [Abnormal Walk] : normal gait [Abdomen Soft] : soft [Bowel Sounds] : normal bowel sounds [Skin Color & Pigmentation] : normal skin color and pigmentation [Cyanosis, Localized] : no localized cyanosis [Nail Clubbing] : no clubbing of the fingernails [Oriented To Time, Place, And Person] : oriented to person, place, and time [Skin Turgor] : normal skin turgor [FreeTextEntry1] : no JVD or HJR

## 2019-05-09 NOTE — DISCUSSION/SUMMARY
[FreeTextEntry1] : Cardiomyopathy\par - Increase Toprol to 50mg bid\par - Continue Hydral 100mg TID\par - Continue daily weights, she will get a BP cuff to monitor BP\par - Continue to monitor HR\par - Increase activity as tolerated\par - Will refer to cardiac rehab\par \par YAJAIRA\par - Labs today renal function wnl\par

## 2019-05-09 NOTE — HISTORY OF PRESENT ILLNESS
[FreeTextEntry1] : Ms. Jerome is a 33 year old woman with history of hyperemesis gravidarum  initially presented to [facility] (35 weeks gestation) for SOB. She was found to be in SVT and received adenosine x4 and failed cardioversion x2. She was then emergently taken for  and post op went into cardiogenic shock. An echo revealed a reduced EF (10-15%) and she was placed on VA ECMO and CVVHD. She was transferred to [facility] on  for further management. He condition slowly improved and she was successfully decannulated on . Her urine output  improved with continued improvement in estimate of GFR. She was sent home off diuretics. Her echocardiogram showed improvement in her LVEF, but her systolic function is still reduced. \par DC weight 227, SCR 2.34\par Presents today for follow up, looks and feels well.  She denies any sob, orthopnea, PND, chest pain, or palpitations.  She has been concerned about her HR, wrist device notes HR ranging from 's.  She denies any dizziness or lightheadedness.  Her main complaint is fatigue, she has been cautious with physical activity.  She still has some tingling in her toes and fingers. She is eating and sleeping well. Weight has been stable.

## 2019-05-11 ENCOUNTER — INPATIENT (INPATIENT)
Facility: HOSPITAL | Age: 34
LOS: 2 days | Discharge: HOME | End: 2019-05-14
Attending: INTERNAL MEDICINE | Admitting: INTERNAL MEDICINE
Payer: COMMERCIAL

## 2019-05-11 VITALS
OXYGEN SATURATION: 99 % | TEMPERATURE: 98 F | RESPIRATION RATE: 18 BRPM | DIASTOLIC BLOOD PRESSURE: 66 MMHG | WEIGHT: 199.96 LBS | SYSTOLIC BLOOD PRESSURE: 114 MMHG | HEART RATE: 85 BPM

## 2019-05-11 VITALS
OXYGEN SATURATION: 99 % | RESPIRATION RATE: 18 BRPM | WEIGHT: 199.96 LBS | DIASTOLIC BLOOD PRESSURE: 66 MMHG | SYSTOLIC BLOOD PRESSURE: 114 MMHG | HEART RATE: 85 BPM | TEMPERATURE: 98 F

## 2019-05-11 DIAGNOSIS — Z98.891 HISTORY OF UTERINE SCAR FROM PREVIOUS SURGERY: Chronic | ICD-10-CM

## 2019-05-11 LAB
ALBUMIN SERPL ELPH-MCNC: 4.5 G/DL — SIGNIFICANT CHANGE UP (ref 3.5–5.2)
ALP SERPL-CCNC: 137 U/L — HIGH (ref 30–115)
ALT FLD-CCNC: 20 U/L — SIGNIFICANT CHANGE UP (ref 0–41)
ANION GAP SERPL CALC-SCNC: 16 MMOL/L — HIGH (ref 7–14)
APTT BLD: 37.7 SEC — SIGNIFICANT CHANGE UP (ref 27–39.2)
AST SERPL-CCNC: 20 U/L — SIGNIFICANT CHANGE UP (ref 0–41)
BASE EXCESS BLDV CALC-SCNC: -1.2 MMOL/L — SIGNIFICANT CHANGE UP (ref -2–2)
BASE EXCESS BLDV CALC-SCNC: -8.6 MMOL/L — LOW (ref -2–2)
BILIRUB SERPL-MCNC: 0.4 MG/DL — SIGNIFICANT CHANGE UP (ref 0.2–1.2)
BUN SERPL-MCNC: 17 MG/DL — SIGNIFICANT CHANGE UP (ref 10–20)
CA-I SERPL-SCNC: 1.24 MMOL/L — SIGNIFICANT CHANGE UP (ref 1.12–1.3)
CALCIUM SERPL-MCNC: 9.7 MG/DL — SIGNIFICANT CHANGE UP (ref 8.5–10.1)
CHLORIDE SERPL-SCNC: 102 MMOL/L — SIGNIFICANT CHANGE UP (ref 98–110)
CK MB CFR SERPL CALC: <1 NG/ML — SIGNIFICANT CHANGE UP (ref 0.6–6.3)
CK SERPL-CCNC: 29 U/L — SIGNIFICANT CHANGE UP (ref 0–225)
CO2 SERPL-SCNC: 21 MMOL/L — SIGNIFICANT CHANGE UP (ref 17–32)
CREAT SERPL-MCNC: 1.1 MG/DL — SIGNIFICANT CHANGE UP (ref 0.7–1.5)
GAS PNL BLDV: 134 MMOL/L — LOW (ref 136–145)
GAS PNL BLDV: 140 MMOL/L — SIGNIFICANT CHANGE UP (ref 136–145)
GAS PNL BLDV: SIGNIFICANT CHANGE UP
GAS PNL BLDV: SIGNIFICANT CHANGE UP
GLUCOSE SERPL-MCNC: 87 MG/DL — SIGNIFICANT CHANGE UP (ref 70–99)
HCG SERPL QL: NEGATIVE — SIGNIFICANT CHANGE UP
HCO3 BLDV-SCNC: 21 MMOL/L — LOW (ref 22–29)
HCO3 BLDV-SCNC: 25 MMOL/L — SIGNIFICANT CHANGE UP (ref 22–29)
HCT VFR BLD CALC: 37.4 % — SIGNIFICANT CHANGE UP (ref 37–47)
HCT VFR BLDA CALC: 36.9 % — SIGNIFICANT CHANGE UP (ref 34–44)
HCT VFR BLDA CALC: 37.6 % — SIGNIFICANT CHANGE UP (ref 34–44)
HGB BLD CALC-MCNC: 12 G/DL — LOW (ref 14–18)
HGB BLD CALC-MCNC: 12.3 G/DL — LOW (ref 14–18)
HGB BLD-MCNC: 11.8 G/DL — LOW (ref 12–16)
INR BLD: 1.04 RATIO — SIGNIFICANT CHANGE UP (ref 0.65–1.3)
LACTATE BLDV-MCNC: 0.9 MMOL/L — SIGNIFICANT CHANGE UP (ref 0.5–1.6)
MAGNESIUM SERPL-MCNC: 1.7 MG/DL — LOW (ref 1.8–2.4)
MCHC RBC-ENTMCNC: 28.5 PG — SIGNIFICANT CHANGE UP (ref 27–31)
MCHC RBC-ENTMCNC: 31.6 G/DL — LOW (ref 32–37)
MCV RBC AUTO: 90.3 FL — SIGNIFICANT CHANGE UP (ref 81–99)
NRBC # BLD: 0 /100 WBCS — SIGNIFICANT CHANGE UP (ref 0–0)
PCO2 BLDV: 47 MMHG — SIGNIFICANT CHANGE UP (ref 41–51)
PCO2 BLDV: 62 MMHG — HIGH (ref 41–51)
PH BLDV: 7.14 — LOW (ref 7.26–7.43)
PH BLDV: 7.33 — SIGNIFICANT CHANGE UP (ref 7.26–7.43)
PLATELET # BLD AUTO: 316 K/UL — SIGNIFICANT CHANGE UP (ref 130–400)
PO2 BLDV: 29 MMHG — SIGNIFICANT CHANGE UP (ref 20–40)
PO2 BLDV: 42 MMHG — HIGH (ref 20–40)
POTASSIUM BLDV-SCNC: 3.9 MMOL/L — SIGNIFICANT CHANGE UP (ref 3.3–5.6)
POTASSIUM SERPL-MCNC: 4.4 MMOL/L — SIGNIFICANT CHANGE UP (ref 3.5–5)
POTASSIUM SERPL-SCNC: 4.4 MMOL/L — SIGNIFICANT CHANGE UP (ref 3.5–5)
PROT SERPL-MCNC: 7 G/DL — SIGNIFICANT CHANGE UP (ref 6–8)
PROTHROM AB SERPL-ACNC: 12 SEC — SIGNIFICANT CHANGE UP (ref 9.95–12.87)
RBC # BLD: 4.14 M/UL — LOW (ref 4.2–5.4)
RBC # FLD: 17.2 % — HIGH (ref 11.5–14.5)
SAO2 % BLDV: 50 % — SIGNIFICANT CHANGE UP
SAO2 % BLDV: 65 % — SIGNIFICANT CHANGE UP
SODIUM SERPL-SCNC: 139 MMOL/L — SIGNIFICANT CHANGE UP (ref 135–146)
TROPONIN T SERPL-MCNC: <0.01 NG/ML — SIGNIFICANT CHANGE UP
WBC # BLD: 7.53 K/UL — SIGNIFICANT CHANGE UP (ref 4.8–10.8)
WBC # FLD AUTO: 7.53 K/UL — SIGNIFICANT CHANGE UP (ref 4.8–10.8)

## 2019-05-11 PROCEDURE — 71260 CT THORAX DX C+: CPT | Mod: 26

## 2019-05-11 PROCEDURE — 71046 X-RAY EXAM CHEST 2 VIEWS: CPT | Mod: 26

## 2019-05-11 PROCEDURE — 99285 EMERGENCY DEPT VISIT HI MDM: CPT | Mod: 25

## 2019-05-11 RX ORDER — METOPROLOL TARTRATE 50 MG
50 TABLET ORAL
Refills: 0 | Status: DISCONTINUED | OUTPATIENT
Start: 2019-05-11 | End: 2019-05-14

## 2019-05-11 RX ORDER — ENOXAPARIN SODIUM 100 MG/ML
40 INJECTION SUBCUTANEOUS DAILY
Refills: 0 | Status: DISCONTINUED | OUTPATIENT
Start: 2019-05-11 | End: 2019-05-14

## 2019-05-11 RX ORDER — SODIUM CHLORIDE 9 MG/ML
1000 INJECTION INTRAMUSCULAR; INTRAVENOUS; SUBCUTANEOUS ONCE
Refills: 0 | Status: COMPLETED | OUTPATIENT
Start: 2019-05-11 | End: 2019-05-11

## 2019-05-11 RX ORDER — KETOROLAC TROMETHAMINE 30 MG/ML
15 SYRINGE (ML) INJECTION THREE TIMES A DAY
Refills: 0 | Status: DISCONTINUED | OUTPATIENT
Start: 2019-05-11 | End: 2019-05-14

## 2019-05-11 RX ORDER — CHLORHEXIDINE GLUCONATE 213 G/1000ML
1 SOLUTION TOPICAL
Refills: 0 | Status: DISCONTINUED | OUTPATIENT
Start: 2019-05-11 | End: 2019-05-14

## 2019-05-11 RX ORDER — ACETAMINOPHEN 500 MG
650 TABLET ORAL EVERY 6 HOURS
Refills: 0 | Status: DISCONTINUED | OUTPATIENT
Start: 2019-05-11 | End: 2019-05-11

## 2019-05-11 RX ORDER — KETOROLAC TROMETHAMINE 30 MG/ML
15 SYRINGE (ML) INJECTION ONCE
Refills: 0 | Status: DISCONTINUED | OUTPATIENT
Start: 2019-05-11 | End: 2019-05-11

## 2019-05-11 RX ORDER — MAGNESIUM SULFATE 500 MG/ML
1 VIAL (ML) INJECTION ONCE
Refills: 0 | Status: COMPLETED | OUTPATIENT
Start: 2019-05-11 | End: 2019-05-11

## 2019-05-11 RX ORDER — ACETAMINOPHEN 500 MG
650 TABLET ORAL EVERY 6 HOURS
Refills: 0 | Status: DISCONTINUED | OUTPATIENT
Start: 2019-05-11 | End: 2019-05-14

## 2019-05-11 RX ADMIN — Medication 15 MILLIGRAM(S): at 20:23

## 2019-05-11 RX ADMIN — Medication 15 MILLIGRAM(S): at 08:15

## 2019-05-11 RX ADMIN — SODIUM CHLORIDE 333.33 MILLILITER(S): 9 INJECTION INTRAMUSCULAR; INTRAVENOUS; SUBCUTANEOUS at 07:52

## 2019-05-11 RX ADMIN — Medication 15 MILLIGRAM(S): at 08:38

## 2019-05-11 RX ADMIN — Medication 100 GRAM(S): at 13:59

## 2019-05-11 RX ADMIN — Medication 15 MILLIGRAM(S): at 20:38

## 2019-05-11 NOTE — ED PROVIDER NOTE - OBJECTIVE STATEMENT
32 yo F with hx of 32 yo F with  s/p emergent C section 4 weeks ago due to refractory aflutter, s/p ECMO, presents for evaluation of right sided chest pain, onset an hour ago, associated with worsening pain with deep breath. NO fever, no chills, no headache, no n/v/d, no abdominal pain, no rash, no trauma. Pt states that she has a history of blood clot in the artery that was used for ECMO. She states that she has been doing well and then this morning she started with the pain, she had not taken any thing for the pain. Pt denies any other symptoms.

## 2019-05-11 NOTE — ED PROVIDER NOTE - PROGRESS NOTE DETAILS
Pain improved Authored by Dr. Costello. Pt sleeping comfortably, in no distress. Discussed with patient imaging and labs. Discussed negative PE study. Had extensive conversation regarding dispo, discussed that if her second trop is negative, will discharge home with follow up with PMD and CARD. Pt agrees with plan. Authored by Dr. Costello. Pt sleeping comfortably, in no distress. Discussed with patient imaging and labs. Discussed negative PE study. Had extensive conversation regarding dispo, discussed that if her second trop is negative, will discharge home with follow up with PMD and CARD. Discussed with patient need to admit, given her history and symptoms. Pt understands and agrees with plan

## 2019-05-11 NOTE — H&P ADULT - NSICDXPASTMEDICALHX_GEN_ALL_CORE_FT
PAST MEDICAL HISTORY:  Asthma as child, no inhaler use>10 years. Denies hospitlizations or intubations. No current inhaler.    Intubation of airway performed without difficulty     Peripartum cardiomyopathy     SVT (supraventricular tachycardia)

## 2019-05-11 NOTE — ED PROVIDER NOTE - GASTROINTESTINAL, MLM
Abdomen soft, non-tender, non distended, no rebound, no rigidity, no guarding; well healed C section incision

## 2019-05-11 NOTE — ED ADULT NURSE NOTE - PMH
Asthma  as child, no inhaler use>10 years. Denies hospitlizations or intubations. No current inhaler.  Intubation of airway performed without difficulty    Peripartum cardiomyopathy    SVT (supraventricular tachycardia)

## 2019-05-11 NOTE — ED ADULT NURSE NOTE - PMH
Asthma  as child, no inhaler use>10 years. Denies hospitlizations or intubations. No current inhaler.  Peripartum cardiomyopathy    SVT (supraventricular tachycardia)

## 2019-05-11 NOTE — H&P ADULT - ASSESSMENT
32 y/o female  s/p emergent C section 4 weeks ago due to refractory aflutter, s/p ECMO, presents for evaluation of right sided chest pain.    # Right sided pleuritic chest pain, etiology unlikely cardiac or pulmonary given intiilial work up, likely secondary to musculoskeletal cause  - CT angio of the chest negative for pe  - EKG normal sinus rhythm   - trops negative x1, 2nd set to follow   - recent echo on  shows normal EF  - tele monitoring  - toradol for pain prn  - consider cardiac evaluation if symptoms persist as patient has had no findings on tele and symptoms have resolved with pain treatment    Activity: ambulate as tolerated  diet: regular  dvt ppx: lovenox 40mg daily  gi ppx: not indicated  full code  dispo: from home with mother at bedside 34 y/o female  s/p emergent C section 4 weeks ago due to refractory aflutter, s/p ECMO, presents for evaluation of right sided chest pain.    # Right sided pleuritic chest pain, etiology unlikely cardiac or pulmonary given intiilial work up, likely secondary to musculoskeletal cause  - CT angio of the chest negative for pe  - EKG normal sinus rhythm   - trops negative x1, 2nd set to follow   - recent echo on  shows improving EF, repeat Echo to follow   - tele monitoring  - toradol for pain prn  - consider cardiac evaluation if symptoms persist as patient has had no findings on tele and symptoms have resolved with pain treatment    Activity: ambulate as tolerated  diet: regular  dvt ppx: lovenox 40mg daily  gi ppx: not indicated  full code  dispo: from home with mother at bedside

## 2019-05-11 NOTE — H&P ADULT - ATTENDING COMMENTS
Patient seen and examined independently. I agree with the resident's note, physical exam, and plan except as below.  Vital Signs Last 24 Hrs  T(C): 35.7 (12 May 2019 10:29), Max: 36.9 (11 May 2019 15:08)  T(F): 96.3 (12 May 2019 10:29), Max: 98.5 (11 May 2019 15:08)  HR: 75 (12 May 2019 10:29) (69 - 75)  BP: 114/55 (12 May 2019 10:29) (101/55 - 114/55)  BP(mean): --  RR: 18 (12 May 2019 10:29) (18 - 20)  SpO2: 100% (12 May 2019 10:29) (97% - 100%)  Pe  nad  aaox3  [id number]  ctabl  soft ntnd+bs  mildly tender under right breast/costochondral   no cce  RUE mildly tender, limited ROM (ECMO site)      #pleuritic right sided chest pain  acs ruled out, CE neg, EKG wnl  repeat echo pending (hx of peripartum cardiomyopathy recently)  no central , subsegmental PE on Ct chest  wedge shaped area Right base - rule out infarct  check V/Q scan , venous duplex upper and lower ext  pulmonary consult  pain control  dvt proph    #hx of peripartum CM - on metoprolol, hydralazine   recently had emergent cscection , ECMO, with prolonged hospital stay    discussed plan in detail with pt and family at bedside - agreeable and all qs answered  discussed with resident covering

## 2019-05-11 NOTE — ED ADULT NURSE NOTE - OBJECTIVE STATEMENT
Pt c/o right sided CP and SOB x 1 hour PTA. pt recently hospitalized for SVT and intubated while pregnant, had emergecny , and then newly diagnosed with peripartum cardiomyopathy. also c/o lower back pain, pt states she has hx sciatica.

## 2019-05-11 NOTE — ED ADULT NURSE NOTE - OBJECTIVE STATEMENT
pt c/o sharp pain to right sided of chest associated with SOB, pain is worse with breathing. Sx started x 1 hour PTA. Pt also c/o lower back pain, states she has a hx of sciatica. Pt states she was recently hospitalized x 2 weeks for SVT during pregnancy needing intubation and emergency . Recent Dx of Peripartum cardiomyopathy. with EJ of 45%.

## 2019-05-11 NOTE — ED PROVIDER NOTE - ATTENDING CONTRIBUTION TO CARE
32 y/o F pmh cardiomyopathy EF 30-40%, Hx refractory SVT s/p emergent csection, ECMO 1 mo ago dc'ed last week, p/w acute onset R sided, non-radiating pleuritic, moderate cp x 1d, onset at rest. worse w/ breathing, relieved w/ rest. No prior DVT/ PE. NO fever, cough, rash, trauma.    CONSTITUTIONAL: Looks uncomfortable  SKIN: Warm dry  HEAD: NCAT  EYES: NL inspection  ENT: MMM  NECK: Supple; non tender.  CARD: RRR  RESP: CTAB  ABD: S/NT no R/G  EXT: no pedal edema  NEURO: Grossly unremarkable  PSYCH: Cooperative, appropriate    IMP: PE vs acs  P: labs, ekg, cxr, CT, analgesia, reassess.

## 2019-05-11 NOTE — H&P ADULT - HISTORY OF PRESENT ILLNESS
34 y/o female  s/p emergent C section 4 weeks ago due to refractory aflutter, s/p ECMO, presents for evaluation of right sided chest pain.  As per the patient she was treated at [facility] with ECMO 4 weeks ago due to the above past medical history.  She has since been resting and sedentary with good follow up with her Cardiologist from [facility]  As per the patient she had right sided pleuritic chest pain that started 1 hour before presentation to the hospital.  She describes that she has some radiation of the pain to the neck and that the pain is not associated with any numbness or tingling of the extremities.  She explains that the pain is 6/10 at its worse and only felt with inspiration however after receiving toradol in the ED the pain has dissipated completely.  The patient denies cough, fever, recent sob, abd pain, sick contacts and lower extremity swelling.  Given the nature of the pain and the patients pmh CT angio was ordered to rule out PE which was negative and EKG and trops were negative on initial work up.

## 2019-05-11 NOTE — ED PROVIDER NOTE - CARDIAC, MLM
Heart sounds S1, S2.  No murmurs, rubs or gallops. Heart sounds S1, S2.  No murmurs, rubs or gallops. chest wall: no rash

## 2019-05-11 NOTE — ED ADULT NURSE NOTE - NSIMPLEMENTINTERV_GEN_ALL_ED
Implemented All Universal Safety Interventions:  [place] to call system. Call bell, personal items and telephone within reach. Instruct patient to call for assistance. Room bathroom lighting operational. Non-slip footwear when patient is off stretcher. Physically safe environment: no spills, clutter or unnecessary equipment. Stretcher in lowest position, wheels locked, appropriate side rails in place.

## 2019-05-11 NOTE — ED PROVIDER NOTE - CLINICAL SUMMARY MEDICAL DECISION MAKING FREE TEXT BOX
Pt w/ chest pain and hx cardiomyopathy and cardiac insult as noted. CT neg for PE. RLL opacity noted- unclear etiology. Trop neg. Pt improved w/ meds, but still had some pain. discussed all results w/ pt. Pt admitted to Tele for further management. Case discussed w/ Dr. Garcia, pt not candidate for OBS.

## 2019-05-12 ENCOUNTER — TRANSCRIPTION ENCOUNTER (OUTPATIENT)
Age: 34
End: 2019-05-12

## 2019-05-12 VITALS
SYSTOLIC BLOOD PRESSURE: 101 MMHG | OXYGEN SATURATION: 100 % | DIASTOLIC BLOOD PRESSURE: 55 MMHG | HEART RATE: 69 BPM | TEMPERATURE: 97 F | RESPIRATION RATE: 20 BRPM

## 2019-05-12 PROCEDURE — 93306 TTE W/DOPPLER COMPLETE: CPT | Mod: 26

## 2019-05-12 RX ORDER — HYDRALAZINE HCL 50 MG
100 TABLET ORAL THREE TIMES A DAY
Refills: 0 | Status: DISCONTINUED | OUTPATIENT
Start: 2019-05-12 | End: 2019-05-13

## 2019-05-12 RX ADMIN — CHLORHEXIDINE GLUCONATE 1 APPLICATION(S): 213 SOLUTION TOPICAL at 06:44

## 2019-05-12 RX ADMIN — Medication 50 MILLIGRAM(S): at 06:32

## 2019-05-12 RX ADMIN — Medication 15 MILLIGRAM(S): at 08:55

## 2019-05-12 RX ADMIN — Medication 50 MILLIGRAM(S): at 19:04

## 2019-05-12 NOTE — PHARMACOTHERAPY INTERVENTION NOTE - COMMENTS
Unable to reach MD #[id number], need clarification of HTCZ 100mg TID order, patient's out patient medications indicate Hydralazine 100mg q 8

## 2019-05-12 NOTE — DISCHARGE NOTE PROVIDER - NSDCCPCAREPLAN_GEN_ALL_CORE_FT
PRINCIPAL DISCHARGE DIAGNOSIS  Diagnosis: Neck pain  Assessment and Plan of Treatment: Your neck pain is likely muscular in origin.      SECONDARY DISCHARGE DIAGNOSES  Diagnosis: Peripartum cardiomyopathy  Assessment and Plan of Treatment: Follow up with your Cardiologist for further monitoring. Resume your medications as instructed. PRINCIPAL DISCHARGE DIAGNOSIS  Diagnosis: Pulmonary embolism  Assessment and Plan of Treatment: edge-shaped peripheral opacity  seen in the right lower lobe on CT chest, there is corresponding  matched perfusion and ventilation abnormalities demonstrated. Intermediate probability for embolism. Seen by pulmonary department and after discussion with the patient, decision is to treat with eliquis      SECONDARY DISCHARGE DIAGNOSES  Diagnosis: Peripartum cardiomyopathy  Assessment and Plan of Treatment: Seen by cardiology service, patient clinically improved, EF stable, recommend to continue medical treatment only. Follow up with your Cardiologist for further monitoring. Resume your medications as instructed.

## 2019-05-12 NOTE — DISCHARGE NOTE PROVIDER - PROVIDER TOKENS
PROVIDER:[TOKEN:[[id number]]] PROVIDER:[TOKEN:[[id number]]],PROVIDER:[TOKEN:[[id number]],FOLLOWUP:[1 month]] PROVIDER:[TOKEN:[[id number]]],PROVIDER:[TOKEN:[[id number]],FOLLOWUP:[1 month]],PROVIDER:[TOKEN:[[id number]],FOLLOWUP:[2 weeks]]

## 2019-05-12 NOTE — DISCHARGE NOTE PROVIDER - CARE PROVIDERS DIRECT ADDRESSES
[email or web address]

## 2019-05-12 NOTE — DISCHARGE NOTE PROVIDER - CARE PROVIDER_API CALL
Brendon Call)  Thoracic and Cardiac Surgery  [address] [place] [address]  Phone: [phone]  Fax: [phone]  Follow Up Time: Brendon Call)  Thoracic and Cardiac Surgery  [address] [place] [address]  Phone: [phone]  Fax: [phone]  Follow Up Time:     Eduardo Ontiveros)  Critical Care Medicine; Internal Medicine; Pulmonary Disease; Sleep Medicine  [address] [place] [address]  Phone: [phone]  Fax: [phone]  Follow Up Time: 1 month Brendon Call)  Thoracic and Cardiac Surgery  [address] [place] [address]  Phone: [phone]  Fax: [phone]  Follow Up Time:     Eduardo Ontiveros)  Critical Care Medicine; Internal Medicine; Pulmonary Disease; Sleep Medicine  [address] [place] [address]  Phone: [phone]  Fax: [phone]  Follow Up Time: 1 month    Pako Sanon; MARLENY)  Cardiac Electrophysiology  [address] [place] [address]  Phone: [phone]  Fax: [phone]  Follow Up Time: 2 weeks

## 2019-05-12 NOTE — PROGRESS NOTE ADULT - ASSESSMENT
34 y/o female  s/p emergent C section 4 weeks ago due to refractory aflutter, s/p ECMO, presents for evaluation of right sided chest pain.    Right sided neck pain +/- pleuritic chest pain likely secondary to musculoskeletal cause and residual trace pleural effusion secondary to Peripartum Cardiomyopathy  - CT angio of the chest negative for pe  - EKG normal sinus rhythm   - negative cardiac enzymes  - discontinue telemetry  - recent echo on  shows improving EF, repeat Echo pending  - toradol for pain prn; requesting Toradol this morning  - resume HCTZ, f/u with Cardiologist as outpatient    Activity: ambulate as tolerated  diet: regular  dvt ppx: lovenox 40mg daily  gi ppx: not indicated  full code  dispo: home 32 y/o female  s/p emergent C section 4 weeks ago due to refractory aflutter, s/p ECMO, presents for evaluation of right sided chest pain.    Right sided neck pain +/- pleuritic chest pain likely secondary to musculoskeletal cause and residual trace pleural effusion secondary to Peripartum Cardiomyopathy  - CT angio of the chest negative for pe  - EKG normal sinus rhythm   - negative cardiac enzymes  - discontinue telemetry  - recent echo on  shows improving EF, repeat Echo pending  - toradol for pain prn; requesting Toradol this morning. Discharge on Methocarbamol for 5 days and PMD follow up with no improvement for PT.  - resume HCTZ, f/u with Cardiologist as outpatient  - no further labs needed    Activity: ambulate as tolerated  diet: regular  dvt ppx: lovenox 40mg daily  gi ppx: not indicated  full code  dispo: home

## 2019-05-13 DIAGNOSIS — Z02.9 ENCOUNTER FOR ADMINISTRATIVE EXAMINATIONS, UNSPECIFIED: ICD-10-CM

## 2019-05-13 LAB
ANION GAP SERPL CALC-SCNC: 15 MMOL/L — HIGH (ref 7–14)
BUN SERPL-MCNC: 14 MG/DL — SIGNIFICANT CHANGE UP (ref 10–20)
CALCIUM SERPL-MCNC: 9.4 MG/DL — SIGNIFICANT CHANGE UP (ref 8.5–10.1)
CHLORIDE SERPL-SCNC: 106 MMOL/L — SIGNIFICANT CHANGE UP (ref 98–110)
CO2 SERPL-SCNC: 21 MMOL/L — SIGNIFICANT CHANGE UP (ref 17–32)
CREAT SERPL-MCNC: 1 MG/DL — SIGNIFICANT CHANGE UP (ref 0.7–1.5)
GLUCOSE SERPL-MCNC: 94 MG/DL — SIGNIFICANT CHANGE UP (ref 70–99)
MAGNESIUM SERPL-MCNC: 1.9 MG/DL — SIGNIFICANT CHANGE UP (ref 1.8–2.4)
POTASSIUM SERPL-MCNC: 4 MMOL/L — SIGNIFICANT CHANGE UP (ref 3.5–5)
POTASSIUM SERPL-SCNC: 4 MMOL/L — SIGNIFICANT CHANGE UP (ref 3.5–5)
SODIUM SERPL-SCNC: 142 MMOL/L — SIGNIFICANT CHANGE UP (ref 135–146)

## 2019-05-13 PROCEDURE — 99222 1ST HOSP IP/OBS MODERATE 55: CPT

## 2019-05-13 PROCEDURE — 93970 EXTREMITY STUDY: CPT | Mod: 26

## 2019-05-13 PROCEDURE — 78582 LUNG VENTILAT&PERFUS IMAGING: CPT | Mod: 26

## 2019-05-13 RX ORDER — METOPROLOL TARTRATE 50 MG
1 TABLET ORAL
Qty: 0 | Refills: 0 | DISCHARGE
Start: 2019-05-13

## 2019-05-13 RX ORDER — APIXABAN 2.5 MG/1
2 TABLET, FILM COATED ORAL
Qty: 28 | Refills: 0
Start: 2019-05-13 | End: 2019-05-19

## 2019-05-13 RX ORDER — MAGNESIUM SULFATE 500 MG/ML
1 VIAL (ML) INJECTION ONCE
Refills: 0 | Status: COMPLETED | OUTPATIENT
Start: 2019-05-13 | End: 2019-05-13

## 2019-05-13 RX ORDER — APIXABAN 2.5 MG/1
10 TABLET, FILM COATED ORAL EVERY 12 HOURS
Refills: 0 | Status: DISCONTINUED | OUTPATIENT
Start: 2019-05-13 | End: 2019-05-14

## 2019-05-13 RX ORDER — APIXABAN 2.5 MG/1
5 TABLET, FILM COATED ORAL EVERY 12 HOURS
Refills: 0 | Status: CANCELLED | OUTPATIENT
Start: 2019-05-20 | End: 2019-05-14

## 2019-05-13 RX ADMIN — Medication 50 MILLIGRAM(S): at 18:05

## 2019-05-13 RX ADMIN — Medication 50 MILLIGRAM(S): at 09:13

## 2019-05-13 RX ADMIN — Medication 100 GRAM(S): at 17:30

## 2019-05-13 RX ADMIN — APIXABAN 10 MILLIGRAM(S): 2.5 TABLET, FILM COATED ORAL at 19:57

## 2019-05-13 NOTE — CONSULT NOTE ADULT - ASSESSMENT
IMPRESSION:    Right side pleuritic chest pain  R/O Distal PE  H/O Peripartum cardiomyopathy    PLAN:    ·	V/Q scan, if low probabily for PE can d/c from pulmonary standpoint  ·	DVT ppx IMPRESSION:    Right side pleuritic chest pain, pulmonary  infarct/ intermediate VQ scan/ moderate clinical suspicion  H/O Peripartum cardiomyopathy    PLAN:    ·	long discussion with her regarding pro and cons of ac she opted to take AC, aware of risk  ·	d dimer  ·	cardio f/up  ·	spoke with team

## 2019-05-13 NOTE — CONSULT NOTE ADULT - SUBJECTIVE AND OBJECTIVE BOX
Patient is a 33y old  Female who presents with a chief complaint of Rigth sided chest pain (13 May 2019 12:05)      HPI:  32 y/o female  s/p emergent C section 4 weeks ago due to refractory aflutter, s/p ECMO, presents for evaluation of right sided chest pain.  As per the patient she was treated at [facility] with ECMO 4 weeks ago due to the above past medical history.  She has since been resting and sedentary with good follow up with her Cardiologist from [facility].  As per the patient she had right sided pleuritic chest pain that started 1 hour before presentation to the hospital.  She describes that she has some radiation of the pain to the neck and that the pain is not associated with any numbness or tingling of the extremities.  She explains that the pain is 6/10 at its worse and only felt with inspiration however after receiving toradol in the ED the pain has dissipated completely.  The patient denies cough, fever, recent sob, abd pain, sick contacts and lower extremity swelling.  Given the nature of the pain and the patients pmh CT angio was ordered to rule out PE which was negative and EKG and trops were negative on initial work up. (11 May 2019 13:04)      Allergies    No Known Allergies    Intolerances    Reglan (Other)      Home Medications:  acetaminophen 325 mg oral tablet: 2 tab(s) orally every 6 hours, As needed, Mild Pain (1 - 3) (11 May 2019 07:02)  Metoprolol Succinate ER 50 mg oral tablet, extended release: 1 tab(s) orally once a day (11 May 2019 07:02)      SOCIAL HX:    Smoking            ETOH/illicit drugs             Occupation    PAST MEDICAL & SURGICAL HISTORY:  Intubation of airway performed without difficulty  Peripartum cardiomyopathy  SVT (supraventricular tachycardia)  Asthma: as child, no inhaler use&gt;10 years. Denies hospitlizations or intubations. No current inhaler.  Delivery by emergency   History of low transverse  section      FAMILY HISTORY:  Family history of obesity  .    No cardiovascular or pulmonary family history     Review of System:  See HPI    PHYSICAL EXAM  Vital Signs Last 24 Hrs  T(C): 35.8 (13 May 2019 05:09), Max: 36.7 (12 May 2019 20:30)  T(F): 96.4 (13 May 2019 05:09), Max: 98 (12 May 2019 20:30)  HR: 76 (13 May 2019 09:00) (68 - 82)  BP: 119/63 (13 May 2019 09:00) (89/50 - 119/63)  BP(mean): 75 (12 May 2019 20:30) (75 - 75)  RR: 18 (13 May 2019 09:00) (17 - 18)  SpO2: 99% (13 May 2019 09:00) (99% - 99%)    General: In NAD  HEENT: DAVID             Lymphatic system: No LN  appreciated  Lungs: Jesus BS  Cardiovascular: Regular  Gastrointestinal: Soft.  + BS   Musculoskeletal: No Clubbing.  Moves all extremities  Skin: Warm.  Intact  Neurological: No motor or sensory deficit       LABS:                                                                                                   CARDIAC MARKERS ( 11 May 2019 20:17 )  x     / <0.01 ng/mL / 29 U/L / x     / <1.0 ng/mL                                                                                    < from: CT Angio Chest w/ IV Cont (19 @ 09:41) >     .    Impression:    No central, lobar or segmental pulmonary emboli. No right heart strain.     Nonspecific,wedge-shaped opacity, peripheral right lower lobe, abutting   pleural surface (series 7/170)    Azygos fissure, congenital variant     Trace right pleural effusion (series 7/143-168).    < end of copied text >      < from: Transthoracic Echocardiogram (19 @ 11:35) >  Summary:   1. Left ventricular ejection fraction, by visual estimation, is 30 to   35%.   2. Moderately decreased global left ventricular systolic function.   3. Mildly increased left ventricular internal cavity size.   4. Spectral Doppler shows restrictive pattern of left ventricular   myocardial filling (Grade III diastolic dysfunction).   5. LA is mildly dilated.   6. Mild to moderate mitral valve regurgitation.    < end of copied text >        MEDICATIONS  (STANDING):  chlorhexidine 4% Liquid 1 Application(s) Topical <User Schedule>  enoxaparin Injectable 40 milliGRAM(s) SubCutaneous daily  hydrALAZINE 100 milliGRAM(s) Oral three times a day  metoprolol succinate ER 50 milliGRAM(s) Oral two times a day    MEDICATIONS  (PRN):  acetaminophen   Tablet .. 650 milliGRAM(s) Oral every 6 hours PRN Moderate Pain (4 - 6)  ketorolac   Injectable 15 milliGRAM(s) IV Push three times a day PRN Severe Pain (7 - 10) Patient is a 33y old  Female who presents with a chief complaint of Rigth sided chest pain (13 May 2019 12:05)      HPI:    34 yo F never smoker, recent hospitalization for emergent C section complicated by respiratory failure and cardiogenic shock secondary to acute SVT and cardiomyopathy s/p ECMO at [facility], now presents for right side 'gas' like pleurtic chest pain that began 2 days PTP. No obvious trigger, improved when she applied pressure to her chest and worsening when she took deep breath. Never happened before, no fever chills night sweats weight loss. She says the pain is improved and now located more posterior but still has the pain.    CTA to rule out PE was performed which was negative for PE but showed a wedge shaped peripheral RLL opacity.      Allergies    No Known Allergies    Intolerances    Reglan (Other)      Home Medications:  acetaminophen 325 mg oral tablet: 2 tab(s) orally every 6 hours, As needed, Mild Pain (1 - 3) (11 May 2019 07:02)  Metoprolol Succinate ER 50 mg oral tablet, extended release: 1 tab(s) orally once a day (11 May 2019 07:02)      SOCIAL HX:    Smoking  no          ETOH/illicit drugs  no             PAST MEDICAL & SURGICAL HISTORY:  Intubation of airway performed without difficulty  Peripartum cardiomyopathy  SVT (supraventricular tachycardia)  Asthma: as child, no inhaler use&gt;10 years. Denies hospitalizations or intubations. No current inhaler.  Delivery by emergency   History of low transverse  section      FAMILY HISTORY:  Family history of obesity  .    No cardiovascular or pulmonary family history     Review of System:  See HPI    PHYSICAL EXAM  Vital Signs Last 24 Hrs  T(C): 35.8 (13 May 2019 05:09), Max: 36.7 (12 May 2019 20:30)  T(F): 96.4 (13 May 2019 05:09), Max: 98 (12 May 2019 20:30)  HR: 76 (13 May 2019 09:00) (68 - 82)  BP: 119/63 (13 May 2019 09:00) (89/50 - 119/63)  BP(mean): 75 (12 May 2019 20:30) (75 - 75)  RR: 18 (13 May 2019 09:00) (17 - 18)  SpO2: 99% (13 May 2019 09:00) (99% - 99%)    General: In NAD  HEENT: DAVID             Lymphatic system: No LN  appreciated  Lungs: Jesus BS CTA no wheeze or crackles  Cardiovascular: Regular  Gastrointestinal: Soft.  + BS   Musculoskeletal: No Clubbing.  Moves all extremities no tenderness to palpation  Skin: Warm.  Intact  Neurological: No motor or sensory deficit       LABS:                                                                                                   CARDIAC MARKERS ( 11 May 2019 20:17 )  x     / <0.01 ng/mL / 29 U/L / x     / <1.0 ng/mL                                                                                    < from: CT Angio Chest w/ IV Cont (19 @ 09:41) >     .    Impression:    No central, lobar or segmental pulmonary emboli. No right heart strain.     Nonspecific,wedge-shaped opacity, peripheral right lower lobe, abutting   pleural surface (series 7/170)    Azygos fissure, congenital variant     Trace right pleural effusion (series 7/143-168).    < end of copied text >      < from: Transthoracic Echocardiogram (19 @ 11:35) >  Summary:   1. Left ventricular ejection fraction, by visual estimation, is 30 to   35%.   2. Moderately decreased global left ventricular systolic function.   3. Mildly increased left ventricular internal cavity size.   4. Spectral Doppler shows restrictive pattern of left ventricular   myocardial filling (Grade III diastolic dysfunction).   5. LA is mildly dilated.   6. Mild to moderate mitral valve regurgitation.    < end of copied text >        MEDICATIONS  (STANDING):  chlorhexidine 4% Liquid 1 Application(s) Topical <User Schedule>  enoxaparin Injectable 40 milliGRAM(s) SubCutaneous daily  hydrALAZINE 100 milliGRAM(s) Oral three times a day  metoprolol succinate ER 50 milliGRAM(s) Oral two times a day    MEDICATIONS  (PRN):  acetaminophen   Tablet .. 650 milliGRAM(s) Oral every 6 hours PRN Moderate Pain (4 - 6)  ketorolac   Injectable 15 milliGRAM(s) IV Push three times a day PRN Severe Pain (7 - 10) Patient is a 33y old  Female who presents with a chief complaint of Rigth sided chest pain (13 May 2019 12:05)      HPI:    32 yo F never smoker, recent hospitalization for emergent C section complicated by respiratory failure and cardiogenic shock secondary to acute SVT and cardiomyopathy s/p ECMO at [facility], now presents for right side pleurtic chest pain that began 2 days PTP associated with SOB. No obvious trigger, improved when she applied pressure to her chest and worsening when she took deep breath. Never happened before, no fever chills night sweats weight loss. She says the pain is improved and now located more posterior but still has the pain.    CTA to rule out PE was performed which was negative for PE but showed a wedge shaped peripheral RLL opacity. Had VQ scan intermediate probability, feels better pain improved, no hx of DVT/ no family hx      Allergies    No Known Allergies    Intolerances    Reglan (Other)      Home Medications:  acetaminophen 325 mg oral tablet: 2 tab(s) orally every 6 hours, As needed, Mild Pain (1 - 3) (11 May 2019 07:02)  Metoprolol Succinate ER 50 mg oral tablet, extended release: 1 tab(s) orally once a day (11 May 2019 07:02)      SOCIAL HX:    Smoking  no          ETOH/illicit drugs  no             PAST MEDICAL & SURGICAL HISTORY:  Intubation of airway performed without difficulty  Peripartum cardiomyopathy  SVT (supraventricular tachycardia)  Asthma: as child, no inhaler use&gt;10 years. Denies hospitalizations or intubations. No current inhaler.  Delivery by emergency   History of low transverse  section      FAMILY HISTORY:  Family history of obesity  .    No cardiovascular or pulmonary family history     Review of System:  See HPI    PHYSICAL EXAM  Vital Signs Last 24 Hrs  T(C): 35.8 (13 May 2019 05:09), Max: 36.7 (12 May 2019 20:30)  T(F): 96.4 (13 May 2019 05:09), Max: 98 (12 May 2019 20:30)  HR: 76 (13 May 2019 09:00) (68 - 82)  BP: 119/63 (13 May 2019 09:00) (89/50 - 119/63)  BP(mean): 75 (12 May 2019 20:30) (75 - 75)  RR: 18 (13 May 2019 09:00) (17 - 18)  SpO2: 99% (13 May 2019 09:00) (99% - 99%)    General: In NAD  HEENT: DAVID             Lymphatic system: No LN  appreciated  Lungs: Jesus BS CTA no wheeze or crackles  Cardiovascular: Regular  Gastrointestinal: Soft.  + BS   Musculoskeletal: No Clubbing.  Moves all extremities no tenderness to palpation  Skin: Warm.  Intact  Neurological: No motor or sensory deficit       LABS:                                                                                                   CARDIAC MARKERS ( 11 May 2019 20:17 )  x     / <0.01 ng/mL / 29 U/L / x     / <1.0 ng/mL                                                                                    < from: CT Angio Chest w/ IV Cont (19 @ 09:41) >     .    Impression:    No central, lobar or segmental pulmonary emboli. No right heart strain.     Nonspecific,wedge-shaped opacity, peripheral right lower lobe, abutting   pleural surface (series 170)    Azygos fissure, congenital variant     Trace right pleural effusion (series /143-168).    < end of copied text >      < from: Transthoracic Echocardiogram (19 @ 11:35) >  Summary:   1. Left ventricular ejection fraction, by visual estimation, is 30 to   35%.   2. Moderately decreased global left ventricular systolic function.   3. Mildly increased left ventricular internal cavity size.   4. Spectral Doppler shows restrictive pattern of left ventricular   myocardial filling (Grade III diastolic dysfunction).   5. LA is mildly dilated.   6. Mild to moderate mitral valve regurgitation.    < end of copied text >        MEDICATIONS  (STANDING):  chlorhexidine 4% Liquid 1 Application(s) Topical <User Schedule>  enoxaparin Injectable 40 milliGRAM(s) SubCutaneous daily  hydrALAZINE 100 milliGRAM(s) Oral three times a day  metoprolol succinate ER 50 milliGRAM(s) Oral two times a day    MEDICATIONS  (PRN):  acetaminophen   Tablet .. 650 milliGRAM(s) Oral every 6 hours PRN Moderate Pain (4 - 6)  ketorolac   Injectable 15 milliGRAM(s) IV Push three times a day PRN Severe Pain (7 - 10)

## 2019-05-13 NOTE — PROGRESS NOTE ADULT - ASSESSMENT
34 y/o female  s/p emergent C section 4 weeks ago due to refractory aflutter, s/p ECMO, presents for evaluation of right sided chest pain.    #pleuritic chest pain/right sided wedge shaped opacity on CT chest   - CT angio of the chest negative for pe  - EKG normal sinus rhythm   - negative cardiac enzymes  -LE and UE duplex negative   -V/Q scan pending   -pulm eval     #Peripartum CMP   cardiology recs appreciated   c/w BB   hydralazine to be stopped given borderline low BP   patient was not started on ACE or ARB because she had YAJAIRA a month ago and had short term HD   OPT follow up     #Progress Note Handoff  Pending (specify):  V/Q scan. Pulm eval   Family discussion:with patient   Disposition: Home pending VQ scan and Pulm eval

## 2019-05-13 NOTE — CONSULT NOTE ADULT - SUBJECTIVE AND OBJECTIVE BOX
Date of Admission: 2019    CHIEF COMPLAINT: right sided chest pain    HISTORY OF PRESENT ILLNESS: 33yFemale with PMH below presented to the hospital for above CC. Has had on ad off R sided CP for the last few days. Worse with inspiration and worse with turning movements. Pain is under R breast and lower rib cage. Denies SOB, denies MEDNEZ. Recently underwent prolonged hospitalization for SVT/ peripartum cardiomyopathy/ multiorgan failure with undergoing ECMO/ CVVHD. Now on road to recovery with improvement in EF and symptomatology. ONly complaint related to stay is some residual RUE swelling where the ECMO cannula were but that is resolving.     PAST MEDICAL & SURGICAL HISTORY:  Intubation of airway performed without difficulty  Peripartum cardiomyopathy  SVT (supraventricular tachycardia)  Asthma: as child, no inhaler use&gt;10 years. Denies hospitlizations or intubations. No current inhaler.  Delivery by emergency   History of low transverse  section    HEALTH ISSUES - PROBLEM Dx:        FAMILY HISTORY:  Family history of obesity    None [x ]  Mother:   Father:   Siblings:     SOCIAL HISTORY:    [x ] Non-smoker  [ ] Smoker  [ ] Alcohol    Allergies    No Known Allergies    Intolerances    Reglan (Other)  	    REVIEW OF SYSTEMS:  CONSTITUTIONAL: No fever, weight loss, or fatigue  CARDIOLOGY: see HPI   RESPIRATORY: see HPI  NEUROLOGICAL: NO weakness, no focal deficits to report.  ENDOCRINOLOGICAL: no recent change in diabetic medications.   GI: no BRBPR, no N,V,diarrhea.    PSYCHIATRY: normal mood and affect  HEENT: no nasal discharge, no ecchymosis  SKIN: no ecchymosis, no breakdown  MUSCULOSKELETAL: Full range of motion x4.      PHYSICAL EXAM:  T(C): 35.8 (19 @ 05:09), Max: 36.7 (19 @ 20:30)  HR: 76 (19 @ 09:00) (68 - 82)  BP: 119/63 (19 @ 09:00) (89/50 - 119/63)  RR: 18 (19 @ 09:00) (17 - 18)  SpO2: 99% (19 @ 09:00) (99% - 99%)  Wt(kg): --  I&O's Summary    Daily     Daily     General Appearance: Normal or age and gender	  Cardiovascular: regular S1 S2, No JVD, No murmurs, No edema  Respiratory: Lungs clear to auscultation	  Psychiatry: A & O x 3, Mood & affect appropriate  Gastrointestinal:  Soft, Non-tender  Skin: No rashes, No ecchymoses, No cyanosis	  Neurologic: Non-focal  Musculoskeletal/ extremities: Normal range of motion, No clubbing, cyanosis or edema  Vascular: Peripheral pulses palpable 2+ bilaterally    LABS:	 	            CARDIAC MARKERS ( 11 May 2019 20:17 )  x     / <0.01 ng/mL / 29 U/L / x     / <1.0 ng/mL          CARDIAC MARKERS:            TELEMETRY EVENTS:  N/A	    ECG:  not performed   	  RADIOLOGY: < from: CT Angio Chest w/ IV Cont (19 @ 09:41) >  Impression:    No central, lobar or segmental pulmonary emboli. No right heart strain.     Nonspecific,wedge-shaped opacity, peripheral right lower lobe, abutting   pleural surface (series 7/170)    Azygos fissure, congenital variant     Trace right pleural effusion (series 7/143-168).    < end of copied text >    OTHER: 	    PREVIOUS DIAGNOSTIC TESTING:    [x ] Echocardiogram: < from: Transthoracic Echocardiogram (19 @ 11:35) >  Summary:   1. Left ventricular ejection fraction, by visual estimation, is 30 to   35%.   2. Moderately decreased global left ventricular systolic function.   3. Mildly increased left ventricular internal cavity size.   4. Spectral Doppler shows restrictive pattern of left ventricular   myocardial filling (Grade III diastolic dysfunction).   5. LA is mildly dilated.   6. Mild to moderate mitral valve regurgitation.    < end of copied text >    [ ]  Catheterization:  [ ] Stress Test:  	  	    Home Medications:  acetaminophen 325 mg oral tablet: 2 tab(s) orally every 6 hours, As needed, Mild Pain (1 - 3) (11 May 2019 07:02)  Metoprolol Succinate ER 50 mg oral tablet, extended release: 1 tab(s) orally once a day (11 May 2019 07:02)    MEDICATIONS  (STANDING):  chlorhexidine 4% Liquid 1 Application(s) Topical <User Schedule>  enoxaparin Injectable 40 milliGRAM(s) SubCutaneous daily  hydrALAZINE 100 milliGRAM(s) Oral three times a day  metoprolol succinate ER 50 milliGRAM(s) Oral two times a day    MEDICATIONS  (PRN):  acetaminophen   Tablet .. 650 milliGRAM(s) Oral every 6 hours PRN Moderate Pain (4 - 6)  ketorolac   Injectable 15 milliGRAM(s) IV Push three times a day PRN Severe Pain (7 - 10) Date of Admission: 2019    CHIEF COMPLAINT: right sided chest pain    HISTORY OF PRESENT ILLNESS: 33yFemale with PMH below presented to the hospital for above CC. Has had on ad off R sided CP for the last few days. Worse with inspiration and worse with turning movements. Pain is under R breast and lower rib cage. Denies SOB, denies MENDEZ. Recently underwent prolonged hospitalization for SVT/ peripartum cardiomyopathy/ multiorgan failure with undergoing ECMO/ CVVHD. Now on road to recovery with improvement in EF and symptomatology. ONly complaint related to stay is some residual RUE swelling where the ECMO cannula were but that is resolving.     PAST MEDICAL & SURGICAL HISTORY:  Intubation of airway performed without difficulty  Peripartum cardiomyopathy  SVT (supraventricular tachycardia)  Asthma: as child, no inhaler use&gt;10 years. Denies hospitlizations or intubations. No current inhaler.  Delivery by emergency   History of low transverse  section    No pertinent family history of premature CAD in first degree relatives    SOCIAL HISTORY: Denies EtOH, smoking or drug use    Allergies  No Known Allergies      Home Medications:  acetaminophen 325 mg oral tablet: 2 tab(s) orally every 6 hours, As needed, Mild Pain (1 - 3) (11 May 2019 07:02)  Metoprolol Succinate ER 50 mg oral tablet, extended release: 1 tab(s) orally once a day (11 May 2019 07:02)    MEDICATIONS  (STANDING):  chlorhexidine 4% Liquid 1 Application(s) Topical <User Schedule>  enoxaparin Injectable 40 milliGRAM(s) SubCutaneous daily  hydrALAZINE 100 milliGRAM(s) Oral three times a day  metoprolol succinate ER 50 milliGRAM(s) Oral two times a day      REVIEW OF SYSTEMS:  CONSTITUTIONAL: No fever, weight loss, fatigue  NECK: No pain or stiffness  RESPIRATORY: See HPI  CARDIOVASCULAR: See HPI  GASTROINTESTINAL: No abdominal/epigastric pain, nausea, vomiting, hematemesis, diarrhea, constipation, melena or hematochezia  GENITOURINARY: No dysuria, frequency, hematuria, incontinence  NEUROLOGICAL: No headaches, memory loss, loss of strength, numbness, tremors  SKIN: No itching, burning, rashes, lesions   ENDOCRINE: No heat/cold intolerance or hair loss  MUSCULOSKELETAL: No joint pain or swelling  HEME/LYMPH: No easy bruising or bleeding gums    PHYSICAL EXAM:  T(C): 35.8 (19 @ 05:09), Max: 36.7 (19 @ 20:30)  HR: 76 (19 @ 09:00) (68 - 82)  BP: 119/63 (19 @ 09:00) (89/50 - 119/63)  RR: 18 (19 @ 09:00) (17 - 18)  SpO2: 99% (19 @ 09:00) (99% - 99%)      PHYSICAL EXAM:  General: NAD, AAOx3  HEENT: NCAT, EOMI, PERRLA  Neck: supple, no JVD  CV: RRR, S1S2 nl, no murmurs, no edema  Respiratory: CTA bilaterally, no wheeze, rales or rhonchi	  Abdomen: soft, NT/ND, +BS  Extremities: ROM nl, 2+ PP bilaterally  Neuro: Nonfocal 	                              11.8   7.53  )-----------( 316      ( 11 May 2019 07:16 )             37.4       139  |  102  |  17  ----------------------------<  87  4.4   |  21  |  1.1  Ca    9.7      11 May 2019 07:16  Mg     1.7         TPro  7.0  /  Alb  4.5  /  TBili  0.4  /  DBili  x   /  AST  20  /  ALT  20  /  AlkPhos  137<H>      PT/INR - ( 11 May 2019 07:16 )   PT: 12.00 sec;   INR: 1.04 ratio    PTT - ( 11 May 2019 07:16 )  PTT:37.7 sec      Troponin T, Serum: <0.01 ng/mL (19 @ 20:17)  Troponin T, Serum: <0.01 ng/mL (19 @ 10:52)        TELEMETRY EVENTS:  N/A	    ECG:  not performed      RADIOLOGY:  < from: CT Angio Chest w/ IV Cont (19 @ 09:41) >  Impression:    No central, lobar or segmental pulmonary emboli. No right heart strain.     Nonspecific,wedge-shaped opacity, peripheral right lower lobe, abutting   pleural surface (series /170)    Azygos fissure, congenital variant     Trace right pleural effusion (series 7/143-168).    < end of copied text >        Echocardiogram: < from: Transthoracic Echocardiogram (19 @ 11:35) >  Summary:   1. Left ventricular ejection fraction, by visual estimation, is 30 to   35%.   2. Moderately decreased global left ventricular systolic function.   3. Mildly increased left ventricular internal cavity size.   4. Spectral Doppler shows restrictive pattern of left ventricular   myocardial filling (Grade III diastolic dysfunction).   5. LA is mildly dilated.   6. Mild to moderate mitral valve regurgitation.    < end of copied text >

## 2019-05-13 NOTE — CONSULT NOTE ADULT - ASSESSMENT
Right sided chest pain  - pleuritic in nature, likely related to small pleural effusion and non specific density on CT  - awaiting VQ scan to rule out subsegmental PE.    Post partum cardiomyopathy  - EF improving  - BP has been borderline, so primary team has been holding hydralazine  - continue beta blocker  - outpatient follow up with Heart Failure team  - discontinue telemetry monitoring  - no clinical indication for diuretic management at this time- no clinical evidence for heart failure. Right sided pleuritic chest pain  - pleuritic in nature, likely related to small pleural effusion and nonspecific density on CT  - awaiting VQ scan to rule out subsegmental PE    Peripartum Cardiomyopathy  - EF improving  - Hydralazine held due to borderline BP with c/o weakness/fatigue  - Resume Hydralazine if BP persistently >140/90  - Continue beta blocker  - Outpatient follow up with [facility] Failure team at [facility]  - Discontinue telemetry monitoring  - No clinical indication for diuretic management at this time given no clinical evidence for heart failure

## 2019-05-13 NOTE — CONSULT NOTE ADULT - SUBJECTIVE AND OBJECTIVE BOX
Patient is a 33y old  Female who presents with a chief complaint of Rigth sided chest pain (13 May 2019 12:13)        HPI:  32 y/o female  s/p emergent C section 4 weeks ago due to refractory aflutter, s/p ECMO, presents for evaluation of right sided chest pain.  As per the patient she was treated at [facility] with ECMO 4 weeks ago due to the above past medical history.  She has since been resting and sedentary with good follow up with her Cardiologist from [facility].  As per the patient she had right sided pleuritic chest pain that started 1 hour before presentation to the hospital.  She describes that she has some radiation of the pain to the neck and that the pain is not associated with any numbness or tingling of the extremities.  She explains that the pain is 6/10 at its worse and only felt with inspiration however after receiving toradol in the ED the pain has dissipated completely.  The patient denies cough, fever, recent sob, abd pain, sick contacts and lower extremity swelling.  Given the nature of the pain and the patients pmh CT angio was ordered to rule out PE which was negative and EKG and trops were negative on initial work up. (11 May 2019 13:04)      Electrophysiology:  33y Female known to the service, recent prolonged hospitalization for SVT, c/o peripartum cardiomyopathy, EF down to 5-10%, multiorgan failure, requiring ECHO and CVVH, discharged from [facility] , now improving, presented with c/o of right-sided pleuritic chest pain, found to have possible pulmonary embolism on VQ scan.  During work up of chest pain, Echo was done and revealed EF 30-35%.  While planning for discharge, patient had run of NSVT, 5.7sec, asymptomatic. Patient is currently on Toprol XL 50mg twice daily. Hydralazine was stopped this am due to hypotension    Transthoracic Echocardiogram (19 @ 11:35) >   1. Left ventricular ejection fraction, by visual estimation, is 30 to 35%.   2. Moderately decreased global left ventricular systolic function.   3. Mildly increased left ventricular internal cavity size.   4. Spectral Doppler shows restrictive pattern of left ventricular   myocardial filling (Grade III diastolic dysfunction).   5. LA is mildly dilated.   6. Mild to moderate mitral valve regurgitation.      NM Pulmonary Ventilation/Perfusion Scan (19 @ 12:42) >  Wedge-shaped peripheral opacity  seen in the right lower lobe on CT   chest, there is corresponding  matched perfusion and ventilation   abnormalities demonstrated.  Intermediate probability for embolism. Wedge shape opacity may represent   pulmonary infarct.          REVIEW OF SYSTEMS    [x] A ten-point review of systems was otherwise negative except as noted.  ECMO site mild resolving swelling and tenderness  Renal function back to baseline        PAST MEDICAL & SURGICAL HISTORY:  Intubation of airway performed without difficulty  Peripartum cardiomyopathy  SVT (supraventricular tachycardia)  Asthma: as child, no inhaler use&gt;10 years. Denies hospitlizations or intubations. No current inhaler.  Delivery by emergency   History of low transverse  section      Home Medications:  metoprolol succinate 50 mg oral tablet, extended release: 1 tab(s) orally 2 times a day (13 May 2019 15:18)      Allergies:    No Known Allergies  Reglan (Other)      PREVIOUS DIAGNOSTIC TESTING:      ECHO  FINDINGS:      STRESS  FINDINGS:    CATHETERIZATION  FINDINGS:    ELECTROPHYSIOLOGY STUDY  FINDINGS:    CAROTID ULTRASOUND:  FINDINGS    VENOUS DUPLEX SCAN:  FINDINGS:    CHEST CT PULMONARY ANGIO with IV Contrast:  FINDINGS:  < from: CT Angio Chest w/ IV Cont (19 @ 09:41) >  No central, lobar or segmental pulmonary emboli. No right heart strain.   Nonspecific,wedge-shaped opacity, peripheral right lower lobe, abutting   pleural surface (series 7/170)  Azygos fissure, congenital variant   Trace right pleural effusion (series 7/143-168).  < end of copied text >      FAMILY HISTORY:  Family history of obesity      SOCIAL HISTORY: denies tobacco / ETOH / illicit drug use    CIGARETTES:    ALCOHOL:      MEDICATIONS  (STANDING):  apixaban 10 milliGRAM(s) Oral every 12 hours  chlorhexidine 4% Liquid 1 Application(s) Topical <User Schedule>  enoxaparin Injectable 40 milliGRAM(s) SubCutaneous daily  magnesium sulfate  IVPB 1 Gram(s) IV Intermittent once  metoprolol succinate ER 50 milliGRAM(s) Oral two times a day    MEDICATIONS  (PRN):  acetaminophen   Tablet .. 650 milliGRAM(s) Oral every 6 hours PRN Moderate Pain (4 - 6)  ketorolac   Injectable 15 milliGRAM(s) IV Push three times a day PRN Severe Pain (7 - 10)      Vital Signs Last 24 Hrs  T(C): 36.4 (13 May 2019 13:15), Max: 36.7 (12 May 2019 20:30)  T(F): 97.6 (13 May 2019 13:15), Max: 98 (12 May 2019 20:30)  HR: 75 (13 May 2019 13:15) (68 - 78)  BP: 99/51 (13 May 2019 13:15) (89/50 - 119/63)  BP(mean): 75 (12 May 2019 20:30) (75 - 75)  RR: 18 (13 May 2019 13:15) (17 - 18)  SpO2: 99% (13 May 2019 09:00) (99% - 99%)    PHYSICAL EXAM:    GENERAL: In no apparent distress, well nourished, and hydrated.  HEAD:  Atraumatic, Normocephalic  EYES: EOMI, PERRLA, conjunctiva and sclera clear  NECK: Supple and normal thyroid.  No JVD or carotid bruit.  Carotid pulse is 2+ bilaterally.  HEART: Regular rate and rhythm; No murmurs, rubs, or gallops.  PULMONARY: Clear to auscultation and perfusion.  No rales, wheezing, or rhonchi bilaterally.  ABDOMEN: Soft, Nontender, Nondistended; Bowel sounds present  EXTREMITIES:  2+ Peripheral Pulses, No clubbing, cyanosis, or edema  NEUROLOGICAL: Grossly nonfocal      INTERPRETATION OF TELEMETRY: NSR,  one run of NSVt 5.7 sec 13bts @150s bpm    ECG:      I&O's Detail      LABS:  Magnesium, Serum (19 @ 07:16)    Magnesium, Serum: 1.7 mg/dL    Comprehensive Metabolic Panel (19 @ 07:16)    Sodium, Serum: 139 mmol/L    Potassium, Serum: 4.4 mmol/L    Chloride, Serum: 102 mmol/L    Carbon Dioxide, Serum: 21 mmol/L    Anion Gap, Serum: 16 mmol/L    Blood Urea Nitrogen, Serum: 17 mg/dL    Creatinine, Serum: 1.1 mg/dL    Glucose, Serum: 87 mg/dL    Calcium, Total Serum: 9.7 mg/dL    Protein Total, Serum: 7.0 g/dL    Albumin, Serum: 4.5 g/dL    Bilirubin Total, Serum: 0.4 mg/dL    Alkaline Phosphatase, Serum: 137 U/L    Aspartate Aminotransferase (AST/SGOT): 20 U/L    Alanine Aminotransferase (ALT/SGPT): 20 U/L      CARDIAC MARKERS ( 11 May 2019 20:17 )  x     / <0.01 ng/mL / 29 U/L / x     / <1.0 ng/mL          BNP      I&O's Detail    Daily     Daily     RADIOLOGY & ADDITIONAL STUDIES:

## 2019-05-13 NOTE — CONSULT NOTE ADULT - ASSESSMENT
34yo Female with peripartum h/o SVT,car 34yo Female with h/o SVT, peripartum cardiomyopathy, multiorgan failure now all improved 32yo Female with h/o SVT, peripartum cardiomyopathy, multiorgan failure now all improving    Plan:  LV function is improving  Con't beta blockers, increase if BP permits  Maintain electrolytes K>4.0 Mg >2.0  repeat labs, last labs from 5/11  will d/w need for lifevest with attending 34yo Female with h/o SVT, peripartum cardiomyopathy, multiorgan failure now all improving    Plan:  LV function is improving  Con't beta blockers, increase if BP permits  Add ACEI or ARB  Maintain electrolytes K>4.0 Mg >2.0  repeat labs, last labs from 5/11  Lifevest prior to d/c  f/u as out-pt in 23 months for repeat echo  will d/w need for lifevest with attending

## 2019-05-13 NOTE — PROGRESS NOTE ADULT - ASSESSMENT
Mrs. Jerome is an 34 y/o female  s/p emergent C section 4 weeks ago with a prolonged hospitalization for SVT, c/o peripartum cardiomyopathy, EF down to 5-10%, multiorgan failure, requiring ECHO and CVVH, discharged from [facility] , presents on  for evaluation of right sided chest pain.    # PE: Right side pleuritic chest pain, pulmonary  infarct/ intermediate risk VQ scan/ moderate clinical suspicion  - likely due to a small PE  - CTA: No central, lobar or segmental pulmonary emboli. No right heart strain.Nonspecific,wedge-shaped opacity, peripheral right lower lobe, abutting pleural surface   - VQ scan on : Wedge-shaped peripheral opacity  seen in the right lower lobe on CT chest, there is corresponding  matched perfusion and ventilation  abnormalities demonstrated. Intermediate probability for embolism. Wedge shape opacity may represent pulmonary infarct.  - Pulmonary service discussed with patients benefits and risk of anticoagulation and she decided to take it.  - Will start Eliquis 10 mg BID for 7 days, followed by 5mg BID for 90 days.    # Peripartum Cardiomyopathy  - negative cardiac enzymes  - TTE: Left ventricular ejection fraction, by visual estimation, is 30 to 35%. Moderately decreased global left ventricular systolic function.Mildly increased left ventricular internal cavity size. Spectral Doppler shows restrictive pattern of left ventricular myocardial filling (Grade III diastolic dysfunction).  - No signs of volume overload: no need for diuretics.  - Evaluated by cardiology service: recommend to continue beta blockers and to hold hydralazine given hypotensive episodes and fatigue. Resume hydralazine if BP>140  - Outpatient follow up with Heart Failure team at [facility]  - No need to start ACE inh given borderline blood pressure and risks with breast feeding    # One episode of non sustained Vtach on tele  - 11 beats while sleeping., asymptomatic.  - EP service evaluation requested, patient will benefit from a life vest given low EF and episodes of Vtach    # dispo: can be discharged home after applying the life vest.

## 2019-05-14 ENCOUNTER — TRANSCRIPTION ENCOUNTER (OUTPATIENT)
Age: 34
End: 2019-05-14

## 2019-05-14 VITALS
SYSTOLIC BLOOD PRESSURE: 117 MMHG | OXYGEN SATURATION: 99 % | RESPIRATION RATE: 18 BRPM | DIASTOLIC BLOOD PRESSURE: 56 MMHG | HEART RATE: 77 BPM

## 2019-05-14 RX ADMIN — Medication 50 MILLIGRAM(S): at 17:18

## 2019-05-14 RX ADMIN — Medication 15 MILLIGRAM(S): at 08:07

## 2019-05-14 RX ADMIN — APIXABAN 10 MILLIGRAM(S): 2.5 TABLET, FILM COATED ORAL at 17:18

## 2019-05-14 RX ADMIN — APIXABAN 10 MILLIGRAM(S): 2.5 TABLET, FILM COATED ORAL at 06:13

## 2019-05-14 RX ADMIN — Medication 50 MILLIGRAM(S): at 06:13

## 2019-05-14 NOTE — PROGRESS NOTE ADULT - SUBJECTIVE AND OBJECTIVE BOX
SUBJECTIVE:    Patient is a 33y old Female who presents with a chief complaint of Rigth sided chest pain (13 May 2019 16:15)    Currently admitted to medicine with the primary diagnosis of Pulmonary embolism     Today is hospital day 2d. This morning she is resting comfortably in bed and reports no new issues or overnight events.   Asymptomatic today.  No chest pain.  No SOB.   No GI or  complaints.    PAST MEDICAL & SURGICAL HISTORY  Intubation of airway performed without difficulty  Peripartum cardiomyopathy  SVT (supraventricular tachycardia)  Asthma: as child, no inhaler use&gt;10 years. Denies hospitlizations or intubations. No current inhaler.  Delivery by emergency   History of low transverse  section    SOCIAL HISTORY:  Negative for smoking/alcohol/drug use.     ALLERGIES:  No Known Allergies    MEDICATIONS:  STANDING MEDICATIONS  apixaban 10 milliGRAM(s) Oral every 12 hours  chlorhexidine 4% Liquid 1 Application(s) Topical <User Schedule>  enoxaparin Injectable 40 milliGRAM(s) SubCutaneous daily  metoprolol succinate ER 50 milliGRAM(s) Oral two times a day    PRN MEDICATIONS  acetaminophen   Tablet .. 650 milliGRAM(s) Oral every 6 hours PRN  ketorolac   Injectable 15 milliGRAM(s) IV Push three times a day PRN    VITALS:   T(F): 97.6  HR: 75  BP: 99/51  RR: 18  SpO2: 99%    LABS:      CARDIAC MARKERS ( 11 May 2019 20:17 )  x     / <0.01 ng/mL / 29 U/L / x     / <1.0 ng/mL      RADIOLOGY:    PHYSICAL EXAM:  General: NAD, AAOx3, on room air  HEENT: NCAT, EOMI  Neck: supple, no JVD  CV: RRR, S1S2 nl, no murmurs, no edema  Respiratory: CTA bilaterally, no wheeze, rales or rhonchi	  Abdomen: soft, NT/ND, +BS  Extremities: ROM nl, 2+ PP bilaterally  Neuro: Nonfocal
SUBJECTIVE:    Patient is a 33y old Female who presents with a chief complaint of Rigth sided chest pain (13 May 2019 16:15)    Currently admitted to medicine with the primary diagnosis of Pulmonary embolism     Today is hospital day 3d. This morning she is resting comfortably in bed and reports no new issues or overnight events.   Asymptomatic today.  No chest pain.  No SOB.   No GI or  complaints.  Concerned with applying the vest and holding her 4 weeks old baby and driving.    PAST MEDICAL & SURGICAL HISTORY  Intubation of airway performed without difficulty  Peripartum cardiomyopathy  SVT (supraventricular tachycardia)  Asthma: as child, no inhaler use&gt;10 years. Denies hospitlizations or intubations. No current inhaler.  Delivery by emergency   History of low transverse  section    SOCIAL HISTORY:  Negative for smoking/alcohol/drug use.     ALLERGIES:  No Known Allergies    MEDICATIONS:  STANDING MEDICATIONS  apixaban 10 milliGRAM(s) Oral every 12 hours  chlorhexidine 4% Liquid 1 Application(s) Topical <User Schedule>  enoxaparin Injectable 40 milliGRAM(s) SubCutaneous daily  metoprolol succinate ER 50 milliGRAM(s) Oral two times a day    PRN MEDICATIONS  acetaminophen   Tablet .. 650 milliGRAM(s) Oral every 6 hours PRN  ketorolac   Injectable 15 milliGRAM(s) IV Push three times a day PRN    VITALS:   T(F): 97.6  HR: 75  BP: 99/51  RR: 18  SpO2: 99%    LABS:      CARDIAC MARKERS ( 11 May 2019 20:17 )  x     / <0.01 ng/mL / 29 U/L / x     / <1.0 ng/mL      RADIOLOGY:    PHYSICAL EXAM:  General: NAD, AAOx3, on room air  HEENT: NCAT, EOMI  Neck: supple, no JVD  CV: RRR, S1S2 nl, no murmurs, no edema  Respiratory: CTA bilaterally, no wheeze, rales or rhonchi	  Abdomen: soft, NT/ND, +BS  Extremities: ROM nl, 2+ PP bilaterally  Neuro: Nonfocal
chest pain is righted sided comes and go now does not have mainly when she takes a deep breath     Vital Signs Last 24 Hrs  T(C): 35.8 (13 May 2019 05:09), Max: 36.7 (12 May 2019 20:30)  T(F): 96.4 (13 May 2019 05:09), Max: 98 (12 May 2019 20:30)  HR: 76 (13 May 2019 09:00) (68 - 82)  BP: 119/63 (13 May 2019 09:00) (89/50 - 119/63)  BP(mean): 75 (12 May 2019 20:30) (75 - 75)  RR: 18 (13 May 2019 09:00) (17 - 18)  SpO2: 99% (13 May 2019 09:00) (99% - 99%)    PHYSICAL EXAM:  GENERAL: NAD, well-developed  HEAD:  Atraumatic, Normocephalic  EYES: EOMI, PERRLA, conjunctiva and sclera clear  NECK: Supple, No JVD  Pulm: Clear to auscultation bilaterally; No wheeze  CV: Regular rate and rhythm; No murmurs, rubs, or gallops  GI Soft, Nontender, Nondistended; Bowel sounds present  EXTREMITIES:  2+ Peripheral Pulses, No clubbing, cyanosis, or edema  PSYCH: AAOx3  NEUROLOGY: non-focal  SKIN: No rashes or lesions    MEDICATIONS  (STANDING):  chlorhexidine 4% Liquid 1 Application(s) Topical <User Schedule>  enoxaparin Injectable 40 milliGRAM(s) SubCutaneous daily  hydrALAZINE 100 milliGRAM(s) Oral three times a day  metoprolol succinate ER 50 milliGRAM(s) Oral two times a day    MEDICATIONS  (PRN):  acetaminophen   Tablet .. 650 milliGRAM(s) Oral every 6 hours PRN Moderate Pain (4 - 6)  ketorolac   Injectable 15 milliGRAM(s) IV Push three times a day PRN Severe Pain (7 - 10)                  CARDIAC MARKERS ( 11 May 2019 20:17 )  x     / <0.01 ng/mL / 29 U/L / x     / <1.0 ng/mL
LENGTH OF HOSPITAL STAY: 1d    CHIEF COMPLAINT:   Patient is a 33y old  Female who presents with a chief complaint of Rigth sided chest pain (11 May 2019 13:04)      HISTORY OF PRESENTING ILLNESS:    HPI:  32 y/o female  s/p emergent C section 4 weeks ago due to refractory aflutter, s/p ECMO, presents for evaluation of right sided chest pain.  As per the patient she was treated at [facility] with ECMO 4 weeks ago due to the above past medical history.  She has since been resting and sedentary with good follow up with her Cardiologist from [facility].  As per the patient she had right sided pleuritic chest pain that started 1 hour before presentation to the hospital.  She describes that she has some radiation of the pain to the neck and that the pain is not associated with any numbness or tingling of the extremities.  She explains that the pain is 6/10 at its worse and only felt with inspiration however after receiving toradol in the ED the pain has dissipated completely.  The patient denies cough, fever, recent sob, abd pain, sick contacts and lower extremity swelling.  Given the nature of the pain and the patients pmh CT angio was ordered to rule out PE which was negative and EKG and trops were negative on initial work up. (11 May 2019 13:04)    PAST MEDICAL & SURGICAL HISTORY  PAST MEDICAL & SURGICAL HISTORY:  Intubation of airway performed without difficulty  Peripartum cardiomyopathy  SVT (supraventricular tachycardia)  Asthma: as child, no inhaler use&gt;10 years. Denies hospitlizations or intubations. No current inhaler.  Delivery by emergency   History of low transverse  section    SOCIAL HISTORY:    ALLERGIES:  No Known Allergies    MEDICATIONS:  STANDING MEDICATIONS  chlorhexidine 4% Liquid 1 Application(s) Topical <User Schedule>  enoxaparin Injectable 40 milliGRAM(s) SubCutaneous daily  hydrochlorothiazide 100 milliGRAM(s) Oral two times a day  hydrochlorothiazide 100 milliGRAM(s) Oral daily  metoprolol succinate ER 50 milliGRAM(s) Oral two times a day    PRN MEDICATIONS  acetaminophen   Tablet .. 650 milliGRAM(s) Oral every 6 hours PRN  ketorolac   Injectable 15 milliGRAM(s) IV Push three times a day PRN    VITALS & PHYSICAL EXAM:  Vital Signs Last 24 Hrs  T(C): 36.8 (12 May 2019 06:30), Max: 36.9 (11 May 2019 15:08)  T(F): 98.2 (12 May 2019 06:30), Max: 98.5 (11 May 2019 15:08)  HR: 72 (12 May 2019 06:07) (66 - 75)  BP: 106/60 (12 May 2019 06:07) (101/54 - 108/56)  BP(mean): --  RR: 18 (12 May 2019 06:07) (16 - 20)  SpO2: 100% (12 May 2019 06:07) (97% - 100%)    GEN: NAD  HEENT: moist mucous membranes, no JVD, right neck tenderness on palpation at SCM, no mass/collection  LUNGS: CTAB, GBAE  HEART: S1 S2, RRR  ABD: +BS, soft, NTND  EXT: +2 PP b/l, -ve LLE b/l  NEURO: AAOX3    LABS:                        11.8   7.53  )-----------( 316      ( 11 May 2019 07:16 )             37.4       139  |  102  |  17  ----------------------------<  87  4.4   |  21  |  1.1  Ca    9.7      11 May 2019 07:16  Mg     1.7         TPro  7.0  /  Alb  4.5  /  TBili  0.4  /  DBili  x   /  AST  20  /  ALT  20  /  AlkPhos  137<H>      PT/INR - ( 11 May 2019 07:16 )   PT: 12.00 sec;   INR: 1.04 ratio    PTT - ( 11 May 2019 07:16 )  PTT:37.7 sec      Creatine Kinase, Serum: 29 U/L (19 @ 20:17)  Troponin T, Serum: <0.01 ng/mL (19 @ 20:17)  Troponin T, Serum: <0.01 ng/mL (19 @ 10:52)      CARDIAC MARKERS ( 11 May 2019 20:17 )  x     / <0.01 ng/mL / 29 U/L / x     / <1.0 ng/mL  CARDIAC MARKERS ( 11 May 2019 10:52 )  x     / <0.01 ng/mL / x     / x     / x      CARDIAC MARKERS ( 11 May 2019 07:16 )  x     / <0.01 ng/mL / x     / x     / x          RADIOLOGY:  < from: CT Angio Chest w/ IV Cont (19 @ 09:41) >  Impression:  No central, lobar or segmental pulmonary emboli. No right heart strain.   Nonspecific,wedge-shaped opacity, peripheral right lower lobe, abutting   pleural surface (series 7/170)  Azygos fissure, congenital variant   Trace right pleural effusion (series 7/143-168).  < end of copied text >    < from: Transthoracic Echocardiogram (19 @ 14:13) >  Conclusions:  1. Normal mitral valve. Mild mitral regurgitation.  2. Normal trileaflet aortic valve.  3. Increased relative wall thickness with normal left  ventricular mass index, consistent with concentric left  ventricular remodeling.  4. Mild-moderate segmental left ventricular systolic  dysfunction. The anterior wall and septum appear  hypokinetic.  5. Normal right ventricular size and function.  *** Compared with echocardiogram of 4/15/2019, LV systolic  function appears improved.    < end of copied text >
no chest pain   no sob     Vital Signs Last 24 Hrs  T(C): 36.1 (14 May 2019 05:59), Max: 36.4 (13 May 2019 13:15)  T(F): 97 (14 May 2019 05:59), Max: 97.6 (13 May 2019 13:15)  HR: 72 (14 May 2019 05:59) (72 - 80)  BP: 96/53 (14 May 2019 05:59) (96/53 - 119/84)  BP(mean): --  RR: 18 (14 May 2019 05:59) (18 - 18)  SpO2: --    PHYSICAL EXAM:  GENERAL: NAD, well-developed  HEAD:  Atraumatic, Normocephalic  EYES: EOMI, PERRLA, conjunctiva and sclera clear  NECK: Supple, No JVD  Pulm Clear to auscultation bilaterally; No wheeze  CV: Regular rate and rhythm; No murmurs, rubs, or gallops  GI: Soft, Nontender, Nondistended; Bowel sounds present  EXTREMITIES:  2+ Peripheral Pulses, No clubbing, cyanosis, or edema  PSYCH: AAOx3  NEUROLOGY: non-focal  SKIN: No rashes or lesions      05-13    142  |  106  |  14  ----------------------------<  94  4.0   |  21  |  1.0    Ca    9.4      13 May 2019 18:05  Mg     1.9     05-13

## 2019-05-14 NOTE — DISCHARGE NOTE NURSING/CASE MANAGEMENT/SOCIAL WORK - NSDCDPATPORTLINK_GEN_ALL_CORE
You can access the [facility] Patient Portal, offered by [facility], by registering with the following website: [email or web address]

## 2019-05-14 NOTE — PROGRESS NOTE ADULT - ASSESSMENT
32 y/o female  s/p emergent C section 4 weeks ago , s/p ECMO, presents for evaluation of right sided chest pain.    #Right side pleuritic chest pain, pulmonary  infarct/ intermediate VQ scan:   patient agreed on anticoagulation for 3 months eliquis and is aware of side effects of possible bleeding       #Peripartum CMP ans NSVT on tele   cardiology recs appreciated   c/w BB   hydralazine to be stopped given borderline low BP   patient was not started on ACE or ARB because she had YAJAIRA a month ago and had short term HD also BP is on lower side   EP recommended lifevest which is pending Auth     discharge is pending lifevest auth   spent 33 min coordinating discharge

## 2019-05-14 NOTE — PROGRESS NOTE ADULT - ASSESSMENT
Mrs. Jerome is an 34 yo female  s/p emergent C section 4 weeks ago with a prolonged hospitalization for SVT, c/o peripartum cardiomyopathy, EF down to 5-10%, multiorgan failure, requiring ECMO and CVVH, discharged from [facility] , presents on  for evaluation of right sided chest pain.    # PE + pulmonary infarct  - Right side pleuritic chest pain, pulmonary  infarct/ intermediate risk VQ scan/ moderate clinical suspicion  - CTA: No central, lobar or segmental pulmonary emboli. No right heart strain.Nonspecific,wedge-shaped opacity, peripheral right lower lobe, abutting pleural surface   - VQ scan on : Wedge-shaped peripheral opacity  seen in the right lower lobe on CT chest, there is corresponding  matched perfusion and ventilation  abnormalities demonstrated. Intermediate probability for embolism. Wedge shape opacity may represent pulmonary infarct.  - Pulmonary service discussed with patients benefits and risk of anticoagulation and she decided to be on anticoagulation for 3 months.  - On  she was started on eliquis 10 mg BID for 7 days, followed by 5mg BID for 90 days.    # Peripartum Cardiomyopathy  - negative cardiac enzymes  - TTE: Left ventricular ejection fraction, by visual estimation, is 30 to 35%. Moderately decreased global left ventricular systolic function.Mildly increased left ventricular internal cavity size. Spectral Doppler shows restrictive pattern of left ventricular myocardial filling (Grade III diastolic dysfunction).  - No signs of volume overload: no need for diuretics.  - Evaluated by cardiology service: recommend to continue beta blockers and to hold hydralazine given hypotensive episodes and fatigue. Resume hydralazine if BP>140  - Outpatient follow up with Heart Failure team at [facility]  - No need to start ACE inh given borderline blood pressure, and recent kidney injury and beeing on CVVH    # One episode of non sustained Vtach on   - 11 beats while sleeping, asymptomatic.  - EP service evaluation requested, patient will benefit from a life vest given low EF and episodes of Vtach    # Dispo: can be discharged home after authorization and applying the life vest.

## 2019-05-14 NOTE — PROGRESS NOTE ADULT - REASON FOR ADMISSION
Rigth sided chest pain
Rigth sided chest pain
Right sided chest pain
Rigth sided chest pain
Rigth sided chest pain

## 2019-05-16 ENCOUNTER — APPOINTMENT (OUTPATIENT)
Dept: CARDIOLOGY | Facility: CLINIC | Age: 34
End: 2019-05-16
Payer: COMMERCIAL

## 2019-05-16 VITALS
DIASTOLIC BLOOD PRESSURE: 57 MMHG | BODY MASS INDEX: 28.98 KG/M2 | WEIGHT: 207 LBS | SYSTOLIC BLOOD PRESSURE: 106 MMHG | HEIGHT: 71 IN | HEART RATE: 82 BPM

## 2019-05-16 PROCEDURE — 99214 OFFICE O/P EST MOD 30 MIN: CPT

## 2019-05-16 RX ORDER — HYDRALAZINE HYDROCHLORIDE 100 MG/1
100 TABLET ORAL 3 TIMES DAILY
Refills: 0 | Status: COMPLETED | COMMUNITY
Start: 2019-05-07 | End: 2019-05-16

## 2019-05-17 DIAGNOSIS — I95.9 HYPOTENSION, UNSPECIFIED: ICD-10-CM

## 2019-05-17 DIAGNOSIS — I47.2 VENTRICULAR TACHYCARDIA: ICD-10-CM

## 2019-05-17 DIAGNOSIS — I42.9 CARDIOMYOPATHY, UNSPECIFIED: ICD-10-CM

## 2019-05-17 DIAGNOSIS — I26.99 OTHER PULMONARY EMBOLISM WITHOUT ACUTE COR PULMONALE: ICD-10-CM

## 2019-05-17 DIAGNOSIS — J90 PLEURAL EFFUSION, NOT ELSEWHERE CLASSIFIED: ICD-10-CM

## 2019-05-17 DIAGNOSIS — Z87.898 PERSONAL HISTORY OF OTHER SPECIFIED CONDITIONS: ICD-10-CM

## 2019-05-17 DIAGNOSIS — R07.9 CHEST PAIN, UNSPECIFIED: ICD-10-CM

## 2019-05-17 DIAGNOSIS — J45.909 UNSPECIFIED ASTHMA, UNCOMPLICATED: ICD-10-CM

## 2019-05-17 NOTE — HISTORY OF PRESENT ILLNESS
[FreeTextEntry1] : Ms. Jerome is a 33 year old woman with history of hyperemesis gravidarum  initially presented to [facility] (35 weeks gestation) for SOB. She was found to be in SVT and received adenosine x4 and failed cardioversion x2. She was then emergently taken for  and post op went into cardiogenic shock. An echo revealed a reduced EF (10-15%) and she was placed on VA ECMO and CVVHD. She was transferred to [facility] on  for further management. He condition slowly improved and she was successfully decannulated on . Her urine output  improved with continued improvement in estimate of GFR. She was sent home off diuretics. Her echocardiogram showed improvement in her LVEF, but her systolic function is still reduced. \par DC weight 227, SCR 2.34\par 19 presented to [facility] with right sided pleuritic type pain, CT and VQ scan suggestive of a PE, she was started on Eliquis.  No DVT.  Prior to discharge had 11 beats of NSVT.  TTE 30-35%.  Hydralazine was dc'd due to low BP, labs were wnl. She was discharged with a Lifevest. \par Her renal function has completely normalized\par \par Presents today for urgent visit, looks and feels well, understandably anxious  She denies any sob, orthopnea, PND, chest pain, or palpitations.   She denies any dizziness or lightheadedness.  She has been less fatigued off hydral, though she has been cautious with physical activity.  She wears a HR montior and rate has been in the 70" to 90". She is eating and sleeping well. Weight has been stable. She is fearful of the Lifevest, getting shocked and any potential harm to her children. Her main complaint now is sciatica pain, for which she usually takes motrin, states no relief with tylenol.

## 2019-05-17 NOTE — ASSESSMENT
[FreeTextEntry1] : Ms. Jerome is a 33yr old female who at 35 weeks gestation presented to [facility] with sob and was noted to be in  SVT which could not be controlled, requiring an emergent . She went into cardiogenic shock and was placed on VA ECMO and CVVHD.  She cardiac function recovered and was successfully decannulated, her renal function improved and did not require long term HD. Presented to [facility] with pleuritic type pain and dx'd with a PE and started on Eliquis.  DC'd home with a Life vest for 11 beats of NSVT.  Today she is euvolemic and normotensive.\par Considering renal function has normalized will trial low dose of lisinopril, if tolerates will consider switch to Entresto if BP allows

## 2019-05-17 NOTE — PHYSICAL EXAM
[General Appearance - Well Developed] : well developed [Normal Appearance] : normal appearance [Normal Conjunctiva] : the conjunctiva exhibited no abnormalities [Normal Oral Mucosa] : normal oral mucosa [Eyelids - No Xanthelasma] : the eyelids demonstrated no xanthelasmas [Respiration, Rhythm And Depth] : normal respiratory rhythm and effort [] : no respiratory distress [Heart Rate And Rhythm] : heart rate and rhythm were normal [Edema] : no peripheral edema present [Bowel Sounds] : normal bowel sounds [Heart Sounds] : normal S1 and S2 [Arterial Pulses Normal] : the arterial pulses were normal [Abnormal Walk] : normal gait [Abdomen Soft] : soft [Nail Clubbing] : no clubbing of the fingernails [Cyanosis, Localized] : no localized cyanosis [Skin Color & Pigmentation] : normal skin color and pigmentation [Skin Turgor] : normal skin turgor [Oriented To Time, Place, And Person] : oriented to person, place, and time [FreeTextEntry1] : no JVD or HJR

## 2019-05-17 NOTE — DISCUSSION/SUMMARY
[FreeTextEntry1] : Cardiomyopathy\par - Continue Toprol to 75mg bid\par - Start low dose lisinopril 5mg\par - Obtain labs in 10days\par - Continue daily weights and BP\par - Continue to monitor HR\par - Increase activity as tolerated\par - Will refer to cardiac rehab\par \par NSVT\par - Can DC life vest\par \par PE\par - Continue Eliquis 5mg bid\par \par RTC 2 weeks

## 2019-05-20 RX ORDER — APIXABAN 2.5 MG/1
1 TABLET, FILM COATED ORAL
Qty: 180 | Refills: 0
Start: 2019-05-20 | End: 2019-08-17

## 2019-05-22 ENCOUNTER — LABORATORY RESULT (OUTPATIENT)
Age: 34
End: 2019-05-22

## 2019-05-22 ENCOUNTER — OUTPATIENT (OUTPATIENT)
Dept: OUTPATIENT SERVICES | Facility: HOSPITAL | Age: 34
LOS: 1 days | Discharge: HOME | End: 2019-05-22

## 2019-05-22 DIAGNOSIS — Z98.891 HISTORY OF UTERINE SCAR FROM PREVIOUS SURGERY: Chronic | ICD-10-CM

## 2019-05-22 DIAGNOSIS — Z01.818 ENCOUNTER FOR OTHER PREPROCEDURAL EXAMINATION: ICD-10-CM

## 2019-05-24 ENCOUNTER — OTHER (OUTPATIENT)
Age: 34
End: 2019-05-24

## 2019-05-31 ENCOUNTER — APPOINTMENT (OUTPATIENT)
Dept: CARDIOLOGY | Facility: CLINIC | Age: 34
End: 2019-05-31
Payer: COMMERCIAL

## 2019-05-31 VITALS — DIASTOLIC BLOOD PRESSURE: 57 MMHG | SYSTOLIC BLOOD PRESSURE: 95 MMHG | HEART RATE: 76 BPM | OXYGEN SATURATION: 97 %

## 2019-05-31 DIAGNOSIS — I50.9 HEART FAILURE, UNSPECIFIED: ICD-10-CM

## 2019-05-31 PROCEDURE — 93000 ELECTROCARDIOGRAM COMPLETE: CPT

## 2019-05-31 PROCEDURE — 99214 OFFICE O/P EST MOD 30 MIN: CPT

## 2019-06-04 ENCOUNTER — APPOINTMENT (OUTPATIENT)
Dept: CARDIOLOGY | Facility: CLINIC | Age: 34
End: 2019-06-04
Payer: COMMERCIAL

## 2019-06-04 ENCOUNTER — OTHER (OUTPATIENT)
Age: 34
End: 2019-06-04

## 2019-06-04 ENCOUNTER — NON-APPOINTMENT (OUTPATIENT)
Age: 34
End: 2019-06-04

## 2019-06-04 VITALS
SYSTOLIC BLOOD PRESSURE: 100 MMHG | DIASTOLIC BLOOD PRESSURE: 58 MMHG | OXYGEN SATURATION: 99 % | HEIGHT: 71 IN | HEART RATE: 86 BPM | BODY MASS INDEX: 28.42 KG/M2 | WEIGHT: 203 LBS

## 2019-06-04 PROBLEM — I50.9 CONGESTIVE HEART FAILURE: Status: ACTIVE | Noted: 2019-05-07

## 2019-06-04 PROCEDURE — 99213 OFFICE O/P EST LOW 20 MIN: CPT

## 2019-06-04 PROCEDURE — 93000 ELECTROCARDIOGRAM COMPLETE: CPT

## 2019-06-04 RX ORDER — METOPROLOL TARTRATE 50 MG/1
50 TABLET, FILM COATED ORAL TWICE DAILY
Qty: 60 | Refills: 0 | Status: DISCONTINUED | COMMUNITY
Start: 2019-05-15 | End: 2019-06-04

## 2019-06-04 RX ORDER — METOPROLOL TARTRATE 75 MG/1
75 TABLET, FILM COATED ORAL AT BEDTIME
Refills: 0 | Status: DISCONTINUED | COMMUNITY
End: 2019-06-04

## 2019-06-04 RX ORDER — LISINOPRIL 5 MG/1
5 TABLET ORAL DAILY
Refills: 0 | Status: DISCONTINUED | COMMUNITY
End: 2019-06-04

## 2019-06-05 NOTE — DISCUSSION/SUMMARY
[FreeTextEntry1] : 34yrs, no prior cardiac history. .  presented 34 weeks pregnant  to [place] 4.10 with SOB and SVT at 200 with unsuccessful cardioversion. Emergent C Section in [place]. Cardiogenic Shock with LVEF 10-15%%. Placed on VA ECMO/CVVHD  in [facility]. Transferred to [facility] on . Underwent successful NWP  and was decannulated in OR on . Unremarkable post decannulation course with early extubation and renal recovery. RUE arterial study .:right radial artery occlusion mid distal forearm. hematoma in right supraclavicular area. Discharged  with Cr 2.34.\par Readmitted to [facility] on : SOB and pleuritic pain. Worked up for PE. Found to have right lower wedge infarct on CT scan and V/Q scan. LE dopplers normal. Placed on Eliquis. \par CTangio .11: no central lobar or segmental PE. No right heart strain. Non specific wedge opacity peripheral RLL abutting pleural surface. V/Q: 5.13: small right pleural effusion. V/Q abn in same distribution as wedge infarct on CT. Intermed prob for embolism. Was d/c on life vest for NSVT which has been d/c. \par Last TTE [facility] 5.12: LVEF 30-35, LVEDD 5.7, Right sided normal. mild mod MR. mild TR.\par meds: toprol XL 50/75, lisinopril 7.5, eloquis 5 bid. \par last labs 6.1: K 4.7, Bun 20, Cr 1.0.\par PMH: Father CHF first diagnosed age 55. no other family members. \par 5 blocks, limited by fatigue. no limitation on stairs. no LE edema. no PND. \par c/o inability to extend RUE with numbness in all finger tips. number toes of both feet. \par motrin 200 bid for sciatica for two weeks. \par Baby well. Not breast feeding. Awaiting copper IUD next week. \par /58, 86, 99%.\par lungs clear, no murmurs, no LE edema. \par EKG: SR 84, one VPC. \par 34 years peripartum requiring ECMO with recovery and residual LV function. \par on submax meds.\par Plan:\par d/c ACE and start low dose entresto if approved. Q 2 weeks uptitration. \par defer reevaluation of LV function. \par To see Matteo Johnston in F/u\par To see neurology for evaluation of upper and lower neuropathy. \par Rell Vila

## 2019-06-05 NOTE — REASON FOR VISIT
[Follow-Up - Clinic] : a clinic follow-up of [Discharge Date: ___] : Discharge Date: [unfilled] [Cardiomyopathy] : cardiomyopathy [Heart Failure] : congestive heart failure [FreeTextEntry1] : INSTRUCTIONS:\par \par 1. Please stop your LISINOPRIL. I have ordered ENTRESTO 24-26mg 1 tablet TWICE a day, which will replace the lisinopril. Please start this Thursday 6/6 in AM. Please have repeat lab work done at a local [facility] lab 7-10 days after starting Entresto to follow up your kidney function and electrolytes.\par \par 2. Please take your Metoprolol succinate 125mg ONCE daily. \par \par 3. We will refer you to cardiac rehab, a neurologist, and to Dr. Johnston with vascular cardiology\par \par 4. Follow up with heart failure NP in 2 weeks and with Dr. Vila in 4 weeks.\par \par 5. Please continue to monitor your BP, HR and weight. Please call us with any questions or concerns.

## 2019-06-05 NOTE — PHYSICAL EXAM
[General Appearance - Well Developed] : well developed [Normal Conjunctiva] : the conjunctiva exhibited no abnormalities [Normal Appearance] : normal appearance [Eyelids - No Xanthelasma] : the eyelids demonstrated no xanthelasmas [Respiration, Rhythm And Depth] : normal respiratory rhythm and effort [Heart Sounds] : normal S1 and S2 [Heart Rate And Rhythm] : heart rate and rhythm were normal [Bowel Sounds] : normal bowel sounds [Edema] : no peripheral edema present [Arterial Pulses Normal] : the arterial pulses were normal [Abdomen Soft] : soft [Abnormal Walk] : normal gait [Skin Color & Pigmentation] : normal skin color and pigmentation [Cyanosis, Localized] : no localized cyanosis [Nail Clubbing] : no clubbing of the fingernails [Oriented To Time, Place, And Person] : oriented to person, place, and time [Skin Turgor] : normal skin turgor [Well Groomed] : well groomed [General Appearance - Well Nourished] : well nourished [General Appearance - In No Acute Distress] : no acute distress [No Oral Pallor] : no oral pallor [No Oral Cyanosis] : no oral cyanosis [Auscultation Breath Sounds / Voice Sounds] : lungs were clear to auscultation bilaterally [Murmurs] : no murmurs present [1+] : right 1+ [2+] : left 2+ [Abdomen Tenderness] : non-tender [] : no hepato-splenomegaly [Abdomen Mass (___ Cm)] : no abdominal mass palpated [No Venous Stasis] : no venous stasis [Skin Lesions] : no skin lesions [FreeTextEntry1] : incisions healed

## 2019-06-05 NOTE — HISTORY OF PRESENT ILLNESS
[FreeTextEntry1] : Ms. Jerome is a 34 year old woman with history of hyperemesis gravidarum  initially presented to [facility] (35 weeks gestation) for SOB. She was found to be in SVT and received adenosine x4 and failed cardioversion x2. She was then emergently taken for  and post op went into cardiogenic shock. An echo revealed a reduced EF 10-15% and she was placed on VA ECMO and CVVHD. She was transferred to [facility] on  for further management. He condition slowly improved and she was successfully decannulated on . Her urine output  improved with continued improvement in estimate of GFR. She was sent home off diuretics. Her echocardiogram showed improvement in her LVEF to 30-35%. DC weight 227lbs, SCR 2.34.\par \par On 19, she presented to [facility] with right sided pleuritic type pain and SOB. CTA and VQ scan suggestive of a PE, she was started on Eliquis. Bilateral LE DVU scan negative for DVT.  Prior to discharge, she had 11 beats of NSVT. Hydralazine was dc'd due to low BP, labs were wnl. She was discharged with a Lifevest, which was subsequently discontinued. Her renal function has completely normalized.\par \par She presents today for routine follow up, overall feeling well. Her activity tolerance is improving and she is now able to walk about 5 blocks before needing to rest for a few minutes due to fatigue. She is able to climb a flight of stairs several times a day without issue. Her main complaint now is sciatica pain, for which she has been taking Motrin 2-3x/day, states no relief with Tylenol. She notes occasional brief episodes of lightheadedness upon standing. Weight is down 4lbs since last visit and she is actively trying to loose weight postpartum. She reports all of her surgical incisions/ECMO cannulation sites have healed and are without erythema or drainage. She denies orthopnea, PND, CP, palpitations, presyncope/syncope, falls, abdominal distention, LE edema, fevers, chills, hematochezia, and melena. She is not breast feeding. She is scheduled to have a copper IUD placed next week. \par \par Of note, she has not been able to fully extend her right arm since hospitalization. She is working with OT and notes ongoing improvement in ROM. Additionally she reports parethesias in R finger tips which has been improving as well. Noted to have R radial artery occlusion in mid/distal forearm during hospitalization.  Additionally reports parethesias in bilateral feet which has been stable.

## 2019-06-14 ENCOUNTER — OUTPATIENT (OUTPATIENT)
Dept: OUTPATIENT SERVICES | Facility: HOSPITAL | Age: 34
LOS: 1 days | Discharge: HOME | End: 2019-06-14

## 2019-06-14 ENCOUNTER — APPOINTMENT (OUTPATIENT)
Dept: NEUROLOGY | Facility: CLINIC | Age: 34
End: 2019-06-14
Payer: COMMERCIAL

## 2019-06-14 VITALS
SYSTOLIC BLOOD PRESSURE: 109 MMHG | DIASTOLIC BLOOD PRESSURE: 72 MMHG | HEART RATE: 114 BPM | HEIGHT: 71 IN | BODY MASS INDEX: 28 KG/M2 | WEIGHT: 200 LBS

## 2019-06-14 DIAGNOSIS — G54.9 NERVE ROOT AND PLEXUS DISORDER, UNSPECIFIED: ICD-10-CM

## 2019-06-14 DIAGNOSIS — G62.9 POLYNEUROPATHY, UNSPECIFIED: ICD-10-CM

## 2019-06-14 DIAGNOSIS — M54.5 LOW BACK PAIN: ICD-10-CM

## 2019-06-14 DIAGNOSIS — Z00.00 ENCOUNTER FOR GENERAL ADULT MEDICAL EXAMINATION WITHOUT ABNORMAL FINDINGS: ICD-10-CM

## 2019-06-14 DIAGNOSIS — Z98.891 HISTORY OF UTERINE SCAR FROM PREVIOUS SURGERY: Chronic | ICD-10-CM

## 2019-06-14 PROCEDURE — 99245 OFF/OP CONSLTJ NEW/EST HI 55: CPT

## 2019-06-14 NOTE — ASSESSMENT
[FreeTextEntry1] : 33 yo F w/ recent pregnancy s/p  complicated by congestive cardiomyopathy with multiorgan failure s/p ECMO with R radial artery occlusion and R clavicular hematoma now with residual RUE paresthesias/dysesthesias and weakness improving but persistent.  Suspect lower brachial plexopathy +/- underlying CTS.  \par \par 1. MRI with and w/o contrast right brachial plexus\par 2. MRI lumbosacral non contrast ordered.\par 3. EMG/NCS RUE/RLE\par 3. Return in 4-6 weeks for electrodiagnostic studies and followup\par

## 2019-06-14 NOTE — HISTORY OF PRESENT ILLNESS
[FreeTextEntry1] : The patient is a 34 year old right handed female.  She is being seen today for pain, numbness and tingling of her right arm and hand especially when extended.  It is difficult for her to grasp cups and the baby bottle with her right hand.  She has dropped the cup with her right hand.  She cant hold her baby with her right arm.  She is experiencing pain, numbness and tingling of her right foot and describes it as a gnawing type of pain/sensation.  Shes had sciatic pain on her left side  pain for 8 years but attributes it to weight gain during pregnancy but "the pain has been getting better".   \par \par At 34 weeks pregnant she went to the ER with shortness of breath.  She was diagnosed with peripartum cardiomyopathy.  She was given an emergency .  She was given adenosine and a   Cardioversion was then performed two times but her heart rhythm was still irregular.  She was flown to [facility] ICU.  Her EF was 5% with multiorgan failure and was placed on ECMO with catherization of the right subclavian/IJ complicated by right radial artery occlusion and R subclavian hematoma.  During her stay, had A line attempt on the right wrist/forearm which caused severe pain.  Also had severely edematous RUE affecting hand, forearm and proximal arm.  Since then, has had painful dysesthesias with TTP in the right mid forearm on volar surface with right hand and forearm numbness with paresthesias affecting digits 1 - 4 > 5.  Had  weakness and ellbow extension weakness.  Has wrist extension weakness.  Has reproducible pain when completely extending her right arm.  Has been on anticoagulation for PE since hospitalization.  Symptoms have improved over the last month but still has significant pain with limited  strength.  \par \par Patient is doing well.  Her kidney function is now restored. Her EF is now 30%.\par     \par \par \par

## 2019-06-14 NOTE — HISTORY OF PRESENT ILLNESS
[FreeTextEntry1] : The patient is a 34 year old right handed female.  She is being seen today for pain, numbness and tingling of her right arm and hand especially when extended.  It is difficult for her to grasp cups and the baby bottle with her right hand.  She has dropped the cup with her right hand.  She cant hold her baby with her right arm.  She is experiencing pain, numbness and tingling of her right foot and describes it as a gnawing type of pain/sensation.  Shes had sciatic pain on her left side  pain for 8 years but attributes it to weight gain during pregnancy but "the pain has been getting better".   \par \par At 34 weeks pregnant she went to the ER with shortness of breath.  She was diagnosed with peripartum cardiomyopathy.  She was given an emergency .  She was given adenosine and a   Cardioversion was then performed two times but her heart rhythm was still irregular.  She was flown to [facility] ICU.  Her EF was 5% with multiorgan failure and was placed on ECMO with catherization of the right subclavian/IJ complicated by right radial artery occlusion and R subclavian hematoma.  During her stay, had A line attempt on the right wrist/forearm which caused severe pain.  Also had severely edematous RUE affecting hand, forearm and proximal arm.  Since then, has had painful dysesthesias with TTP in the right mid forearm on volar surface with right hand and forearm numbness with paresthesias affecting digits 1 - 4 > 5.  Had  weakness and ellbow extension weakness.  Has wrist extension weakness.  Has reproducible pain when completely extending her right arm.  Has been on anticoagulation for PE since hospitalization.  Symptoms have improved over the last month but still has significant pain with limited  strength.  \par \par Patient is doing well.  Her kidney function is now restored. Her EF is now 30%.\par     \par \par \par

## 2019-06-14 NOTE — ASSESSMENT
[FreeTextEntry1] : 35 yo F w/ recent pregnancy s/p  complicated by congestive cardiomyopathy with multiorgan failure s/p ECMO with R radial artery occlusion and R clavicular hematoma now with residual RUE paresthesias/dysesthesias and weakness improving but persistent.  Suspect lower brachial plexopathy +/- underlying CTS.  \par \par 1. MRI with and w/o contrast right brachial plexus\par 2. MRI lumbosacral non contrast ordered.\par 3. EMG/NCS RUE/RLE\par 3. Return in 4-6 weeks for electrodiagnostic studies and followup\par

## 2019-06-14 NOTE — PHYSICAL EXAM
[FreeTextEntry1] : Physical examination:  \par General:   The patient is pleasant, cooperative, well dressed and in no acute distress.  Appearance is consistent with chronologic age.  No abnormal facies.\par Neurologic examination:  The patient is oriented to person, place, time and date.   Remote and recent memory is normal.   Fund of knowledge is intact and normal.  Language with normal repetition, comprehension and naming.  Nondysarthric.   \par Cranial nerves examination: intact VA, VFF.  EOMI w/o nystagmus, skew or reported double vision.  PERRL.  No ptosis/weakness of eyelid closure.  Facial sensation is normal with normal bite.  No facial asymmetry.  Hearing grossly intact b/l.  Palate elevates midline.  Neck flexion/extension, SCM/Trap strength normal.  Tongue midline with full resistance.  \par Motor examination:   Normal tone, bulk and range of motion.  (+) TTP R volar forearm with LROM with elbow extension R side.  limited arm extension. no TTP or swelling in R Erb's point. \par Formal Muscle Strength Testing: (MRC grade R/L) 5/5 CLARA, BB, 4/5 TR, 4/5 BR, 4+/5 WF/FF, 4/5 WE/FE/APB/FDI. slight winging scapula.  5/5 internal rotation/external rotation/supination, 4+/5 pronation. 5/5 ILP, QDS, HS, DF, PF.  Arises from sitting/squatting wnl.  Toe/heel walks.  \par Reflexes:   2+ b/l pectoralis, biceps, triceps, brachioradialis, patella and Achilles.  Plantar response downgoing b/l.  Jaw jerk, Neris, clonus absent.  \par Sensory examination:   Intact to light touch and pinprick, pain, temperature and proprioception and vibration in all extremities.    (+) Tinel RUE. R Digit 5 > 2\par Cerebellum:   FTN/HKS intact with normal JOANN in all limbs.   Gait is narrow based and normal with normal tandem.  Romberg (-).  No dysmetria or dysdiadokinesia.       \par \par

## 2019-06-17 ENCOUNTER — OTHER (OUTPATIENT)
Age: 34
End: 2019-06-17

## 2019-06-19 ENCOUNTER — APPOINTMENT (OUTPATIENT)
Dept: CARDIOLOGY | Facility: CLINIC | Age: 34
End: 2019-06-19
Payer: COMMERCIAL

## 2019-06-19 VITALS
DIASTOLIC BLOOD PRESSURE: 71 MMHG | OXYGEN SATURATION: 99 % | BODY MASS INDEX: 28.7 KG/M2 | HEART RATE: 80 BPM | SYSTOLIC BLOOD PRESSURE: 101 MMHG | HEIGHT: 71 IN | WEIGHT: 205 LBS

## 2019-06-19 VITALS
OXYGEN SATURATION: 99 % | HEIGHT: 71 IN | BODY MASS INDEX: 28.56 KG/M2 | TEMPERATURE: 99.2 F | SYSTOLIC BLOOD PRESSURE: 111 MMHG | HEART RATE: 103 BPM | DIASTOLIC BLOOD PRESSURE: 60 MMHG | WEIGHT: 204 LBS

## 2019-06-19 PROCEDURE — 99214 OFFICE O/P EST MOD 30 MIN: CPT

## 2019-06-19 PROCEDURE — 99215 OFFICE O/P EST HI 40 MIN: CPT

## 2019-06-19 RX ORDER — SACUBITRIL AND VALSARTAN 24; 26 MG/1; MG/1
24-26 TABLET, FILM COATED ORAL TWICE DAILY
Qty: 60 | Refills: 3 | Status: DISCONTINUED | COMMUNITY
Start: 2019-06-04 | End: 2019-06-19

## 2019-06-19 NOTE — REASON FOR VISIT
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  Second pregancy, 35 weeks, found to have SVT, cardioversion, decompensated, ECMO, dialysis, EF 10%, then 35%.  Discharged after 3 weeks.  Baby is ok, baby boy Shad. In May, developed R sided pleuritic pain.  CTPA was NEGATIVE for PE (i reviewed the images).  D -Dimer was NOT sent.  Duplex of upper and lower extremeties NEGATIVE for DVT.  A V/Q scan was performed (there was a right infarct/consolidation on CT) which was INTERMEDIATE risk for PE.  Started on eliquis 5mg BID.  no bleeding or bruising.  She is not breast feeding.\par \par No prior DVT or VTE history.  She has been on A/C since May 13th.  \par \par Seeing Dr. Vila later today.  Breathing ok.  No more pleuritic pains.  R arm is bothering her w movement.  Also with R radial artery occlusion, but with strong ulnar pulse on exam. \par \par Impression: \par \par No evidence of deep or superficial venous thrombosis in the bilateral upper \par extremities. \par \par ICD-10: M79.89 \par \par Impression: \par \par No evidence of deep venous thrombosis or superficial thrombophlebitis in \par bilateral lower extremities. \par \par ICD-10: M79.89 \par Impression: \par \par Very small right pleural effusion. \par \par Wedge-shaped peripheral opacity seen in the right lower lobe on CT chest, \par there is corresponding matched perfusion and ventilation abnormalities \par demonstrated. \par \par Intermediate probability for embolism. Wedge shape opacity may represent \par pulmonary infarct. \par \par These findings were discussed with Dr. Nuzhat renteria [id number] on 5/13/19 at 1:30 \par Pm with read back by Dr. Velasquez\par Impression: \par \par No central, lobar or segmental pulmonary emboli. No right heart strain. \par \par Nonspecific,wedge-shaped opacity, peripheral right lower lobe, abutting \par pleural surface (series 7/170) \par \par Azygos fissure, congenital variant \par \par Trace right pleural effusion (series 7/143-168). \par Impression: Significant upper extremity arterial disease is not revealed by \par this examination. \par Impression: There is a hematoma in the right supraclavicular area. \par \par The right radial artery remains occluded in the mid and distal forearm. \par \par \par \par Impression: \par \par No evidence of deep venous thrombosis or superficial thrombophlebitis in the \par bilateral lower extremities. \par \par ICD-10: M79.89 \par \par \par  [FreeTextEntry2] : Question about PE

## 2019-06-19 NOTE — ASSESSMENT
[FreeTextEntry1] : Assessment:\par 1.  On Eliquis for presumed PE\par 2.  CHF\par 3.  SVT\par 4.  R Radial artery occlusion\par \par Plan\par 1.  It is unclear to me if she truly had a PE.  The CTPA was fairly good quality and did not show any PE up to the segmental branches.  The duplexes of the upper and lower extremities were negative for DVT.  The V/q scan however was intermediate risk (however she had lung consolidation in that area)\par 2.  I don’t think she had a PE, but cannot be absolutely certain.  Unfortunately there is no D-Dimer at the time of her hospital admission in May  - this would have helped to discern this discrepancy of CTPA and V/Q scan \par 3.  Given her recent complex and severe hospital course in April, would treat for a total duration of 3 months and then stop eliquis August 13th.  Will treat as a provoked event for 3 months total\par 4.  Breast feeding should be avoided with eliquis\par 5.  Continue excellent care with Dr. Vila.

## 2019-06-19 NOTE — PHYSICAL EXAM
[General Appearance - Well Developed] : well developed [Normal Appearance] : normal appearance [Well Groomed] : well groomed [General Appearance - Well Nourished] : well nourished [No Deformities] : no deformities [General Appearance - In No Acute Distress] : no acute distress [Normal Conjunctiva] : the conjunctiva exhibited no abnormalities [Eyelids - No Xanthelasma] : the eyelids demonstrated no xanthelasmas [Normal Oral Mucosa] : normal oral mucosa [No Oral Cyanosis] : no oral cyanosis [No Oral Pallor] : no oral pallor [Normal Jugular Venous A Waves Present] : normal jugular venous A waves present [Normal Jugular Venous V Waves Present] : normal jugular venous V waves present [No Jugular Venous Lu A Waves] : no jugular venous lu A waves [Heart Rate And Rhythm] : heart rate and rhythm were normal [Heart Sounds] : normal S1 and S2 [Murmurs] : no murmurs present [Respiration, Rhythm And Depth] : normal respiratory rhythm and effort [Exaggerated Use Of Accessory Muscles For Inspiration] : no accessory muscle use [Auscultation Breath Sounds / Voice Sounds] : lungs were clear to auscultation bilaterally [Abdomen Soft] : soft [Abdomen Tenderness] : non-tender [Abdomen Mass (___ Cm)] : no abdominal mass palpated [Gait - Sufficient For Exercise Testing] : the gait was sufficient for exercise testing [Abnormal Walk] : normal gait [Cyanosis, Localized] : no localized cyanosis [Nail Clubbing] : no clubbing of the fingernails [Petechial Hemorrhages (___cm)] : no petechial hemorrhages [] : no ischemic changes

## 2019-06-20 NOTE — ASSESSMENT
[FreeTextEntry1] : Ms. Jerome is a 34yr old female who at 35 weeks gestation presented to [facility] with sob and was noted to be in  SVT which could not be controlled, requiring an emergent . She went into cardiogenic shock and was placed on VA ECMO and CVVHD.  Her cardiac function recovered and was successfully decannulated, her renal function improved and did not require long term HD. Presented to [facility] with pleuritic type pain and dx'd with a PE and started on Eliquis.  DC'd home with a Life vest for 11 beats of NSVT.  \par Today she is euvolemic and normotensive, she has tolerated increase of neurohormonal agents. \par

## 2019-06-20 NOTE — DISCUSSION/SUMMARY
[FreeTextEntry1] : Cardiomyopathy\par - Continue Toprol 125mg daily\par - Increase Entresto to 49-51mg bid\par - Obtain labs in 10 days\par - Continue daily weights and BP\par - Increase activity as tolerated\par - Will refer to cardiac rehab\par \par PE\par - Continue Eliquis 5mg bid\par \par Plexopathy\par - F/U with Neuro and testing as recommended\par \par RTC 4 weeks with Dr Vila

## 2019-06-20 NOTE — HISTORY OF PRESENT ILLNESS
[FreeTextEntry1] : Ms. Jerome is a 34 year old woman with history of hyperemesis gravidarum  initially presented to [facility] (35 weeks gestation) for SOB. She was found to be in SVT and received adenosine x4 and failed cardioversion x2. She was then emergently taken for  and post op went into cardiogenic shock. An echo revealed a reduced EF 10-15% and she was placed on VA ECMO and CVVHD. She was transferred to [facility] on  for further management. He condition slowly improved and she was successfully decannulated on . Her urine output  improved with continued improvement in estimate of GFR. She was sent home off diuretics. Her echocardiogram showed improvement in her LVEF to 30-35%. DC weight 227lbs, SCR 2.34.\par \par On 19, she presented to [facility] with right sided pleuritic type pain and SOB. CTA and VQ scan suggestive of a PE, she was started on Eliquis. Bilateral LE DVU scan negative for DVT.  Prior to discharge, she had 11 beats of NSVT. Hydralazine was dc'd due to low BP, labs were wnl. She was discharged with a Lifevest, which was subsequently discontinued. Her renal function has completely normalized.\par \par She presents today for routine follow up, looking and feeling well. Last visit the lisinopril was dc'd and she was started on entresto 24/26mg bid and Toprol was increased to 150mg daily, which she appears to have tolerated.  Her activity tolerance continues to improve, she is able to climb a flight of stairs and is less fatigued. She denies any sob, orthopnea or PND.  He has not noted any chest pain or palpitaions. She stopped wearing the fitbit as she became more anxious checking it frequently. Her sciatica pain has resolved. She is eating and sleeping well. She is not breast feeding. She has not noted any edema.  She recently had a copper IUD placed. Labs wnl\par  she saw Neuro for ongoing RUE weakness and parethesia in her R finger tips and bilateral feet.  Per neuro suspect lower brachial plexopathy and residual R sided symptoms related to radial artery occlusion, with plan for f/u testing.\par  seen by Dr. Johnston who does not feel she had a PE, will continue Eliquis for a totoal of 3 months and can dc mid August. \par \par

## 2019-06-20 NOTE — PHYSICAL EXAM
[Well Groomed] : well groomed [Normal Appearance] : normal appearance [General Appearance - Well Developed] : well developed [General Appearance - Well Nourished] : well nourished [Normal Conjunctiva] : the conjunctiva exhibited no abnormalities [General Appearance - In No Acute Distress] : no acute distress [No Oral Pallor] : no oral pallor [Eyelids - No Xanthelasma] : the eyelids demonstrated no xanthelasmas [No Oral Cyanosis] : no oral cyanosis [Respiration, Rhythm And Depth] : normal respiratory rhythm and effort [Heart Rate And Rhythm] : heart rate and rhythm were normal [Auscultation Breath Sounds / Voice Sounds] : lungs were clear to auscultation bilaterally [Heart Sounds] : normal S1 and S2 [Murmurs] : no murmurs present [1+] : right 1+ [Arterial Pulses Normal] : the arterial pulses were normal [Edema] : no peripheral edema present [2+] : right 2+ [Abdomen Soft] : soft [Bowel Sounds] : normal bowel sounds [] : no hepato-splenomegaly [Abdomen Tenderness] : non-tender [Abdomen Mass (___ Cm)] : no abdominal mass palpated [Abnormal Walk] : normal gait [Cyanosis, Localized] : no localized cyanosis [Nail Clubbing] : no clubbing of the fingernails [Skin Turgor] : normal skin turgor [Skin Color & Pigmentation] : normal skin color and pigmentation [No Venous Stasis] : no venous stasis [Skin Lesions] : no skin lesions [Oriented To Time, Place, And Person] : oriented to person, place, and time [FreeTextEntry1] : JVP normal, without HJR

## 2019-07-04 NOTE — HISTORY OF PRESENT ILLNESS
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 She was in  last trimester of her 2nd pregnancy at 34 weeks, an emergent C section was done on April 10, 2019 , went on cardiogenic shock , EF 10% was put on ECMO,  and she was flown to [facility] for higher level of care. Her cardiac function improved - successfully decannulated from ecmo. She was discharged on 5/6/2019 but later on presented to [facility] for an acute shortness of breath on 5/11. A V/Q scan confirmed she developed DVT. She is currently on eliquis 5mg twice daily.\par \par \par CARDIAC TESTING\par ECHO: (5/12/2019) LVEF 30-35%.LA mildly dilated, mild to moderate MR, Spectral doppler shows restrictive pattern- Grade III diastolic dysfunction\par

## 2019-07-04 NOTE — PHYSICAL EXAM
[General Appearance - Well Developed] : well developed [General Appearance - Well Nourished] : well nourished [Normal Appearance] : normal appearance [Well Groomed] : well groomed [No Deformities] : no deformities [General Appearance - In No Acute Distress] : no acute distress [Normal Conjunctiva] : the conjunctiva exhibited no abnormalities [Eyelids - No Xanthelasma] : the eyelids demonstrated no xanthelasmas [Normal Oral Mucosa] : normal oral mucosa [No Oral Pallor] : no oral pallor [No Oral Cyanosis] : no oral cyanosis [Normal Jugular Venous A Waves Present] : normal jugular venous A waves present [No Jugular Venous Lu A Waves] : no jugular venous lu A waves [Heart Rate And Rhythm] : heart rate and rhythm were normal [Auscultation Breath Sounds / Voice Sounds] : lungs were clear to auscultation bilaterally [Bowel Sounds] : normal bowel sounds [Abnormal Walk] : normal gait [Abdomen Soft] : soft [Petechial Hemorrhages (___cm)] : no petechial hemorrhages [Cyanosis, Localized] : no localized cyanosis [Nail Clubbing] : no clubbing of the fingernails [Skin Color & Pigmentation] : normal skin color and pigmentation [No Venous Stasis] : no venous stasis [Skin Lesions] : no skin lesions [] : no rash [No Skin Ulcers] : no skin ulcer [Oriented To Time, Place, And Person] : oriented to person, place, and time [No Xanthoma] : no  xanthoma was observed [No Anxiety] : not feeling anxious [Mood] : the mood was normal [Affect] : the affect was normal [FreeTextEntry1] : no jvd

## 2019-07-12 ENCOUNTER — OUTPATIENT (OUTPATIENT)
Dept: OUTPATIENT SERVICES | Facility: HOSPITAL | Age: 34
LOS: 1 days | Discharge: HOME | End: 2019-07-12

## 2019-07-12 ENCOUNTER — LABORATORY RESULT (OUTPATIENT)
Age: 34
End: 2019-07-12

## 2019-07-12 DIAGNOSIS — Z98.891 HISTORY OF UTERINE SCAR FROM PREVIOUS SURGERY: Chronic | ICD-10-CM

## 2019-07-14 ENCOUNTER — OUTPATIENT (OUTPATIENT)
Dept: OUTPATIENT SERVICES | Facility: HOSPITAL | Age: 34
LOS: 1 days | Discharge: HOME | End: 2019-07-14
Payer: COMMERCIAL

## 2019-07-14 DIAGNOSIS — Z98.891 HISTORY OF UTERINE SCAR FROM PREVIOUS SURGERY: Chronic | ICD-10-CM

## 2019-07-14 DIAGNOSIS — G54.9 NERVE ROOT AND PLEXUS DISORDER, UNSPECIFIED: ICD-10-CM

## 2019-07-14 PROCEDURE — 72148 MRI LUMBAR SPINE W/O DYE: CPT | Mod: 26

## 2019-07-14 PROCEDURE — 71552 MRI CHEST W/O & W/DYE: CPT | Mod: 26

## 2019-07-15 ENCOUNTER — APPOINTMENT (OUTPATIENT)
Dept: NEUROLOGY | Facility: CLINIC | Age: 34
End: 2019-07-15
Payer: COMMERCIAL

## 2019-07-15 VITALS
SYSTOLIC BLOOD PRESSURE: 113 MMHG | DIASTOLIC BLOOD PRESSURE: 66 MMHG | WEIGHT: 203 LBS | HEIGHT: 71 IN | BODY MASS INDEX: 28.42 KG/M2 | HEART RATE: 80 BPM

## 2019-07-15 PROCEDURE — 99214 OFFICE O/P EST MOD 30 MIN: CPT

## 2019-07-15 NOTE — DATA REVIEWED
[de-identified] : MRI Brach Plexus: necrosis of the R pectoralis.  no brachial plexitis.  \par \par MRI LS (prelim): large central disc protrusion affect L > R at L5 - S1

## 2019-07-15 NOTE — ASSESSMENT
[FreeTextEntry1] : 35 yo F w/ recent pregnancy s/p  complicated by congestive cardiomyopathy with multiorgan failure s/p ECMO with R radial artery occlusion and R clavicular hematoma now with residual RUE paresthesias/dysesthesias and weakness improving but persistent.  Currently with large central disc herniation L5 - S1 likely contributing to prior sciatica.  Pt does not want surgery at this time and is essentially asymptomatic.  Will refer to neurosurgery for evaluation regardless.  \par \par 1. Neurosurgery evaluation\par 2. EMG/NCS RUE/LLE- already scheduled\par

## 2019-07-15 NOTE — PHYSICAL EXAM
[FreeTextEntry1] : Neurologic Examination:\par NAD. AOx3.  Intact memory.  Speech fluent, nondysarthric.  CN 2 – 12 normal.  \par Strength 5/5 b/l LUE/ b/l LE.  4+ RUE CLARA, TR. decreased RUE elbow extension.  NL tone, bulk. No abnl movements.  DTRs 2+ UE, 2+ patellar, 2 + R AChilles, 1+ L AChilles. Plantar response downgoing b/l.  (-) Hoffmans, clonus.  Sensory intact LT/PP, pain, temp, proprioception and vibration.  NL FTN/HKS.  No dysdiadokinesia.  Gait narrow based/NL tandem.  (+) SLR LLE.  \par \par

## 2019-07-15 NOTE — HISTORY OF PRESENT ILLNESS
[FreeTextEntry1] : Since her last visit, patient has had increased ROM in the RUE but still has persistent paresthesias and numbness across volar forearm.  Denies any  weakness.  Symptoms gradually improving.  LBP and LLE pain has essentially resolved since last visit and has not recurred over the last month.  Has persistent numbness in bilateral distal toes but no incontinence, saddle anesthesia or weakness in the legs.  Has tightness in the LLE.  Is scheduled for EMG.

## 2019-07-16 ENCOUNTER — APPOINTMENT (OUTPATIENT)
Dept: CARDIOLOGY | Facility: CLINIC | Age: 34
End: 2019-07-16
Payer: COMMERCIAL

## 2019-07-16 VITALS
WEIGHT: 207 LBS | HEIGHT: 71 IN | SYSTOLIC BLOOD PRESSURE: 101 MMHG | DIASTOLIC BLOOD PRESSURE: 61 MMHG | BODY MASS INDEX: 28.98 KG/M2 | HEART RATE: 78 BPM | OXYGEN SATURATION: 99 %

## 2019-07-16 PROCEDURE — 99214 OFFICE O/P EST MOD 30 MIN: CPT

## 2019-07-16 RX ORDER — SACUBITRIL AND VALSARTAN 49; 51 MG/1; MG/1
49-51 TABLET, FILM COATED ORAL TWICE DAILY
Qty: 120 | Refills: 5 | Status: DISCONTINUED | COMMUNITY
Start: 2019-06-19 | End: 2019-07-16

## 2019-07-16 NOTE — PHYSICAL EXAM
[General Appearance - Well Developed] : well developed [Well Groomed] : well groomed [General Appearance - Well Nourished] : well nourished [General Appearance - In No Acute Distress] : no acute distress [Normal Conjunctiva] : the conjunctiva exhibited no abnormalities [No Oral Pallor] : no oral pallor [No Oral Cyanosis] : no oral cyanosis [Respiration, Rhythm And Depth] : normal respiratory rhythm and effort [Auscultation Breath Sounds / Voice Sounds] : lungs were clear to auscultation bilaterally [Heart Rate And Rhythm] : heart rate and rhythm were normal [Heart Sounds] : normal S1 and S2 [Murmurs] : no murmurs present [Arterial Pulses Normal] : the arterial pulses were normal [Edema] : no peripheral edema present [1+] : right 1+ [2+] : left 2+ [Bowel Sounds] : normal bowel sounds [Abdomen Soft] : soft [Abdomen Tenderness] : non-tender [] : no hepato-splenomegaly [Abnormal Walk] : normal gait [Nail Clubbing] : no clubbing of the fingernails [Cyanosis, Localized] : no localized cyanosis [Skin Color & Pigmentation] : normal skin color and pigmentation [Skin Turgor] : normal skin turgor [Oriented To Time, Place, And Person] : oriented to person, place, and time [FreeTextEntry1] : JVP normal, without HJR [Impaired Insight] : insight and judgment were intact [Affect] : the affect was normal [Mood] : the mood was normal

## 2019-07-16 NOTE — HISTORY OF PRESENT ILLNESS
[FreeTextEntry1] : Ms. Jerome is a 34 year old woman with history of hyperemesis gravidarum  initially presented to [facility] (35 weeks gestation) for SOB. She was found to be in SVT and received adenosine x4 and failed cardioversion x2. She was then emergently taken for  and post op went into cardiogenic shock. An echo revealed a reduced EF 10-15% and she was placed on VA ECMO and CVVHD. She was transferred to [facility] on  for further management. He condition slowly improved and she was successfully decannulated on . Her urine output  improved with continued improvement in estimate of GFR. She was sent home off diuretics. Her echocardiogram showed improvement in her LVEF to 30-35%. DC weight 227 lbs, SCr 2.34.\par \par On 19, she presented to [facility] with right sided pleuritic type pain and SOB. CTA and VQ scan suggestive of a PE, she was started on Eliquis. Bilateral LE DVU scan negative for DVT.  Prior to discharge, she had 11 beats of NSVT. Hydralazine was dc'd due to low BP, labs were wnl. She was discharged with a Lifevest, which was subsequently discontinued. Her renal function has completely normalized.\par \par She presents today for routine follow up, looking and feeling well. During her last clinic visit on , her Entresto was increased to moderate dose and she has tolerated it well. Denies any s/s of angioedema. She is not breastfeeding. She endorses ongoing lower back/sciatica pain and still cannot fully extend her right arm. She has been following with neurology for her RUE and b/l feet paresthesias. She had an MRI of her right shoulder which showed no brachial injury. She had an MRI of her lumbar spine which showed a large central disc herniation (L5 - S1) likely contributing to prior sciatica and she was referred to neurosurgery. She also has seen Dr. Johnston who felt PE was unlikely and recommended 3 months with AC and can d/c Eliquis 19.\par \par She endorses unlimited ET and is starting cardiac rehab tomorrow. She denies any CP, palpitations, syncope, LH/dizziness, dyspnea at rest or on exertion, orthopnea, PND, cough, abdominal discomfort, LE edema, or weight gain. Her appetite is normal and her bowel and bladder habits are unchanged. She has been limiting fluid and sodium in her diet and taking her medications as directed. She has not been admitted to the hospital or seen in the ER for HF in the interim.

## 2019-07-20 ENCOUNTER — OTHER (OUTPATIENT)
Age: 34
End: 2019-07-20

## 2019-07-22 ENCOUNTER — APPOINTMENT (OUTPATIENT)
Dept: NEUROSURGERY | Facility: CLINIC | Age: 34
End: 2019-07-22

## 2019-07-26 ENCOUNTER — OUTPATIENT (OUTPATIENT)
Dept: OUTPATIENT SERVICES | Facility: HOSPITAL | Age: 34
LOS: 1 days | Discharge: HOME | End: 2019-07-26

## 2019-07-26 DIAGNOSIS — Z98.891 HISTORY OF UTERINE SCAR FROM PREVIOUS SURGERY: Chronic | ICD-10-CM

## 2019-07-26 DIAGNOSIS — Z01.818 ENCOUNTER FOR OTHER PREPROCEDURAL EXAMINATION: ICD-10-CM

## 2019-07-29 ENCOUNTER — OUTPATIENT (OUTPATIENT)
Dept: OUTPATIENT SERVICES | Facility: HOSPITAL | Age: 34
LOS: 1 days | Discharge: HOME | End: 2019-07-29

## 2019-07-29 DIAGNOSIS — I10 ESSENTIAL (PRIMARY) HYPERTENSION: ICD-10-CM

## 2019-07-29 DIAGNOSIS — Z98.891 HISTORY OF UTERINE SCAR FROM PREVIOUS SURGERY: Chronic | ICD-10-CM

## 2019-07-30 ENCOUNTER — OTHER (OUTPATIENT)
Age: 34
End: 2019-07-30

## 2019-07-30 ENCOUNTER — APPOINTMENT (OUTPATIENT)
Dept: CARDIOLOGY | Facility: CLINIC | Age: 34
End: 2019-07-30
Payer: COMMERCIAL

## 2019-07-30 VITALS
BODY MASS INDEX: 29.26 KG/M2 | HEART RATE: 82 BPM | HEIGHT: 71 IN | WEIGHT: 209 LBS | OXYGEN SATURATION: 98 % | DIASTOLIC BLOOD PRESSURE: 59 MMHG | SYSTOLIC BLOOD PRESSURE: 111 MMHG

## 2019-07-30 DIAGNOSIS — N17.9 ACUTE KIDNEY FAILURE, UNSPECIFIED: ICD-10-CM

## 2019-07-30 PROCEDURE — 99214 OFFICE O/P EST MOD 30 MIN: CPT

## 2019-07-30 RX ORDER — APIXABAN 5 MG/1
5 TABLET, FILM COATED ORAL TWICE DAILY
Qty: 20 | Refills: 1 | Status: DISCONTINUED | COMMUNITY
Start: 2019-07-20 | End: 2019-07-30

## 2019-07-30 RX ORDER — SACUBITRIL AND VALSARTAN 97; 103 MG/1; MG/1
97-103 TABLET, FILM COATED ORAL
Qty: 20 | Refills: 1 | Status: DISCONTINUED | COMMUNITY
Start: 2019-07-20 | End: 2019-07-30

## 2019-07-30 NOTE — HISTORY OF PRESENT ILLNESS
[FreeTextEntry1] : Ms. Jerome is a 34 year old woman with history of hyperemesis gravidarum  initially presented to [facility] (35 weeks gestation) for SOB. She was found to be in SVT and received adenosine x4 and failed cardioversion x2. She was then emergently taken for  and post op went into cardiogenic shock. An echo revealed a reduced EF 10-15% and she was placed on VA ECMO and CVVHD. She was transferred to [facility] on  for further management. He condition slowly improved and she was successfully decannulated on . Her urine output  improved with continued improvement in estimate of GFR. She was sent home off diuretics. Her echocardiogram showed improvement in her LVEF to 30-35%.\par \par On 19, she presented to [facility] with right sided pleuritic type pain and SOB. CTA and VQ scan suggestive of a PE, she was started on Eliquis. Bilateral LE DVU scan negative for DVT.  Prior to discharge, she had 11 beats of NSVT. Hydralazine was dc'd due to low BP, labs were wnl. She was discharged with a Lifevest, which was subsequently discontinued. Her renal function has completely normalized.\par \par She presents today for routine follow up, looking and feeling well. During her last clinic visit on , her Entresto was increased to max dose and she has tolerated it well with repeat labs showing K 4.4 and normal Cr. She has now completed 4 sessions of cardiac rehab with gradual increase in intensity of exercise, which she is tolerating well. She is able to complete the 5-10 minutes of exercise on each the bike, treadmill and step machine without dyspnea. She notes limitations in her activity related to lower back/sciatica pain, however not due to dyspnea. She endorses occasional mild lightheadedness, worse in the morning and when changing positions. Notes occasional SBP low 80s while at cardiac rehab, however generally SBP low 90s. She denies orthopnea, PND, CP, palpitations, abdominal distention, LE edema, presyncope/syncope. Her weight is stable from last visit. She endorses limiting her fluids to less than 2L/day. She has been taking her medications as prescribed. She is not breastfeeding. \par \par She still cannot fully extend her right arm although her ROM has been improving. She continues to notes paresthesias in her R forearm and fingers. She has been following with neurology for her RUE and b/l feet paresthesias. She had an MRI of her right shoulder which showed no brachial injury. She had an MRI of her lumbar spine which showed a large central disc herniation (L5 - S1) likely contributing to prior sciatica and she was referred to neurosurgery, whom she will be seeing tomorrow. She also has seen Dr. Johnston who felt PE was unlikely and recommended 3 months with RICO and can d/c Eliquis 19.

## 2019-07-30 NOTE — PHYSICAL EXAM
[Well Groomed] : well groomed [General Appearance - Well Developed] : well developed [General Appearance - Well Nourished] : well nourished [Normal Conjunctiva] : the conjunctiva exhibited no abnormalities [General Appearance - In No Acute Distress] : no acute distress [No Oral Cyanosis] : no oral cyanosis [No Oral Pallor] : no oral pallor [Respiration, Rhythm And Depth] : normal respiratory rhythm and effort [Heart Sounds] : normal S1 and S2 [Heart Rate And Rhythm] : heart rate and rhythm were normal [Auscultation Breath Sounds / Voice Sounds] : lungs were clear to auscultation bilaterally [Arterial Pulses Normal] : the arterial pulses were normal [Edema] : no peripheral edema present [Murmurs] : no murmurs present [1+] : right 1+ [2+] : left 2+ [Bowel Sounds] : normal bowel sounds [Abdomen Tenderness] : non-tender [Abdomen Soft] : soft [] : no hepato-splenomegaly [Abnormal Walk] : normal gait [Cyanosis, Localized] : no localized cyanosis [Nail Clubbing] : no clubbing of the fingernails [Skin Turgor] : normal skin turgor [Skin Color & Pigmentation] : normal skin color and pigmentation [Affect] : the affect was normal [Impaired Insight] : insight and judgment were intact [Oriented To Time, Place, And Person] : oriented to person, place, and time [Mood] : the mood was normal [FreeTextEntry1] : JVP normal, without HJR

## 2019-07-30 NOTE — REASON FOR VISIT
[Cardiomyopathy] : cardiomyopathy [Follow-Up - Clinic] : a clinic follow-up of [Heart Failure] : congestive heart failure

## 2019-07-30 NOTE — ASSESSMENT
[FreeTextEntry1] : Ms. Jerome is a 34 yr old female with a NICM HFrEF (LVEF 30-35%, improved from 10-15%), who at 35 weeks gestation presented to [facility] with sob and was noted to be in SVT which could not be controlled, requiring an emergent . She went into cardiogenic shock and was placed on VA ECMO and CVVHD. She cardiac function recovered and was successfully decannulated, her renal function improved as well and did not require long term HD. She presents today for follow up doing well. She has tolerating uptitration of neurohormonal antagonists. Her renal function remains normal on most recent labs on max dose Entresto. She is Stage C, NYHA Class II and euvolemic. I have recommended the following:\par \par 1. NICM HFrEF\par - Will increase Toprol to 200mg daily and will start spironolactone 25mg MWF as her K 4.4 and she is not currently requiring a standing loop diuretic. She will have follow up labs to reassess renal function and potassium 7-10 days after starting spironolactone.\par - Will continue Entresto 97-103mg BID\par - She will continue with cardiac rehab\par - Repeat TTE to be done mid September, 2 months after being on max GDMT to reassess for recovery. If LVEF remains <35% will refer to EP for evaluation for ICD\par \par 2. PE\par - Will continue Eliquis through  per Dr. Johnston.\par - She will start ASA EC 81mg daily on  per Dr. Vila\par \par 3. Follow up in 1 month with Dr. Vila

## 2019-07-31 ENCOUNTER — APPOINTMENT (OUTPATIENT)
Dept: NEUROSURGERY | Facility: CLINIC | Age: 34
End: 2019-07-31
Payer: COMMERCIAL

## 2019-07-31 VITALS — HEIGHT: 71 IN | WEIGHT: 206 LBS | BODY MASS INDEX: 28.84 KG/M2

## 2019-07-31 PROCEDURE — 99203 OFFICE O/P NEW LOW 30 MIN: CPT

## 2019-08-01 NOTE — PLAN
[FreeTextEntry1] : I discussed the MRI and clinical findings with Ms. Jerome in detail.  I believe a left L5-S1 microdiscectomy is indicated.  However at this time, she is not cardiac stable to undergo surgery.  She will return after a repeat BETTY in September.

## 2019-08-01 NOTE — HISTORY OF PRESENT ILLNESS
[de-identified] : This is a 34 yrs old female who presents today for a consultation of left buttock pain radiating to the left posterior thigh since the age of [age]. It is associated with tingling on the left leg. Denies bowel and/or urinary incontinence. Patient reports of having numbness and tingling on the right arm, right hand, and toes due to the ECMO (with an EF of 35%) she was on after she gave birth four months ago. Patient has not participated in physical therapy for her leg, she is currently in cardiac rehab, 3x a week. She has not been evaluated by pain management. Pain is worse when getting up from a standing position, bending, and rolling, and pain is better when laying still. \par \par MRI of the Lumbar spine without contrast done on 7/14/19 showed L5-S1 large superiorly-directed central disc herniation eccentric to the \par left producing severe spinal stenosis with compression of the descending cauda equina nerve roots.

## 2019-08-02 ENCOUNTER — APPOINTMENT (OUTPATIENT)
Dept: CARDIOLOGY | Facility: CLINIC | Age: 34
End: 2019-08-02
Payer: COMMERCIAL

## 2019-08-02 VITALS — BODY MASS INDEX: 28.73 KG/M2 | DIASTOLIC BLOOD PRESSURE: 60 MMHG | SYSTOLIC BLOOD PRESSURE: 110 MMHG | WEIGHT: 206 LBS

## 2019-08-02 PROCEDURE — 99213 OFFICE O/P EST LOW 20 MIN: CPT

## 2019-08-02 PROCEDURE — 93000 ELECTROCARDIOGRAM COMPLETE: CPT

## 2019-08-02 RX ORDER — COPPER 313.4 MG/1
INTRAUTERINE DEVICE INTRAUTERINE
Qty: 1 | Refills: 0 | Status: ACTIVE | COMMUNITY
Start: 2019-07-16

## 2019-08-11 NOTE — HISTORY OF PRESENT ILLNESS
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 She was in  last trimester of her 2nd pregnancy at 34 weeks, an emergent C section was done on April 10, 2019 , went on cardiogenic shock , EF 10% was put on ECMO,  and she was flown to [facility] for higher level of care. Her cardiac function improved - successfully decannulated from ecmo. She was discharged on 5/6/2019 but later on presented to [facility] for an acute shortness of breath on 5/11. A V/Q scan confirmed she developed DVT. She is currently on eliquis 5mg twice daily.\par \par Patient feels much better after the show from the hospital. Patient was seen by Dr. Vila who recommended to deferring on EF evaluation till patient is on full dose of medications.\par \par Patient is currently exercising in a gymand feeling much better.\par \par \par CARDIAC TESTING\par ECHO: (5/12/2019) LVEF 30-35%.LA mildly dilated, mild to moderate MR, Spectral doppler shows restrictive pattern- Grade III diastolic dysfunction\par

## 2019-08-11 NOTE — ASSESSMENT
[FreeTextEntry1] : post partum CM\par \par C/w metoprolol succ \par Will request for MUGA scan in Sept to evaluate heart function\par \par - cont HF meds\par \par \par

## 2019-08-11 NOTE — PHYSICAL EXAM
[General Appearance - Well Developed] : well developed [Normal Appearance] : normal appearance [General Appearance - Well Nourished] : well nourished [Well Groomed] : well groomed [General Appearance - In No Acute Distress] : no acute distress [No Deformities] : no deformities [Normal Conjunctiva] : the conjunctiva exhibited no abnormalities [Normal Oral Mucosa] : normal oral mucosa [Eyelids - No Xanthelasma] : the eyelids demonstrated no xanthelasmas [No Oral Pallor] : no oral pallor [No Oral Cyanosis] : no oral cyanosis [Normal Jugular Venous A Waves Present] : normal jugular venous A waves present [No Jugular Venous Lu A Waves] : no jugular venous lu A waves [Auscultation Breath Sounds / Voice Sounds] : lungs were clear to auscultation bilaterally [Heart Rate And Rhythm] : heart rate and rhythm were normal [FreeTextEntry1] : no jvd [Bowel Sounds] : normal bowel sounds [Abdomen Soft] : soft [Abnormal Walk] : normal gait [Cyanosis, Localized] : no localized cyanosis [Nail Clubbing] : no clubbing of the fingernails [Petechial Hemorrhages (___cm)] : no petechial hemorrhages [Skin Color & Pigmentation] : normal skin color and pigmentation [] : no rash [Skin Lesions] : no skin lesions [No Venous Stasis] : no venous stasis [No Skin Ulcers] : no skin ulcer [No Xanthoma] : no  xanthoma was observed [Oriented To Time, Place, And Person] : oriented to person, place, and time [Affect] : the affect was normal [Mood] : the mood was normal [No Anxiety] : not feeling anxious

## 2019-08-15 ENCOUNTER — OUTPATIENT (OUTPATIENT)
Dept: OUTPATIENT SERVICES | Facility: HOSPITAL | Age: 34
LOS: 1 days | Discharge: HOME | End: 2019-08-15

## 2019-08-15 DIAGNOSIS — I42.0 DILATED CARDIOMYOPATHY: ICD-10-CM

## 2019-08-15 DIAGNOSIS — Z98.891 HISTORY OF UTERINE SCAR FROM PREVIOUS SURGERY: Chronic | ICD-10-CM

## 2019-08-18 ENCOUNTER — INPATIENT (INPATIENT)
Facility: HOSPITAL | Age: 34
LOS: 2 days | Discharge: HOME | End: 2019-08-21
Attending: NEUROLOGICAL SURGERY | Admitting: NEUROLOGICAL SURGERY
Payer: COMMERCIAL

## 2019-08-18 VITALS
RESPIRATION RATE: 16 BRPM | DIASTOLIC BLOOD PRESSURE: 58 MMHG | HEART RATE: 64 BPM | OXYGEN SATURATION: 98 % | WEIGHT: 210.1 LBS | TEMPERATURE: 99 F | SYSTOLIC BLOOD PRESSURE: 104 MMHG

## 2019-08-18 DIAGNOSIS — Z98.891 HISTORY OF UTERINE SCAR FROM PREVIOUS SURGERY: Chronic | ICD-10-CM

## 2019-08-18 LAB
ALBUMIN SERPL ELPH-MCNC: 4.8 G/DL — SIGNIFICANT CHANGE UP (ref 3.5–5.2)
ALP SERPL-CCNC: 101 U/L — SIGNIFICANT CHANGE UP (ref 30–115)
ALT FLD-CCNC: 26 U/L — SIGNIFICANT CHANGE UP (ref 0–41)
ANION GAP SERPL CALC-SCNC: 14 MMOL/L — SIGNIFICANT CHANGE UP (ref 7–14)
AST SERPL-CCNC: 32 U/L — SIGNIFICANT CHANGE UP (ref 0–41)
BASOPHILS # BLD AUTO: 0.02 K/UL — SIGNIFICANT CHANGE UP (ref 0–0.2)
BASOPHILS NFR BLD AUTO: 0.2 % — SIGNIFICANT CHANGE UP (ref 0–1)
BILIRUB SERPL-MCNC: 0.3 MG/DL — SIGNIFICANT CHANGE UP (ref 0.2–1.2)
BUN SERPL-MCNC: 35 MG/DL — HIGH (ref 10–20)
CALCIUM SERPL-MCNC: 10.2 MG/DL — HIGH (ref 8.5–10.1)
CHLORIDE SERPL-SCNC: 102 MMOL/L — SIGNIFICANT CHANGE UP (ref 98–110)
CO2 SERPL-SCNC: 21 MMOL/L — SIGNIFICANT CHANGE UP (ref 17–32)
CREAT SERPL-MCNC: 1 MG/DL — SIGNIFICANT CHANGE UP (ref 0.7–1.5)
EOSINOPHIL # BLD AUTO: 0.07 K/UL — SIGNIFICANT CHANGE UP (ref 0–0.7)
EOSINOPHIL NFR BLD AUTO: 0.8 % — SIGNIFICANT CHANGE UP (ref 0–8)
GLUCOSE SERPL-MCNC: 93 MG/DL — SIGNIFICANT CHANGE UP (ref 70–99)
HCG SERPL QL: NEGATIVE — SIGNIFICANT CHANGE UP
HCT VFR BLD CALC: 41 % — SIGNIFICANT CHANGE UP (ref 37–47)
HGB BLD-MCNC: 13.5 G/DL — SIGNIFICANT CHANGE UP (ref 12–16)
IMM GRANULOCYTES NFR BLD AUTO: 0.3 % — SIGNIFICANT CHANGE UP (ref 0.1–0.3)
LYMPHOCYTES # BLD AUTO: 1.92 K/UL — SIGNIFICANT CHANGE UP (ref 1.2–3.4)
LYMPHOCYTES # BLD AUTO: 21.6 % — SIGNIFICANT CHANGE UP (ref 20.5–51.1)
MCHC RBC-ENTMCNC: 31.2 PG — HIGH (ref 27–31)
MCHC RBC-ENTMCNC: 32.9 G/DL — SIGNIFICANT CHANGE UP (ref 32–37)
MCV RBC AUTO: 94.7 FL — SIGNIFICANT CHANGE UP (ref 81–99)
MONOCYTES # BLD AUTO: 0.67 K/UL — HIGH (ref 0.1–0.6)
MONOCYTES NFR BLD AUTO: 7.5 % — SIGNIFICANT CHANGE UP (ref 1.7–9.3)
NEUTROPHILS # BLD AUTO: 6.17 K/UL — SIGNIFICANT CHANGE UP (ref 1.4–6.5)
NEUTROPHILS NFR BLD AUTO: 69.6 % — SIGNIFICANT CHANGE UP (ref 42.2–75.2)
NRBC # BLD: 0 /100 WBCS — SIGNIFICANT CHANGE UP (ref 0–0)
PLATELET # BLD AUTO: 277 K/UL — SIGNIFICANT CHANGE UP (ref 130–400)
POTASSIUM SERPL-MCNC: 4.9 MMOL/L — SIGNIFICANT CHANGE UP (ref 3.5–5)
POTASSIUM SERPL-SCNC: 4.9 MMOL/L — SIGNIFICANT CHANGE UP (ref 3.5–5)
PROT SERPL-MCNC: 7.8 G/DL — SIGNIFICANT CHANGE UP (ref 6–8)
RBC # BLD: 4.33 M/UL — SIGNIFICANT CHANGE UP (ref 4.2–5.4)
RBC # FLD: 12 % — SIGNIFICANT CHANGE UP (ref 11.5–14.5)
SODIUM SERPL-SCNC: 137 MMOL/L — SIGNIFICANT CHANGE UP (ref 135–146)
WBC # BLD: 8.88 K/UL — SIGNIFICANT CHANGE UP (ref 4.8–10.8)
WBC # FLD AUTO: 8.88 K/UL — SIGNIFICANT CHANGE UP (ref 4.8–10.8)

## 2019-08-18 PROCEDURE — 99223 1ST HOSP IP/OBS HIGH 75: CPT | Mod: AI

## 2019-08-18 PROCEDURE — 99285 EMERGENCY DEPT VISIT HI MDM: CPT

## 2019-08-18 PROCEDURE — 99221 1ST HOSP IP/OBS SF/LOW 40: CPT

## 2019-08-18 RX ORDER — ENOXAPARIN SODIUM 100 MG/ML
40 INJECTION SUBCUTANEOUS DAILY
Refills: 0 | Status: DISCONTINUED | OUTPATIENT
Start: 2019-08-18 | End: 2019-08-19

## 2019-08-18 RX ORDER — DOCUSATE SODIUM 100 MG
100 CAPSULE ORAL THREE TIMES A DAY
Refills: 0 | Status: DISCONTINUED | OUTPATIENT
Start: 2019-08-18 | End: 2019-08-20

## 2019-08-18 RX ORDER — TRAMADOL HYDROCHLORIDE 50 MG/1
50 TABLET ORAL EVERY 6 HOURS
Refills: 0 | Status: DISCONTINUED | OUTPATIENT
Start: 2019-08-18 | End: 2019-08-20

## 2019-08-18 RX ORDER — SACUBITRIL AND VALSARTAN 24; 26 MG/1; MG/1
1 TABLET, FILM COATED ORAL
Refills: 0 | Status: DISCONTINUED | OUTPATIENT
Start: 2019-08-18 | End: 2019-08-20

## 2019-08-18 RX ORDER — MORPHINE SULFATE 50 MG/1
4 CAPSULE, EXTENDED RELEASE ORAL ONCE
Refills: 0 | Status: DISCONTINUED | OUTPATIENT
Start: 2019-08-18 | End: 2019-08-18

## 2019-08-18 RX ORDER — ONDANSETRON 8 MG/1
4 TABLET, FILM COATED ORAL ONCE
Refills: 0 | Status: COMPLETED | OUTPATIENT
Start: 2019-08-18 | End: 2019-08-18

## 2019-08-18 RX ORDER — SENNA PLUS 8.6 MG/1
2 TABLET ORAL AT BEDTIME
Refills: 0 | Status: DISCONTINUED | OUTPATIENT
Start: 2019-08-18 | End: 2019-08-20

## 2019-08-18 RX ORDER — ACETAMINOPHEN 500 MG
650 TABLET ORAL EVERY 4 HOURS
Refills: 0 | Status: DISCONTINUED | OUTPATIENT
Start: 2019-08-18 | End: 2019-08-20

## 2019-08-18 RX ORDER — CHLORHEXIDINE GLUCONATE 213 G/1000ML
1 SOLUTION TOPICAL
Refills: 0 | Status: DISCONTINUED | OUTPATIENT
Start: 2019-08-18 | End: 2019-08-20

## 2019-08-18 RX ORDER — MORPHINE SULFATE 50 MG/1
4 CAPSULE, EXTENDED RELEASE ORAL EVERY 4 HOURS
Refills: 0 | Status: DISCONTINUED | OUTPATIENT
Start: 2019-08-18 | End: 2019-08-18

## 2019-08-18 RX ORDER — DEXAMETHASONE 0.5 MG/5ML
10 ELIXIR ORAL ONCE
Refills: 0 | Status: COMPLETED | OUTPATIENT
Start: 2019-08-18 | End: 2019-08-18

## 2019-08-18 RX ORDER — OXYCODONE AND ACETAMINOPHEN 5; 325 MG/1; MG/1
1 TABLET ORAL ONCE
Refills: 0 | Status: DISCONTINUED | OUTPATIENT
Start: 2019-08-18 | End: 2019-08-18

## 2019-08-18 RX ORDER — KETOROLAC TROMETHAMINE 30 MG/ML
30 SYRINGE (ML) INJECTION ONCE
Refills: 0 | Status: DISCONTINUED | OUTPATIENT
Start: 2019-08-18 | End: 2019-08-18

## 2019-08-18 RX ORDER — MORPHINE SULFATE 50 MG/1
2 CAPSULE, EXTENDED RELEASE ORAL EVERY 4 HOURS
Refills: 0 | Status: DISCONTINUED | OUTPATIENT
Start: 2019-08-18 | End: 2019-08-20

## 2019-08-18 RX ORDER — OXYCODONE AND ACETAMINOPHEN 5; 325 MG/1; MG/1
1 TABLET ORAL EVERY 6 HOURS
Refills: 0 | Status: DISCONTINUED | OUTPATIENT
Start: 2019-08-18 | End: 2019-08-18

## 2019-08-18 RX ORDER — SPIRONOLACTONE 25 MG/1
25 TABLET, FILM COATED ORAL
Refills: 0 | Status: DISCONTINUED | OUTPATIENT
Start: 2019-08-18 | End: 2019-08-20

## 2019-08-18 RX ORDER — MORPHINE SULFATE 50 MG/1
2 CAPSULE, EXTENDED RELEASE ORAL EVERY 4 HOURS
Refills: 0 | Status: DISCONTINUED | OUTPATIENT
Start: 2019-08-18 | End: 2019-08-18

## 2019-08-18 RX ORDER — METHOCARBAMOL 500 MG/1
750 TABLET, FILM COATED ORAL ONCE
Refills: 0 | Status: COMPLETED | OUTPATIENT
Start: 2019-08-18 | End: 2019-08-18

## 2019-08-18 RX ORDER — MORPHINE SULFATE 50 MG/1
2 CAPSULE, EXTENDED RELEASE ORAL ONCE
Refills: 0 | Status: DISCONTINUED | OUTPATIENT
Start: 2019-08-18 | End: 2019-08-18

## 2019-08-18 RX ORDER — METOPROLOL TARTRATE 50 MG
200 TABLET ORAL DAILY
Refills: 0 | Status: DISCONTINUED | OUTPATIENT
Start: 2019-08-18 | End: 2019-08-20

## 2019-08-18 RX ORDER — ASPIRIN/CALCIUM CARB/MAGNESIUM 324 MG
81 TABLET ORAL DAILY
Refills: 0 | Status: DISCONTINUED | OUTPATIENT
Start: 2019-08-18 | End: 2019-08-19

## 2019-08-18 RX ORDER — MORPHINE SULFATE 50 MG/1
2 CAPSULE, EXTENDED RELEASE ORAL EVERY 6 HOURS
Refills: 0 | Status: DISCONTINUED | OUTPATIENT
Start: 2019-08-18 | End: 2019-08-18

## 2019-08-18 RX ADMIN — MORPHINE SULFATE 2 MILLIGRAM(S): 50 CAPSULE, EXTENDED RELEASE ORAL at 09:22

## 2019-08-18 RX ADMIN — MORPHINE SULFATE 2 MILLIGRAM(S): 50 CAPSULE, EXTENDED RELEASE ORAL at 22:49

## 2019-08-18 RX ADMIN — MORPHINE SULFATE 2 MILLIGRAM(S): 50 CAPSULE, EXTENDED RELEASE ORAL at 08:08

## 2019-08-18 RX ADMIN — Medication 30 MILLIGRAM(S): at 08:08

## 2019-08-18 RX ADMIN — ONDANSETRON 4 MILLIGRAM(S): 8 TABLET, FILM COATED ORAL at 01:53

## 2019-08-18 RX ADMIN — METHOCARBAMOL 750 MILLIGRAM(S): 500 TABLET, FILM COATED ORAL at 01:53

## 2019-08-18 RX ADMIN — MORPHINE SULFATE 2 MILLIGRAM(S): 50 CAPSULE, EXTENDED RELEASE ORAL at 13:40

## 2019-08-18 RX ADMIN — MORPHINE SULFATE 2 MILLIGRAM(S): 50 CAPSULE, EXTENDED RELEASE ORAL at 13:10

## 2019-08-18 RX ADMIN — Medication 30 MILLIGRAM(S): at 02:20

## 2019-08-18 RX ADMIN — ENOXAPARIN SODIUM 40 MILLIGRAM(S): 100 INJECTION SUBCUTANEOUS at 21:40

## 2019-08-18 RX ADMIN — SACUBITRIL AND VALSARTAN 1 TABLET(S): 24; 26 TABLET, FILM COATED ORAL at 21:39

## 2019-08-18 RX ADMIN — Medication 30 MILLIGRAM(S): at 08:17

## 2019-08-18 RX ADMIN — MORPHINE SULFATE 2 MILLIGRAM(S): 50 CAPSULE, EXTENDED RELEASE ORAL at 16:52

## 2019-08-18 RX ADMIN — MORPHINE SULFATE 2 MILLIGRAM(S): 50 CAPSULE, EXTENDED RELEASE ORAL at 09:58

## 2019-08-18 RX ADMIN — Medication 30 MILLIGRAM(S): at 09:15

## 2019-08-18 RX ADMIN — MORPHINE SULFATE 2 MILLIGRAM(S): 50 CAPSULE, EXTENDED RELEASE ORAL at 03:35

## 2019-08-18 RX ADMIN — Medication 10 MILLIGRAM(S): at 01:53

## 2019-08-18 RX ADMIN — TRAMADOL HYDROCHLORIDE 50 MILLIGRAM(S): 50 TABLET ORAL at 19:26

## 2019-08-18 NOTE — ED ADULT TRIAGE NOTE - CHIEF COMPLAINT QUOTE
Pt with history of lumbar disc problems, came c/o severe back pain after lifting her baby, denies new injuries.

## 2019-08-18 NOTE — CONSULT NOTE ADULT - SUBJECTIVE AND OBJECTIVE BOX
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 Pt notes she has chronic back pain due to long history of disc herniation and spinal stenosis. She is following with neuro surgery Dr Cage who plans on doing surgery once her EF improves. On Friday prior to presentation her back pain suddenly got worse after lifting her child's carseat. She tried to avoid coming to the ED, took motrin and a oxycodone from her  but the pain didn't get better over the weekend and she could barely move so she came to the ED. She says the pain is in her back radiating down her left leg. She has baseline numbness in her toes b/l but now has numbness in her whole left leg. Pain is exacerbated by movement and is better with the opioid meds she was given as long as she lays still. She denies any incontinence, saddle paresthesias or gait abnormalities other then that due to pain.       ED: Decadron, Toradol, Robaxin, morphine, zofran (18 Aug 2019 14:38)    Pt seen and examined at bedside and gives above history.  Last MRI 3 weeks ago revealed:    < from: MR Lumbar Spine No Cont (19 @ 13:45) >  IMPRESSION:    L5-S1 large superiorly-directed central disc herniation eccentric to the   left producing severe spinal stenosis with compression of the descending   cauda equina nerve roots.    < end of copied text >          PAST MEDICAL & SURGICAL HISTORY:  Intubation of airway performed without difficulty  Peripartum cardiomyopathy  SVT (supraventricular tachycardia)  Asthma: as child, no inhaler use&gt;10 years. Denies hospitlizations or intubations due to asthma. No current inhaler.  Delivery by emergency   History of low transverse  section      Home Medications:  aspirin 81 mg oral tablet: 1 tab(s) orally once a day (18 Aug 2019 14:48)  Entresto 97 mg-103 mg oral tablet: 1 tab(s) orally 2 times a day (18 Aug 2019 14:48)  Metoprolol Succinate  mg oral tablet, extended release: 1 tab(s) orally once a day (18 Aug 2019 14:48)  Motrin: 2 tab(s) orally once a day, As Needed (18 Aug 2019 14:48)  spironolactone 25 mg oral tablet: 1 tab(s) orally once a day on  and Fri (18 Aug 2019 14:48)      Allergies    No Known Allergies    Intolerances    Reglan (Other)      MEDICATIONS  (STANDING):  aspirin enteric coated 81 milliGRAM(s) Oral daily  chlorhexidine 4% Liquid 1 Application(s) Topical <User Schedule>  docusate sodium 100 milliGRAM(s) Oral three times a day  enoxaparin Injectable 40 milliGRAM(s) SubCutaneous daily  metoprolol succinate  milliGRAM(s) Oral daily  sacubitril 97 mG/valsartan 103 mG 1 Tablet(s) Oral two times a day  spironolactone 25 milliGRAM(s) Oral <User Schedule>    MEDICATIONS  (PRN):  acetaminophen   Tablet .. 650 milliGRAM(s) Oral every 4 hours PRN Mild Pain (1 - 3)  morphine  - Injectable 2 milliGRAM(s) IV Push every 4 hours PRN Severe Pain (7 - 10)  senna 2 Tablet(s) Oral at bedtime PRN Constipation  traMADol 50 milliGRAM(s) Oral every 6 hours PRN Moderate Pain (4 - 6)      ICU Vital Signs Last 24 Hrs  T(C): 36.7 (18 Aug 2019 14:41), Max: 37.2 (18 Aug 2019 07:44)  T(F): 98 (18 Aug 2019 14:41), Max: 99 (18 Aug 2019 07:44)  HR: 58 (18 Aug 2019 16:49) (56 - 71)  BP: 104/51 (18 Aug 2019 16:49) (95/53 - 107/53)  BP(mean): 74 (18 Aug 2019 16:49) (74 - 74)  ABP: --  ABP(mean): --  RR: 18 (18 Aug 2019 14:41) (16 - 18)  SpO2: 98% (18 Aug 2019 09:35) (97% - 99%)      I&O's Detail      CBC Full  -  ( 18 Aug 2019 01:40 )  WBC Count : 8.88 K/uL  RBC Count : 4.33 M/uL  Hemoglobin : 13.5 g/dL  Hematocrit : 41.0 %  Platelet Count - Automated : 277 K/uL  Mean Cell Volume : 94.7 fL  Mean Cell Hemoglobin : 31.2 pg  Mean Cell Hemoglobin Concentration : 32.9 g/dL  Auto Neutrophil # : 6.17 K/uL  Auto Lymphocyte # : 1.92 K/uL  Auto Monocyte # : 0.67 K/uL  Auto Eosinophil # : 0.07 K/uL  Auto Basophil # : 0.02 K/uL  Auto Neutrophil % : 69.6 %  Auto Lymphocyte % : 21.6 %  Auto Monocyte % : 7.5 %  Auto Eosinophil % : 0.8 %  Auto Basophil % : 0.2 %        137  |  102  |  35<H>  ----------------------------<  93  4.9   |  21  |  1.0    Ca    10.2<H>      18 Aug 2019 01:40    TPro  7.8  /  Alb  4.8  /  TBili  0.3  /  DBili  x   /  AST  32  /  ALT  26  /  AlkPhos  101  -            AAOX3. 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 Pt notes she has chronic back pain due to long history of disc herniation and spinal stenosis. She is following with neuro surgery Dr Cage who plans on doing surgery once her EF improves. On Friday prior to presentation her back pain suddenly got worse after lifting her child's carseat. She tried to avoid coming to the ED, took motrin and a oxycodone from her  but the pain didn't get better over the weekend and she could barely move so she came to the ED. She says the pain is in her back radiating down her left leg. She has baseline numbness in her toes b/l but now has numbness in her whole left leg. Pain is exacerbated by movement and is better with the opioid meds she was given as long as she lays still. She denies any incontinence, saddle paresthesias or gait abnormalities other then that due to pain.       ED: Decadron, Toradol, Robaxin, morphine, zofran (18 Aug 2019 14:38)    Pt seen and examined at bedside and gives above history.  Last MRI 3 weeks ago revealed:    < from: MR Lumbar Spine No Cont (19 @ 13:45) >  IMPRESSION:    L5-S1 large superiorly-directed central disc herniation eccentric to the   left producing severe spinal stenosis with compression of the descending   cauda equina nerve roots.    < end of copied text >          PAST MEDICAL & SURGICAL HISTORY:  Intubation of airway performed without difficulty  Peripartum cardiomyopathy  SVT (supraventricular tachycardia)  Asthma: as child, no inhaler use&gt;10 years. Denies hospitlizations or intubations due to asthma. No current inhaler.  Delivery by emergency   History of low transverse  section      Home Medications:  aspirin 81 mg oral tablet: 1 tab(s) orally once a day (18 Aug 2019 14:48)  Entresto 97 mg-103 mg oral tablet: 1 tab(s) orally 2 times a day (18 Aug 2019 14:48)  Metoprolol Succinate  mg oral tablet, extended release: 1 tab(s) orally once a day (18 Aug 2019 14:48)  Motrin: 2 tab(s) orally once a day, As Needed (18 Aug 2019 14:48)  spironolactone 25 mg oral tablet: 1 tab(s) orally once a day on  and Fri (18 Aug 2019 14:48)      Allergies    No Known Allergies    Intolerances    Reglan (Other)      MEDICATIONS  (STANDING):  aspirin enteric coated 81 milliGRAM(s) Oral daily  chlorhexidine 4% Liquid 1 Application(s) Topical <User Schedule>  docusate sodium 100 milliGRAM(s) Oral three times a day  enoxaparin Injectable 40 milliGRAM(s) SubCutaneous daily  metoprolol succinate  milliGRAM(s) Oral daily  sacubitril 97 mG/valsartan 103 mG 1 Tablet(s) Oral two times a day  spironolactone 25 milliGRAM(s) Oral <User Schedule>    MEDICATIONS  (PRN):  acetaminophen   Tablet .. 650 milliGRAM(s) Oral every 4 hours PRN Mild Pain (1 - 3)  morphine  - Injectable 2 milliGRAM(s) IV Push every 4 hours PRN Severe Pain (7 - 10)  senna 2 Tablet(s) Oral at bedtime PRN Constipation  traMADol 50 milliGRAM(s) Oral every 6 hours PRN Moderate Pain (4 - 6)      ICU Vital Signs Last 24 Hrs  T(C): 36.7 (18 Aug 2019 14:41), Max: 37.2 (18 Aug 2019 07:44)  T(F): 98 (18 Aug 2019 14:41), Max: 99 (18 Aug 2019 07:44)  HR: 58 (18 Aug 2019 16:49) (56 - 71)  BP: 104/51 (18 Aug 2019 16:49) (95/53 - 107/53)  BP(mean): 74 (18 Aug 2019 16:49) (74 - 74)  ABP: --  ABP(mean): --  RR: 18 (18 Aug 2019 14:41) (16 - 18)  SpO2: 98% (18 Aug 2019 09:35) (97% - 99%)      I&O's Detail      CBC Full  -  ( 18 Aug 2019 01:40 )  WBC Count : 8.88 K/uL  RBC Count : 4.33 M/uL  Hemoglobin : 13.5 g/dL  Hematocrit : 41.0 %  Platelet Count - Automated : 277 K/uL  Mean Cell Volume : 94.7 fL  Mean Cell Hemoglobin : 31.2 pg  Mean Cell Hemoglobin Concentration : 32.9 g/dL  Auto Neutrophil # : 6.17 K/uL  Auto Lymphocyte # : 1.92 K/uL  Auto Monocyte # : 0.67 K/uL  Auto Eosinophil # : 0.07 K/uL  Auto Basophil # : 0.02 K/uL  Auto Neutrophil % : 69.6 %  Auto Lymphocyte % : 21.6 %  Auto Monocyte % : 7.5 %  Auto Eosinophil % : 0.8 %  Auto Basophil % : 0.2 %        137  |  102  |  35<H>  ----------------------------<  93  4.9   |  21  |  1.0    Ca    10.2<H>      18 Aug 2019 01:40    TPro  7.8  /  Alb  4.8  /  TBili  0.3  /  DBili  x   /  AST  32  /  ALT  26  /  AlkPhos  101  -            AAOX3. Verbal function intact  follows commands  found laying on pts right side to alleviate pain  Motor: MAEx4  5/5 power in b/l UE  5/5 power RLE  5-/5 power LLE limited by pain  DF/PF 5/5  Sensation: decreased sensation to RUE and bottom of left foot   no point tenderness        Assessment/Plan:  will discuss plan for surgical intervention with attg  obtain Echo to myrna MOLINA  pain meds  PT/rehab

## 2019-08-18 NOTE — ED PROVIDER NOTE - NS ED ROS FT
Constitutional: See HPI.  Eyes: No visual changes,  ENMT: No neck pain  Cardiac: No cp  Respiratory: No cough   GI: No nausea, vomiting, diarrhea or abdominal pain.  : No dysuria, frequency or burning. No Discharge  MS: see hpi  Psych: No suicidal or homicidal ideations.  Neuro: No headache   Skin: No skin rash.

## 2019-08-18 NOTE — H&P ADULT - ASSESSMENT
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    # Back pain secondary to Severe spinal stenosis  -MRI 2019: severe stenosis with compression of cauda equina roots at L5-S1  -followed by Neurosx with plan for microdiscectomy once cardiac function improved  -pain management with IV morphine (unable to tolerate oxycodone) for now, start senna colace  -neurosurgery eval (Dr Cage)  -PT eval    # Hx peripartum cardiomyopathy / Aflutter  -cardiac function improving last echo 2019: EF improved to 30-35%  -c/w ASA, metoprolol 200mg daily, entresto 97/103 BID, Aldactone 3x weekly  -cardio plan for repeat echo in Sept, will repeat now while inpatient for possible neurosurgery intervention    # Hx of PE  -V/Q with intermediate likelihood of PE 2019  -completed course of Eliquis, last dose 19  -no complaints of SOB at present    # DVT ppx  -lovenox    # Diet  -DASH    # Activity  -ambulate as tolerated    # Code Status  -d/w patient  -FULL CODE    # Dispo  -from home

## 2019-08-18 NOTE — ED PROVIDER NOTE - CLINICAL SUMMARY MEDICAL DECISION MAKING FREE TEXT BOX
Patient remains with pain despite meds.  Not ambulatory due to pain.  Will admit for Neurosurgical consult, pain management.

## 2019-08-18 NOTE — H&P ADULT - NSHPPHYSICALEXAM_GEN_ALL_CORE
GENERAL: NAD  HEENT: NCAT  CHEST/LUNG: CTAB  HEART: Regular rate and rhythm; s1 s2 appreciated, No murmurs, rubs, or gallops  ABDOMEN: Soft, Nontender, Nondistended; Bowel sounds present  BACK: mild tenderness to palpation of sacral spine  EXTREMITIES: No LE edema b/l, RUE weakness w/o limits in ROM, LLE negative babinski, weakness limited by pain, no sensory deficits noted  NERVOUS SYSTEM:  Alert & Oriented X3

## 2019-08-18 NOTE — ED PROVIDER NOTE - ATTENDING CONTRIBUTION TO CARE
33 y/o female with hx of asthma, s/p cardioversion/intubation and post partum cardiomyopathy, last known EF = 35% presents to ED with low back pain, known to have lumbar disc herniations.  No bowel or bladder dysfunction.  No weakness.  PE:  agree with above.  A/P:  Low Back Pain, meds and reassess.

## 2019-08-18 NOTE — H&P ADULT - NSHPLABSRESULTS_GEN_ALL_CORE
Complete Blood Count + Automated Diff (08.18.19 @ 01:40)    WBC Count: 8.88 K/uL    RBC Count: 4.33 M/uL    Hemoglobin: 13.5 g/dL    Hematocrit: 41.0 %    Mean Cell Volume: 94.7 fL    Mean Cell Hemoglobin: 31.2 pg    Mean Cell Hemoglobin Conc: 32.9 g/dL    Red Cell Distrib Width: 12.0 %    Platelet Count - Automated: 277 K/uL    Auto Neutrophil #: 6.17 K/uL    Auto Lymphocyte #: 1.92 K/uL    Auto Monocyte #: 0.67 K/uL    Auto Eosinophil #: 0.07 K/uL    Auto Basophil #: 0.02 K/uL    Auto Neutrophil %: 69.6: Differential percentages must be correlated with absolute numbers for  clinical significance. %    Auto Lymphocyte %: 21.6 %    Auto Monocyte %: 7.5 %    Auto Eosinophil %: 0.8 %    Auto Basophil %: 0.2 %    Auto Immature Granulocyte %: 0.3 %    Nucleated RBC: 0 /100 WBCs    Comprehensive Metabolic Panel (08.18.19 @ 01:40)    Sodium, Serum: 137 mmol/L    Potassium, Serum: 4.9: Hemolyzed. Interpret with caution mmol/L    Chloride, Serum: 102 mmol/L    Carbon Dioxide, Serum: 21 mmol/L    Anion Gap, Serum: 14 mmol/L    Blood Urea Nitrogen, Serum: 35 mg/dL    Creatinine, Serum: 1.0 mg/dL    Glucose, Serum: 93 mg/dL    Calcium, Total Serum: 10.2 mg/dL    Protein Total, Serum: 7.8 g/dL    Albumin, Serum: 4.8 g/dL    Bilirubin Total, Serum: 0.3 mg/dL    Alkaline Phosphatase, Serum: 101 U/L    Aspartate Aminotransferase (AST/SGOT): 32: Hemolyzed. Interpret with caution U/L    Alanine Aminotransferase (ALT/SGPT): 26: Hemolyzed. Interpret with caution U/L    eGFR if Non : 74    eGFR if : 86 mL/min/1.73M2    < from: MR Lumbar Spine No Cont (07.14.19 @ 13:45) >7/2019   IMPRESSION:  L5-S1 large superiorly-directed central disc herniation eccentric to the   left producing severe spinal stenosis with compression of the descending   cauda equina nerve roots.  < end of copied text >

## 2019-08-18 NOTE — ED PROVIDER NOTE - OBJECTIVE STATEMENT
34 year old female w/ a pmh of asthma and previous peripartum cardiomyopathy s/p cardioverson, intubation transferred to [facility] at the time was on ECMO and dialysis with last EF 35%. Patient 34 year old female w/ a pmh of asthma and previous peripartum cardiomyopathy s/p cardioverson, intubation transferred to [facility] at the time was on ECMO and dialysis with last EF 35%. Patient has a known history of herniated disc and has been following with neurology Dr. Dunham and neurosurgery. Patient was told earlier that she wasn't a surgical candidate due to her decreased EF. PAtient lifted her child and began having pain. Pain is located on the left and goes down her left buttock down to her left lower leg. Patient denies any fever chills cough cp sob n/v urinary / fecal incontience.

## 2019-08-18 NOTE — H&P ADULT - ATTENDING COMMENTS
patient seen and examined independently on morning rounds in the ED, chart reviewed and discussed with medicine resident and agree with the above H/P and assessment and plan with the following addendum:     in brief, 35 yo woman with h/o severe spinal stenosis, joss-partum cardiomyopathy requiring emergent  and transfer to [facility] for ECHMO in 2019 (EF at that time was 30%) who p/w worsening lower back pain that worsened after lifting her almost 5 month old baby. She denies hearing any pops or sounds- no bowel or bladder incontinence- she was following with Neurosurg as outpatient as well as cardio/eps with plan for eventual surgical repair once EF improved.  no fever, chills, sick contacts or similar prior episodes- she is unable to walk currently- morphine iv relieving pain and unable to tolerate oral percocet 2/2 GI symptoms.  no recent travel or sick contacts-   Admitted to medicine [facility] for pain mangment and consideration for inpatient need for repair.     pcp- Dr. Christine/ Neuro- Dr. Dunham/ EPS- Dr. Talita RUTH- as per above otherwise review of systems unremarkable    PE:  GEN-NAD, AAOx3  HEENT- NCAT, PERRLA, EOMI  NECK- supple no jvd, no lymphadenopathy  PULM- Clear to auscultation bilaterally, fair air entry  CVS- +s1/s2 RRR no murmurs  BACK- mild ttp lumbo-sacral spine  GI- soft NT ND +bs, no rebound, no guarding  EXT- no edema, unable to stand or ambulate currently 2/2 pain      labs/radiology reviewed    a/p:   #Back pain- severe spinal stenosis with L5-S1 cord compression  -pain control- change iv morphine to 4hr  -neurosurgery consult   -MRI  -check echo to eval EF prior to consideration for surgery    #h/o CMO peripartum/Aflutter  -check echo and EF (previous EF 30-35% in may 2019)  -EPS./cardiology f/u  -cont asa, bblocker, entresto, aldactone    #?h/o PE  -completed 3 months of eliquis---was on 5 mg bid--stopped this week    DVT/GI ppx  guarded prognosis    FULL CODE

## 2019-08-18 NOTE — H&P ADULT - NSICDXPASTMEDICALHX_GEN_ALL_CORE_FT
PAST MEDICAL HISTORY:  Asthma as child, no inhaler use>10 years. Denies hospitlizations or intubations. No current inhaler.    Intubation of airway performed without difficulty     Peripartum cardiomyopathy     SVT (supraventricular tachycardia) PAST MEDICAL HISTORY:  Asthma as child, no inhaler use>10 years. Denies hospitlizations or intubations due to asthma. No current inhaler.    Intubation of airway performed without difficulty     Peripartum cardiomyopathy     SVT (supraventricular tachycardia)

## 2019-08-18 NOTE — H&P ADULT - NSICDXFAMILYHX_GEN_ALL_CORE_FT
FAMILY HISTORY:  Family history of obesity FAMILY HISTORY:  Family history of CHF (congestive heart failure), father  Family history of COPD (chronic obstructive pulmonary disease), father  Family history of obesity  Family hx of lung cancer, grandparent  FH: CAD (coronary artery disease), grandparent

## 2019-08-18 NOTE — H&P ADULT - HISTORY OF PRESENT ILLNESS
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 Pt notes she has chronic back pain due to long history of disc herniation and spinal stenosis. She is following with neuro surgery Dr Cage who plans on doing surgery once her EF improves. On Friday prior to presentation her back pain suddenly got worse after lifting her child's carseat. She tried to avoid coming to the ED but the pain didn't get better over the qweend and she could barely move so she came to the ED. 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 Pt notes she has chronic back pain due to long history of disc herniation and spinal stenosis. She is following with neuro surgery Dr Cage who plans on doing surgery once her EF improves. On Friday prior to presentation her back pain suddenly got worse after lifting her child's carseat. She tried to avoid coming to the ED, took motrin and a oxycodone from her  but the pain didn't get better over the weekend and she could barely move so she came to the ED. She says the pain is in her back radiating down her left leg. She has baseline numbness in her toes b/l but now has numbness in her whole left leg. Pain is exacerbated by movement and is better with the opoid meds she was given as long as she lays still. She denies any incontinence, saddle paresthesias or gait abnormalities other then that due to pain.       ED: Decadron, Toradol, Robaxin, morphine, zofran

## 2019-08-19 LAB
ANION GAP SERPL CALC-SCNC: 11 MMOL/L — SIGNIFICANT CHANGE UP (ref 7–14)
APTT BLD: 37 SEC — SIGNIFICANT CHANGE UP (ref 27–39.2)
BASOPHILS # BLD AUTO: 0.03 K/UL — SIGNIFICANT CHANGE UP (ref 0–0.2)
BASOPHILS NFR BLD AUTO: 0.3 % — SIGNIFICANT CHANGE UP (ref 0–1)
BUN SERPL-MCNC: 34 MG/DL — HIGH (ref 10–20)
CALCIUM SERPL-MCNC: 9.6 MG/DL — SIGNIFICANT CHANGE UP (ref 8.5–10.1)
CHLORIDE SERPL-SCNC: 107 MMOL/L — SIGNIFICANT CHANGE UP (ref 98–110)
CO2 SERPL-SCNC: 24 MMOL/L — SIGNIFICANT CHANGE UP (ref 17–32)
CREAT SERPL-MCNC: 0.9 MG/DL — SIGNIFICANT CHANGE UP (ref 0.7–1.5)
EOSINOPHIL # BLD AUTO: 0.02 K/UL — SIGNIFICANT CHANGE UP (ref 0–0.7)
EOSINOPHIL NFR BLD AUTO: 0.2 % — SIGNIFICANT CHANGE UP (ref 0–8)
GLUCOSE SERPL-MCNC: 97 MG/DL — SIGNIFICANT CHANGE UP (ref 70–99)
HCT VFR BLD CALC: 38.9 % — SIGNIFICANT CHANGE UP (ref 37–47)
HGB BLD-MCNC: 12.9 G/DL — SIGNIFICANT CHANGE UP (ref 12–16)
IMM GRANULOCYTES NFR BLD AUTO: 0.2 % — SIGNIFICANT CHANGE UP (ref 0.1–0.3)
INR BLD: 0.99 RATIO — SIGNIFICANT CHANGE UP (ref 0.65–1.3)
LYMPHOCYTES # BLD AUTO: 3.33 K/UL — SIGNIFICANT CHANGE UP (ref 1.2–3.4)
LYMPHOCYTES # BLD AUTO: 30.4 % — SIGNIFICANT CHANGE UP (ref 20.5–51.1)
MCHC RBC-ENTMCNC: 31.2 PG — HIGH (ref 27–31)
MCHC RBC-ENTMCNC: 33.2 G/DL — SIGNIFICANT CHANGE UP (ref 32–37)
MCV RBC AUTO: 94.2 FL — SIGNIFICANT CHANGE UP (ref 81–99)
MONOCYTES # BLD AUTO: 0.85 K/UL — HIGH (ref 0.1–0.6)
MONOCYTES NFR BLD AUTO: 7.8 % — SIGNIFICANT CHANGE UP (ref 1.7–9.3)
NEUTROPHILS # BLD AUTO: 6.7 K/UL — HIGH (ref 1.4–6.5)
NEUTROPHILS NFR BLD AUTO: 61.1 % — SIGNIFICANT CHANGE UP (ref 42.2–75.2)
NRBC # BLD: 0 /100 WBCS — SIGNIFICANT CHANGE UP (ref 0–0)
PLATELET # BLD AUTO: 240 K/UL — SIGNIFICANT CHANGE UP (ref 130–400)
POTASSIUM SERPL-MCNC: 4.2 MMOL/L — SIGNIFICANT CHANGE UP (ref 3.5–5)
POTASSIUM SERPL-SCNC: 4.2 MMOL/L — SIGNIFICANT CHANGE UP (ref 3.5–5)
PROTHROM AB SERPL-ACNC: 11.4 SEC — SIGNIFICANT CHANGE UP (ref 9.95–12.87)
RBC # BLD: 4.13 M/UL — LOW (ref 4.2–5.4)
RBC # FLD: 12 % — SIGNIFICANT CHANGE UP (ref 11.5–14.5)
SODIUM SERPL-SCNC: 142 MMOL/L — SIGNIFICANT CHANGE UP (ref 135–146)
WBC # BLD: 10.95 K/UL — HIGH (ref 4.8–10.8)
WBC # FLD AUTO: 10.95 K/UL — HIGH (ref 4.8–10.8)

## 2019-08-19 PROCEDURE — 99222 1ST HOSP IP/OBS MODERATE 55: CPT

## 2019-08-19 PROCEDURE — 72148 MRI LUMBAR SPINE W/O DYE: CPT | Mod: 26

## 2019-08-19 PROCEDURE — 93306 TTE W/DOPPLER COMPLETE: CPT | Mod: 26

## 2019-08-19 PROCEDURE — 93010 ELECTROCARDIOGRAM REPORT: CPT

## 2019-08-19 PROCEDURE — 71045 X-RAY EXAM CHEST 1 VIEW: CPT | Mod: 26

## 2019-08-19 PROCEDURE — 99233 SBSQ HOSP IP/OBS HIGH 50: CPT

## 2019-08-19 RX ORDER — PANTOPRAZOLE SODIUM 20 MG/1
40 TABLET, DELAYED RELEASE ORAL
Refills: 0 | Status: DISCONTINUED | OUTPATIENT
Start: 2019-08-19 | End: 2019-08-20

## 2019-08-19 RX ORDER — DEXAMETHASONE 0.5 MG/5ML
4 ELIXIR ORAL EVERY 6 HOURS
Refills: 0 | Status: DISCONTINUED | OUTPATIENT
Start: 2019-08-19 | End: 2019-08-20

## 2019-08-19 RX ORDER — DEXAMETHASONE 0.5 MG/5ML
10 ELIXIR ORAL ONCE
Refills: 0 | Status: COMPLETED | OUTPATIENT
Start: 2019-08-19 | End: 2019-08-19

## 2019-08-19 RX ORDER — SODIUM CHLORIDE 9 MG/ML
1000 INJECTION INTRAMUSCULAR; INTRAVENOUS; SUBCUTANEOUS
Refills: 0 | Status: DISCONTINUED | OUTPATIENT
Start: 2019-08-20 | End: 2019-08-20

## 2019-08-19 RX ADMIN — MORPHINE SULFATE 2 MILLIGRAM(S): 50 CAPSULE, EXTENDED RELEASE ORAL at 17:08

## 2019-08-19 RX ADMIN — SACUBITRIL AND VALSARTAN 1 TABLET(S): 24; 26 TABLET, FILM COATED ORAL at 21:32

## 2019-08-19 RX ADMIN — TRAMADOL HYDROCHLORIDE 50 MILLIGRAM(S): 50 TABLET ORAL at 21:32

## 2019-08-19 RX ADMIN — Medication 200 MILLIGRAM(S): at 15:24

## 2019-08-19 RX ADMIN — PANTOPRAZOLE SODIUM 40 MILLIGRAM(S): 20 TABLET, DELAYED RELEASE ORAL at 11:06

## 2019-08-19 RX ADMIN — TRAMADOL HYDROCHLORIDE 50 MILLIGRAM(S): 50 TABLET ORAL at 12:36

## 2019-08-19 RX ADMIN — Medication 100 MILLIGRAM(S): at 15:24

## 2019-08-19 RX ADMIN — Medication 100 MILLIGRAM(S): at 21:32

## 2019-08-19 RX ADMIN — Medication 1 MILLIGRAM(S): at 08:36

## 2019-08-19 RX ADMIN — TRAMADOL HYDROCHLORIDE 50 MILLIGRAM(S): 50 TABLET ORAL at 12:06

## 2019-08-19 RX ADMIN — TRAMADOL HYDROCHLORIDE 50 MILLIGRAM(S): 50 TABLET ORAL at 20:42

## 2019-08-19 RX ADMIN — MORPHINE SULFATE 2 MILLIGRAM(S): 50 CAPSULE, EXTENDED RELEASE ORAL at 07:49

## 2019-08-19 RX ADMIN — MORPHINE SULFATE 2 MILLIGRAM(S): 50 CAPSULE, EXTENDED RELEASE ORAL at 17:38

## 2019-08-19 RX ADMIN — SACUBITRIL AND VALSARTAN 1 TABLET(S): 24; 26 TABLET, FILM COATED ORAL at 10:17

## 2019-08-19 RX ADMIN — SPIRONOLACTONE 25 MILLIGRAM(S): 25 TABLET, FILM COATED ORAL at 06:08

## 2019-08-19 RX ADMIN — TRAMADOL HYDROCHLORIDE 50 MILLIGRAM(S): 50 TABLET ORAL at 02:18

## 2019-08-19 RX ADMIN — Medication 100 MILLIGRAM(S): at 06:08

## 2019-08-19 RX ADMIN — Medication 102 MILLIGRAM(S): at 11:05

## 2019-08-19 RX ADMIN — Medication 4 MILLIGRAM(S): at 17:10

## 2019-08-19 RX ADMIN — Medication 4 MILLIGRAM(S): at 23:46

## 2019-08-19 RX ADMIN — MORPHINE SULFATE 2 MILLIGRAM(S): 50 CAPSULE, EXTENDED RELEASE ORAL at 03:13

## 2019-08-19 RX ADMIN — MORPHINE SULFATE 2 MILLIGRAM(S): 50 CAPSULE, EXTENDED RELEASE ORAL at 21:32

## 2019-08-19 RX ADMIN — MORPHINE SULFATE 2 MILLIGRAM(S): 50 CAPSULE, EXTENDED RELEASE ORAL at 08:19

## 2019-08-19 RX ADMIN — ENOXAPARIN SODIUM 40 MILLIGRAM(S): 100 INJECTION SUBCUTANEOUS at 11:06

## 2019-08-19 NOTE — PROGRESS NOTE ADULT - SUBJECTIVE AND OBJECTIVE BOX
Patient is a 34y old  Female who presents with a chief complaint of Back pain (19 Aug 2019 13:56)    INTERVAL HPI/OVERNIGHT EVENTS: still +LBP, LLE weaker then right, no incontinence or saddle anesthesia  ROS: Denies CP, SOB, AP, new weakness  All other systems reviewed and are within normal limits except for the complaints above.  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 Pt notes she has chronic back pain due to long history of disc herniation and spinal stenosis. She is following with neuro surgery Dr Cage who plans on doing surgery once her EF improves. On Friday prior to presentation her back pain suddenly got worse after lifting her child's carseat. She tried to avoid coming to the ED, took motrin and a oxycodone from her  but the pain didn't get better over the weekend and she could barely move so she came to the ED. She says the pain is in her back radiating down her left leg. She has baseline numbness in her toes b/l but now has numbness in her whole left leg. Pain is exacerbated by movement and is better with the opoid meds she was given as long as she lays still. She denies any incontinence, saddle paresthesias or gait abnormalities other then that due to pain.       ED: Decadron, Toradol, Robaxin, morphine, zofran (18 Aug 2019 14:38)    BACK PAIN  ^BACK PAIN  Family hx of lung cancer  FH: CAD (coronary artery disease)  Family history of COPD (chronic obstructive pulmonary disease)  Family history of CHF (congestive heart failure)  Family history of obesity  No pertinent family history in first degree relatives  No pertinent family history in first degree relatives  Handoff  MEWS Score  35w  Intubation of airway performed without difficulty  Peripartum cardiomyopathy  SVT (supraventricular tachycardia)  Asthma  No pertinent past medical history  Back pain  Delivery by emergency   History of low transverse  section  No significant past surgical history  BACK PAIN  90+    MEDICATIONS  (STANDING):  chlorhexidine 4% Liquid 1 Application(s) Topical <User Schedule>  dexamethasone  Injectable 4 milliGRAM(s) IV Push every 6 hours  docusate sodium 100 milliGRAM(s) Oral three times a day  metoprolol succinate  milliGRAM(s) Oral daily  pantoprazole    Tablet 40 milliGRAM(s) Oral before breakfast  sacubitril 97 mG/valsartan 103 mG 1 Tablet(s) Oral two times a day  spironolactone 25 milliGRAM(s) Oral <User Schedule>    MEDICATIONS  (PRN):  acetaminophen   Tablet .. 650 milliGRAM(s) Oral every 4 hours PRN Mild Pain (1 - 3)  morphine  - Injectable 2 milliGRAM(s) IV Push every 4 hours PRN Severe Pain (7 - 10)  senna 2 Tablet(s) Oral at bedtime PRN Constipation  traMADol 50 milliGRAM(s) Oral every 6 hours PRN Moderate Pain (4 - 6)    Home Medications:  aspirin 81 mg oral tablet: 1 tab(s) orally once a day (18 Aug 2019 14:48)  Entresto 97 mg-103 mg oral tablet: 1 tab(s) orally 2 times a day (18 Aug 2019 14:48)  Metoprolol Succinate  mg oral tablet, extended release: 1 tab(s) orally once a day (18 Aug 2019 14:48)  Motrin: 2 tab(s) orally once a day, As Needed (18 Aug 2019 14:48)  spironolactone 25 mg oral tablet: 1 tab(s) orally once a day on  and Fri (18 Aug 2019 14:48)    Vital Signs Last 24 Hrs  T(C): 36.8 (19 Aug 2019 14:24), Max: 36.8 (19 Aug 2019 14:24)  T(F): 98.2 (19 Aug 2019 14:24), Max: 98.2 (19 Aug 2019 14:24)  HR: 81 (19 Aug 2019 15:29) (62 - 81)  BP: 103/49 (19 Aug 2019 15:29) (101/52 - 107/58)  BP(mean): --  RR: 18 (19 Aug 2019 14:24) (18 - 19)  SpO2: --  CAPILLARY BLOOD GLUCOSE        General: NAD, AAO3  HEENT: PERRLA, EOM  CV: S1 S2  Resp: decreased breath sounds at bases  GI: NT/ND/S +BS  MS: no clubbing/cyanosis/edema, 2+ pulses b/l  Neuro: LLE 4/5, RLE 5/5, 2+reflexes thruout, +SLT b/l  LABS:                        12.9   10.95 )-----------( 240      ( 19 Aug 2019 06:26 )             38.9     08-    142  |  107  |  34<H>  ----------------------------<  97  4.2   |  24  |  0.9    Ca    9.6      19 Aug 2019 06:26    TPro  7.8  /  Alb  4.8  /  TBili  0.3  /  DBili  x   /  AST  32  /  ALT  26  /  AlkPhos  101  08-18    LIVER FUNCTIONS - ( 18 Aug 2019 01:40 )  Alb: 4.8 g/dL / Pro: 7.8 g/dL / ALK PHOS: 101 U/L / ALT: 26 U/L / AST: 32 U/L / GGT: x                           Consultant(s) Notes Reviewed:  [x ] YES  [ ] NO  Care Discussed with Consultants/Other Providers/ Housestaff [ x] YES  [ ] NO  Radiology personally reviewed.

## 2019-08-19 NOTE — CONSULT NOTE ADULT - ASSESSMENT
34 y.o F pmh severe spinal stenosis L5-S1, peripartum cardiomyopathy s/p echmo + RRT April 2019 w/ echo showing EF 30-35%, PE s/p eliquis presenting w/ worsening back pain/parasthesias awaiting possible microdisectomy    Severe spinal stenosis L5-S1  Peripartum cardiomyopathy EF 30-35% s/p echmo 04/2019 +A-flutter + SVT  PE s/p eliquis w/ last eliquis use 08/13/19    Recommendation: Patient is moderate risk for a moderate risk procedure w/ RCRI score class II risk representing 6% 30 day post-op cardiac mortality   2D TTE  ECG   Stop aspirin 1-2 days prior to surgery and cont. 1 day post op if no major bleeding   Outpatient f/u 34 y.o F pmh severe spinal stenosis L5-S1, Peripartum cardiomyopathy s/p echmo + RRT April 2019 w/ echo showing EF 30-35%, PE s/p eliquis presenting w/ worsening back pain/parasthesias awaiting possible microdisectomy    Severe spinal stenosis L5-S1  Peripartum cardiomyopathy EF 30-35% s/p echmo 04/2019 +A-flutter + SVT  PE s/p eliquis w/ last eliquis use 08/13/19    Recommendation: Patient is moderate risk for a moderate risk procedure w/ RCRI score class II risk representing 6% 30 day post-op cardiac mortality   2D TTE  Stop aspirin 1-2 days prior to surgery and cont. 1 day post op if no major bleeding   Outpatient f/u

## 2019-08-19 NOTE — PROGRESS NOTE ADULT - ASSESSMENT
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    # Back pain secondary to Severe spinal stenosis due to disk herniation  -MRI 2019: severe stenosis with compression of cauda equina roots at L5-S1  -cardio clearance of chart  -awaiting OR    # Hx peripartum cardiomyopathy / Aflutter  -cardiac function improving last echo 2019: EF improved to 30-35%  -c/w ASA, metoprolol 200mg daily, entresto 97/103 BID, Aldactone 3x weekly    # Hx of PE  -completed course of Eliquis, last dose 19  -no complaints of SOB at present    # DVT ppx -lovenox  # Diet -DASH  # Activity -ambulate as tolerated  # Code Status -d/w patient -FULL CODE  # Dispo -from home    #Progress Note Handoff  Pending (specify):  discectomy by NS  Pt/Family discussion: Pt/ informed and agree with the current plan  Disposition: Home__x____/SNF_______/4A________/To be determined________/Waiting for Auth_____
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    # Back pain secondary to Severe spinal stenosis  -MRI 2019: severe stenosis with compression of cauda equina roots at L5-S1  -followed by Neurosx with plan for microdiscectomy once cardiac function improved  -New echo is pending, if EF improves, possibility of surgery   -last echo showed 30-35% EF  -Cardiology consult is pending   -pain management with IV morphine (unable to tolerate oxycodone) for now, start senna colace  -neurosurgery eval (Dr Cage)  -PT eval: pending    # Hx peripartum cardiomyopathy / Aflutter  -cardiac function improving last echo 2019: EF improved to 30-35%  -c/w ASA, metoprolol 200mg daily, entresto 97/103 BID, Aldactone 3x weekly  -cardio plan for repeat echo in Sept, will repeat now while inpatient for possible neurosurgery intervention    # Hx of PE  -V/Q with intermediate likelihood of PE 2019  -completed course of Eliquis, last dose 19  -no complaints of SOB at present    # DVT ppx -lovenox  # Diet -DASH  # Activity -ambulate as tolerated  # Code Status -d/w patient -FULL CODE  # Dispo -from home

## 2019-08-19 NOTE — PROGRESS NOTE ADULT - SUBJECTIVE AND OBJECTIVE BOX
JOE BEDOLLA 34y Female  MRN#: [id number]   CODE STATUS:________      SUBJECTIVE  Patient is a 34y old Female who presents with a chief complaint of Back pain (19 Aug 2019 12:12)  Currently admitted to medicine with the primary diagnosis of Back pain  Hospital course has been complicated by _______.   Today is hospital day 1d, and this morning she is _________ and reports ________ overnight events.     Present Today:           Bazzi Catheter ()No/ ()Yes? Indication:          Central Line ()No/ ()Yes? Indication:          IV Fluids ()No/ ()Yes? Type:  Rate:  Indication:      OBJECTIVE  PAST MEDICAL & SURGICAL HISTORY  Intubation of airway performed without difficulty  Peripartum cardiomyopathy  SVT (supraventricular tachycardia)  Asthma: as child, no inhaler use&gt;10 years. Denies hospitlizations or intubations due to asthma. No current inhaler.  Delivery by emergency   History of low transverse  section    ALLERGIES:  No Known Allergies    MEDICATIONS:  STANDING MEDICATIONS  chlorhexidine 4% Liquid 1 Application(s) Topical <User Schedule>  dexamethasone  Injectable 4 milliGRAM(s) IV Push every 6 hours  docusate sodium 100 milliGRAM(s) Oral three times a day  enoxaparin Injectable 40 milliGRAM(s) SubCutaneous daily  metoprolol succinate  milliGRAM(s) Oral daily  pantoprazole    Tablet 40 milliGRAM(s) Oral before breakfast  sacubitril 97 mG/valsartan 103 mG 1 Tablet(s) Oral two times a day  spironolactone 25 milliGRAM(s) Oral <User Schedule>    PRN MEDICATIONS  acetaminophen   Tablet .. 650 milliGRAM(s) Oral every 4 hours PRN  morphine  - Injectable 2 milliGRAM(s) IV Push every 4 hours PRN  senna 2 Tablet(s) Oral at bedtime PRN  traMADol 50 milliGRAM(s) Oral every 6 hours PRN      VITAL SIGNS: Last 24 Hours  T(C): 35.5 (19 Aug 2019 05:46), Max: 36.7 (18 Aug 2019 14:41)  T(F): 95.9 (19 Aug 2019 05:46), Max: 98 (18 Aug 2019 14:41)  HR: 62 (18 Aug 2019 22:56) (58 - 64)  BP: 107/58 (19 Aug 2019 05:46) (103/55 - 107/58)  BP(mean): 74 (18 Aug 2019 16:49) (74 - 74)  RR: 18 (19 Aug 2019 05:46) (18 - 19)  SpO2: --      PHYSICAL EXAM:    GENERAL: moderate distress due to back pain and lower extremity, well-developed, AAOx3 ,   HEENT:  Atraumatic, Normocephalic. EOMI, PERRLA, conjunctiva and sclera clear, No JVD  PULMONARY: Clear to auscultation bilaterally; No wheeze  CARDIOVASCULAR: Regular rate and rhythm; No murmurs, rubs, or gallops  GASTROINTESTINAL: Soft, Nontender, Nondistended; Bowel sounds present  MUSCULOSKELETAL:  2+ Peripheral Pulses, No clubbing, cyanosis, or edema  NEUROLOGY: non-focal  SKIN: No rashes or lesions    LABS:                        12.9   10.95 )-----------( 240      ( 19 Aug 2019 06:26 )             38.9     08-    142  |  107  |  34<H>  ----------------------------<  97  4.2   |  24  |  0.9    Ca    9.6      19 Aug 2019 06:26    TPro  7.8  /  Alb  4.8  /  TBili  0.3  /  DBili  x   /  AST  32  /  ALT  26  /  AlkPhos  101  08-18          RADIOLOGY:    MR lumbar spine: pending  Echo : pending JOE BEDOLLA 34y Female  MRN#: [id number]       SUBJECTIVE  Patient is a 34y old Female who presents with a chief complaint of Back pain (19 Aug 2019 12:12)  Currently admitted to medicine with the primary diagnosis of Back pain  Today is hospital day 1d, No overnight complaints, except lower extremity and back pain    OBJECTIVE  PAST MEDICAL & SURGICAL HISTORY  Intubation of airway performed without difficulty  Peripartum cardiomyopathy  SVT (supraventricular tachycardia)  Asthma: as child, no inhaler use&gt;10 years. Denies hospitlizations or intubations due to asthma. No current inhaler.  Delivery by emergency   History of low transverse  section    ALLERGIES:  No Known Allergies    MEDICATIONS:  STANDING MEDICATIONS  chlorhexidine 4% Liquid 1 Application(s) Topical <User Schedule>  dexamethasone  Injectable 4 milliGRAM(s) IV Push every 6 hours  docusate sodium 100 milliGRAM(s) Oral three times a day  enoxaparin Injectable 40 milliGRAM(s) SubCutaneous daily  metoprolol succinate  milliGRAM(s) Oral daily  pantoprazole    Tablet 40 milliGRAM(s) Oral before breakfast  sacubitril 97 mG/valsartan 103 mG 1 Tablet(s) Oral two times a day  spironolactone 25 milliGRAM(s) Oral <User Schedule>    PRN MEDICATIONS  acetaminophen   Tablet .. 650 milliGRAM(s) Oral every 4 hours PRN  morphine  - Injectable 2 milliGRAM(s) IV Push every 4 hours PRN  senna 2 Tablet(s) Oral at bedtime PRN  traMADol 50 milliGRAM(s) Oral every 6 hours PRN      VITAL SIGNS: Last 24 Hours  T(C): 35.5 (19 Aug 2019 05:46), Max: 36.7 (18 Aug 2019 14:41)  T(F): 95.9 (19 Aug 2019 05:46), Max: 98 (18 Aug 2019 14:41)  HR: 62 (18 Aug 2019 22:56) (58 - 64)  BP: 107/58 (19 Aug 2019 05:46) (103/55 - 107/58)  BP(mean): 74 (18 Aug 2019 16:49) (74 - 74)  RR: 18 (19 Aug 2019 05:46) (18 - 19)  SpO2: --      PHYSICAL EXAM:    GENERAL: moderate distress due to back pain and lower extremity, well-developed, AAOx3 ,   HEENT:  Atraumatic, Normocephalic. EOMI, PERRLA, conjunctiva and sclera clear, No JVD  PULMONARY: Clear to auscultation bilaterally; No wheeze  CARDIOVASCULAR: Regular rate and rhythm; No murmurs, rubs, or gallops  GASTROINTESTINAL: Soft, Nontender, Nondistended; Bowel sounds present  MUSCULOSKELETAL:  2+ Peripheral Pulses, No clubbing, cyanosis, or edema  NEUROLOGY: non-focal  SKIN: No rashes or lesions    LABS:                        12.9   10.95 )-----------( 240      ( 19 Aug 2019 06:26 )             38.9     08-    142  |  107  |  34<H>  ----------------------------<  97  4.2   |  24  |  0.9    Ca    9.6      19 Aug 2019 06:26    TPro  7.8  /  Alb  4.8  /  TBili  0.3  /  DBili  x   /  AST  32  /  ALT  26  /  AlkPhos  101  08-18          RADIOLOGY:    MR lumbar spine: pending  Echo : pending

## 2019-08-19 NOTE — CONSULT NOTE ADULT - ATTENDING COMMENTS
Asymptomatic from a cardiac standpoint, hemodynamically stable and clinically euvolemic    Moderate-risk for perioperative MACE

## 2019-08-19 NOTE — CONSULT NOTE ADULT - SUBJECTIVE AND OBJECTIVE BOX
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 Pt notes she has chronic back pain due to long history of disc herniation and spinal stenosis. She is following with neuro surgery Dr Lima. On Friday prior to presentation her back pain suddenly got worse after lifting her child's car seat. She states she was going to her cardiac rehab apt and could barely get out of the car to the apt She tried to avoid coming to the ED, took motrin and a oxycodone from her  but the pain didn't get better over the weekend and she could barely move so she came to the ED. She states the pain is in her back radiating down her left leg. She has baseline numbness in her toes L>R but now has numbness in her whole left leg. Pain is exacerbated by movement and is better with the opoid meds she was given as long as she lays still. She denies any incontinence, saddle paresthesias or gait abnormalities other then that due to pain.       ED: Decadron, Toradol, Robaxin, morphine, zofran (18 Aug 2019 14:38)      ROS:  Patient states symptoms of SOB w/ worsening edema during peripartum cardiomyopathy have decreased since d/c and maintains her function and symptoms have returned to baseline not having to take breaks while carrying her son or walking     PAST MEDICAL & SURGICAL HISTORY  Intubation of airway performed without difficulty  Peripartum cardiomyopathy  SVT (supraventricular tachycardia)  Asthma: as child, no inhaler use&gt;10 years. Denies hospitlizations or intubations due to asthma. No current inhaler.  Delivery by emergency   History of low transverse  section      FAMILY HISTORY:  FAMILY HISTORY:  Family hx of lung cancer: grandparent  FH: CAD (coronary artery disease): grandparent  Family history of COPD (chronic obstructive pulmonary disease): father  Family history of CHF (congestive heart failure): father  Family history of obesity      SOCIAL HISTORY:  [-]smoker  [+]Alcohol- Social  [-]Drug    ALLERGIES:  No Known Allergies      MEDICATIONS:  MEDICATIONS  (STANDING):  chlorhexidine 4% Liquid 1 Application(s) Topical <User Schedule>  dexamethasone  Injectable 4 milliGRAM(s) IV Push every 6 hours  docusate sodium 100 milliGRAM(s) Oral three times a day  enoxaparin Injectable 40 milliGRAM(s) SubCutaneous daily  metoprolol succinate  milliGRAM(s) Oral daily  pantoprazole    Tablet 40 milliGRAM(s) Oral before breakfast  sacubitril 97 mG/valsartan 103 mG 1 Tablet(s) Oral two times a day  spironolactone 25 milliGRAM(s) Oral <User Schedule>    MEDICATIONS  (PRN):  acetaminophen   Tablet .. 650 milliGRAM(s) Oral every 4 hours PRN Mild Pain (1 - 3)  morphine  - Injectable 2 milliGRAM(s) IV Push every 4 hours PRN Severe Pain (7 - 10)  senna 2 Tablet(s) Oral at bedtime PRN Constipation  traMADol 50 milliGRAM(s) Oral every 6 hours PRN Moderate Pain (4 - 6)      HOME MEDICATIONS:  Home Medications:  aspirin 81 mg oral tablet: 1 tab(s) orally once a day (18 Aug 2019 14:48)  Entresto 97 mg-103 mg oral tablet: 1 tab(s) orally 2 times a day (18 Aug 2019 14:48)  Metoprolol Succinate  mg oral tablet, extended release: 1 tab(s) orally once a day (18 Aug 2019 14:48)  Motrin: 2 tab(s) orally once a day, As Needed (18 Aug 2019 14:48)  spironolactone 25 mg oral tablet: 1 tab(s) orally once a day on  and Fri (18 Aug 2019 14:48)      VITALS:   T(F): 95.9 ( @ 05:46), Max: 99 ( @ 07:44)  HR: 62 ( @ 22:56) (56 - 71)  BP: 107/58 ( @ 05:46) (95/53 - 107/58)  BP(mean): 74 ( @ 16:49) (74 - 74)  RR: 18 ( @ 05:46) (16 - 19)  SpO2: 98% ( @ 09:35) (97% - 99%)      PHYSICAL EXAM:  GEN: NAD  HEENT: no pallor  NECK: Supple, no JVD  LUNGS: Clear to auscultation bilaterally  CARDIOVASCULAR: Normal S1S2, no rubs, murmurs, or gallops  EXT: No Lower extremity edema/cyanosis  NEURO: Non focal. AAO X 3. dDecreased sensation to RUE and bottom of left foot   no point tenderness. Motor 5/5, limited LLE due to pain   SKIN: Intact    LABS:                        12.9   10.95 )-----------( 240      ( 19 Aug 2019 06:26 )             38.9         142  |  107  |  34<H>  ----------------------------<  97  4.2   |  24  |  0.9    Ca    9.6      19 Aug 2019 06:26    TPro  7.8  /  Alb  4.8  /  TBili  0.3  /  DBili  x   /  AST  32  /  ALT  26  /  AlkPhos  101  -          RADIOLOGY:  -MRI: < from: MR Lumbar Spine No Cont (19 @ 13:45) >  IMPRESSION:    L5-S1 large superiorly-directed central disc herniation eccentric to the   left producing severe spinal stenosis with compression of the descending   cauda equina nerve roots.    < from: MR Brachial Plexus w/wo IV Cont, Right (19 @ 14:29) >  IMPRESSION:    1. No MR evidence of enhancing brachial plexus lesion     2. Fibrofatty right chest wall inflammatory change at lateral edge of   right pectoralis major suggestive of fat necrosis, overall limited on   this wide field-of-view dedicated to the brachial plexus. Recommend   dedicated MRI of the right shoulder/pectoralis with and without contrast   for further assessment    -TTE: < from: Transthoracic Echocardiogram (19 @ 11:35) >  Summary:   1. Left ventricular ejection fraction, by visual estimation, is 30 to   35%.   2. Moderately decreased global left ventricular systolic function.   3. Mildly increased left ventricular internal cavity size.   4. Spectral Doppler shows restrictive pattern of left ventricular   myocardial filling (Grade III diastolic dysfunction).   5. LA is mildly dilated.   6. Mild to moderate mitral valve regurgitation.        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 Pt notes she has chronic back pain due to long history of disc herniation and spinal stenosis. She is following with neuro surgery Dr Lima. On Friday prior to presentation her back pain suddenly got worse after lifting her child's car seat. She states she was going to her cardiac rehab apt and could barely get out of the car to the apt She tried to avoid coming to the ED, took motrin and a oxycodone from her  but the pain didn't get better over the weekend and she could barely move so she came to the ED. She states the pain is in her back radiating down her left leg. She has baseline numbness in her toes L>R but now has numbness in her whole left leg. Pain is exacerbated by movement and is better with the opoid meds she was given as long as she lays still. She denies any incontinence, saddle paresthesias or gait abnormalities other then that due to pain.       ED: Decadron, Toradol, Robaxin, morphine, zofran (18 Aug 2019 14:38)      ROS:  Patient states symptoms of SOB w/ worsening edema during peripartum cardiomyopathy have decreased since d/c and maintains her function and symptoms have returned to baseline not having to take breaks while carrying her son or walking     PAST MEDICAL & SURGICAL HISTORY  Intubation of airway performed without difficulty  Peripartum cardiomyopathy  SVT (supraventricular tachycardia)  Asthma: as child, no inhaler use&gt;10 years. Denies hospitlizations or intubations due to asthma. No current inhaler.  Delivery by emergency   History of low transverse  section      FAMILY HISTORY:  FAMILY HISTORY:  Family hx of lung cancer: grandparent  FH: CAD (coronary artery disease): grandparent  Family history of COPD (chronic obstructive pulmonary disease): father  Family history of CHF (congestive heart failure): father  Family history of obesity      SOCIAL HISTORY:  [-]smoker  [+]Alcohol- Social  [-]Drug    ALLERGIES:  No Known Allergies      MEDICATIONS:  MEDICATIONS  (STANDING):  chlorhexidine 4% Liquid 1 Application(s) Topical <User Schedule>  dexamethasone  Injectable 4 milliGRAM(s) IV Push every 6 hours  docusate sodium 100 milliGRAM(s) Oral three times a day  enoxaparin Injectable 40 milliGRAM(s) SubCutaneous daily  metoprolol succinate  milliGRAM(s) Oral daily  pantoprazole    Tablet 40 milliGRAM(s) Oral before breakfast  sacubitril 97 mG/valsartan 103 mG 1 Tablet(s) Oral two times a day  spironolactone 25 milliGRAM(s) Oral <User Schedule>    MEDICATIONS  (PRN):  acetaminophen   Tablet .. 650 milliGRAM(s) Oral every 4 hours PRN Mild Pain (1 - 3)  morphine  - Injectable 2 milliGRAM(s) IV Push every 4 hours PRN Severe Pain (7 - 10)  senna 2 Tablet(s) Oral at bedtime PRN Constipation  traMADol 50 milliGRAM(s) Oral every 6 hours PRN Moderate Pain (4 - 6)      HOME MEDICATIONS:  Home Medications:  aspirin 81 mg oral tablet: 1 tab(s) orally once a day (18 Aug 2019 14:48)  Entresto 97 mg-103 mg oral tablet: 1 tab(s) orally 2 times a day (18 Aug 2019 14:48)  Metoprolol Succinate  mg oral tablet, extended release: 1 tab(s) orally once a day (18 Aug 2019 14:48)  Motrin: 2 tab(s) orally once a day, As Needed (18 Aug 2019 14:48)  spironolactone 25 mg oral tablet: 1 tab(s) orally once a day on  and Fri (18 Aug 2019 14:48)      VITALS:   T(F): 95.9 ( @ 05:46), Max: 99 ( @ 07:44)  HR: 62 ( @ 22:56) (56 - 71)  BP: 107/58 ( @ 05:46) (95/53 - 107/58)  BP(mean): 74 ( @ 16:49) (74 - 74)  RR: 18 ( @ 05:46) (16 - 19)  SpO2: 98% ( @ 09:35) (97% - 99%)      PHYSICAL EXAM:  GEN: NAD  HEENT: no pallor  NECK: Supple, no JVD  LUNGS: Clear to auscultation bilaterally  CARDIOVASCULAR: Normal S1S2, no rubs, murmurs, or gallops  EXT: No Lower extremity edema/cyanosis  NEURO: Non focal. AAO X 3. dDecreased sensation to RUE and bottom of left foot   no point tenderness. Motor 5/5, limited LLE due to pain   SKIN: Intact    LABS:                        12.9   10.95 )-----------( 240      ( 19 Aug 2019 06:26 )             38.9         142  |  107  |  34<H>  ----------------------------<  97  4.2   |  24  |  0.9    Ca    9.6      19 Aug 2019 06:26    TPro  7.8  /  Alb  4.8  /  TBili  0.3  /  DBili  x   /  AST  32  /  ALT  26  /  AlkPhos  101  08-18          RADIOLOGY:  -MRI: < from: MR Lumbar Spine No Cont (19 @ 13:45) >  IMPRESSION:    L5-S1 large superiorly-directed central disc herniation eccentric to the   left producing severe spinal stenosis with compression of the descending   cauda equina nerve roots.    < from: MR Brachial Plexus w/wo IV Cont, Right (19 @ 14:29) >  IMPRESSION:    1. No MR evidence of enhancing brachial plexus lesion     2. Fibrofatty right chest wall inflammatory change at lateral edge of   right pectoralis major suggestive of fat necrosis, overall limited on   this wide field-of-view dedicated to the brachial plexus. Recommend   dedicated MRI of the right shoulder/pectoralis with and without contrast   for further assessment    -TTE: < from: Transthoracic Echocardiogram (19 @ 11:35) >  Summary:   1. Left ventricular ejection fraction, by visual estimation, is 30 to   35%.   2. Moderately decreased global left ventricular systolic function.   3. Mildly increased left ventricular internal cavity size.   4. Spectral Doppler shows restrictive pattern of left ventricular   myocardial filling (Grade III diastolic dysfunction).   5. LA is mildly dilated.   6. Mild to moderate mitral valve regurgitation.        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 Pt notes she has chronic back pain due to long history of disc herniation and spinal stenosis. She is following with neuro surgery Dr Lima. On Friday prior to presentation her back pain suddenly got worse after lifting her child's car seat. She states she was going to her cardiac rehab apt and could barely get out of the car to the apt She tried to avoid coming to the ED, took motrin and a oxycodone from her  but the pain didn't get better over the weekend and she could barely move so she came to the ED. She states the pain is in her back radiating down her left leg. She has baseline numbness in her toes L>R but now has numbness in her whole left leg. Pain is exacerbated by movement and is better with the opoid meds she was given as long as she lays still. She denies any incontinence, saddle paresthesias or gait abnormalities other then that due to pain.       ED: Decadron, Toradol, Robaxin, morphine, zofran (18 Aug 2019 14:38)      ROS:  Patient states symptoms of SOB w/ worsening edema during peripartum cardiomyopathy have decreased since d/c and maintains her function and symptoms have returned to baseline not having to take breaks while carrying her son or walking     PAST MEDICAL & SURGICAL HISTORY  Intubation of airway performed without difficulty  Peripartum cardiomyopathy  SVT (supraventricular tachycardia)  Asthma: as child, no inhaler use&gt;10 years. Denies hospitlizations or intubations due to asthma. No current inhaler.  Delivery by emergency   History of low transverse  section      FAMILY HISTORY:  Family hx of lung cancer: grandparent  FH: CAD (coronary artery disease): grandparent  Family history of COPD (chronic obstructive pulmonary disease): father  Family history of CHF (congestive heart failure): father  Family history of obesity      SOCIAL HISTORY:  [-]smoker  [+]Alcohol- Social  [-]Drug    ALLERGIES:  No Known Allergies      MEDICATIONS  (STANDING):  chlorhexidine 4% Liquid 1 Application(s) Topical <User Schedule>  dexamethasone  Injectable 4 milliGRAM(s) IV Push every 6 hours  docusate sodium 100 milliGRAM(s) Oral three times a day  enoxaparin Injectable 40 milliGRAM(s) SubCutaneous daily  metoprolol succinate  milliGRAM(s) Oral daily  pantoprazole    Tablet 40 milliGRAM(s) Oral before breakfast  sacubitril 97 mG/valsartan 103 mG 1 Tablet(s) Oral two times a day  spironolactone 25 milliGRAM(s) Oral <User Schedule>    MEDICATIONS  (PRN):  acetaminophen   Tablet .. 650 milliGRAM(s) Oral every 4 hours PRN Mild Pain (1 - 3)  morphine  - Injectable 2 milliGRAM(s) IV Push every 4 hours PRN Severe Pain (7 - 10)  senna 2 Tablet(s) Oral at bedtime PRN Constipation  traMADol 50 milliGRAM(s) Oral every 6 hours PRN Moderate Pain (4 - 6)      Home Medications:  aspirin 81 mg oral tablet: 1 tab(s) orally once a day (18 Aug 2019 14:48)  Entresto 97 mg-103 mg oral tablet: 1 tab(s) orally 2 times a day (18 Aug 2019 14:48)  Metoprolol Succinate  mg oral tablet, extended release: 1 tab(s) orally once a day (18 Aug 2019 14:48)  Motrin: 2 tab(s) orally once a day, As Needed (18 Aug 2019 14:48)  spironolactone 25 mg oral tablet: 1 tab(s) orally once a day on  and Fri (18 Aug 2019 14:48)      VITALS:   T(F): 95.9 ( @ 05:46), Max: 99 ( @ 07:44)  HR: 62 ( @ 22:56) (56 - 71)  BP: 107/58 ( @ 05:46) (95/53 - 107/58)  BP(mean): 74 ( @ 16:49) (74 - 74)  RR: 18 ( @ 05:46) (16 - 19)  SpO2: 98% ( @ 09:35) (97% - 99%)      PHYSICAL EXAM:  General: NAD, AAOx3  HEENT: NCAT, EOMI, PERRLA  Neck: supple, no JVD  CV: RRR, S1S2 nl, no murmurs, no edema  Respiratory: CTA bilaterally, no wheeze, rales or rhonchi	  Abdomen: soft, NT/ND, +BS  Extremities: ROM nl, 2+ PP bilaterally  NEURO: Non focal. AAO X 3. dDecreased sensation to RUE and bottom of left foot   no point tenderness. Motor 5/5, limited LLE due to pain   SKIN: Intact                            12.9   10.95 )-----------( 240      ( 19 Aug 2019 06:26 )             38.9     08-    142  |  107  |  34<H>  ----------------------------<  97  4.2   |  24  |  0.9    Ca    9.6      19 Aug 2019 06:26    TPro  7.8  /  Alb  4.8  /  TBili  0.3  /  DBili  x   /  AST  32  /  ALT  26  /  AlkPhos  101  08-18          RADIOLOGY:  -MRI: < from: MR Lumbar Spine No Cont (19 @ 13:45) >  IMPRESSION:    L5-S1 large superiorly-directed central disc herniation eccentric to the   left producing severe spinal stenosis with compression of the descending   cauda equina nerve roots.    < from: MR Brachial Plexus w/wo IV Cont, Right (19 @ 14:29) >  IMPRESSION:    1. No MR evidence of enhancing brachial plexus lesion     2. Fibrofatty right chest wall inflammatory change at lateral edge of   right pectoralis major suggestive of fat necrosis, overall limited on   this wide field-of-view dedicated to the brachial plexus. Recommend   dedicated MRI of the right shoulder/pectoralis with and without contrast   for further assessment      -TTE: < from: Transthoracic Echocardiogram (19 @ 11:35) >  Summary:   1. Left ventricular ejection fraction, by visual estimation, is 30 to   35%.   2. Moderately decreased global left ventricular systolic function.   3. Mildly increased left ventricular internal cavity size.   4. Spectral Doppler shows restrictive pattern of left ventricular   myocardial filling (Grade III diastolic dysfunction).   5. LA is mildly dilated.   6. Mild to moderate mitral valve regurgitation.        ECG: < from: 12 Lead ECG (08.19.19 @ 13:40) >  Diagnosis Line Normal sinus rhythm  Nonspecific T wave abnormality  Abnormal ECG

## 2019-08-19 NOTE — PROGRESS NOTE ADULT - SUBJECTIVE AND OBJECTIVE BOX
JOE BEDOLLA  MRN-[id number]    Current Issues:     Seen patient at bedside with Dr. Cage during rounds.  continues with LLE radiuclar pain.  Will schedule for Discectomy  for tuesday morning.     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 Pt notes she has chronic back pain due to long history of disc herniation and spinal stenosis. She is following with neuro surgery Dr Cage who plans on doing surgery once her EF improves. On Friday prior to presentation her back pain suddenly got worse after lifting her child's carseat. She tried to avoid coming to the ED, took motrin and a oxycodone from her  but the pain didn't get better over the weekend and she could barely move so she came to the ED. She says the pain is in her back radiating down her left leg. She has baseline numbness in her toes b/l but now has numbness in her whole left leg. Pain is exacerbated by movement and is better with the opoid meds she was given as long as she lays still. She denies any incontinence, saddle paresthesias or gait abnormalities other then that due to pain.       ED: Decadron, Toradol, Robaxin, morphine, zofran (18 Aug 2019 14:38)      Allergies    No Known Allergies    Intolerances    Reglan (Other)    MEDICATIONS  (STANDING):  chlorhexidine 4% Liquid 1 Application(s) Topical <User Schedule>  dexamethasone  Injectable 4 milliGRAM(s) IV Push every 6 hours  docusate sodium 100 milliGRAM(s) Oral three times a day  enoxaparin Injectable 40 milliGRAM(s) SubCutaneous daily  metoprolol succinate  milliGRAM(s) Oral daily  pantoprazole    Tablet 40 milliGRAM(s) Oral before breakfast  sacubitril 97 mG/valsartan 103 mG 1 Tablet(s) Oral two times a day  spironolactone 25 milliGRAM(s) Oral <User Schedule>    MEDICATIONS  (PRN):  acetaminophen   Tablet .. 650 milliGRAM(s) Oral every 4 hours PRN Mild Pain (1 - 3)  morphine  - Injectable 2 milliGRAM(s) IV Push every 4 hours PRN Severe Pain (7 - 10)  senna 2 Tablet(s) Oral at bedtime PRN Constipation  traMADol 50 milliGRAM(s) Oral every 6 hours PRN Moderate Pain (4 - 6)    Vital Signs Last 24 Hrs  T(C): 35.5 (19 Aug 2019 05:46), Max: 36.7 (18 Aug 2019 14:41)  T(F): 95.9 (19 Aug 2019 05:46), Max: 98 (18 Aug 2019 14:41)  HR: 62 (18 Aug 2019 22:56) (58 - 64)  BP: 107/58 (19 Aug 2019 05:46) (103/55 - 107/58)  BP(mean): 74 (18 Aug 2019 16:49) (74 - 74)  RR: 18 (19 Aug 2019 05:46) (18 - )        -    142  |  107  |  34<H>  ----------------------------<  97  4.2   |  24  |  0.9    Ca    9.6      19 Aug 2019 06:26    TPro  7.8  /  Alb  4.8  /  TBili  0.3  /  DBili  x   /  AST  32  /  ALT  26  /  AlkPhos  101                            12.9   10.95 )-----------( 240      ( 19 Aug 2019 06:26 )             38.9         Exam:  in bed, lying on side.    left leg DF PF EHL limited due to pain  Right leg mobile   sensory intact        Plan:    start Decadron/ protonix today - informed medical team  MRI done confirms large herniated disc L5S1  Awaiting Cardiology clearance note  echo today.    NPO after midnight  dc'd asa  hold lovenox after today    Patient will need clearance prior to surgery in am.                              Home Medications:  aspirin 81 mg oral tablet: 1 tab(s) orally once a day (18 Aug 2019 14:48)  Entresto 97 mg-103 mg oral tablet: 1 tab(s) orally 2 times a day (18 Aug 2019 14:48)  Metoprolol Succinate  mg oral tablet, extended release: 1 tab(s) orally once a day (18 Aug 2019 14:48)  Motrin: 2 tab(s) orally once a day, As Needed (18 Aug 2019 14:48)  spironolactone 25 mg oral tablet: 1 tab(s) orally once a day on  and Fri (18 Aug 2019 14:48)     PAST MEDICAL & SURGICAL HISTORY:  Intubation of airway performed without difficulty  Peripartum cardiomyopathy  SVT (supraventricular tachycardia)  Asthma: as child, no inhaler use&gt;10 years. Denies hospitlizations or intubations due to asthma. No current inhaler.  Delivery by emergency   History of low transverse  section JOE BEDOLLA  MRN-[id number]    Current Issues:     Seen patient at bedside with Dr. Cage during rounds.  Continues with LLE radiuclar pain.  Will schedule for Discectomy  for tuesday morning.     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 Pt notes she has chronic back pain due to long history of disc herniation and spinal stenosis. She is following with neuro surgery Dr Cage who plans on doing surgery once her EF improves. On Friday prior to presentation her back pain suddenly got worse after lifting her child's carseat. She tried to avoid coming to the ED, took motrin and a oxycodone from her  but the pain didn't get better over the weekend and she could barely move so she came to the ED. She says the pain is in her back radiating down her left leg. She has baseline numbness in her toes b/l but now has numbness in her whole left leg. Pain is exacerbated by movement and is better with the opoid meds she was given as long as she lays still. She denies any incontinence, saddle paresthesias or gait abnormalities other then that due to pain.       ED: Decadron, Toradol, Robaxin, morphine, zofran (18 Aug 2019 14:38)      Allergies    No Known Allergies    Intolerances    Reglan (Other)    MEDICATIONS  (STANDING):  chlorhexidine 4% Liquid 1 Application(s) Topical <User Schedule>  dexamethasone  Injectable 4 milliGRAM(s) IV Push every 6 hours  docusate sodium 100 milliGRAM(s) Oral three times a day  enoxaparin Injectable 40 milliGRAM(s) SubCutaneous daily  metoprolol succinate  milliGRAM(s) Oral daily  pantoprazole    Tablet 40 milliGRAM(s) Oral before breakfast  sacubitril 97 mG/valsartan 103 mG 1 Tablet(s) Oral two times a day  spironolactone 25 milliGRAM(s) Oral <User Schedule>    MEDICATIONS  (PRN):  acetaminophen   Tablet .. 650 milliGRAM(s) Oral every 4 hours PRN Mild Pain (1 - 3)  morphine  - Injectable 2 milliGRAM(s) IV Push every 4 hours PRN Severe Pain (7 - 10)  senna 2 Tablet(s) Oral at bedtime PRN Constipation  traMADol 50 milliGRAM(s) Oral every 6 hours PRN Moderate Pain (4 - 6)    Vital Signs Last 24 Hrs  T(C): 35.5 (19 Aug 2019 05:46), Max: 36.7 (18 Aug 2019 14:41)  T(F): 95.9 (19 Aug 2019 05:46), Max: 98 (18 Aug 2019 14:41)  HR: 62 (18 Aug 2019 22:56) (58 - 64)  BP: 107/58 (19 Aug 2019 05:46) (103/55 - 107/58)  BP(mean): 74 (18 Aug 2019 16:49) (74 - 74)  RR: 18 (19 Aug 2019 05:46) (18 - )        -    142  |  107  |  34<H>  ----------------------------<  97  4.2   |  24  |  0.9    Ca    9.6      19 Aug 2019 06:26    TPro  7.8  /  Alb  4.8  /  TBili  0.3  /  DBili  x   /  AST  32  /  ALT  26  /  AlkPhos  101                            12.9   10.95 )-----------( 240      ( 19 Aug 2019 06:26 )             38.9         Exam:  in bed, lying on side.    left leg DF PF EHL limited due to pain  Right leg mobile   sensory intact        Plan:    start Decadron/ protonix today - informed medical team  MRI done confirms large herniated disc L5S1  Awaiting Cardiology clearance note  echo today.    NPO after midnight  dc'd asa  hold lovenox after today    Patient will need clearance prior to surgery in am.                              Home Medications:  aspirin 81 mg oral tablet: 1 tab(s) orally once a day (18 Aug 2019 14:48)  Entresto 97 mg-103 mg oral tablet: 1 tab(s) orally 2 times a day (18 Aug 2019 14:48)  Metoprolol Succinate  mg oral tablet, extended release: 1 tab(s) orally once a day (18 Aug 2019 14:48)  Motrin: 2 tab(s) orally once a day, As Needed (18 Aug 2019 14:48)  spironolactone 25 mg oral tablet: 1 tab(s) orally once a day on  and Fri (18 Aug 2019 14:48)     PAST MEDICAL & SURGICAL HISTORY:  Intubation of airway performed without difficulty  Peripartum cardiomyopathy  SVT (supraventricular tachycardia)  Asthma: as child, no inhaler use&gt;10 years. Denies hospitlizations or intubations due to asthma. No current inhaler.  Delivery by emergency   History of low transverse  section

## 2019-08-20 ENCOUNTER — RESULT REVIEW (OUTPATIENT)
Age: 34
End: 2019-08-20

## 2019-08-20 PROCEDURE — 88304 TISSUE EXAM BY PATHOLOGIST: CPT | Mod: 26

## 2019-08-20 PROCEDURE — 63030 LAMOT DCMPRN NRV RT 1 LMBR: CPT | Mod: AS,LT

## 2019-08-20 PROCEDURE — 88311 DECALCIFY TISSUE: CPT | Mod: 26

## 2019-08-20 PROCEDURE — 63030 LAMOT DCMPRN NRV RT 1 LMBR: CPT | Mod: LT

## 2019-08-20 PROCEDURE — 99221 1ST HOSP IP/OBS SF/LOW 40: CPT | Mod: 25,57

## 2019-08-20 RX ORDER — BENZOCAINE AND MENTHOL 5; 1 G/100ML; G/100ML
1 LIQUID ORAL
Refills: 0 | Status: DISCONTINUED | OUTPATIENT
Start: 2019-08-20 | End: 2019-08-21

## 2019-08-20 RX ORDER — CEFAZOLIN SODIUM 1 G
1000 VIAL (EA) INJECTION EVERY 8 HOURS
Refills: 0 | Status: COMPLETED | OUTPATIENT
Start: 2019-08-20 | End: 2019-08-21

## 2019-08-20 RX ORDER — SENNA PLUS 8.6 MG/1
2 TABLET ORAL AT BEDTIME
Refills: 0 | Status: DISCONTINUED | OUTPATIENT
Start: 2019-08-20 | End: 2019-08-21

## 2019-08-20 RX ORDER — ONDANSETRON 8 MG/1
4 TABLET, FILM COATED ORAL ONCE
Refills: 0 | Status: DISCONTINUED | OUTPATIENT
Start: 2019-08-20 | End: 2019-08-20

## 2019-08-20 RX ORDER — HYDROMORPHONE HYDROCHLORIDE 2 MG/ML
1 INJECTION INTRAMUSCULAR; INTRAVENOUS; SUBCUTANEOUS
Refills: 0 | Status: DISCONTINUED | OUTPATIENT
Start: 2019-08-20 | End: 2019-08-20

## 2019-08-20 RX ORDER — SPIRONOLACTONE 25 MG/1
25 TABLET, FILM COATED ORAL
Refills: 0 | Status: DISCONTINUED | OUTPATIENT
Start: 2019-08-20 | End: 2019-08-21

## 2019-08-20 RX ORDER — MORPHINE SULFATE 50 MG/1
2 CAPSULE, EXTENDED RELEASE ORAL EVERY 4 HOURS
Refills: 0 | Status: DISCONTINUED | OUTPATIENT
Start: 2019-08-20 | End: 2019-08-21

## 2019-08-20 RX ORDER — DEXAMETHASONE 0.5 MG/5ML
4 ELIXIR ORAL EVERY 6 HOURS
Refills: 0 | Status: COMPLETED | OUTPATIENT
Start: 2019-08-20 | End: 2019-08-21

## 2019-08-20 RX ORDER — ACETAMINOPHEN 500 MG
650 TABLET ORAL EVERY 4 HOURS
Refills: 0 | Status: DISCONTINUED | OUTPATIENT
Start: 2019-08-20 | End: 2019-08-21

## 2019-08-20 RX ORDER — OXYCODONE AND ACETAMINOPHEN 5; 325 MG/1; MG/1
2 TABLET ORAL ONCE
Refills: 0 | Status: DISCONTINUED | OUTPATIENT
Start: 2019-08-20 | End: 2019-08-20

## 2019-08-20 RX ORDER — SODIUM CHLORIDE 9 MG/ML
1000 INJECTION, SOLUTION INTRAVENOUS
Refills: 0 | Status: DISCONTINUED | OUTPATIENT
Start: 2019-08-20 | End: 2019-08-20

## 2019-08-20 RX ORDER — METOPROLOL TARTRATE 50 MG
200 TABLET ORAL DAILY
Refills: 0 | Status: DISCONTINUED | OUTPATIENT
Start: 2019-08-20 | End: 2019-08-21

## 2019-08-20 RX ORDER — TRAMADOL HYDROCHLORIDE 50 MG/1
50 TABLET ORAL EVERY 6 HOURS
Refills: 0 | Status: DISCONTINUED | OUTPATIENT
Start: 2019-08-20 | End: 2019-08-21

## 2019-08-20 RX ORDER — SACUBITRIL AND VALSARTAN 24; 26 MG/1; MG/1
1 TABLET, FILM COATED ORAL
Refills: 0 | Status: DISCONTINUED | OUTPATIENT
Start: 2019-08-20 | End: 2019-08-21

## 2019-08-20 RX ORDER — DOCUSATE SODIUM 100 MG
100 CAPSULE ORAL THREE TIMES A DAY
Refills: 0 | Status: DISCONTINUED | OUTPATIENT
Start: 2019-08-20 | End: 2019-08-21

## 2019-08-20 RX ORDER — PANTOPRAZOLE SODIUM 20 MG/1
40 TABLET, DELAYED RELEASE ORAL
Refills: 0 | Status: DISCONTINUED | OUTPATIENT
Start: 2019-08-20 | End: 2019-08-21

## 2019-08-20 RX ADMIN — SODIUM CHLORIDE 75 MILLILITER(S): 9 INJECTION INTRAMUSCULAR; INTRAVENOUS; SUBCUTANEOUS at 00:01

## 2019-08-20 RX ADMIN — Medication 100 MILLIGRAM(S): at 22:06

## 2019-08-20 RX ADMIN — Medication 4 MILLIGRAM(S): at 18:15

## 2019-08-20 RX ADMIN — Medication 100 MILLIGRAM(S): at 06:30

## 2019-08-20 RX ADMIN — SACUBITRIL AND VALSARTAN 1 TABLET(S): 24; 26 TABLET, FILM COATED ORAL at 22:06

## 2019-08-20 RX ADMIN — PANTOPRAZOLE SODIUM 40 MILLIGRAM(S): 20 TABLET, DELAYED RELEASE ORAL at 06:30

## 2019-08-20 RX ADMIN — Medication 4 MILLIGRAM(S): at 06:30

## 2019-08-20 RX ADMIN — HYDROMORPHONE HYDROCHLORIDE 1 MILLIGRAM(S): 2 INJECTION INTRAMUSCULAR; INTRAVENOUS; SUBCUTANEOUS at 13:39

## 2019-08-20 RX ADMIN — MORPHINE SULFATE 2 MILLIGRAM(S): 50 CAPSULE, EXTENDED RELEASE ORAL at 02:29

## 2019-08-20 NOTE — PROGRESS NOTE ADULT - SUBJECTIVE AND OBJECTIVE BOX
NEUROSURGERY POST OP NOTE:    S/P Left L5-S1 MLD    Pt seen and examined at bedside. Pt c/o incisonal pain      T(C): 37.6 (08-20-19 @ 14:31), Max: 37.6 (08-20-19 @ 14:31)  HR: 58 (08-20-19 @ 14:31) (56 - 81)  BP: 112/54 (08-20-19 @ 14:31) (102/59 - 132/44)  RR: 18 (08-20-19 @ 14:31) (13 - 22)  SpO2: 99% (08-20-19 @ 14:31) (99% - 100%)      acetaminophen   Tablet .. 650 milliGRAM(s) Oral every 4 hours PRN  ceFAZolin   IVPB 1000 milliGRAM(s) IV Intermittent every 8 hours  docusate sodium 100 milliGRAM(s) Oral three times a day  HYDROmorphone  Injectable 1 milliGRAM(s) IV Push every 10 minutes PRN  lactated ringers. 1000 milliLiter(s) IV Continuous <Continuous>  metoprolol succinate  milliGRAM(s) Oral daily  morphine  - Injectable 2 milliGRAM(s) IV Push every 4 hours PRN  ondansetron Injectable 4 milliGRAM(s) IV Push once PRN  oxyCODONE    5 mG/acetaminophen 325 mG 2 Tablet(s) Oral once PRN  pantoprazole    Tablet 40 milliGRAM(s) Oral before breakfast  sacubitril 97 mG/valsartan 103 mG 1 Tablet(s) Oral two times a day  senna 2 Tablet(s) Oral at bedtime PRN  spironolactone 25 milliGRAM(s) Oral <User Schedule>  traMADol 50 milliGRAM(s) Oral every 6 hours PRN      Exam:            Assessment:   As above    Plan:  Pain Meds PRN  PT

## 2019-08-20 NOTE — PROGRESS NOTE ADULT - SUBJECTIVE AND OBJECTIVE BOX
NEUROSURGERY POST OP NOTE:    S/P Left L5-S1 MLD    Pt seen and examined at bedside. Pt c/o incisional pain at this time. Sts significant improvement in her pre-op LLE pain. c/o residual numbness. Denies RLE sx.      T(C): 37.6 (08-20-19 @ 14:31), Max: 37.6 (08-20-19 @ 14:31)  HR: 65 (08-20-19 @ 18:12) (56 - 81)  BP: 99/50 (08-20-19 @ 18:12) (99/50 - 132/44)  RR: 18 (08-20-19 @ 18:12) (13 - 22)  SpO2: 99% (08-20-19 @ 14:31) (99% - 100%)      acetaminophen   Tablet .. 650 milliGRAM(s) Oral every 4 hours PRN  ceFAZolin   IVPB 1000 milliGRAM(s) IV Intermittent every 8 hours  dexamethasone  Injectable 4 milliGRAM(s) IV Push every 6 hours  docusate sodium 100 milliGRAM(s) Oral three times a day  metoprolol succinate  milliGRAM(s) Oral daily  morphine  - Injectable 2 milliGRAM(s) IV Push every 4 hours PRN  pantoprazole    Tablet 40 milliGRAM(s) Oral before breakfast  sacubitril 97 mG/valsartan 103 mG 1 Tablet(s) Oral two times a day  senna 2 Tablet(s) Oral at bedtime PRN  spironolactone 25 milliGRAM(s) Oral <User Schedule>  traMADol 50 milliGRAM(s) Oral every 6 hours PRN      Exam:  Strength 5/5  Sensation decr to light touch prox LLE compare to RLE, equal distally  KJ 2+  Dressing C/D/I    Assessment:  As above    Plan:  Pain Meds PRn  Decadron x 24hrs  PT

## 2019-08-20 NOTE — CHART NOTE - NSCHARTNOTEFT_GEN_A_CORE
PACU ANESTHESIA ADMISSION NOTE      Procedure: Lumbar discectomy    Post op diagnosis:      ____  Intubated  TV:______       Rate: ______      FiO2: ______    _x___  Patent Airway    _x___  Full return of protective reflexes    _x___  Full recovery from anesthesia / back to baseline status    Vitals:  T(C): 36.6 (08-20-19 @ 05:23), Max: 36.8 (08-19-19 @ 14:24)  HR: 59 (08-20-19 @ 05:23) (59 - 81)  BP: 102/59 (08-20-19 @ 05:23) (101/52 - 119/56)  RR: 18 (08-20-19 @ 05:23) (16 - 18)  SpO2: --    Mental Status:  _x___ Awake   _____ Alert   _____ Drowsy   _____ Sedated    Nausea/Vomiting:  _x___  NO       ______Yes,   See Post - Op Orders         Pain Scale (0-10):  __0___    Treatment:   x____ See Post - Op/PCA Orders    Post - Operative Fluids:     __x__ See Post - Op Orders    Plan: Discharge:     x_____Floor     _____Critical Care    _____  Other:_________________    Comments:  No anesthesia issues or complications noted.  Discharge when criteria met.

## 2019-08-21 ENCOUNTER — TRANSCRIPTION ENCOUNTER (OUTPATIENT)
Age: 34
End: 2019-08-21

## 2019-08-21 VITALS — DIASTOLIC BLOOD PRESSURE: 53 MMHG | SYSTOLIC BLOOD PRESSURE: 108 MMHG | RESPIRATION RATE: 18 BRPM | HEART RATE: 65 BPM

## 2019-08-21 RX ORDER — DOCUSATE SODIUM 100 MG
1 CAPSULE ORAL
Qty: 0 | Refills: 0 | DISCHARGE
Start: 2019-08-21

## 2019-08-21 RX ORDER — ASPIRIN/CALCIUM CARB/MAGNESIUM 324 MG
81 TABLET ORAL DAILY
Refills: 0 | Status: DISCONTINUED | OUTPATIENT
Start: 2019-08-21 | End: 2019-08-21

## 2019-08-21 RX ORDER — TRAMADOL HYDROCHLORIDE 50 MG/1
1 TABLET ORAL
Qty: 28 | Refills: 0
Start: 2019-08-21 | End: 2019-08-27

## 2019-08-21 RX ORDER — SENNA PLUS 8.6 MG/1
2 TABLET ORAL
Qty: 0 | Refills: 0 | DISCHARGE
Start: 2019-08-21

## 2019-08-21 RX ADMIN — Medication 4 MILLIGRAM(S): at 01:38

## 2019-08-21 RX ADMIN — PANTOPRAZOLE SODIUM 40 MILLIGRAM(S): 20 TABLET, DELAYED RELEASE ORAL at 06:33

## 2019-08-21 RX ADMIN — SPIRONOLACTONE 25 MILLIGRAM(S): 25 TABLET, FILM COATED ORAL at 06:33

## 2019-08-21 RX ADMIN — Medication 200 MILLIGRAM(S): at 14:28

## 2019-08-21 RX ADMIN — Medication 100 MILLIGRAM(S): at 14:29

## 2019-08-21 RX ADMIN — Medication 100 MILLIGRAM(S): at 06:33

## 2019-08-21 RX ADMIN — Medication 4 MILLIGRAM(S): at 12:13

## 2019-08-21 RX ADMIN — Medication 81 MILLIGRAM(S): at 14:35

## 2019-08-21 RX ADMIN — Medication 4 MILLIGRAM(S): at 06:32

## 2019-08-21 RX ADMIN — Medication 100 MILLIGRAM(S): at 06:32

## 2019-08-21 RX ADMIN — SACUBITRIL AND VALSARTAN 1 TABLET(S): 24; 26 TABLET, FILM COATED ORAL at 09:59

## 2019-08-21 NOTE — DISCHARGE NOTE NURSING/CASE MANAGEMENT/SOCIAL WORK - NSDCDPATPORTLINK_GEN_ALL_CORE
You can access the [facility] Patient Portal, offered by [facility], by registering with the following website: [email or web address]

## 2019-08-21 NOTE — DISCHARGE NOTE PROVIDER - HOSPITAL COURSE
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  Pt had a known L5-S1 disc herniation, being worked up by Dr Cage.  Pt was planned for surgery in September after clearance from Cardiology.  Pt presented to ED with intractable back pain, with numbness in left lower extremity.   Pt had repeat Echo showing EF 65%, and was cleared by cardiology for OR.  Pt went to OR on 19 for L5-S1 microlumbardiscectomy.  Pt did well post op, ambulated and did stairs with physical therapy.   Pt saw Dr Sanon's team regarding doses of cardiac medications for discharge home.  Stable for discharge home.

## 2019-08-21 NOTE — DISCHARGE NOTE PROVIDER - CARE PROVIDERS DIRECT ADDRESSES
,DirectAddress_Unknown,[email or web address]

## 2019-08-21 NOTE — DISCHARGE NOTE PROVIDER - NSDCCPCAREPLAN_GEN_ALL_CORE_FT
PRINCIPAL DISCHARGE DIAGNOSIS  Diagnosis: Lumbosacral disc herniation  Assessment and Plan of Treatment:

## 2019-08-21 NOTE — DISCHARGE NOTE PROVIDER - PROVIDER TOKENS
PROVIDER:[TOKEN:[[id number]]],PROVIDER:[TOKEN:[[id number]]],PROVIDER:[TOKEN:[[id number]]]

## 2019-08-21 NOTE — DISCHARGE NOTE PROVIDER - NSDCACTIVITY_GEN_ALL_CORE
Do not drive or operate machinery/Showering allowed/Do not make important decisions/Walking - Indoors allowed/No heavy lifting/straining

## 2019-08-21 NOTE — CONSULT NOTE ADULT - SUBJECTIVE AND OBJECTIVE BOX
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 Pt notes she has chronic back pain due to long history of disc herniation and spinal stenosis. She is following with neuro surgery Dr Cage who plans on doing surgery once her EF improves. On Friday prior to presentation her back pain suddenly got worse after lifting her child's carseat. She tried to avoid coming to the ED, took motrin and a oxycodone from her  but the pain didn't get better over the weekend and she could barely move so she came to the ED. She says the pain is in her back radiating down her left leg. She has baseline numbness in her toes b/l but now has numbness in her whole left leg. Pain is exacerbated by movement and is better with the opoid meds she was given as long as she lays still. She denies any incontinence, saddle paresthesias or gait abnormalities other then that due to pain.       ED: Decadron, Toradol, Robaxin, morphine, zofran (18 Aug 2019 14:38)      PAST MEDICAL & SURGICAL HISTORY:  Intubation of airway performed without difficulty  Peripartum cardiomyopathy  SVT (supraventricular tachycardia)  Asthma: as child, no inhaler use&gt;10 years. Denies hospitlizations or intubations due to asthma. No current inhaler.  Delivery by emergency   History of low transverse  section      Hospital Course: S/P Left L5-S1 MLD- Post op Pain/numbness L heel- on IV steroids  +void    TODAY'S SUBJECTIVE & REVIEW OF SYMPTOMS:     Constitutional WNL   Cardio WNL   Resp WNL   GI WNL  Heme WNL  Endo WNL  Skin WNL  MSK WNL  Neuro WNL  Cognitive WNL  Psych WNL      MEDICATIONS  (STANDING):  dexamethasone  Injectable 4 milliGRAM(s) IV Push every 6 hours  docusate sodium 100 milliGRAM(s) Oral three times a day  metoprolol succinate  milliGRAM(s) Oral daily  pantoprazole    Tablet 40 milliGRAM(s) Oral before breakfast  sacubitril 97 mG/valsartan 103 mG 1 Tablet(s) Oral two times a day  spironolactone 25 milliGRAM(s) Oral <User Schedule>    MEDICATIONS  (PRN):  acetaminophen   Tablet .. 650 milliGRAM(s) Oral every 4 hours PRN Mild Pain (1 - 3)  benzocaine 15 mG/menthol 3.6 mG Lozenge 1 Lozenge Oral every 2 hours PRN Sore Throat  morphine  - Injectable 2 milliGRAM(s) IV Push every 4 hours PRN Severe Pain (7 - 10)  senna 2 Tablet(s) Oral at bedtime PRN Constipation  traMADol 50 milliGRAM(s) Oral every 6 hours PRN Moderate Pain (4 - 6)      FAMILY HISTORY:  Family hx of lung cancer: grandparent  FH: CAD (coronary artery disease): grandparent  Family history of COPD (chronic obstructive pulmonary disease): father  Family history of CHF (congestive heart failure): father  Family history of obesity      Allergies    No Known Allergies    Intolerances    Reglan (Other)      SOCIAL HISTORY:    [    ] Etoh  [    ] Smoking  [    ] Substance abuse     Home Environment:  [    ] Home Alone  [ x   ] Lives with Family  [    ] Home Health Aid    Dwelling:  [    ] Apartment  [  x  ] Private House  [    ] Adult Home  [    ] Skilled Nursing Facility      [    ] Short Term  [    ] Long Term  [   x ] Stairs                           [    ] Elevator     FUNCTIONAL STATUS PTA: (Check all that apply)  Ambulation: [ x    ]Independent    [    ] Dependent     [    ] Non-Ambulatory  Assistive Device: [    ] SA Cane  [    ]  Q Cane  [    ] Walker  [    ]  Wheelchair  ADL : [  x  ] Independent  [    ]  Dependent       Vital Signs Last 24 Hrs  T(C): 35.5 (21 Aug 2019 05:27), Max: 37.6 (20 Aug 2019 14:31)  T(F): 95.9 (21 Aug 2019 05:27), Max: 99.6 (20 Aug 2019 14:31)  HR: 61 (21 Aug 2019 09:46) (51 - 76)  BP: 110/53 (21 Aug 2019 09:46) (98/51 - 132/44)  BP(mean): --  RR: 18 (21 Aug 2019 09:46) (13 - 22)  SpO2: 98% (20 Aug 2019 21:57) (98% - 100%)      PHYSICAL EXAM: Alert & Oriented X3  GENERAL: NAD, well-groomed, well-developed  HEAD:  Atraumatic, Normocephalic  EYES: EOMI, PERRLA, conjunctiva and sclera clear  NECK: Supple  CHEST/LUNG: Clear bilaterally  HEART: Regular rate and rhythm  ABDOMEN: Soft, Nontender, Nondistended; Bowel sounds present  EXTREMITIES:  no calf tenderness,no edema BLES    NERVOUS SYSTEM:  Cranial Nerves 2-12 intact [  x  ] Abnormal  [    ]  ROM: WFL all extremities [ x   ]  Abnormal [     ]  Motor Strength: WFL all extremities  [    ]  Abnormal [ x   ]Sl plantarflexor weakness LLE  Sensation: intact to light touch [    ] Abnormal [   x ]dysesthesias  plantar aspect L foot  Reflexes: Symmetric [    ]  Abnormal [ x   ]diminished ankle jerk LLE    FUNCTIONAL STATUS:  Bed Mobility: [   ]  Independent [ x   ]  Supervision [   ]  Needs Assistance [  ]  N/A  Transfers: [    ]  Independent [   x ]  Supervision [    ]  Needs Assistance [    ]  N/A    Ambulation:  [    ]  Independent [    ]  Supervision [ x   ]  Needs Assistance [    ]  N/A   ADL:  [    ]   Independent [    ] Requires Assistance [   x ] N/A   PAIN/PARESTHESIAS L FOOT WITH WALKER WITH PT- UNABLE TO DO STAIRS    LABS:          PT/INR - ( 19 Aug 2019 22:20 )   PT: 11.40 sec;   INR: 0.99 ratio         PTT - ( 19 Aug 2019 22:20 )  PTT:37.0 sec      RADIOLOGY & ADDITIONAL STUDIES:

## 2019-08-21 NOTE — PHYSICAL THERAPY INITIAL EVALUATION ADULT - ASR EQUIP NEEDS DISCH PT EVAL
RTS with handles, shower chair with back, rental reclining chair/raised toilet seat/rolling walker (5 inch wheels)

## 2019-08-21 NOTE — DISCHARGE NOTE PROVIDER - CARE PROVIDER_API CALL
Jalyn Cage)  Neurological Surgery  [address], [address]  [place] [address]  Phone: [phone]  Fax: [phone]  Follow Up Time:     Pako Sanon; MARLENY)  Cardiac Electrophysiology  [address] [facility], [address] [address]  Phone: [phone]  Fax: [phone]  Follow Up Time:     Rell Vila)  Cardiovascular Disease; Internal Medicine  [address] [place] [address]  Phone: [phone]  Fax: [phone]  Follow Up Time:

## 2019-08-21 NOTE — PHYSICAL THERAPY INITIAL EVALUATION ADULT - GENERAL OBSERVATIONS, REHAB EVAL
14:50-14:58 Pt encountered semifowler in bed in NAD. Pt reports 10 /10 pain radiating down LLE with sitting/ standing/ weight bearing. Pending OR possibly tomorrow 2* to disc herniation. Pt educated on therapeutic exercise in bed as tolerated and changing position to promote circulation . Will f/u for PT when appropriate.
1135 am. patient received in bed, left in b/s chair, with folded blankets to elevate the seat (patient is 5'11"),  present.

## 2019-08-21 NOTE — CONSULT NOTE ADULT - ASSESSMENT
IMPRESSION: Rehab of LBP SP MLD- L S1 Radiculopathy    PRECAUTIONS: [  x  ] Cardiac  [    ] Respiratory  [    ] Seizures [    ] Contact Isolation  [    ] Droplet Isolation  [    ] Other    Weight Bearing Status:     RECOMMENDATION:  ?ADD NEURONTIN    Out of Bed to Chair     DVT/Decubiti Prophylaxis    REHAB PLAN:     [   x  ] Bedside P/T 3-5 times a week   [     ] Bedside O/T  2-3 times a week   [     ] No Rehab Therapy Indicated   [     ]  Speech Therapy   Conditioning/ROM                                 ADL  Bed Mobility                                            Conditioning/ROM  Transfers                                                  Bed Mobility  Sitting /Standing Balance                      Transfers                                        Gait Training                                            Sitting/Standing Balance  Stair Training [ x  ]Applicable                 Home equipment Eval                                                                     Splinting  [   ] Only      GOALS:   ADL   [   x ]   Independent         Transfers  [ x   ] Independent            Ambulation  [  x   ] Independent     [   x  ] With device                            [    ]  CG                                               [    ]  CG                                                    [     ] CG                            [    ] Min A                                          [    ] Min A                                                [     ] Min  A          DISCHARGE PLAN:   [     ]  Good candidate for Intensive Rehabilitation/Hospital based                                             Will tolerate 3hrs Intensive Rehab Daily                                       [      ]  Short Term Rehab in Skilled Nursing Facility                                       [    x  ]  Home with Outpatient or  services                                         [      ]  Possible Candidate for Intensive Hospital based Rehab

## 2019-08-21 NOTE — DISCHARGE NOTE PROVIDER - NSDCFUADDINST_GEN_ALL_CORE_FT
You may shower as usual.  Keep wound clean and dry.  You may remove dressing in 4 days. Do not pull steri strips off.

## 2019-08-21 NOTE — DISCHARGE NOTE PROVIDER - NSDCFUADDAPPT_GEN_ALL_CORE_FT
Call Dr Cage's office for appointment in 10-14 days.    Call Dr Sanon's office and Dr Vila's office for appointments within 1-2 weeks

## 2019-08-22 LAB — SURGICAL PATHOLOGY STUDY: SIGNIFICANT CHANGE UP

## 2019-08-29 ENCOUNTER — APPOINTMENT (OUTPATIENT)
Dept: NEUROLOGY | Facility: CLINIC | Age: 34
End: 2019-08-29

## 2019-09-03 ENCOUNTER — APPOINTMENT (OUTPATIENT)
Dept: CARDIOLOGY | Facility: CLINIC | Age: 34
End: 2019-09-03
Payer: COMMERCIAL

## 2019-09-03 VITALS
WEIGHT: 210 LBS | SYSTOLIC BLOOD PRESSURE: 102 MMHG | HEART RATE: 78 BPM | OXYGEN SATURATION: 100 % | DIASTOLIC BLOOD PRESSURE: 65 MMHG | BODY MASS INDEX: 29.4 KG/M2 | HEIGHT: 71 IN

## 2019-09-03 PROCEDURE — 99215 OFFICE O/P EST HI 40 MIN: CPT

## 2019-09-03 PROCEDURE — 93000 ELECTROCARDIOGRAM COMPLETE: CPT

## 2019-09-03 RX ORDER — APIXABAN 5 MG/1
5 TABLET, FILM COATED ORAL TWICE DAILY
Qty: 14 | Refills: 1 | Status: DISCONTINUED | COMMUNITY
Start: 2019-05-15 | End: 2019-09-03

## 2019-09-04 ENCOUNTER — APPOINTMENT (OUTPATIENT)
Dept: NEUROSURGERY | Facility: CLINIC | Age: 34
End: 2019-09-04
Payer: COMMERCIAL

## 2019-09-04 VITALS — BODY MASS INDEX: 28.98 KG/M2 | WEIGHT: 207 LBS | HEIGHT: 71 IN

## 2019-09-04 DIAGNOSIS — Z86.711 PERSONAL HISTORY OF PULMONARY EMBOLISM: ICD-10-CM

## 2019-09-04 DIAGNOSIS — M51.27 OTHER INTERVERTEBRAL DISC DISPLACEMENT, LUMBOSACRAL REGION: ICD-10-CM

## 2019-09-04 DIAGNOSIS — M48.062 SPINAL STENOSIS, LUMBAR REGION WITH NEUROGENIC CLAUDICATION: ICD-10-CM

## 2019-09-04 DIAGNOSIS — I42.8 OTHER CARDIOMYOPATHIES: ICD-10-CM

## 2019-09-04 DIAGNOSIS — M48.07 SPINAL STENOSIS, LUMBOSACRAL REGION: ICD-10-CM

## 2019-09-04 DIAGNOSIS — Z86.79 PERSONAL HISTORY OF OTHER DISEASES OF THE CIRCULATORY SYSTEM: ICD-10-CM

## 2019-09-04 DIAGNOSIS — R20.2 PARESTHESIA OF SKIN: ICD-10-CM

## 2019-09-04 DIAGNOSIS — Z09 ENCOUNTER FOR FOLLOW-UP EXAMINATION AFTER COMPLETED TREATMENT FOR CONDITIONS OTHER THAN MALIGNANT NEOPLASM: ICD-10-CM

## 2019-09-04 PROCEDURE — 99024 POSTOP FOLLOW-UP VISIT: CPT

## 2019-09-04 NOTE — PHYSICAL EXAM
[FreeTextEntry1] : General - well appearing in no acute distress\par \par Incision - as stated above. \par \par Musculoskeletal- mildly decreased ROM; negative SLR. \par \par Neuro - awake, alert, and oriented; CN II-XII intact; 5/5 strength; sensation diminished in the left hamstring and S1 distribution of the left foot, reflexes normal; gait intact.

## 2019-09-04 NOTE — ASSESSMENT
[FreeTextEntry1] : We have had a thorough discussion regarding her current condition. She is doing well since surgery. Her preop radiculopathy has improved. She will give herself continued time to heal. We will monitor her incision site which is intact on today's exam. I will see her back in 1 week for reassessment. She will call barring any issues.

## 2019-09-04 NOTE — HISTORY OF PRESENT ILLNESS
[FreeTextEntry1] : Ms. Jerome is doing well since surgery. Her preoperative radiculopathy has improved. She notes some numbness in the left hamstring and left heel / lateral foot into the 5th toe. She denies weakness in the legs, however, feel's the numbness does effect her walking somewhat. \par  \par Her incision site is healing well with no signs of erythema, infection, or discharge. There is no evidence of skin break down or dehiscence. There is no fluctuance or swelling.  She presents today because she noticed some swelling around the proximal aspect of the incision. This is not appreciated today.

## 2019-09-09 ENCOUNTER — APPOINTMENT (OUTPATIENT)
Dept: NEUROSURGERY | Facility: CLINIC | Age: 34
End: 2019-09-09
Payer: COMMERCIAL

## 2019-09-09 VITALS — BODY MASS INDEX: 28.7 KG/M2 | WEIGHT: 205 LBS | HEIGHT: 71 IN

## 2019-09-09 PROCEDURE — 99024 POSTOP FOLLOW-UP VISIT: CPT

## 2019-09-09 NOTE — HISTORY OF PRESENT ILLNESS
[FreeTextEntry1] : Ms. Jerome is a 34 year old woman with history of hyperemesis gravidarum  initially presented to [facility] (35 weeks gestation) for SOB. She was found to be in SVT and received adenosine x4 and failed cardioversion x2. She was then emergently taken for  and post op went into cardiogenic shock. An echo revealed a reduced EF 10-15% and she was placed on VA ECMO and CVVHD. She was transferred to [facility] on  for further management. He condition slowly improved and she was successfully decannulated on . Her urine output  improved with continued improvement in estimate of GFR. She was sent home off diuretics. On 19, she presented to [facility] with right sided pleuritic type pain and SOB. CTA and VQ scan suggestive of a PE, she was started on Eliquis. Bilateral LE DVU scan negative for DVT.\par \par Since she was last seen on , she was hospitalized at [facility] from - . She had known severe spinal stenosis of L5-S1 2/2 disc herniation and was planned for surgery in September, however presented to [facility] ER on  with intractable back pain and numbness in LLE. Repeat TTE done showed LVEF 65%, LVIDd 4.6 with nl RV function. on , she underwent L5-S1 microlumbardiscectomy. Pathology noted degenerative changes. \par \par She presents today for routine follow up. She notes the pain to be resolved, however has paresthesias in her LLE/foot that impairs her gait. She has stopped cardiac rehab until she is cleared by neurosurg. She notes lightheadedness and fatigue approximately an hour after taking Toprol in the afternoon. Reports  SBP 80-90s at times. Denies limitations in her activity to due dyspnea. Denies orthopnea, PND, CP, palpitations, abdominal distention and LE edema. She is taking her medications as prescribed. She is not breast feeding. \par \par Additionally of note, she still cannot fully extend her right arm although her ROM has been improving. She continues to notes paresthesias in her R forearm and fingers. She has been following with neurology for her RUE and b/l feet paresthesias.

## 2019-09-09 NOTE — PHYSICAL EXAM
[General Appearance - Well Developed] : well developed [Well Groomed] : well groomed [General Appearance - Well Nourished] : well nourished [Normal Conjunctiva] : the conjunctiva exhibited no abnormalities [General Appearance - In No Acute Distress] : no acute distress [No Oral Pallor] : no oral pallor [No Oral Cyanosis] : no oral cyanosis [Auscultation Breath Sounds / Voice Sounds] : lungs were clear to auscultation bilaterally [Respiration, Rhythm And Depth] : normal respiratory rhythm and effort [Heart Sounds] : normal S1 and S2 [Heart Rate And Rhythm] : heart rate and rhythm were normal [Murmurs] : no murmurs present [Arterial Pulses Normal] : the arterial pulses were normal [Edema] : no peripheral edema present [1+] : right 1+ [2+] : left 2+ [Bowel Sounds] : normal bowel sounds [Abdomen Soft] : soft [Abdomen Tenderness] : non-tender [] : no hepato-splenomegaly [Nail Clubbing] : no clubbing of the fingernails [Cyanosis, Localized] : no localized cyanosis [Skin Color & Pigmentation] : normal skin color and pigmentation [Skin Turgor] : normal skin turgor [Oriented To Time, Place, And Person] : oriented to person, place, and time [Impaired Insight] : insight and judgment were intact [Affect] : the affect was normal [Mood] : the mood was normal [FreeTextEntry1] : JVP normal, without HJR [Limping On The Left] : limping on the left

## 2019-09-09 NOTE — DISCUSSION/SUMMARY
[FreeTextEntry1] : Saw Dr Cage, neurosurgery who recommended surgery for prolapsed disc L5/S1. \par Underwent microdiscectomy under GA at [facility] Aug 20th. sciatic pain is better but has left foot and leg paraesthesia which impedes ambulation. \par no SOB. no LE edema. \par meds: entresto 200 bid, Toprol 200, Ald 25 tiw ASA 81\par 102/65, 78, 100%\par EKG NSR\par TTE: 8.19: LVEF 65, LVEDD 4.6, normal RV function. no valv issues. \par Aug 19: Cr .9. \par normalized LV function. excellent cardiac recovery. \par Will decrease Toprol 150, can take in evening. \par see 4 months. \par Rell Vila

## 2019-09-09 NOTE — ASSESSMENT
[FreeTextEntry1] : Ms. Jerome is a 34 yr old female with a NICM HFrEF now with recovered biventricular function (LVEF 65%, improved from 10-15%), who at 35 weeks gestation presented to [facility] with sob and was noted to be in SVT which could not be controlled, requiring an emergent . She went into cardiogenic shock and was placed on VA ECMO and CVVHD. She cardiac function recovered and was successfully decannulated, her renal function improved as well and did not require long term HD. She has had excellent cardiac recovery and she presents today for follow up doing well. She is Stage C, NYHA Class I-II and euvolemic.

## 2019-09-10 NOTE — HISTORY OF PRESENT ILLNESS
[FreeTextEntry1] : s/p L5-S1 microdiscectomy for cauda equina syndrome.  Doing well.  Some foot numbness.  Wound clean dry and intact.\par \par Return in 4 weeks for follow-up.

## 2019-09-21 ENCOUNTER — APPOINTMENT (OUTPATIENT)
Dept: CARDIOLOGY | Facility: CLINIC | Age: 34
End: 2019-09-21

## 2019-10-04 ENCOUNTER — APPOINTMENT (OUTPATIENT)
Dept: CARDIOLOGY | Facility: CLINIC | Age: 34
End: 2019-10-04
Payer: COMMERCIAL

## 2019-10-04 VITALS
DIASTOLIC BLOOD PRESSURE: 60 MMHG | WEIGHT: 205 LBS | SYSTOLIC BLOOD PRESSURE: 110 MMHG | HEART RATE: 60 BPM | BODY MASS INDEX: 28.59 KG/M2

## 2019-10-04 PROCEDURE — 99213 OFFICE O/P EST LOW 20 MIN: CPT

## 2019-10-04 PROCEDURE — 93000 ELECTROCARDIOGRAM COMPLETE: CPT

## 2019-10-07 ENCOUNTER — APPOINTMENT (OUTPATIENT)
Dept: NEUROSURGERY | Facility: CLINIC | Age: 34
End: 2019-10-07
Payer: COMMERCIAL

## 2019-10-07 VITALS — WEIGHT: 205 LBS | HEIGHT: 71 IN | BODY MASS INDEX: 28.7 KG/M2

## 2019-10-07 PROCEDURE — 99024 POSTOP FOLLOW-UP VISIT: CPT

## 2019-10-07 NOTE — HISTORY OF PRESENT ILLNESS
[FreeTextEntry1] : Patient presents today s/p L5-S1 microdiscectomy for cauda equina syndrome on 8/20/19. Doing well. Continues to have left inner and posterior numbness which started prior to surgery. Reports left foot numbness appears to be improving. She reports she has occasional right hip pain because she is using her right foot more. Patient has not started physical therapy.

## 2019-10-08 RX ORDER — ASPIRIN ENTERIC COATED TABLETS 81 MG 81 MG/1
81 TABLET, DELAYED RELEASE ORAL
Refills: 0 | Status: ACTIVE | COMMUNITY
Start: 2019-09-03

## 2019-10-25 ENCOUNTER — RX RENEWAL (OUTPATIENT)
Age: 34
End: 2019-10-25

## 2019-11-11 NOTE — PROGRESS NOTE ADULT - PROVIDER SPECIALTY LIST ADULT
CT Surgery
Critical Care
Electrophysiology
Heart Failure
Infectious Disease
Nephrology
CT Surgery
CTU
CTU
Critical Care
Critical Care
Electrophysiology
Heart Failure
Infectious Disease
Infectious Disease
Nephrology
Nephrology
Critical Care
Heart Failure
Nephrology
Nephrology
CT Surgery
CT Surgery
Electrophysiology
Heart Failure
Infectious Disease
Nephrology
Nephrology
Heart Failure
Nephrology
CT Surgery
CT Surgery
Nephrology
CT Surgery
Heart Failure
CT Surgery
Heart Failure
CT Surgery
Heart Failure
Heart Failure
CT Surgery
Heart Failure
[facility] at home
Heart Failure
Heart Failure

## 2019-11-30 NOTE — PHYSICAL EXAM
[General Appearance - Well Developed] : well developed [Normal Appearance] : normal appearance [Well Groomed] : well groomed [General Appearance - Well Nourished] : well nourished [General Appearance - In No Acute Distress] : no acute distress [No Deformities] : no deformities [Normal Conjunctiva] : the conjunctiva exhibited no abnormalities [Normal Oral Mucosa] : normal oral mucosa [Eyelids - No Xanthelasma] : the eyelids demonstrated no xanthelasmas [No Oral Pallor] : no oral pallor [No Oral Cyanosis] : no oral cyanosis [Normal Jugular Venous A Waves Present] : normal jugular venous A waves present [No Jugular Venous Lu A Waves] : no jugular venous lu A waves [Auscultation Breath Sounds / Voice Sounds] : lungs were clear to auscultation bilaterally [Heart Rate And Rhythm] : heart rate and rhythm were normal [Abdomen Soft] : soft [Bowel Sounds] : normal bowel sounds [Nail Clubbing] : no clubbing of the fingernails [Abnormal Walk] : normal gait [Petechial Hemorrhages (___cm)] : no petechial hemorrhages [Cyanosis, Localized] : no localized cyanosis [Skin Color & Pigmentation] : normal skin color and pigmentation [No Venous Stasis] : no venous stasis [] : no rash [Skin Lesions] : no skin lesions [No Skin Ulcers] : no skin ulcer [No Xanthoma] : no  xanthoma was observed [Affect] : the affect was normal [Oriented To Time, Place, And Person] : oriented to person, place, and time [Mood] : the mood was normal [No Anxiety] : not feeling anxious [FreeTextEntry1] : no jvd

## 2019-11-30 NOTE — ASSESSMENT
[FreeTextEntry1] : Patient was seen and examined with Dr. Sanon\par #post partum CM-\par  recent echo on 8/19/2019 65%\par  patient with good exercise tolerance, denies any complaints\par \par -C/w metoprolol succ 150mg at hs\par - cont HF meds\par \par Next follow up in 6 months\par

## 2019-11-30 NOTE — PHYSICAL EXAM
[General Appearance - Well Developed] : well developed [Normal Appearance] : normal appearance [General Appearance - Well Nourished] : well nourished [Well Groomed] : well groomed [General Appearance - In No Acute Distress] : no acute distress [No Deformities] : no deformities [Normal Conjunctiva] : the conjunctiva exhibited no abnormalities [Eyelids - No Xanthelasma] : the eyelids demonstrated no xanthelasmas [Normal Oral Mucosa] : normal oral mucosa [No Oral Pallor] : no oral pallor [No Oral Cyanosis] : no oral cyanosis [Normal Jugular Venous A Waves Present] : normal jugular venous A waves present [No Jugular Venous Lu A Waves] : no jugular venous lu A waves [Auscultation Breath Sounds / Voice Sounds] : lungs were clear to auscultation bilaterally [Heart Rate And Rhythm] : heart rate and rhythm were normal [Bowel Sounds] : normal bowel sounds [Abdomen Soft] : soft [Nail Clubbing] : no clubbing of the fingernails [Abnormal Walk] : normal gait [Petechial Hemorrhages (___cm)] : no petechial hemorrhages [Cyanosis, Localized] : no localized cyanosis [Skin Color & Pigmentation] : normal skin color and pigmentation [No Venous Stasis] : no venous stasis [] : no rash [Skin Lesions] : no skin lesions [No Skin Ulcers] : no skin ulcer [No Xanthoma] : no  xanthoma was observed [Oriented To Time, Place, And Person] : oriented to person, place, and time [Affect] : the affect was normal [Mood] : the mood was normal [No Anxiety] : not feeling anxious [FreeTextEntry1] : no jvd

## 2019-11-30 NOTE — HISTORY OF PRESENT ILLNESS
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 She was in  last trimester of her 2nd pregnancy at 34 weeks, an emergent C section was done on April 10, 2019 , went on cardiogenic shock , EF 10% was put on ECMO,  and she was flown to [facility] for higher level of care. Her cardiac function improved - successfully decannulated from ecmo. She was discharged on 5/6/2019 but later on presented to [facility] for an acute shortness of breath on 5/11. A V/Q scan confirmed she developed DVT. She was on eliquis 5mg twice daily.\par \par  Dr. Vila  recommended to deferring on EF evaluation till patient is on full dose of medications.\par Patient is back to work, exercising but is vigilant about drinking alcohol.\par August 19, 2019 echo showed 65% LVEF\par \par \par CARDIAC TESTING\par ECHO: (5/12/2019) LVEF 30-35%.LA mildly dilated, mild to moderate MR, Spectral doppler shows restrictive pattern- Grade III diastolic dysfunction\par

## 2019-11-30 NOTE — HISTORY OF PRESENT ILLNESS
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 She was in  last trimester of her 2nd pregnancy at 34 weeks, an emergent C section was done on April 10, 2019 , went on cardiogenic shock , EF 10% was put on ECMO,  and [place] of [place]. Her cardiac function improved - successfully decannulated from ecmo. She was discharged on 5/6/2019 but later on presented to [facility] for an acute shortness of breath on 5/11. A V/Q scan confirmed she developed DVT. She was on eliquis 5mg twice daily.\par \par  Dr. Vila  recommended to deferring on EF evaluation till patient is on full dose of medications.\par Patient is back to work, exercising but is vigilant about drinking alcohol.\par August 19, 2019 echo showed 65% LVEF\par \par \par CARDIAC TESTING\par ECHO: (5/12/2019) LVEF 30-35%.LA mildly dilated, mild to moderate MR, Spectral doppler shows restrictive pattern- Grade III diastolic dysfunction\par

## 2019-12-19 ENCOUNTER — APPOINTMENT (OUTPATIENT)
Dept: NEUROSURGERY | Facility: CLINIC | Age: 34
End: 2019-12-19
Payer: COMMERCIAL

## 2019-12-19 VITALS — BODY MASS INDEX: 28.98 KG/M2 | HEIGHT: 71 IN | WEIGHT: 207 LBS

## 2019-12-19 DIAGNOSIS — M54.16 RADICULOPATHY, LUMBAR REGION: ICD-10-CM

## 2019-12-19 PROCEDURE — 99213 OFFICE O/P EST LOW 20 MIN: CPT

## 2019-12-19 NOTE — ASSESSMENT
[FreeTextEntry1] : I discussed the clinical findings with Ms. Jerome in detail. I believe an MRI lumbar spine with and without contrast is warranted.  I will call her with these results.  I also gave her some exercises to do in the meantime.  If the MRI is unremarkable, I will send her for an EMG.

## 2019-12-19 NOTE — HISTORY OF PRESENT ILLNESS
[FreeTextEntry1] : CC:  numbness in her left leg\par \par HPI:  Patient is a 34 year-old active female who presents with years of left lower extremity pain and numbness due to a known left L5-S1 herniated disc.  She presented in 8/2019 to the ER with exacerbation of pain.  In documentation prior to surgery, there are multiple providers who document numbness in the left lower extremity in the S1 distribution.  She subsequently had a left L5-S1 microdiscectomy.  She did well from this and her pain subsided significantly.  She returns today because she states that since the surgery she has numbness in her left foot which she has not had prior, although documentation by multiple providers is to the contrary.  She has not had postop imaging.\par \par On exam, she has numbness in the left leg in an S1 distribution as well as difficulty with toe raises.

## 2020-01-19 ENCOUNTER — OUTPATIENT (OUTPATIENT)
Dept: OUTPATIENT SERVICES | Facility: HOSPITAL | Age: 35
LOS: 1 days | Discharge: HOME | End: 2020-01-19
Payer: COMMERCIAL

## 2020-01-19 DIAGNOSIS — R10.2 PELVIC AND PERINEAL PAIN: ICD-10-CM

## 2020-01-19 DIAGNOSIS — Z98.891 HISTORY OF UTERINE SCAR FROM PREVIOUS SURGERY: Chronic | ICD-10-CM

## 2020-01-19 PROCEDURE — 76830 TRANSVAGINAL US NON-OB: CPT | Mod: 26

## 2020-01-19 PROCEDURE — 76856 US EXAM PELVIC COMPLETE: CPT | Mod: 26

## 2020-01-24 ENCOUNTER — FORM ENCOUNTER (OUTPATIENT)
Age: 35
End: 2020-01-24

## 2020-01-25 ENCOUNTER — OUTPATIENT (OUTPATIENT)
Dept: OUTPATIENT SERVICES | Facility: HOSPITAL | Age: 35
LOS: 1 days | Discharge: HOME | End: 2020-01-25
Payer: COMMERCIAL

## 2020-01-25 DIAGNOSIS — M54.5 LOW BACK PAIN: ICD-10-CM

## 2020-01-25 DIAGNOSIS — Z98.891 HISTORY OF UTERINE SCAR FROM PREVIOUS SURGERY: Chronic | ICD-10-CM

## 2020-01-25 PROCEDURE — 72158 MRI LUMBAR SPINE W/O & W/DYE: CPT | Mod: 26

## 2020-02-11 ENCOUNTER — APPOINTMENT (OUTPATIENT)
Dept: HEART FAILURE | Facility: CLINIC | Age: 35
End: 2020-02-11
Payer: COMMERCIAL

## 2020-02-11 ENCOUNTER — NON-APPOINTMENT (OUTPATIENT)
Age: 35
End: 2020-02-11

## 2020-02-11 VITALS
BODY MASS INDEX: 29.12 KG/M2 | WEIGHT: 208 LBS | DIASTOLIC BLOOD PRESSURE: 68 MMHG | RESPIRATION RATE: 12 BRPM | SYSTOLIC BLOOD PRESSURE: 102 MMHG | HEART RATE: 79 BPM | OXYGEN SATURATION: 100 % | HEIGHT: 71 IN

## 2020-02-11 DIAGNOSIS — I26.99 OTHER PULMONARY EMBOLISM W/OUT ACUTE COR PULMONALE: ICD-10-CM

## 2020-02-11 PROCEDURE — 99214 OFFICE O/P EST MOD 30 MIN: CPT

## 2020-02-11 PROCEDURE — 93000 ELECTROCARDIOGRAM COMPLETE: CPT

## 2020-02-11 NOTE — DISCUSSION/SUMMARY
[FreeTextEntry1] : s/p disectomy in lumbar region Aug 19. improved back pain but developed left heel numbness. \par right hand 95% better. \par meds: entresto 200 bid, troprol 150 , ald 25 tid, asa, IUD, \par last TTE: aug 2019: LVEDD 4.6, LVEF 65, normal RV, trivial TR, no MR. RVSP 18. \par 102/68, 79, 208 \par lungs clear, abdo soft, no LE edema. \par 8: Cr. .9. \par NYHA I, no limitations in exercise. 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 \par Plan: \par Exercise Rx.\par No change in meds. \par See in 6 months. \par Will discuss need for long term Dr Naylor. \par Rell Vila

## 2020-02-11 NOTE — HISTORY OF PRESENT ILLNESS
[FreeTextEntry1] : Ms. Jerome is a 34 year old woman with history of hyperemesis gravidarum  initially presented to [facility] (35 weeks gestation) for SOB. She was found to be in SVT and received adenosine x4 and failed cardioversion x2. She was then emergently taken for  and post op went into cardiogenic shock. An echo revealed a reduced EF 10-15% and she was placed on VA ECMO and CVVHD. She was transferred to [facility] on 19 for further management. Her condition slowly improved and she was successfully decannulated on 19. Her urine output  improved with continued improvement in estimate of GFR. She was sent home off diuretics. On 19, she presented to [facility] with right sided pleuritic type pain and SOB. CTA and VQ scan suggestive of a PE, she was started on Eliquis. Bilateral LE DVU scan negative for DVT.\par \par She was hospitalized at [facility] from 19-19. She had known severe spinal stenosis of L5-S1 2/2 disc herniation and was planned for surgery in September, however presented to [facility] ER on  with intractable back pain and numbness in LLE. Repeat TTE done showed LVEF 65%, LVIDd 4.6 with nl RV function. on , she underwent L5-S1 microlumbardiscectomy. Pathology noted degenerative changes. \par \par

## 2020-04-10 ENCOUNTER — APPOINTMENT (OUTPATIENT)
Dept: CARDIOLOGY | Facility: CLINIC | Age: 35
End: 2020-04-10

## 2020-04-19 NOTE — PHYSICAL EXAM
3 [FreeTextEntry1] : Physical examination:  \par General:   The patient is pleasant, cooperative, well dressed and in no acute distress.  Appearance is consistent with chronologic age.  No abnormal facies.\par Neurologic examination:  The patient is oriented to person, place, time and date.   Remote and recent memory is normal.   Fund of knowledge is intact and normal.  Language with normal repetition, comprehension and naming.  Nondysarthric.   \par Cranial nerves examination: intact VA, VFF.  EOMI w/o nystagmus, skew or reported double vision.  PERRL.  No ptosis/weakness of eyelid closure.  Facial sensation is normal with normal bite.  No facial asymmetry.  Hearing grossly intact b/l.  Palate elevates midline.  Neck flexion/extension, SCM/Trap strength normal.  Tongue midline with full resistance.  \par Motor examination:   Normal tone, bulk and range of motion.  (+) TTP R volar forearm with LROM with elbow extension R side.  limited arm extension. no TTP or swelling in R Erb's point. \par Formal Muscle Strength Testing: (MRC grade R/L) 5/5 CLARA, BB, 4/5 TR, 4/5 BR, 4+/5 WF/FF, 4/5 WE/FE/APB/FDI. slight winging scapula.  5/5 internal rotation/external rotation/supination, 4+/5 pronation. 5/5 ILP, QDS, HS, DF, PF.  Arises from sitting/squatting wnl.  Toe/heel walks.  \par Reflexes:   2+ b/l pectoralis, biceps, triceps, brachioradialis, patella and Achilles.  Plantar response downgoing b/l.  Jaw jerk, Neris, clonus absent.  \par Sensory examination:   Intact to light touch and pinprick, pain, temperature and proprioception and vibration in all extremities.    (+) Tinel RUE. R Digit 5 > 2\par Cerebellum:   FTN/HKS intact with normal JOANN in all limbs.   Gait is narrow based and normal with normal tandem.  Romberg (-).  No dysmetria or dysdiadokinesia.       \par \par

## 2020-06-24 NOTE — ED ADULT NURSE NOTE - NS ED NOTE ABUSE RESPONSE YN
Patient left message from  line that he is cancelling today's appointment. States wrist doesn't hurt, he is busy  and he doesn't need appointment.Appointment cancelled.  
Yes

## 2020-06-30 NOTE — PROGRESS NOTE ADULT - SUBJECTIVE AND OBJECTIVE BOX
Mom informed of the advised. Patient transferred to schedule.to schedule lab.  Forms on MA's desk for review .   [facility] Division of Kidney Diseases & Hypertension  FOLLOW UP NOTE  [phone]--------------------------------------------------------------------------------  Chief Complaint:History of extracorporeal membrane oxygenation treatment      24 hour events/subjective: Patient seen and examined at bedside. Creatinine downtrending to 5.83 from 6.35 yesterday. Patient continues to urinate appropriately, breathing is stable, and swelling decreasing.        PAST HISTORY  --------------------------------------------------------------------------------  No significant changes to PMH, PSH, FHx, SHx, unless otherwise noted    ALLERGIES & MEDICATIONS  --------------------------------------------------------------------------------  Allergies    No Known Allergies    Intolerances    Reglan (Other)    Standing Inpatient Medications  buMETAnide IVPB 1 milliGRAM(s) IV Intermittent every 12 hours  docusate sodium 100 milliGRAM(s) Oral three times a day  heparin  Injectable 5000 Unit(s) SubCutaneous every 8 hours  hydrALAZINE 100 milliGRAM(s) Oral every 8 hours  metoprolol succinate ER 12.5 milliGRAM(s) Oral daily  pantoprazole    Tablet 40 milliGRAM(s) Oral before breakfast  senna 2 Tablet(s) Oral at bedtime  sodium chloride 0.9%. 1000 milliLiter(s) IV Continuous <Continuous>    PRN Inpatient Medications  acetaminophen   Tablet .. 650 milliGRAM(s) Oral every 6 hours PRN  ALPRAZolam 0.25 milliGRAM(s) Oral at bedtime PRN  benzocaine 15 mG/menthol 3.6 mG Lozenge 1 Lozenge Oral every 2 hours PRN  guaiFENesin   Syrup  (Sugar-Free) 200 milliGRAM(s) Oral every 6 hours PRN  hydrALAZINE Injectable 10 milliGRAM(s) IV Push every 6 hours PRN  oxyCODONE    IR 5 milliGRAM(s) Oral every 4 hours PRN  sodium chloride 0.65% Nasal 1 Spray(s) Both Nostrils three times a day PRN      REVIEW OF SYSTEMS  --------------------------------------------------------------------------------  Gen: No  fevers/chills  Skin: No rashes  Head/Eyes/Ears/Mouth: No headache; Normal hearing; Normal vision w/o blurriness  Respiratory: No dyspnea, cough, wheezing, hemoptysis  CV: No chest pain, PND, orthopnea  GI: No abdominal pain, diarrhea, constipation, nausea, vomiting  : No increased frequency, dysuria, hematuria, nocturia  MSK: No joint pain/swelling; no back pain; no edema  Neuro: No dizziness/lightheadedness, weakness, seizures, numbness, tingling      All other systems were reviewed and are negative, except as noted.    VITALS/PHYSICAL EXAM  --------------------------------------------------------------------------------  T(C): 36.8 (04-23-19 @ 10:59), Max: 37.1 (04-22-19 @ 21:32)  HR: 93 (04-23-19 @ 10:01) (79 - 93)  BP: 136/81 (04-23-19 @ 10:01) (129/75 - 140/80)  RR: 18 (04-23-19 @ 10:59) (18 - 19)  SpO2: 98% (04-23-19 @ 10:59) (97% - 99%)  Wt(kg): --    Weight (kg): 103.3 (04-22-19 @ 14:01)      04-22-19 @ 07:01  -  04-23-19 @ 07:00  --------------------------------------------------------  IN: 1190 mL / OUT: 6150 mL / NET: -4960 mL    04-23-19 @ 07:01  -  04-23-19 @ 11:16  --------------------------------------------------------  IN: 360 mL / OUT: 1600 mL / NET: -1240 mL      Physical Exam:  	Gen: NAD  	Pulm: trace bibasilar rales B/L  	CV: RRR, S1S2  	Abd: +BS, soft, nontender/nondistended  	: No suprapubic tenderness  	UE: Warm  	LE: Warm, + LE edema, improving  	Psych: Normal affect and mood  	Skin: Warm, without rashes  	Vascular access: left IJ non-tunneled HD catheter  LABS/STUDIES  --------------------------------------------------------------------------------              9.6    14.4  >-----------<  423      [04-23-19 @ 06:54]              29.6     140  |  102  |  52  ----------------------------<  87      [04-23-19 @ 06:54]  4.6   |  23  |  5.83        Ca     8.4     [04-23-19 @ 06:54]      Mg     2.1     [04-22-19 @ 07:00]      Phos  6.1     [04-22-19 @ 07:00]            Creatinine Trend:  SCr 5.83 [04-23 @ 06:54]  SCr 6.35 [04-22 @ 07:00]  SCr 6.03 [04-21 @ 05:46]  SCr 7.73 [04-20 @ 06:46]  SCr 7.56 [04-20 @ 02:06]

## 2020-07-06 RX ORDER — METOPROLOL SUCCINATE 50 MG/1
50 TABLET, EXTENDED RELEASE ORAL
Qty: 90 | Refills: 3 | Status: DISCONTINUED | COMMUNITY
Start: 2019-09-03 | End: 2020-07-06

## 2020-08-10 NOTE — HISTORY OF PRESENT ILLNESS
[FreeTextEntry1] : Ms. Jerome is a 35 year old woman with history of hyperemesis gravidarum  initially presented to [facility] (35 weeks gestation) for SOB. She was found to be in SVT and received adenosine x4 and failed cardioversion x2. She was then emergently taken for  and post op went into cardiogenic shock. An echo revealed a reduced EF 10-15% and she was placed on VA ECMO and CVVHD. She was transferred to [facility] on 19 for further management. Her condition slowly improved and she was successfully decannulated on 19. Her urine output  improved with continued improvement in estimate of GFR. She was sent home off diuretics. On 19, she presented to [facility] with right sided pleuritic type pain and SOB. CTA and VQ scan suggestive of a PE, she was started on Eliquis. Bilateral LE DVU scan negative for DVT.\par \par She was hospitalized at [facility] from 19-19. She had known severe spinal stenosis of L5-S1 2/2 disc herniation and was planned for surgery in September, however presented to [facility] ER on  with intractable back pain and numbness in LLE. Repeat TTE done showed LVEF 65%, LVIDd 4.6 with nl RV function. on , she underwent L5-S1 microlumbardiscectomy. Pathology noted degenerative changes. \par \par

## 2020-08-11 ENCOUNTER — APPOINTMENT (OUTPATIENT)
Dept: HEART FAILURE | Facility: CLINIC | Age: 35
End: 2020-08-11
Payer: COMMERCIAL

## 2020-08-23 ENCOUNTER — TRANSCRIPTION ENCOUNTER (OUTPATIENT)
Age: 35
End: 2020-08-23

## 2020-08-23 ENCOUNTER — EMERGENCY (EMERGENCY)
Facility: HOSPITAL | Age: 35
LOS: 0 days | Discharge: HOME | End: 2020-08-23
Attending: EMERGENCY MEDICINE | Admitting: EMERGENCY MEDICINE
Payer: COMMERCIAL

## 2020-08-23 VITALS
TEMPERATURE: 99 F | SYSTOLIC BLOOD PRESSURE: 108 MMHG | HEART RATE: 72 BPM | WEIGHT: 201.06 LBS | OXYGEN SATURATION: 99 % | DIASTOLIC BLOOD PRESSURE: 63 MMHG | RESPIRATION RATE: 18 BRPM

## 2020-08-23 DIAGNOSIS — Z79.82 LONG TERM (CURRENT) USE OF ASPIRIN: ICD-10-CM

## 2020-08-23 DIAGNOSIS — Z88.8 ALLERGY STATUS TO OTHER DRUGS, MEDICAMENTS AND BIOLOGICAL SUBSTANCES: ICD-10-CM

## 2020-08-23 DIAGNOSIS — R00.2 PALPITATIONS: ICD-10-CM

## 2020-08-23 DIAGNOSIS — Z79.899 OTHER LONG TERM (CURRENT) DRUG THERAPY: ICD-10-CM

## 2020-08-23 DIAGNOSIS — I47.1 SUPRAVENTRICULAR TACHYCARDIA: ICD-10-CM

## 2020-08-23 DIAGNOSIS — R06.02 SHORTNESS OF BREATH: ICD-10-CM

## 2020-08-23 DIAGNOSIS — Z98.891 HISTORY OF UTERINE SCAR FROM PREVIOUS SURGERY: Chronic | ICD-10-CM

## 2020-08-23 DIAGNOSIS — R07.89 OTHER CHEST PAIN: ICD-10-CM

## 2020-08-23 DIAGNOSIS — J45.909 UNSPECIFIED ASTHMA, UNCOMPLICATED: ICD-10-CM

## 2020-08-23 LAB
ALBUMIN SERPL ELPH-MCNC: 5 G/DL — SIGNIFICANT CHANGE UP (ref 3.5–5.2)
ALP SERPL-CCNC: 89 U/L — SIGNIFICANT CHANGE UP (ref 30–115)
ALT FLD-CCNC: 14 U/L — SIGNIFICANT CHANGE UP (ref 0–41)
ANION GAP SERPL CALC-SCNC: 11 MMOL/L — SIGNIFICANT CHANGE UP (ref 7–14)
AST SERPL-CCNC: 17 U/L — SIGNIFICANT CHANGE UP (ref 0–41)
BASOPHILS # BLD AUTO: 0.02 K/UL — SIGNIFICANT CHANGE UP (ref 0–0.2)
BASOPHILS NFR BLD AUTO: 0.3 % — SIGNIFICANT CHANGE UP (ref 0–1)
BILIRUB SERPL-MCNC: 0.7 MG/DL — SIGNIFICANT CHANGE UP (ref 0.2–1.2)
BUN SERPL-MCNC: 19 MG/DL — SIGNIFICANT CHANGE UP (ref 10–20)
CALCIUM SERPL-MCNC: 9.9 MG/DL — SIGNIFICANT CHANGE UP (ref 8.5–10.1)
CHLORIDE SERPL-SCNC: 104 MMOL/L — SIGNIFICANT CHANGE UP (ref 98–110)
CO2 SERPL-SCNC: 26 MMOL/L — SIGNIFICANT CHANGE UP (ref 17–32)
CREAT SERPL-MCNC: 0.9 MG/DL — SIGNIFICANT CHANGE UP (ref 0.7–1.5)
D DIMER BLD IA.RAPID-MCNC: 19 NG/ML DDU — SIGNIFICANT CHANGE UP (ref 0–230)
EOSINOPHIL # BLD AUTO: 0.03 K/UL — SIGNIFICANT CHANGE UP (ref 0–0.7)
EOSINOPHIL NFR BLD AUTO: 0.5 % — SIGNIFICANT CHANGE UP (ref 0–8)
GLUCOSE SERPL-MCNC: 93 MG/DL — SIGNIFICANT CHANGE UP (ref 70–99)
HCT VFR BLD CALC: 43.8 % — SIGNIFICANT CHANGE UP (ref 37–47)
HGB BLD-MCNC: 14.1 G/DL — SIGNIFICANT CHANGE UP (ref 12–16)
IMM GRANULOCYTES NFR BLD AUTO: 0.2 % — SIGNIFICANT CHANGE UP (ref 0.1–0.3)
LYMPHOCYTES # BLD AUTO: 1.83 K/UL — SIGNIFICANT CHANGE UP (ref 1.2–3.4)
LYMPHOCYTES # BLD AUTO: 28.1 % — SIGNIFICANT CHANGE UP (ref 20.5–51.1)
MAGNESIUM SERPL-MCNC: 2.2 MG/DL — SIGNIFICANT CHANGE UP (ref 1.8–2.4)
MCHC RBC-ENTMCNC: 29.8 PG — SIGNIFICANT CHANGE UP (ref 27–31)
MCHC RBC-ENTMCNC: 32.2 G/DL — SIGNIFICANT CHANGE UP (ref 32–37)
MCV RBC AUTO: 92.6 FL — SIGNIFICANT CHANGE UP (ref 81–99)
MONOCYTES # BLD AUTO: 0.36 K/UL — SIGNIFICANT CHANGE UP (ref 0.1–0.6)
MONOCYTES NFR BLD AUTO: 5.5 % — SIGNIFICANT CHANGE UP (ref 1.7–9.3)
NEUTROPHILS # BLD AUTO: 4.26 K/UL — SIGNIFICANT CHANGE UP (ref 1.4–6.5)
NEUTROPHILS NFR BLD AUTO: 65.4 % — SIGNIFICANT CHANGE UP (ref 42.2–75.2)
NRBC # BLD: 0 /100 WBCS — SIGNIFICANT CHANGE UP (ref 0–0)
NT-PROBNP SERPL-SCNC: 54 PG/ML — SIGNIFICANT CHANGE UP (ref 0–300)
PLATELET # BLD AUTO: 272 K/UL — SIGNIFICANT CHANGE UP (ref 130–400)
POTASSIUM SERPL-MCNC: 4.4 MMOL/L — SIGNIFICANT CHANGE UP (ref 3.5–5)
POTASSIUM SERPL-SCNC: 4.4 MMOL/L — SIGNIFICANT CHANGE UP (ref 3.5–5)
PROT SERPL-MCNC: 7.4 G/DL — SIGNIFICANT CHANGE UP (ref 6–8)
RBC # BLD: 4.73 M/UL — SIGNIFICANT CHANGE UP (ref 4.2–5.4)
RBC # FLD: 12.7 % — SIGNIFICANT CHANGE UP (ref 11.5–14.5)
SODIUM SERPL-SCNC: 141 MMOL/L — SIGNIFICANT CHANGE UP (ref 135–146)
TROPONIN T SERPL-MCNC: <0.01 NG/ML — SIGNIFICANT CHANGE UP
WBC # BLD: 6.51 K/UL — SIGNIFICANT CHANGE UP (ref 4.8–10.8)
WBC # FLD AUTO: 6.51 K/UL — SIGNIFICANT CHANGE UP (ref 4.8–10.8)

## 2020-08-23 PROCEDURE — 71046 X-RAY EXAM CHEST 2 VIEWS: CPT | Mod: 26

## 2020-08-23 PROCEDURE — 93010 ELECTROCARDIOGRAM REPORT: CPT

## 2020-08-23 PROCEDURE — 99285 EMERGENCY DEPT VISIT HI MDM: CPT

## 2020-08-23 NOTE — ED PROVIDER NOTE - PMH
Asthma  as child, no inhaler use>10 years. Denies hospitlizations or intubations due to asthma. No current inhaler.  Intubation of airway performed without difficulty    Peripartum cardiomyopathy    SVT (supraventricular tachycardia)

## 2020-08-23 NOTE — ED PROVIDER NOTE - PROGRESS NOTE DETAILS
bedside ultrasound negative for pleural effusion, or blood clots. good contractility. mild lv enlargement. advised to f/u with cardiologist dr. arevalo. advised of return precaution such as chest pain, sob, back pain, abdominal pain, syncope, sweating, sob, neck pain, arm pain, jaw pain. agreeable to dc.

## 2020-08-23 NOTE — ED ADULT NURSE NOTE - OBJECTIVE STATEMENT
34 y/o female came in complaining of palpitations, pt has hx of cardiomyopathy. pt denies any numbness and tingling.

## 2020-08-23 NOTE — ED PROVIDER NOTE - NSFOLLOWUPINSTRUCTIONS_ED_ALL_ED_FT
Heart Palpitations    WHAT YOU NEED TO KNOW:    Heart palpitations are feelings that your heart races, jumps, throbs, or flutters. You may feel extra beats, no beats for a short time, or skipped beats. You may have these feelings in your chest, throat, or neck. They may happen when you are sitting, standing, or lying. Heart palpitations may be frightening, but are usually not caused by a serious problem.     DISCHARGE INSTRUCTIONS:    Call 911 or have someone else call for any of the following:     You have any of the following signs of a heart attack:   Squeezing, pressure, or pain in your chest      You may also have any of the following:   Discomfort or pain in your back, neck, jaw, stomach, or arm      Shortness of breath      Nausea or vomiting      Lightheadedness or a sudden cold sweat      You have any of the following signs of a stroke:   Numbness or drooping on one side of your face       Weakness in an arm or leg      Confusion or difficulty speaking      Dizziness, a severe headache, or vision loss      You faint or lose consciousness.     Return to the emergency department if:     Your palpitations happen more often or get more intense.         Contact your healthcare provider if:     You have new or worsening swelling in your feet or ankles.      You have questions or concerns about your condition or care.    Follow up with your healthcare provider as directed: You may need to follow up with a cardiologist. You may need tests to check for heart problems that cause palpitations. Write down your questions so you remember to ask them during your visits.     Keep a record: Write down when your palpitations start and stop, what you were doing when they started, and your symptoms. Keep track of what you ate or drank within a few hours of your palpitations. Include anything that seemed to help your symptoms, such as lying down or holding your breath. This record will help you and your healthcare provider learn what triggers your palpitations. Bring this record with you to your follow up visits.    Help prevent heart palpitations:     Manage stress and anxiety. Find ways to relax such as listening to music, meditating, or doing yoga. Exercise can also help decrease stress and anxiety. Talk to someone you trust about your stress or anxiety. You can also talk to a therapist.       Get plenty of sleep every night. Ask your healthcare provider how much sleep you need each night.       Do not drink caffeine or alcohol. Caffeine and alcohol can make your palpitations worse. Caffeine is found in soda, coffee, tea, chocolate, and drinks that increase your energy.       Do not smoke. Nicotine and other chemicals in cigarettes and cigars may damage your heart and blood vessels. Ask your healthcare provider for information if you currently smoke and need help to quit. E-cigarettes or smokeless tobacco still contain nicotine. Talk to your healthcare provider before you use these products.       Do not use illegal drugs. Talk to your healthcare provider if you use illegal drugs and want help to quit.          © Copyright [employer] 2019 All illustrations and images included in CareNotes are the copyrighted property of [employer] or [employer].

## 2020-08-23 NOTE — ED PROVIDER NOTE - PHYSICAL EXAMINATION
Physical Exam    Vital Signs: I have reviewed the initial vital signs.  Constitutional: well-nourished, appears stated age, no acute distress  Eyes: Conjunctiva pink, Sclera clear  Cardiovascular: S1 and S2, regular rate, regular rhythm, well-perfused extremities, radial and pedal pulses equal and 2+ b/l.   Respiratory: unlabored respiratory effort, clear to auscultation bilaterally no wheezing, rales and rhonchi. pt is speaking full sentences. no accessory muscle use. no reproducible chest tenderness or chest wall crepitus.,   Gastrointestinal: soft, non-tender, nondistended abdomen, no pulsatile mass, normal bowl sounds, no rebound, no guarding  Musculoskeletal: supple neck, no lower extremity edema, no calf tenderness, no midline tenderness, no palpable spinal step offs  Integumentary: warm, dry, no rash  Neurologic: awake, alert  Psychiatric: appropriate mood, appropriate affect

## 2020-08-23 NOTE — ED ADULT NURSE NOTE - NSIMPLEMENTINTERV_GEN_ALL_ED
Implemented All Universal Safety Interventions:  [place] to call system. Call bell, personal items and telephone within reach. Instruct patient to call for assistance. Room bathroom lighting operational. Non-slip footwear when patient is off stretcher. Physically safe environment: no spills, clutter or unnecessary equipment. Stretcher in lowest position, wheels locked, appropriate side rails in place.

## 2020-08-23 NOTE — ED PROVIDER NOTE - NS ED ROS FT
CONST: No fever, chills or bodyaches  EYES: No pain, redness, drainage or visual changes.  ENT: No ear pain or discharge, nasal discharge or congestion. No sore throat  CARD: (+) chest pain. No palpitations  RESP: No SOB, cough, hemoptysis. No hx of asthma or COPD  GI: No abdominal pain, N/V/D  : No urinary symptoms  MS: No joint pain, back pain or extremity pain/injury  SKIN: No rashes  NEURO: No headache, dizziness, paresthesias or LOC

## 2020-08-23 NOTE — ED PROVIDER NOTE - ATTENDING CONTRIBUTION TO CARE
35yoF with h/o peripartum cardiomyopathy with SVT, HF 1 yr ago, now resolved, presents with intermittent palpitations and SOB. Ever since incident has had intermittent palpitations however yest had another episode with some SOB and sweating. Went to urgent care but sent here due to low BP - pt has not yet eaten today. Copper IUD. Denies all other symptoms including cough, fever, COVID exposure (and negative recent tests), recent long distance travel. On exam, afebrile, hemodynamically stable, saturating well on RA, NAD, well appearing, no WOB, speaking full sentences, head NCAT, EOMI grossly, anicteric, MMM, no JVD or HJR, RRR, nml S1/S2, no m/r/g, lungs CTAB, no w/r/r, abd soft, NT, ND, nml BS, no rebound or guarding, AAO, CN's 3-12 grossly intact, VELÁSQUEZ spontaneously, no leg cyanosis or edema, skin warm, well perfused, no rashes or hives. No e/o fluid overload on exam, CXR, BNP. Character low suspicion for PE and D-dimer negative. Character low suspicion for ACS and ECG/trop unremarkable. No arrhythmia noted here. No anemia or bleeding or gross electrolyte abnormalities. Patient is well appearing, NAD, afebrile, hemodynamically stable. Any available tests and studies were discussed with patient and mom. Discharged with instructions in further symptomatic care, return precautions, and need for PMD and cardio f/u.

## 2020-08-23 NOTE — ED PROVIDER NOTE - OBJECTIVE STATEMENT
36 y/o female with a PMH of peripartum cardiomyopathy, SVT, asthma, and possible PE (was on anticoagulation for two months a year ago) presents to the ED for evaluation of intermittent chest flutters and pressure x 2 days. Pt reports she feels chest fluttering at rest, that resolves with activity. Pt reports difficulty taking a deep breath when chest flutter sensation occurs. Pt reports she follows with cardiologist Dr. Vila, last seen January 2019. Pt denies hx of stress tests or CCTA. Pt reports IUD without hormones. Pt denies sob, back pain, neck pain, jaw pain, dizziness, sweating, abdominal pain, n/v/d/c, dizziness, leg pain, leg swelling, recent surgery, recent travel, recent hospitalizations, recent trauma, recent sick contacts, fever, chills, hx of mi or dvt, use of cigarettes or vaping, or hx of cancer.

## 2020-08-23 NOTE — ED PROVIDER NOTE - PATIENT PORTAL LINK FT
You can access the FollowMyHealth Patient Portal offered by [facility] by registering at the following website: [email or web address]. By joining [employer]’s FollowMyHealth portal, you will also be able to view your health information using other applications (apps) compatible with our system.

## 2020-08-25 ENCOUNTER — APPOINTMENT (OUTPATIENT)
Dept: ELECTROPHYSIOLOGY | Facility: CLINIC | Age: 35
End: 2020-08-25

## 2020-08-25 ENCOUNTER — APPOINTMENT (OUTPATIENT)
Dept: HEART FAILURE | Facility: CLINIC | Age: 35
End: 2020-08-25
Payer: COMMERCIAL

## 2020-08-25 ENCOUNTER — NON-APPOINTMENT (OUTPATIENT)
Age: 35
End: 2020-08-25

## 2020-08-25 VITALS
HEART RATE: 79 BPM | DIASTOLIC BLOOD PRESSURE: 64 MMHG | BODY MASS INDEX: 28 KG/M2 | HEIGHT: 71 IN | SYSTOLIC BLOOD PRESSURE: 109 MMHG | OXYGEN SATURATION: 100 % | TEMPERATURE: 98.6 F | WEIGHT: 200 LBS | RESPIRATION RATE: 12 BRPM

## 2020-08-25 DIAGNOSIS — I42.8 OTHER CARDIOMYOPATHIES: ICD-10-CM

## 2020-08-25 PROCEDURE — 99215 OFFICE O/P EST HI 40 MIN: CPT

## 2020-08-25 PROCEDURE — 93000 ELECTROCARDIOGRAM COMPLETE: CPT

## 2020-08-25 RX ORDER — SPIRONOLACTONE 25 MG/1
25 TABLET ORAL
Qty: 36 | Refills: 1 | Status: DISCONTINUED | COMMUNITY
Start: 2019-07-30 | End: 2020-08-25

## 2020-08-25 NOTE — HISTORY OF PRESENT ILLNESS
[FreeTextEntry1] : Ms. Jerome is a 35 year old  woman with history of severe peripartum cardiomyopathy, now with recovered  LVEF. Her history is also notable for severe spinal stenosis s/p microlumbardiscectomy. She presents today for an urgent visit, having recently visited [facility] ED. \par \par In 2020, at 35 weeks gestation, she presented to [facility] for SOB, found to be in SVT and received adenosine x4 and failed cardioversion x2. She then underwent emergent  and developed cardiogenic shock post operatively. TTE revealed severely reduced LVEF 10-15% and she was placed on VA ECMO and CVVHD. Her condition slowly improved and she was successfully decannulated on 19 and eventually discharged without a diuretic requirement. Readmitted in May 2019 at [facility] with SOB and R chest pleuritic pain, CTA and VQ scan suggestive of PE and was treated with Eliquis. She was then rehospitalized in 2019 for intractable back pain accompanied by LLE numbness and underwent L5-S1 microlumbardiscectomy for known severe spinal stenosis 2/2 disc herniation. Pathology noted degenerative changes. \par \par \par

## 2020-08-25 NOTE — DISCUSSION/SUMMARY
[FreeTextEntry1] : 2 days ago sudden episode of SOB. Urgent care "low BP" 86/56 \par EKG: showed PVCs. Sent to ER [facility]:\par normal EKG, CXR, "mild LVE", ddimer neg. trop neg. NT pro 54. \par normal blood tests. \par since have felt ok. "feels heart beating in chest at night" \par resting HR 50s. at night. 90/-100/\par meds: entresto 200 bid, toprol 150 Xl at night. stopped Ald 12.5 and asa. \par 79 109/64 200\par EKG: SR 64 narrow complex \par No evidence for cardiac etiiology for episode. Suspicious for panic attack and patient is at risk for PTLD.\par Plan;\par Resume ASA\par TTE (ED echo described LVE), Zio patch. \par Referral to behavioural health (D/w Dr Feng). \par See 4 mth which is 2 yr post presentation. \par While I am reluctant to decrease HF meds, I have told patient I will discuss with Dr Naylor.\par Rell Vila \par \par \par

## 2020-10-17 ENCOUNTER — OUTPATIENT (OUTPATIENT)
Dept: OUTPATIENT SERVICES | Facility: HOSPITAL | Age: 35
LOS: 1 days | Discharge: HOME | End: 2020-10-17

## 2020-10-17 DIAGNOSIS — Z98.891 HISTORY OF UTERINE SCAR FROM PREVIOUS SURGERY: Chronic | ICD-10-CM

## 2020-10-18 DIAGNOSIS — Z11.59 ENCOUNTER FOR SCREENING FOR OTHER VIRAL DISEASES: ICD-10-CM

## 2020-10-20 ENCOUNTER — OUTPATIENT (OUTPATIENT)
Dept: OUTPATIENT SERVICES | Facility: HOSPITAL | Age: 35
LOS: 1 days | Discharge: HOME | End: 2020-10-20
Payer: COMMERCIAL

## 2020-10-20 DIAGNOSIS — Z98.891 HISTORY OF UTERINE SCAR FROM PREVIOUS SURGERY: Chronic | ICD-10-CM

## 2020-10-20 PROCEDURE — 93306 TTE W/DOPPLER COMPLETE: CPT | Mod: 26

## 2020-10-23 ENCOUNTER — APPOINTMENT (OUTPATIENT)
Dept: CARDIOLOGY | Facility: CLINIC | Age: 35
End: 2020-10-23
Payer: COMMERCIAL

## 2020-10-23 ENCOUNTER — NON-APPOINTMENT (OUTPATIENT)
Age: 35
End: 2020-10-23

## 2020-10-23 VITALS
WEIGHT: 197 LBS | BODY MASS INDEX: 27.58 KG/M2 | HEIGHT: 71 IN | SYSTOLIC BLOOD PRESSURE: 98 MMHG | HEART RATE: 72 BPM | TEMPERATURE: 95.7 F | DIASTOLIC BLOOD PRESSURE: 61 MMHG

## 2020-10-23 PROCEDURE — 93000 ELECTROCARDIOGRAM COMPLETE: CPT

## 2020-10-23 PROCEDURE — 99213 OFFICE O/P EST LOW 20 MIN: CPT

## 2020-10-23 PROCEDURE — 99072 ADDL SUPL MATRL&STAF TM PHE: CPT

## 2020-12-08 ENCOUNTER — NON-APPOINTMENT (OUTPATIENT)
Age: 35
End: 2020-12-08

## 2020-12-08 ENCOUNTER — APPOINTMENT (OUTPATIENT)
Dept: HEART FAILURE | Facility: CLINIC | Age: 35
End: 2020-12-08
Payer: COMMERCIAL

## 2020-12-08 VITALS
OXYGEN SATURATION: 99 % | DIASTOLIC BLOOD PRESSURE: 63 MMHG | SYSTOLIC BLOOD PRESSURE: 126 MMHG | HEART RATE: 70 BPM | RESPIRATION RATE: 16 BRPM | BODY MASS INDEX: 28.14 KG/M2 | HEIGHT: 71 IN | WEIGHT: 201 LBS | TEMPERATURE: 98.4 F

## 2020-12-08 PROCEDURE — 99072 ADDL SUPL MATRL&STAF TM PHE: CPT

## 2020-12-08 PROCEDURE — 93000 ELECTROCARDIOGRAM COMPLETE: CPT

## 2020-12-08 PROCEDURE — 99215 OFFICE O/P EST HI 40 MIN: CPT

## 2020-12-08 NOTE — DISCUSSION/SUMMARY
[FreeTextEntry1] : feels well. describes some anxiety. unlimited in walking. \par meds; toprol XL 75 bid, entresto 200 bid. asa. \par 126/63 70 99%\par no JVP. lungs clear. abdo soft. no LE edema. \par labs august normal\par EKG: one VPCs 65/min. \par TTE 10.20: LVEF 54% LVEDD 4.5, normal RV. no MR trivial TR. definty. normal function. \par Patient is now 2yrs from the event with normal ejection fraction. \par She is asking to reduce stop meds. \par We discussed the following plan:\par review HR monitor. if HR well controlled reduce and possibly stop toprol.\par will discuss this Dr Naylor.\par See 6 months. \par referral behavioral health\par Rell Vila \par \par

## 2020-12-08 NOTE — HISTORY OF PRESENT ILLNESS
[FreeTextEntry1] : Ms. Jerome is a 35 year old  woman with history of severe peripartum cardiomyopathy, now with recovered  LVEF. Her history is also notable for severe spinal stenosis s/p microlumbardiscectomy. She presents today for an urgent visit, having recently visited [facility] ED. \par \par In 2020, at 35 weeks gestation, she presented to [facility] for SOB, found to be in SVT and received adenosine x4 and failed cardioversion x2. She then underwent emergent  and developed cardiogenic shock post operatively. TTE revealed severely reduced LVEF 10-15% and she was placed on VA ECMO and CVVHD. Her condition slowly improved and she was successfully decannulated on 19 and eventually discharged without a diuretic requirement. Readmitted in May 2019 at [facility] with SOB and R chest pleuritic pain, CTA and VQ scan suggestive of PE and was treated with Eliquis. She was then rehospitalized in 2019 for intractable back pain accompanied by LLE numbness and underwent L5-S1 microlumbardiscectomy for known severe spinal stenosis 2/2 disc herniation. Pathology noted degenerative changes. \par \par \par

## 2020-12-12 NOTE — PROGRESS NOTE ADULT - SUBJECTIVE AND OBJECTIVE BOX
Subjective: 34yFemale with a pmhx of BACK PAIN  ^BACK PAIN  Family hx of lung cancer  FH: CAD (coronary artery disease)  Family history of COPD (chronic obstructive pulmonary disease)  Family history of CHF (congestive heart failure)  Family history of obesity  No pertinent family history in first degree relatives  No pertinent family history in first degree relatives  Handoff  MEWS Score  35w  Intubation of airway performed without difficulty  Peripartum cardiomyopathy  SVT (supraventricular tachycardia)  Asthma  No pertinent past medical history  Back pain  Lumbar discectomy  Delivery by emergency   History of low transverse  section  No significant past surgical history  BACK PAIN  90+      S/P Left L5-S1 MLD    Pt seen and examined at bedside. Pt c/o incisional pain at this time. Sts significant improvement in her pre-op LLE pain. c/o residual numbness. Denies RLE sx.    Allergies    No Known Allergies    Intolerances    Reglan (Other)      Vital Signs Last 24 Hrs  T(C): 35.5 (21 Aug 2019 05:27), Max: 37.6 (20 Aug 2019 14:31)  T(F): 95.9 (21 Aug 2019 05:27), Max: 99.6 (20 Aug 2019 14:31)  HR: 61 (21 Aug 2019 09:46) (51 - 76)  BP: 110/53 (21 Aug 2019 09:46) (98/51 - 132/44)  BP(mean): --  RR: 18 (21 Aug 2019 09:46) (13 - 22)  SpO2: 98% (20 Aug 2019 21:57) (98% - 100%)      acetaminophen   Tablet .. 650 milliGRAM(s) Oral every 4 hours PRN  benzocaine 15 mG/menthol 3.6 mG Lozenge 1 Lozenge Oral every 2 hours PRN  dexamethasone  Injectable 4 milliGRAM(s) IV Push every 6 hours  docusate sodium 100 milliGRAM(s) Oral three times a day  metoprolol succinate  milliGRAM(s) Oral daily  morphine  - Injectable 2 milliGRAM(s) IV Push every 4 hours PRN  pantoprazole    Tablet 40 milliGRAM(s) Oral before breakfast  sacubitril 97 mG/valsartan 103 mG 1 Tablet(s) Oral two times a day  senna 2 Tablet(s) Oral at bedtime PRN  spironolactone 25 milliGRAM(s) Oral <User Schedule>  traMADol 50 milliGRAM(s) Oral every 6 hours PRN        19 @ 07:01  -  19 @ 07:00  --------------------------------------------------------  IN: 0 mL / OUT: 225 mL / NET: -225 mL          Exam:  Strength 5/5  Sensation decr to light touch prox LLE compare to RLE, equal distally  KJ 2+  Dressing C/D/I      PT/INR - ( 19 Aug 2019 22:20 )   PT: 11.40 sec;   INR: 0.99 ratio         PTT - ( 19 Aug 2019 22:20 )  PTT:37.0 sec      Imaging:  < from: Xray Chest 1 View AP/PA (19 @ 16:23) >  Impression:      No radiographic evidence of acute cardiopulmonary disease.      POP ORTEGA M.D., RESIDENT RADIOLOGIST  This document has been electronically signed.  JULISSA CALDERA M.D., ATTENDING RADIOLOGIST  This document has been electronically signed. Aug 19 2019  4:34PM    < end of copied text >      Assessment/Plan: as above:  pt seen and examined with Dr. Cage  pt had questions about medications for going home  called Dr. Sanon whop will speak with pt regarding cardiac meds  pain meds prn  will likely d/c home today  d/w attending yellow

## 2020-12-18 ENCOUNTER — NON-APPOINTMENT (OUTPATIENT)
Age: 35
End: 2020-12-18

## 2020-12-27 NOTE — PHYSICAL EXAM
[General Appearance - Well Developed] : well developed [Normal Appearance] : normal appearance [Well Groomed] : well groomed [General Appearance - Well Nourished] : well nourished [No Deformities] : no deformities [General Appearance - In No Acute Distress] : no acute distress [Normal Conjunctiva] : the conjunctiva exhibited no abnormalities [Eyelids - No Xanthelasma] : the eyelids demonstrated no xanthelasmas [Normal Oral Mucosa] : normal oral mucosa [No Oral Pallor] : no oral pallor [No Oral Cyanosis] : no oral cyanosis [Normal Jugular Venous A Waves Present] : normal jugular venous A waves present [No Jugular Venous Lu A Waves] : no jugular venous lu A waves [Auscultation Breath Sounds / Voice Sounds] : lungs were clear to auscultation bilaterally [Heart Rate And Rhythm] : heart rate and rhythm were normal [Bowel Sounds] : normal bowel sounds [Abdomen Soft] : soft [Abnormal Walk] : normal gait [Nail Clubbing] : no clubbing of the fingernails [Cyanosis, Localized] : no localized cyanosis [Petechial Hemorrhages (___cm)] : no petechial hemorrhages [Skin Color & Pigmentation] : normal skin color and pigmentation [] : no rash [No Venous Stasis] : no venous stasis [Skin Lesions] : no skin lesions [No Skin Ulcers] : no skin ulcer [No Xanthoma] : no  xanthoma was observed [Oriented To Time, Place, And Person] : oriented to person, place, and time [Affect] : the affect was normal [Mood] : the mood was normal [No Anxiety] : not feeling anxious [FreeTextEntry1] : no jvd

## 2020-12-27 NOTE — ASSESSMENT
[FreeTextEntry1] : Patient was seen and examined with Dr. Sanon\par #post partum CM-\par - event monitor to evaluate HR\par - If HR decreased may consider reducing BB\par \par \par

## 2020-12-27 NOTE — HISTORY OF PRESENT ILLNESS
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 She was in  last trimester of her 2nd pregnancy at 34 weeks, an emergent C section was done on April 10, 2019 , went on cardiogenic shock , EF 10% was put on ECMO,  and she was flown to [facility] for higher level of care. Her cardiac function improved - successfully decannulated from ecmo. She was discharged on 5/6/2019 but later on presented to [facility] for an acute shortness of breath on 5/11. A V/Q scan confirmed she developed DVT. She was on eliquis 5mg twice daily.\par \par  Dr. Vila  recommended to deferring on EF evaluation till patient is on full dose of medications.\par Patient is back to work, exercising but is vigilant about drinking alcohol.\par August 19, 2019 echo showed 65% LVEF\par \par \par Patient reports low heart rate. Patient complains of some tiredness.\par \par EKG SR 72 bpm\par \par CARDIAC TESTING\par ECHO: (5/12/2019) LVEF 30-35%.LA mildly dilated, mild to moderate MR, Spectral doppler shows restrictive pattern- Grade III diastolic dysfunction\par

## 2021-01-06 ENCOUNTER — APPOINTMENT (OUTPATIENT)
Dept: CV DIAGNOSTICS | Facility: HOSPITAL | Age: 36
End: 2021-01-06

## 2021-01-06 ENCOUNTER — OUTPATIENT (OUTPATIENT)
Dept: OUTPATIENT SERVICES | Facility: HOSPITAL | Age: 36
LOS: 1 days | End: 2021-01-06
Payer: COMMERCIAL

## 2021-01-06 DIAGNOSIS — Z98.891 HISTORY OF UTERINE SCAR FROM PREVIOUS SURGERY: Chronic | ICD-10-CM

## 2021-01-06 DIAGNOSIS — I49.3 VENTRICULAR PREMATURE DEPOLARIZATION: ICD-10-CM

## 2021-01-06 PROCEDURE — 93018 CV STRESS TEST I&R ONLY: CPT

## 2021-01-06 PROCEDURE — 93016 CV STRESS TEST SUPVJ ONLY: CPT

## 2021-01-06 PROCEDURE — 93017 CV STRESS TEST TRACING ONLY: CPT

## 2021-01-14 NOTE — PROGRESS NOTE ADULT - SUBJECTIVE AND OBJECTIVE BOX
JOE BEDOLLA  MRN-[id number]  Patient is a 33y old  Female who presents with a chief complaint of VA ECMO (2019 01:45)    HPI:  32 yo  at 10w1d GA by LMP of 8/10/18 with EDC of 18 with known hyperemesis gravidarum presents with nausea and vomiting many times yesterday 4/10/19. She had been taking diclegis for the past week with partial relief. She last held down a full meal yesterday (10/18) afternoon. The only liquid that she tolerates is milk, she vomits even with water. She has persistent heartburn that is relived by antacids or milk. 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 She is worried that her use of antinausea medication in her previous pregnancy caused this congenital defect, so she refuses most antinausea medications. She is comfortable taking diclegis and jaime only. She felt some mild weakness that improved with fluids given by ED team. She currently denies dizziness, weakness, fevers, chills, shortness of breath, chest pain, nausea, vomiting, dysuria, hematuria, diarrhea, or constipation.    Ob/Gyn History:  ,  c/s for craniosynostosis also complicated by hyperemesis gravidarum requiring home zofran pump, SAB x2               LMP -   8/10/18                Cycle Length - regular, monthly  Denies history of ovarian cysts, uterine fibroids, abnormal paps, or STIs  Last Pap Smear - several months ago, normal per pt    HOSPITAL COURSE:  33y female HD#1,  34w5d dated by 1st trimester sonogram, presents to the ED on 4/10 with CC of SOB for past 2 days. Patient was sinus tachy to 200s, adenosine administered, no response, cardioverted with no resolution. OBGYN team proceeded with emergent . Patient intubated in ED and taken to L&D. EBL 800cc, no acute events intraop. SICU consult placed for hemodynamic monitoring and ventilator management.    SICU COURSE:  Patient was sedated with propofol and precedex, became acidotic and retaining. Cards concerned for thyroid storm and advised no CT with contrast to r/o PE, patient was not stable for V/Q scan. Central and arterial line placed overnight for HD monitoring. [place] placed in AM for possible hemodialysis vs CVVH.    During AM rounds on  patient became sinus tachy to 180s, multiple lopressor pushes given with minimal resolution.  Patient amio gtt d/c'd started on esmolol, milrinone with resolution of sinus tachy 120s. Patient weaned off shameka, levo titrating down. Nephro consult placed, recs CVVH for hyperkalemia, low UO. Cardiology, EPS, CT Surg, Interventional cardiology bedside for evaluation determined patient would benefit from ecmo/impella due to decreasing EF 10% from 50% in ED. VA ECMO placed (right SC arterial, right femoral venous) and transferred to [facility] for further workup and management.     On arrival to [facility] at 1130pm on 19, pt received on Levophed 0.23mcg/kg/min (50ml/hr), Vasopressin 0.06units/hr (4ml), Phenylephrine 0.5mcg/kg/min (21ml/hr), VA ECMO 3100RPM with 2.45LPM    HR: 107  CVP: 23  PAP: 41/26 (31) (2019 23:39)      Surgery/Hospital course:  transferred from [facility] with VA ECMO. intubated on pressors;  easy to wake up , follow commands, moves all ext.      Physical Exam:  Vital Signs Last 24 Hrs  T(C): 35.2 (2019 03:00), Max: 37.8 (2019 12:00)  T(F): 95.4 (2019 03:00), Max: 100 (2019 12:00)  HR: 104 (2019 05:45) (79 - 168)  BP: --  BP(mean): --  RR: 14 (2019 05:45) (9 - 36)  SpO2: 100% (2019 05:45) (85% - 100%)  Gen:  sedated   CNS: non focal .	  Neck: no JVD  RES : clear , no wheezing      ; chest tubes                     CVS: Regular  rhythm. Normal S1/S2  Abd: Soft, non-distended. Bowel sounds present.  Skin: No rash.  Ext: VA ECMO ; A Line      ============================I/O===========================   I&O's Detail    2019 07:01  -  2019 05:59  --------------------------------------------------------  IN:    dexmedetomidine Infusion: 213.9 mL    heparin Infusion: 5 mL    IV PiggyBack: 300 mL    norepinephrine Infusion: 162.3 mL    Packed Red Blood Cells: 300 mL    phenylephrine   Infusion: 24 mL    vasopressin Infusion: 34 mL  Total IN: 1039.2 mL  OUT:    Indwelling Catheter - Urethral: 160 mL  Total OUT: 160 mL    Total NET: 879.2 mL    ============================ LABS =========================                        8.6    25.0  )-----------( 136      ( 2019 23:48 )             27.0     04-12    141  |  107  |  35<H>  ----------------------------<  114<H>  5.5<H>   |  18<L>  |  2.73<H>    Ca    7.6<L>      2019 03:05  Phos  7.4       Mg     1.8         TPro  3.7<L>  /  Alb  2.3<L>  /  TBili  1.1  /  DBili  x   /  AST  6988<H>  /  ALT  2137<H>  /  AlkPhos  102  04-12    LIVER FUNCTIONS - ( 2019 03:05 )Alb: 2.3 g/dL / Pro: 3.7 g/dL / ALK PHOS: 102 U/L / ALT: 2137 U/L / AST: 6988 U/L / GGT: x           PT/INR - ( 2019 23:48 )   PT: 24.6 sec;   INR: 2.11 ratio  PTT - ( 2019 23:48 )  PTT:29.0 sec  ABG - ( 2019 05:21 )pH, Arterial: 7.37  pH, Blood: x     /  pCO2: 35    /  pO2: 279   / HCO3: 20    / Base Excess: -4.1  /  SaO2: 100       Urinalysis Basic - ( 2019 01:00 )    Color: Yellow / Appearance: Slightly Turbid / S.012 / pH: x  Gluc: x / Ketone: Negative  / Bili: Negative / Urobili: Negative   Blood: x / Protein: 30 mg/dL / Nitrite: Negative   Leuk Esterase: Negative / RBC: 15 /hpf / WBC 8 /HPF   Sq Epi: x / Non Sq Epi: 2 /hpf / Bacteria: Negative    ======================Micro/Rad/Cardio=================  CXR: Reviewed  Echo:Reviewed  ======================================================  PAST MEDICAL & SURGICAL HISTORY:  Asthma: as child, no inhaler use&gt;10 years. Denies hospitlizations or intubations. No current inhaler.  History of low transverse  section      ====================ASSESMENT ==============  acute respiratory failure  cardiogenic shock  shock liver  acute renal failure  coagulopathy  Leukocytosis  postpartum ( emergent C section 4/10/2019)    Plan:  ====================== NEUROLOGY=====================  dexmedetomidine Infusion 0.5 MICROgram(s)/kG/Hr IV Continuous  HYDROmorphone  Injectable 0.5 milliGRAM(s) IV Push PRN  meperidine     Injectable 25 milliGRAM(s) IV Push    ==================== RESPIRATORY======================  Mechanical Ventilation:  Mode: AC/ CMV (Assist Control/ Continuous Mandatory Ventilation)  RR (machine): 14  TV (machine): 400  FiO2: 50  PEEP: 5  ====================CARDIOVASCULAR==================  norepinephrine Infusion 0.23 MICROgram(s)/kG/Min IV Continuous <Continuous>  phenylephrine    Infusion 0.491 MICROgram(s)/kG/Min IV Continuous <Continuous>  vasopressin drip  VA ECMO ( R Fem--> R Axillary)  RPM increased to 4300 , generating flow 4.1 L/Min  ===================HEMATOLOGIC/ONC ===================  heparin  Infusion 500 Unit(s)/Hr IV Continuous <Continuous>    ===================== RENAL =========================  CVVH L femoral HD cathetrer  Bazzi monitor urine output   ==================== GASTROINTESTINAL===================  pantoprazole  Injectable 40 milliGRAM(s) IV Push daily  sodium chloride 0.9% lock flush 10 milliLiter(s) IV Push every 1 hour PRN  sodium chloride 0.9%. 1000 milliLiter(s) IV Continuous <Continuous>    =======================    ENDOCRINE  =====================  dextrose 50% Injectable 50  dextrose 50% Injectable 25  insulin Infusion 1  vasopressin Infusion 0.067    ========================INFECTIOUS DISEASE================  cefepime   IVPB 1000 milliGRAM(s) IV Intermittent every 8 hours  cefepime   IVPB          Patient requires continuous monitoring with bedside rhythm monitoring,arterial line,pulse oximetry,ventilator monitoring;intermittent blood gas analysis.  Care plan discussed with ICU care team.  patient remain critical; required more than usual post op care; I have spent 35 minutes providing non routine post op care. JOE BEDOLLA  MRN-[id number]  Patient is a 33y old  Female who presents with a chief complaint of VA ECMO (2019 01:45)    HPI:  32 yo  at 10w1d GA by LMP of 8/10/18 with EDC of 18 with known hyperemesis gravidarum presents with nausea and vomiting many times yesterday 4/10/19. She had been taking diclegis for the past week with partial relief. She last held down a full meal yesterday (10/18) afternoon. The only liquid that she tolerates is milk, she vomits even with water. She has persistent heartburn that is relived by antacids or milk. 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 She is worried that her use of antinausea medication in her previous pregnancy caused this congenital defect, so she refuses most antinausea medications. She is comfortable taking diclegis and jaime only. She felt some mild weakness that improved with fluids given by ED team. She currently denies dizziness, weakness, fevers, chills, shortness of breath, chest pain, nausea, vomiting, dysuria, hematuria, diarrhea, or constipation.    Ob/Gyn History:  ,  c/s for craniosynostosis also complicated by hyperemesis gravidarum requiring home zofran pump, SAB x2               LMP -   8/10/18                Cycle Length - regular, monthly  Denies history of ovarian cysts, uterine fibroids, abnormal paps, or STIs  Last Pap Smear - several months ago, normal per pt    HOSPITAL COURSE:  33y female HD#1,  34w5d dated by 1st trimester sonogram, presents to the ED on 4/10 with CC of SOB for past 2 days. Patient was sinus tachy to 200s, adenosine administered, no response, cardioverted with no resolution. OBGYN team proceeded with emergent . Patient intubated in ED and taken to L&D. EBL 800cc, no acute events intraop. SICU consult placed for hemodynamic monitoring and ventilator management.    SICU COURSE:  Patient was sedated with propofol and precedex, became acidotic and retaining. Cards concerned for thyroid storm and advised no CT with contrast to r/o PE, patient was not stable for V/Q scan. Central and arterial line placed overnight for HD monitoring. [place] placed in AM for possible hemodialysis vs CVVH.    During AM rounds on  patient became sinus tachy to 180s, multiple lopressor pushes given with minimal resolution.  Patient amio gtt d/c'd started on esmolol, milrinone with resolution of sinus tachy 120s. Patient weaned off shameka, levo titrating down. Nephro consult placed, recs CVVH for hyperkalemia, low UO. Cardiology, EPS, CT Surg, Interventional cardiology bedside for evaluation determined patient would benefit from ecmo/impella due to decreasing EF 10% from 50% in ED. VA ECMO placed (right SC arterial, right femoral venous) and transferred to [facility] for further workup and management.     On arrival to [facility] at 1130pm on 19, pt received on Levophed 0.23mcg/kg/min (50ml/hr), Vasopressin 0.06units/hr (4ml), Phenylephrine 0.5mcg/kg/min (21ml/hr), VA ECMO 3100RPM with 2.45LPM    HR: 107  CVP: 23  PAP: 41/26 (31) (2019 23:39)      Surgery/Hospital course:  transferred from [facility] with VA ECMO. intubated on pressors;  easy to wake up , follow commands, moves all ext.      Physical Exam:  Vital Signs Last 24 Hrs  T(C): 35.2 (2019 03:00), Max: 37.8 (2019 12:00)  T(F): 95.4 (2019 03:00), Max: 100 (2019 12:00)  HR: 104 (2019 05:45) (79 - 168)  BP: --  BP(mean): --  RR: 14 (2019 05:45) (9 - 36)  SpO2: 100% (2019 05:45) (85% - 100%)  Gen:  sedated   CNS: non focal .	  Neck: no JVD  RES : clear , no wheezing      ; chest tubes                     CVS: Regular  rhythm. Normal S1/S2  Abd: Soft, non-distended. Bowel sounds present.  Skin: No rash.  Ext: VA ECMO ; A Line      ============================I/O===========================   I&O's Detail    2019 07:01  -  2019 05:59  --------------------------------------------------------  IN:    dexmedetomidine Infusion: 213.9 mL    heparin Infusion: 5 mL    IV PiggyBack: 300 mL    norepinephrine Infusion: 162.3 mL    Packed Red Blood Cells: 300 mL    phenylephrine   Infusion: 24 mL    vasopressin Infusion: 34 mL  Total IN: 1039.2 mL  OUT:    Indwelling Catheter - Urethral: 160 mL  Total OUT: 160 mL    Total NET: 879.2 mL    ============================ LABS =========================                        8.6    25.0  )-----------( 136      ( 2019 23:48 )             27.0     04-12    141  |  107  |  35<H>  ----------------------------<  114<H>  5.5<H>   |  18<L>  |  2.73<H>    Ca    7.6<L>      2019 03:05  Phos  7.4       Mg     1.8         TPro  3.7<L>  /  Alb  2.3<L>  /  TBili  1.1  /  DBili  x   /  AST  6988<H>  /  ALT  2137<H>  /  AlkPhos  102  04-12    LIVER FUNCTIONS - ( 2019 03:05 )Alb: 2.3 g/dL / Pro: 3.7 g/dL / ALK PHOS: 102 U/L / ALT: 2137 U/L / AST: 6988 U/L / GGT: x           PT/INR - ( 2019 23:48 )   PT: 24.6 sec;   INR: 2.11 ratio  PTT - ( 2019 23:48 )  PTT:29.0 sec  ABG - ( 2019 05:21 )pH, Arterial: 7.37  pH, Blood: x     /  pCO2: 35    /  pO2: 279   / HCO3: 20    / Base Excess: -4.1  /  SaO2: 100       Urinalysis Basic - ( 2019 01:00 )    Color: Yellow / Appearance: Slightly Turbid / S.012 / pH: x  Gluc: x / Ketone: Negative  / Bili: Negative / Urobili: Negative   Blood: x / Protein: 30 mg/dL / Nitrite: Negative   Leuk Esterase: Negative / RBC: 15 /hpf / WBC 8 /HPF   Sq Epi: x / Non Sq Epi: 2 /hpf / Bacteria: Negative    ======================Micro/Rad/Cardio=================  CXR: Reviewed  Echo:Reviewed  ======================================================  PAST MEDICAL & SURGICAL HISTORY:  Asthma: as child, no inhaler use&gt;10 years. Denies hospitlizations or intubations. No current inhaler.  History of low transverse  section      ====================ASSESMENT ==============  acute respiratory failure  cardiogenic shock  shock liver  Lactic acidosis  acute renal failure  Hyperkalemia  coagulopathy  Leukocytosis  postpartum ( emergent C section 4/10/2019)    Plan:  ====================== NEUROLOGY=====================  dexmedetomidine Infusion 0.5 MICROgram(s)/kG/Hr IV Continuous  HYDROmorphone  Injectable 0.5 milliGRAM(s) IV Push PRN  meperidine     Injectable 25 milliGRAM(s) IV Push    ==================== RESPIRATORY======================  Mechanical Ventilation:  Mode: AC/ CMV (Assist Control/ Continuous Mandatory Ventilation)  RR (machine): 14  TV (machine): 400  FiO2: 50  PEEP: 5  ====================CARDIOVASCULAR==================  norepinephrine Infusion 0.23 MICROgram(s)/kG/Min IV Continuous <Continuous>  phenylephrine    Infusion 0.491 MICROgram(s)/kG/Min IV Continuous <Continuous>  vasopressin drip  VA ECMO ( R Fem--> R Axillary)  RPM increased to 4300 , generating flow 4.1 L/Min  ===================HEMATOLOGIC/ONC ===================  heparin  Infusion 500 Unit(s)/Hr IV Continuous <Continuous>    ===================== RENAL =========================  CVVH L femoral HD cathetrer  Bazzi monitor urine output   ==================== GASTROINTESTINAL===================  pantoprazole  Injectable 40 milliGRAM(s) IV Push daily  sodium chloride 0.9% lock flush 10 milliLiter(s) IV Push every 1 hour PRN  sodium chloride 0.9%. 1000 milliLiter(s) IV Continuous <Continuous>    =======================    ENDOCRINE  =====================  dextrose 50% Injectable 50  dextrose 50% Injectable 25  insulin Infusion 1  vasopressin Infusion 0.067    ========================INFECTIOUS DISEASE================  cefepime   IVPB 1000 milliGRAM(s) IV Intermittent every 8 hours  cefepime   IVPB          Patient requires continuous monitoring with bedside rhythm monitoring,arterial line,pulse oximetry,ventilator monitoring;intermittent blood gas analysis.  Care plan discussed with ICU care team.  patient remain critical; time spent 60 minutes . Zyclara Pregnancy And Lactation Text: This medication is Pregnancy Category C. It is unknown if this medication is excreted in breast milk.

## 2021-01-29 ENCOUNTER — APPOINTMENT (OUTPATIENT)
Dept: CARDIOLOGY | Facility: CLINIC | Age: 36
End: 2021-01-29

## 2021-06-08 ENCOUNTER — APPOINTMENT (OUTPATIENT)
Dept: HEART FAILURE | Facility: CLINIC | Age: 36
End: 2021-06-08

## 2021-06-08 NOTE — HISTORY OF PRESENT ILLNESS
[FreeTextEntry1] : Ms. Jerome is a 35 year old  woman with history of severe peripartum cardiomyopathy, now with recovered  LVEF. Her history is also notable for severe spinal stenosis s/p microlumbardiscectomy. She presents today for an urgent visit, having recently visited [facility] ED. \par \par In 2020, at 35 weeks gestation, she presented to [facility] for SOB, found to be in SVT and received adenosine x4 and failed cardioversion x2. She then underwent emergent  and developed cardiogenic shock post operatively. TTE revealed severely reduced LVEF 10-15% and she was placed on VA ECMO and CVVHD. Her condition slowly improved and she was successfully decannulated on 19 and eventually discharged without a diuretic requirement. Readmitted in May 2019 at [facility] with SOB and R chest pleuritic pain, CTA and VQ scan suggestive of PE and was treated with Eliquis. She was then rehospitalized in 2019 for intractable back pain accompanied by LLE numbness and underwent L5-S1 microlumbardiscectomy for known severe spinal stenosis 2/2 disc herniation. Pathology noted degenerative changes. \par \par More recently, with holter monitor from 10/23/21 - 21 (placed for c/o palpitations), noted to have high PVC burden 16% as well as 2 short runs of NSVT. \par \par \par

## 2021-09-14 ENCOUNTER — NON-APPOINTMENT (OUTPATIENT)
Age: 36
End: 2021-09-14

## 2021-09-14 ENCOUNTER — APPOINTMENT (OUTPATIENT)
Dept: HEART FAILURE | Facility: CLINIC | Age: 36
End: 2021-09-14
Payer: COMMERCIAL

## 2021-09-14 VITALS
HEART RATE: 93 BPM | HEIGHT: 71 IN | TEMPERATURE: 98.6 F | SYSTOLIC BLOOD PRESSURE: 100 MMHG | WEIGHT: 206 LBS | RESPIRATION RATE: 16 BRPM | BODY MASS INDEX: 28.84 KG/M2 | DIASTOLIC BLOOD PRESSURE: 63 MMHG | OXYGEN SATURATION: 100 %

## 2021-09-14 PROCEDURE — 99215 OFFICE O/P EST HI 40 MIN: CPT

## 2021-09-14 PROCEDURE — 93000 ELECTROCARDIOGRAM COMPLETE: CPT

## 2021-09-14 NOTE — DISCUSSION/SUMMARY
[FreeTextEntry1] : meds: toprol 75 bid, entresto full dose. asa. \par holter 10.23-11.5.20: two NSVT 182. 6 bts High PVC burden 16%. Avg HR 73. \par stress test: ariana protocol 9 mins. peak . peak /60 freq VPC during rest. rare during recovery. \par 100/63, 93/min \par no recent labs. \par long discussion about duration of HF meds. ectopy might suggest residual abnormal myocardium. needs to continue BB and possibly increase dose to 200 if symptomatic ectopy.  patient agrees to continue entresto till year 4 post insult. meanwhile annual assessment of cardiac function. this year for MRI in october. \par consult with Dr Jamison. \par Rell Vila

## 2021-09-14 NOTE — CARDIOLOGY SUMMARY
[de-identified] : \par 9/3/19 EKG: SR HR 67 \par 07/16/19 EKG: SR HR 71\par 06/04/19 EKG: SR HR 84\par  [de-identified] : \par 10/23/20-11/05/20 Holter monitor: High PVC burden (16%) accompanied by 2 short runs of NSVT.\par  [de-identified] : \par 10/20/20 TTE: LVEF 54%, LVEDD 4.5, normal RV, no MR, trivial TR.\par \par 08/19/19 TTE: LVEF 65%, LVIDd 4.61cm, nl RV size and function, nl LA and RA (LA volume index 17.8), mild MS\par \par 05/12/19 TTE: LVEF 30-35% (global), LVIDd 5.67cm, RV nl size and function, LA mildly dilated LA 3.8, RA nl, mild-mod MR, mild TR\par \par 04/25/19 TTE: LVEF 40-45%, LVIDd 5.5cm, septum 1.1, PWT 1.3, RV nl size and function, nl atria, mild MR, mild TR, min MS, est RVSP 27\par \par 04/12/19 TTE: LVEF 10-15% (global), LVIDd 5.3cm, septum 0.8, PWT 1.1, RVE with RVSD, moderately dilated LA, TREY, mod MR, mod TR, no LV thrombus, L pleural effusion, RVSP 30\par  [de-identified] : \par 01/06/21 Exercise stress test: SR with frequent VPDs during rest. 0.5 mm horizontal STD in leads V4-V6 started at 06:00 mins of exercise at HR of 155 and persisted 13:00 mins into recovery. These changes did not meet ischemic criteria. Rare VPDs occurred during recovery.\par

## 2021-09-14 NOTE — HISTORY OF PRESENT ILLNESS
[FreeTextEntry1] : Ms. Jerome is a 36 year old  woman with history of severe peripartum cardiomyopathy, now with recovered  LVEF. Her history is also notable for severe spinal stenosis s/p microlumbardiscectomy. She presents today for routine follow-up.\par \par In 2019, at 35 weeks gestation, she presented to [facility] for SOB, found to be in SVT and received adenosine x4 and failed cardioversion x2. She then underwent emergent  and developed cardiogenic shock post operatively. TTE revealed severely reduced LVEF 10-15% and she was placed on VA ECMO and CVVHD. Her condition slowly improved and she was successfully decannulated on 19 and eventually discharged without a diuretic requirement. Readmitted in May 2019 at [facility] with SOB and R chest pleuritic pain, CTA and VQ scan suggestive of PE and was treated with Eliquis. She was then rehospitalized in 2019 for intractable back pain accompanied by LLE numbness and underwent L5-S1 microlumbardiscectomy for known severe spinal stenosis 2/2 disc herniation. Pathology noted degenerative changes.

## 2021-09-14 NOTE — HISTORY OF PRESENT ILLNESS
[FreeTextEntry1] : Ms. Jerome is a 36 year old  woman with history of severe peripartum cardiomyopathy, now with recovered  LVEF. Her history is also notable for severe spinal stenosis s/p microlumbardiscectomy. She presents today for routine follow-up.\par \par In 2019, at 35 weeks gestation, she presented to [facility] for SOB, found to be in SVT and received adenosine x4 and failed cardioversion x2. She then underwent emergent  and developed cardiogenic shock post operatively. TTE revealed severely reduced LVEF 10-15% and she was placed on VA ECMO and CVVHD. Her condition slowly improved and she was successfully decannulated on 19 and eventually discharged without a diuretic requirement. Readmitted in May 2019 at [facility] with SOB and R chest pleuritic pain, CTA and VQ scan suggestive of PE and was treated with Eliquis. She was then rehospitalized in 2019 for intractable back pain accompanied by LLE numbness and underwent L5-S1 microlumbardiscectomy for known severe spinal stenosis 2/2 disc herniation. Pathology noted degenerative changes.

## 2021-11-30 ENCOUNTER — NON-APPOINTMENT (OUTPATIENT)
Age: 36
End: 2021-11-30

## 2021-11-30 ENCOUNTER — APPOINTMENT (OUTPATIENT)
Dept: ELECTROPHYSIOLOGY | Facility: CLINIC | Age: 36
End: 2021-11-30
Payer: COMMERCIAL

## 2021-11-30 ENCOUNTER — APPOINTMENT (OUTPATIENT)
Dept: HEART FAILURE | Facility: CLINIC | Age: 36
End: 2021-11-30
Payer: COMMERCIAL

## 2021-11-30 VITALS
HEIGHT: 71 IN | BODY MASS INDEX: 27.3 KG/M2 | SYSTOLIC BLOOD PRESSURE: 105 MMHG | HEART RATE: 68 BPM | OXYGEN SATURATION: 97 % | DIASTOLIC BLOOD PRESSURE: 63 MMHG | WEIGHT: 195 LBS

## 2021-11-30 VITALS
OXYGEN SATURATION: 97 % | HEIGHT: 71 IN | BODY MASS INDEX: 28.56 KG/M2 | SYSTOLIC BLOOD PRESSURE: 107 MMHG | RESPIRATION RATE: 16 BRPM | WEIGHT: 204 LBS | DIASTOLIC BLOOD PRESSURE: 66 MMHG | HEART RATE: 68 BPM

## 2021-11-30 LAB
ALBUMIN SERPL ELPH-MCNC: 4.7 G/DL
ALP BLD-CCNC: 73 U/L
ALT SERPL-CCNC: 11 U/L
ANION GAP SERPL CALC-SCNC: 15 MMOL/L
AST SERPL-CCNC: 15 U/L
BILIRUB SERPL-MCNC: 0.2 MG/DL
BUN SERPL-MCNC: 26 MG/DL
CALCIUM SERPL-MCNC: 9.6 MG/DL
CHLORIDE SERPL-SCNC: 105 MMOL/L
CO2 SERPL-SCNC: 22 MMOL/L
CREAT SERPL-MCNC: 0.9 MG/DL
GLUCOSE SERPL-MCNC: 69 MG/DL
NT-PROBNP SERPL-MCNC: 26 PG/ML
POTASSIUM SERPL-SCNC: 4 MMOL/L
PROT SERPL-MCNC: 6.8 G/DL
SODIUM SERPL-SCNC: 143 MMOL/L

## 2021-11-30 PROCEDURE — 99214 OFFICE O/P EST MOD 30 MIN: CPT

## 2021-11-30 PROCEDURE — 99215 OFFICE O/P EST HI 40 MIN: CPT

## 2021-11-30 PROCEDURE — 93000 ELECTROCARDIOGRAM COMPLETE: CPT

## 2021-11-30 NOTE — HISTORY OF PRESENT ILLNESS
[FreeTextEntry1] : Ms. Jerome is a 36 year old  woman with history of severe peripartum cardiomyopathy, now with recovered  LVEF. Her history is also notable for severe spinal stenosis s/p microlumbardiscectomy. She presents today for routine follow-up.\par \par In 2019, at 35 weeks gestation, she presented to [facility] for SOB, found to be in SVT and received adenosine x4 and failed cardioversion x2. She then underwent emergent  and developed cardiogenic shock post operatively. TTE revealed severely reduced LVEF 10-15% and she was placed on VA ECMO and CVVHD. Her condition slowly improved and she was successfully decannulated on 19 and eventually discharged without a diuretic requirement. Readmitted in May 2019 at [facility] with SOB and R chest pleuritic pain, CTA and VQ scan suggestive of PE and was treated with Eliquis. She was then rehospitalized in 2019 for intractable back pain accompanied by LLE numbness and underwent L5-S1 microlumbardiscectomy for known severe spinal stenosis 2/2 disc herniation. Pathology noted degenerative changes.

## 2021-11-30 NOTE — DISCUSSION/SUMMARY
[FreeTextEntry1] : no symptoms other than palpitations which can be bothersome at night .\par currently being treated for a UTI discovered after episode of hematuria. on cipro. \par has just seen Dr Jamison who has recommended increasing toprol 200 and repeat 3 days holter. \par holter 10.23-11.5.20: two NSVT 182. 6 bts High PVC burden 16%. Avg HR 73. \par stress test: ariana protocol 9 mins. peak . peak /60 freq VPC during rest. rare during recovery. \par meds: toprol 75 bid, entresto full dose. asa. \par 107/66 68 normal sats. \par Nov 19th: K 4.0, BUN 26/.9. NT proBNP 26. \par clinically stable. \par For MRI this month\par increase toprol 100 bid. repeat holter. \par see 6 months\par Rell Vila

## 2021-11-30 NOTE — CARDIOLOGY SUMMARY
[de-identified] : \par 9/3/19 EKG: SR HR 67 \par 07/16/19 EKG: SR HR 71\par 06/04/19 EKG: SR HR 84\par  [de-identified] : \par 10/23/20-11/05/20 Holter monitor: High PVC burden (16%) accompanied by 2 short runs of NSVT.\par  [de-identified] : \par 01/06/21 Exercise stress test: SR with frequent VPDs during rest. 0.5 mm horizontal STD in leads V4-V6 started at 06:00 mins of exercise at HR of 155 and persisted 13:00 mins into recovery. These changes did not meet ischemic criteria. Rare VPDs occurred during recovery.\par  [de-identified] : \par 10/20/20 TTE: LVEF 54%, LVEDD 4.5, normal RV, no MR, trivial TR.\par \par 08/19/19 TTE: LVEF 65%, LVIDd 4.61cm, nl RV size and function, nl LA and RA (LA volume index 17.8), mild PA\par \par 05/12/19 TTE: LVEF 30-35% (global), LVIDd 5.67cm, RV nl size and function, LA mildly dilated LA 3.8, RA nl, mild-mod MR, mild TR\par \par 04/25/19 TTE: LVEF 40-45%, LVIDd 5.5cm, septum 1.1, PWT 1.3, RV nl size and function, nl atria, mild MR, mild TR, min PA, est RVSP 27\par \par 04/12/19 TTE: LVEF 10-15% (global), LVIDd 5.3cm, septum 0.8, PWT 1.1, RVE with RVSD, moderately dilated LA, TREY, mod MR, mod TR, no LV thrombus, L pleural effusion, RVSP 30\par

## 2021-12-01 NOTE — HISTORY OF PRESENT ILLNESS
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 She recently wore a 2 week event monitor which showed a 16% PVC burden. She reports symptoms including palpitations, particularly when she is at rest. She is active and [employer] 4 days a week and has no limitations.

## 2021-12-01 NOTE — CARDIOLOGY SUMMARY
[de-identified] : NSR at 65bpm [de-identified] : BIOTEL x 14 d (10/23 - 11/5/2020)\par VE burden of 16% including runs of NSVT [de-identified] : 1/6/2021\par 9 minutes and 45 seconds of exercise (11METS)\par Frequent VPDs\par Blunted BP response [de-identified] : TTE 10/20/2020\par LVEF by Lane's method with biplane is 54%\par Normal LV size and wall thicknesses with normal systolic and diastolic function\par Normal LA size\par Normal RA size\par No evidence of MR

## 2021-12-01 NOTE — DISCUSSION/SUMMARY
[FreeTextEntry1] : 36F with h/o severe peripartum CM (4/2019) presenting for evaluation of frequent PVCs. She has a PVC burden of 16%, however only one of her resting 12 lead ECGs shows a single PVC which occurs at the end of the rhythm strip and appears to be inferiorly directed c/w outflow tract PVC. She is symptomatic however she is very reluctant to proceed with ablation. Will try increasing metoprolol to 100mg bid and monitor her symptoms. Would get cMRI and a repeat echo in 6 months. We can monitor with 24 hour holter every 6 months to evaluate PVC burden and response to medication.

## 2022-02-01 ENCOUNTER — OUTPATIENT (OUTPATIENT)
Dept: OUTPATIENT SERVICES | Facility: HOSPITAL | Age: 37
LOS: 1 days | Discharge: HOME | End: 2022-02-01
Payer: COMMERCIAL

## 2022-02-01 DIAGNOSIS — I50.22 CHRONIC SYSTOLIC (CONGESTIVE) HEART FAILURE: ICD-10-CM

## 2022-02-01 DIAGNOSIS — Z98.891 HISTORY OF UTERINE SCAR FROM PREVIOUS SURGERY: Chronic | ICD-10-CM

## 2022-02-01 PROCEDURE — 75561 CARDIAC MRI FOR MORPH W/DYE: CPT | Mod: 26

## 2022-05-10 NOTE — CONSULT NOTE ADULT - ATTENDING COMMENTS
seen and examined with NP. I agree with H & P, A & P.  P wave morphology/axis and variability support a sinus mechanism, ie sinus tachycardia in the setting of severe acute illness.  Continue supportive care.  Consider r/o PE. Rifampin Pregnancy And Lactation Text: This medication is Pregnancy Category C and it isn't know if it is safe during pregnancy. It is also excreted in breast milk and should not be used if you are breast feeding.

## 2022-05-18 NOTE — CARDIOLOGY SUMMARY
[de-identified] : \par 9/3/19 EKG: SR HR 67 \par 07/16/19 EKG: SR HR 71\par 06/04/19 EKG: SR HR 84\par  [de-identified] : \par 10/23/20-11/05/20 Holter monitor: High PVC burden (16%) accompanied by 2 short runs of NSVT.\par  [de-identified] : \par 01/06/21 Exercise stress test: SR with frequent VPDs during rest. 0.5 mm horizontal STD in leads V4-V6 started at 06:00 mins of exercise at HR of 155 and persisted 13:00 mins into recovery. These changes did not meet ischemic criteria. Rare VPDs occurred during recovery.\par  [de-identified] : \par 10/20/20 TTE: LVEF 54%, LVEDD 4.5, normal RV, no MR, trivial TR.\par \par 08/19/19 TTE: LVEF 65%, LVIDd 4.61cm, nl RV size and function, nl LA and RA (LA volume index 17.8), mild [place]\par \par 05/12/19 TTE: LVEF 30-35% (global), LVIDd 5.67cm, RV nl size and function, LA mildly dilated LA 3.8, RA nl, mild-mod MR, mild TR\par \par 04/25/19 TTE: LVEF 40-45%, LVIDd 5.5cm, septum 1.1, PWT 1.3, RV nl size and function, nl atria, mild MR, mild TR, min AZ, est RVSP 27\par \par 04/12/19 TTE: LVEF 10-15% (global), LVIDd 5.3cm, septum 0.8, PWT 1.1, RVE with RVSD, moderately dilated LA, TREY, mod MR, mod TR, no LV thrombus, L pleural effusion, RVSP 30\par  Instructions: This plan will send the code FBSE to the PM system.  DO NOT or CHANGE the price. Price (Do Not Change): 0.00 Detail Level: Simple

## 2022-10-18 NOTE — ASSESSMENT
[FreeTextEntry1] : I saw and examined patient with Dr. Sanon\par \par Patient well appearing denies any palpitations, we are aware she discontinued wearing lifevest\par C/w metoprolol succ \par Will request for MUGA scan to evaluate heart function\par Continue with eliquis for DVT - denies any bleeding issue.\par Given pulmonary referral\par C/w follow up with Dr. Vila\par \par  18-Oct-2022 15:08

## 2023-04-15 NOTE — PATIENT PROFILE ADULT - NSPROMEDSADMININFO_GEN_A_NUR
no concerns
Implemented All Fall with Harm Risk Interventions:  [place] to call system. Call bell, personal items and telephone within reach. Instruct patient to call for assistance. Room bathroom lighting operational. Non-slip footwear when patient is off stretcher. Physically safe environment: no spills, clutter or unnecessary equipment. Stretcher in lowest position, wheels locked, appropriate side rails in place. Provide visual cue, wrist band, yellow gown, etc. Monitor gait and stability. Monitor for mental status changes and reorient to person, place, and time. Review medications for side effects contributing to fall risk. Reinforce activity limits and safety measures with patient and family. Provide visual clues: red socks.

## 2023-09-20 NOTE — PROGRESS NOTE ADULT - SUBJECTIVE AND OBJECTIVE BOX
[facility] Division of Kidney Diseases & Hypertension  FOLLOW UP NOTE  [phone]--------------------------------------------------------------------------------  Chief Complaint:History of extracorporeal membrane oxygenation treatment      24 hour events/subjective: Patient awake, extubated, off CRRT. As per team will attempt to wean ECMO today. Patient making good urine ~2.3L in last 24 hrs however creatinine acutely clover to ~3.0.        PAST HISTORY  --------------------------------------------------------------------------------  No significant changes to PMH, PSH, FHx, SHx, unless otherwise noted    ALLERGIES & MEDICATIONS  --------------------------------------------------------------------------------  Allergies    No Known Allergies    Intolerances    Reglan (Other)    Standing Inpatient Medications  amiodarone Infusion 0.5 mG/Min IV Continuous <Continuous>  docusate sodium 100 milliGRAM(s) Oral three times a day  heparin  Infusion 1300 Unit(s)/Hr IV Continuous <Continuous>  insulin lispro (HumaLOG) corrective regimen sliding scale   SubCutaneous three times a day before meals  insulin lispro (HumaLOG) corrective regimen sliding scale   SubCutaneous at bedtime  niCARdipine Infusion 5 mG/Hr IV Continuous <Continuous>  pantoprazole    Tablet 40 milliGRAM(s) Oral before breakfast  polyethylene glycol 3350 17 Gram(s) Oral daily  sodium chloride 0.9%. 1000 milliLiter(s) IV Continuous <Continuous>    PRN Inpatient Medications  HYDROmorphone  Injectable 0.5 milliGRAM(s) IV Push every 2 hours PRN  ondansetron Injectable 4 milliGRAM(s) IV Push every 4 hours PRN  oxyCODONE    IR 5 milliGRAM(s) Oral every 6 hours PRN  simethicone 80 milliGRAM(s) Chew every 4 hours PRN  sodium chloride 0.9% lock flush 10 milliLiter(s) IV Push every 1 hour PRN      REVIEW OF SYSTEMS  --------------------------------------------------------------------------------  Gen: No  fevers/chills  Skin: No rashes  Head/Eyes/Ears/Mouth: No headache; Normal hearing; Normal vision w/o blurriness  Respiratory: No dyspnea, cough, wheezing, hemoptysis  CV: No chest pain, PND, orthopnea  GI: Some nausea and vomiting  : Urine output increasing  MSK: No joint pain/swelling; no back pain; no edema  Neuro: No dizziness/lightheadedness, weakness, seizures, numbness, tingling      All other systems were reviewed and are negative, except as noted.    VITALS/PHYSICAL EXAM  --------------------------------------------------------------------------------  T(C): 36.6 (04-15-19 @ 09:00), Max: 36.7 (04-14-19 @ 16:00)  HR: 130 (04-15-19 @ 09:15) (112 - 150)  BP: -160  RR: 13 (04-15-19 @ 09:15) (10 - 34)  SpO2: 100% (04-15-19 @ 09:15) (94% - 100%)  Wt(kg): --        04-14-19 @ 07:01  -  04-15-19 @ 07:00  --------------------------------------------------------  IN: 1405.8 mL / OUT: 2375 mL / NET: -969.2 mL    04-15-19 @ 07:01  -  04-15-19 @ 09:23  --------------------------------------------------------  IN: 130.4 mL / OUT: 315 mL / NET: -184.6 mL      Physical Exam:  	Gen: Awake and interactive  	HEENT: Anicteric  	Pulm: Coarse breath sounds  	CV:  Tachycardic  	Abd: +BS, soft   	Ext: 1/2+ edema of the LE  	Neuro: No focal deficits  	Skin: Warm  	Vascular: No cyanosis              Access: N/A, off CRRT      LABS/STUDIES  --------------------------------------------------------------------------------              7.4    15.1  >-----------<  72       [04-15-19 @ 01:34]              22.9     135  |  101  |  28  ----------------------------<  104      [04-15-19 @ 01:34]  4.2   |  24  |  3.00        Ca     7.8     [04-15-19 @ 01:34]      Mg     2.4     [04-15-19 @ 01:34]      Phos  3.5     [04-15-19 @ 01:34]    TPro  4.5  /  Alb  2.3  /  TBili  2.4  /  DBili  x   /  AST  768  /  ALT  627  /  AlkPhos  133  [04-15-19 @ 01:34]    PT/INR: PT 15.5 , INR 1.34       [04-15-19 @ 01:34]  PTT: 53.2       [04-15-19 @ 08:19]          [04-13-19 @ 21:56]        [04-15-19 @ 01:34]    Creatinine Trend:  SCr 3.00 [04-15 @ 01:34]  SCr 1.91 [04-14 @ 01:58]  SCr 2.02 [04-13 @ 01:59]  SCr 2.50 [04-12 @ 12:00]  SCr 2.43 [04-12 @ 06:00]    Urinalysis - [04-12-19 @ 01:00]      Color Yellow / Appearance Slightly Turbid / SG 1.012 / pH 6.0      Gluc Negative / Ketone Negative  / Bili Negative / Urobili Negative       Blood Small / Protein 30 mg/dL / Leuk Est Negative / Nitrite Negative      RBC 15 / WBC 8 / Hyaline 5 / Gran  / Sq Epi  / Non Sq Epi 2 / Bacteria Negative    Urine Creatinine 5      [04-14-19 @ 12:04]  Urine Protein 12      [04-14-19 @ 16:06]  Urine Sodium 99.0      [04-11-19 @ 06:07]    Ferritin 202      [04-15-19 @ 08:24]  HbA1c 5.5      [04-13-19 @ 09:28]  TSH 0.79      [04-10-19 @ 18:55]    HIV Nonreact      [04-10-19 @ 18:51] independent

## 2023-10-21 NOTE — ED ADULT TRIAGE NOTE - DIRECT TO ROOM CARE INITIATED:
"PICC Line Insertion Procedure Note    Pt. Name:   Venkatesh Alvarado  MRN:          [id number]    Procedure:     Insertion of a  TRIPLE Lumen  5 fr  Bard SOLO (valved) Power PICC, Lot number [id number]    Indications: Vascular Access    Contraindications : None    Procedure Details:    Patient identified with 2 identifiers and \"Time Out\" conducted.    Central line insertion bundle followed: Hand hygiene performed prior to procedure, site cleansed with Cholraprep (CHG), hat, mask, sterile gloves, sterile gown worn, patient draped with maximum barrier head to toe drape, sterile field maintained.    The right upper arm BASILIC vein was assessed by ultrasound and found to be anechoic, compressible, patent, and of adequate size.     Lidocaine 1% 2.5 ml administered SQ to the insertion site.     Modified Seldinger Technique (MST) used for insertion, ONE attempt(s) required to access vein. Imaging during access shows the needle within the vein.     PICC was advanced through peel-away sheath.    Catheter threaded without difficulty. The tip of the catheter was positioned in the SVC. Good blood return noted.    Catheter was flushed with 30 cc normal saline.     Catheter secured with Statlock, tissue adhesive (on insertion site only), Biopatch (CHG), and Tegaderm dressing applied.    The sharps that are included in the PICC insertion kit were accounted for and disposed of in the sharps container prior to breakdown of the sterile field.    CLABSI prevention brochure left at bedside.    Patient  tolerated procedure well.     Patient's primary RN notified PICC is ready for use.      Findings:    Total catheter length  42 cm, with 0 cm exposed. Mid upper arm circumference is 35 cm.     Tip placement verified in the distal SVC by [employer]:        Comments:  None        Neftaly Hawley, MSN, RN, [facility]-BC   Vascular Access - [facility]      " 11-May-2019 06:12

## 2023-11-03 NOTE — PATIENT PROFILE ADULT - NSPROMEDSPATCH_GEN_A_NUR
Writer attempted to call patient 3 times, all calls just rang and then stated phone number isn't accepting our calls. Pending call back from patient. Thank you!     Minnie Medrano none

## 2023-11-20 NOTE — PATIENT PROFILE ADULT - CENTRAL VENOUS CATHETER/PICC LINE
- Subjective


Encounter Date: 12/02/20


Encounter Time: 10:30


Subjective: 


Patient up in bed feels better today.  Nurse ambulated the patient she dropped 

her oxygen saturations significantly without oxygen will require oxygen on 

discharge





- Objective


Vital Signs & Weight: 


                             Vital Signs (12 hours)











  Temp Pulse Resp BP BP Pulse Ox


 


 12/02/20 11:58  97.6 F  98  16   122/85  98


 


 12/02/20 08:00     140/92 H   94 L


 


 12/02/20 07:19  98.5 F  69  16  167/102 H   100


 


 12/02/20 04:00  97.8 F  65  18  157/97 H   98








                                     Weight











Weight                         153 lb 12.8 oz














I&O: 


                                        











 12/01/20 12/02/20 12/03/20





 06:59 06:59 06:59


 


Intake Total 1290 2400 


 


Output Total  700 


 


Balance 1290 1700 











Result Diagrams: 


                                 11/29/20 06:40





                                 12/02/20 07:08


Additional Labs: 


                                   Accuchecks











  12/02/20 12/02/20 12/01/20





  11:55 06:39 21:52


 


POC Glucose  155 H  187 H  302 H














  12/01/20 12/01/20





  15:51 11:59


 


POC Glucose  346 H  226 H














Hospitalist ROS





- Review of Systems


Cardiovascular: denies: chest pain, palpitations, orthopnea, paroxysmal noc. 

dyspnea, edema, light headedness, other


Gastrointestinal: denies: nausea, vomiting, abdominal pain, diarrhea, 

constipation, melena, hematochezia, other


Genitourinary: denies: dysuria, frequency, incontinence, hematuria, retention, 

other





- Medication


Medications: 


Active Medications











Generic Name Dose Route Start Last Admin





  Trade Name Syedq  PRN Reason Stop Dose Admin


 


Acetaminophen  650 mg  11/28/20 14:10  12/01/20 21:50





  Acetaminophen 325 Mg Tab  PO   650 mg





  Q4H PRN   Administration





  Headache/Fever/Mild Pain (1-3)  


 


Albuterol Sulfate  2 puff  11/28/20 19:00  12/02/20 12:46





  Albuterol 200 Puff (6.7gm Inhaler)  INH   2 puff





  H8ES-FO JOANNA   Administration


 


Enoxaparin Sodium  40 mg  11/29/20 09:00  12/02/20 08:00





  Enoxaparin Sodium 40 Mg/0.4 Ml Syringe  SC   40 mg





  0900 JOANNA   Administration


 


Famotidine  20 mg  11/28/20 21:00  12/02/20 08:00





  Famotidine 20 Mg Tab  PO   20 mg





  BID JOANNA   Administration


 


Guaifenesin/Dextromethorphan  15 ml  11/28/20 14:10  12/01/20 23:33





  Guaifenesin Dm 100-10/5 Ml Udcup  PO   15 ml





  Q4H PRN   Administration





  Cough  


 


Dexamethasone 6 mg/ Sodium  50.6 mls @ 100 mls/hr  11/29/20 09:00  12/02/20 

09:57





  Chloride  IVPB   50.6 mls





  DAILY JOANNA   Administration


 


Remdesivir 100 mg/ Sodium  250 mls @ 250 mls/hr  11/29/20 16:00  12/01/20 16:49





  Chloride  IV  12/02/20 16:59  250 mls





  1600 JOANNA   Administration


 


Insulin Glargine 25 units/  0.25 mls @ 0 mls/hr  11/30/20 09:00  12/02/20 09:57





  Miscellaneous Medication  SC   0.25 mls





  QAM JOANNA   Administration


 


Insulin Glargine 25 units/  0.25 mls @ 0 mls/hr  11/29/20 21:00  12/01/20 21:47





  Miscellaneous Medication  SC   0.25 mls





  HS JOANNA   Administration


 


Insulin Human Lispro  0 units  11/28/20 14:10  12/02/20 06:44





  Humalog 300 Units/3 Ml Vial  SC   2 unit





  .MODERATE SLIDING SC PRN   Administration





  Moderate Correctional Scale  


 


Insulin Human Lispro  0 units  11/28/20 14:10  11/30/20 19:47





  Humalog 300 Units/3 Ml Vial  SC   5 unit





  .BEDTIME SLIDING SC PRN   Administration





  Bedtime Correctional Scale  


 


Insulin Human Lispro  8 units  11/29/20 12:00  12/02/20 12:45





  Humalog 300 Units/3 Ml Vial  SC   8 unit





  TID-WM JOANNA   Administration


 


Sodium Chloride  10 ml  11/29/20 21:00  12/02/20 08:01





  Flush - Normal Saline 10 Ml Syringe  IVF   10 ml





  Q12HR JOANNA   Administration














- Exam


Neck: negative: supple, symmetric, no JVD, no thyromegaly, no lymphadenopathy, 

no carotid bruit, JVD


Heart: negative: RRR, no murmur, no gallops, no rubs, normal peripheral pulses, 

irregular, diminshed peripheral pulses, murmur present, II/IV, III/IV


Respiratory: negative: CTAB, no wheezes, no rales, no ronchi, normal chest 

expansion, no tachypnea, normal percussion, rales, rhonchi, tachypneic, wheezes


Gastrointestinal: negative: soft, non-tender, non-distended, normal bowel 

sounds, no palpable masses, no hepatomegaly, no splenomegaly, no bruit, no 

guarding, no rigidity, tender to palpation, distended, diminished bowl sounds, 

voluntary guarding





Hosp A/P


(1) Acute respiratory failure with hypoxia


Code(s): J96.01 - ACUTE RESPIRATORY FAILURE WITH HYPOXIA   Status: Acute   





(2) COVID-19


Code(s): U07.1 - COVID-19   Status: Acute   





(3) Diabetes


Code(s): E11.9 - TYPE 2 DIABETES MELLITUS WITHOUT COMPLICATIONS   Status: Acute 

 





- Plan





Patient has no diarrhea.  Currently on high flow.  We will continue remdesivir 

and steroids.  She is on DVT prophylaxis.  Patient's insulin dose increase.





11/30 we will continue remdesivir and steroids.  Patient has been off the high 

flow via nasal cannula will monitor.  She is able to tolerate overall and has 

been not having any diarrhea.





12/1 patient will finish her remdesivir tomorrow.  Asked nurse to ambulate the 

patient to see how she does.  We will need oxygen for home.  We will continue 

steroids and DVT prophylaxis.





12/2 possible discharge home in the a.m.  Today was her last day of remdesivir. 

Will check labs in a.m.  She will require oxygen Case management aware.
4
yes

## 2023-11-29 NOTE — ED PROVIDER NOTE - PHYSICAL EXAMINATION
Quality 226: Preventive Care And Screening: Tobacco Use: Screening And Cessation Intervention: Patient screened for tobacco use and is an ex/non-smoker
Detail Level: Detailed
Quality 431: Preventive Care And Screening: Unhealthy Alcohol Use - Screening: Patient not identified as an unhealthy alcohol user when screened for unhealthy alcohol use using a systematic screening method
CONSTITUTIONAL: WA / WN / NAD  HEAD: NCAT  EYES: PERRL; EOMI;   ENT: Normal pharynx; mucous membranes pink/moist, no erythema.  NECK: Supple; no meningeal signs  CARD: RRR; nl S1/S2; no M/R/G.   RESP: Respiratory rate and effort are normal; breath sounds clear and equal bilaterally.  ABD: Soft, NT ND  MSK/EXT: No gross deformities; full range of motion. + left iliac crest ttp.  SKIN: Warm and dry;   NEURO: AAOx3  PSYCH: Memory Intact, Normal Affect

## 2024-01-30 PROBLEM — I49.3 PVCS (PREMATURE VENTRICULAR CONTRACTIONS): Status: ACTIVE | Noted: 2020-12-18

## 2024-01-30 NOTE — CARDIOLOGY SUMMARY
[de-identified] : \par  9/3/19 EKG: SR HR 67 \par  07/16/19 EKG: SR HR 71\par  06/04/19 EKG: SR HR 84\par   [de-identified] : 10/23/20-11/05/20 Holter monitor: High PVC burden (16%) accompanied by 2 short runs of NSVT.  [de-identified] : \par  01/06/21 Exercise stress test: SR with frequent VPDs during rest. 0.5 mm horizontal STD in leads V4-V6 started at 06:00 mins of exercise at HR of 155 and persisted 13:00 mins into recovery. These changes did not meet ischemic criteria. Rare VPDs occurred during recovery.\par   [de-identified] : \par  10/20/20 TTE: LVEF 54%, LVEDD 4.5, normal RV, no MR, trivial TR.\par  \par  08/19/19 TTE: LVEF 65%, LVIDd 4.61cm, nl RV size and function, nl LA and RA (LA volume index 17.8), mild CT\par  \par  05/12/19 TTE: LVEF 30-35% (global), LVIDd 5.67cm, RV nl size and function, LA mildly dilated LA 3.8, RA nl, mild-mod MR, mild TR\par  \par  04/25/19 TTE: LVEF 40-45%, LVIDd 5.5cm, septum 1.1, PWT 1.3, RV nl size and function, nl atria, mild MR, mild TR, min CT, est RVSP 27\par  \par  04/12/19 TTE: LVEF 10-15% (global), LVIDd 5.3cm, septum 0.8, PWT 1.1, RVE with RVSD, moderately dilated LA, TREY, mod MR, mod TR, no LV thrombus, L pleural effusion, RVSP 30\par   [de-identified] : Cardiac MRI 2/1/22: normal biventricular size and function. LVEF 59%. LVEDVi 70 cc/m2. No valvular abnormalities. No evidence of intracardiac mass or thrombus. No pericardial effusion. No evidence of focal abnormal LGE to suggest fibrous changes/scarring.

## 2024-01-30 NOTE — HISTORY OF PRESENT ILLNESS
[FreeTextEntry1] : Ms. 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 Her history is also notable for severe spinal stenosis s/p microlumbardiscectomy and high PVC burden noted on holter monitor.   In 2019, at 35 weeks gestation, she presented to [facility] for SOB, found to be in SVT and received adenosine x4 and failed cardioversion x2. She then underwent emergent  and developed cardiogenic shock post operatively. TTE revealed severely reduced LVEF 10-15% and she was placed on [facility] ECMO and CVVHD. Her condition slowly improved and she was successfully decannulated on 19 and eventually discharged without a diuretic requirement. Readmitted in May 2019 at [facility] with SOB and R chest pleuritic pain, CTA and VQ scan suggestive of PE and was treated with Eliquis. She was then rehospitalized in 2019 for intractable back pain accompanied by LLE numbness and underwent L5-S1 microlumbardiscectomy for known severe spinal stenosis 2/2 disc herniation. Pathology noted degenerative changes.   Holter monitor 2020 notable for 16% PVC burden for which she established care with Dr. Jamison. BB was increased and plan was obtain serial holter every 6 months to reassess PVC burden.   She presents today for routine follow-up, last seen in 2021.

## 2024-01-31 ENCOUNTER — APPOINTMENT (OUTPATIENT)
Dept: HEART FAILURE | Facility: CLINIC | Age: 39
End: 2024-01-31
Payer: COMMERCIAL

## 2024-01-31 DIAGNOSIS — I49.3 VENTRICULAR PREMATURE DEPOLARIZATION: ICD-10-CM

## 2024-01-31 RX ORDER — SPIRONOLACTONE 25 MG/1
1 TABLET, FILM COATED ORAL
Qty: 0 | Refills: 0 | DISCHARGE

## 2024-01-31 RX ORDER — METOPROLOL TARTRATE 50 MG
1 TABLET ORAL
Qty: 0 | Refills: 0 | DISCHARGE

## 2024-01-31 RX ORDER — IBUPROFEN 200 MG
2 TABLET ORAL
Qty: 0 | Refills: 0 | DISCHARGE

## 2024-01-31 RX ORDER — SACUBITRIL AND VALSARTAN 24; 26 MG/1; MG/1
1 TABLET, FILM COATED ORAL
Qty: 0 | Refills: 0 | DISCHARGE

## 2024-01-31 RX ORDER — ASPIRIN/CALCIUM CARB/MAGNESIUM 324 MG
1 TABLET ORAL
Qty: 0 | Refills: 0 | DISCHARGE

## 2024-02-27 ENCOUNTER — NON-APPOINTMENT (OUTPATIENT)
Age: 39
End: 2024-02-27

## 2024-03-18 PROBLEM — I47.10 SUPRAVENTRICULAR TACHYCARDIA, PAROXYSMAL: Status: ACTIVE | Noted: 2019-05-07

## 2024-03-19 ENCOUNTER — APPOINTMENT (OUTPATIENT)
Dept: HEART FAILURE | Facility: CLINIC | Age: 39
End: 2024-03-19
Payer: SELF-PAY

## 2024-03-19 VITALS
OXYGEN SATURATION: 99 % | SYSTOLIC BLOOD PRESSURE: 108 MMHG | BODY MASS INDEX: 28.42 KG/M2 | TEMPERATURE: 98.2 F | HEART RATE: 84 BPM | DIASTOLIC BLOOD PRESSURE: 73 MMHG | WEIGHT: 203 LBS | HEIGHT: 71 IN

## 2024-03-19 DIAGNOSIS — I47.10 SUPRAVENTRICULAR TACHYCARDIA, UNSPECIFIED: ICD-10-CM

## 2024-03-19 PROBLEM — Z78.9 OTHER SPECIFIED HEALTH STATUS: Chronic | Status: ACTIVE | Noted: 2019-05-11

## 2024-03-19 PROBLEM — J45.909 UNSPECIFIED ASTHMA, UNCOMPLICATED: Chronic | Status: ACTIVE | Noted: 2019-04-10

## 2024-03-19 PROBLEM — I47.1 SUPRAVENTRICULAR TACHYCARDIA: Chronic | Status: ACTIVE | Noted: 2019-05-11

## 2024-03-19 PROCEDURE — 99215 OFFICE O/P EST HI 40 MIN: CPT | Mod: 25

## 2024-03-19 PROCEDURE — 93000 ELECTROCARDIOGRAM COMPLETE: CPT

## 2024-03-19 NOTE — CARDIOLOGY SUMMARY
[de-identified] : \par  9/3/19 EKG: SR HR 67 \par  07/16/19 EKG: SR HR 71\par  06/04/19 EKG: SR HR 84\par   [de-identified] : \par  10/23/20-11/05/20 Holter monitor: High PVC burden (16%) accompanied by 2 short runs of NSVT.\par   [de-identified] : \par  01/06/21 Exercise stress test: SR with frequent VPDs during rest. 0.5 mm horizontal STD in leads V4-V6 started at 06:00 mins of exercise at HR of 155 and persisted 13:00 mins into recovery. These changes did not meet ischemic criteria. Rare VPDs occurred during recovery.\par   [de-identified] : \par  10/20/20 TTE: LVEF 54%, LVEDD 4.5, normal RV, no MR, trivial TR.\par  \par  08/19/19 TTE: LVEF 65%, LVIDd 4.61cm, nl RV size and function, nl LA and RA (LA volume index 17.8), mild [place]\par  \par  05/12/19 TTE: LVEF 30-35% (global), LVIDd 5.67cm, RV nl size and function, LA mildly dilated LA 3.8, RA nl, mild-mod MR, mild TR\par  \par  04/25/19 TTE: LVEF 40-45%, LVIDd 5.5cm, septum 1.1, PWT 1.3, RV nl size and function, nl atria, mild MR, mild TR, min TX, est RVSP 27\par  \par  04/12/19 TTE: LVEF 10-15% (global), LVIDd 5.3cm, septum 0.8, PWT 1.1, RVE with RVSD, moderately dilated LA, TREY, mod MR, mod TR, no LV thrombus, L pleural effusion, RVSP 30\par   [de-identified] : Cardiac MRI 2/1/22: normal biventricular size and function. LVEF 59%. LVEDVi 70 cc/m2. No valvular abnormalities. No evidence of intracardiac mass or thrombus. No pericardial effusion. No evidence of focal abnormal LGE to suggest fibrous changes/scarring.

## 2024-03-19 NOTE — DISCUSSION/SUMMARY
[FreeTextEntry1] : last seen by me over a year ago.  index admission for peripartum CMP requiring ECMO and recovery decannulation.  Occasional palpitations at night.  unlimited walking. chases her son. not doing regular aerobic exercise has just seen Dr Jamison who has recommended increasing toprol 200 and repeat 3 days holter.  holter Oct 2020: PVC burden 16%. Avg HR 73.  Oct 2020 stress test: ariana protocol 9 mins. peak . peak /60 freq VPC during rest. rare during recovery.  Cardiac MRI 2022: normal BIV , LVEDVi 70, LVEF 59, no LGE meds: toprol 100 bid, entresto low dose. asa. semaglutide.  108/73, 84, unchanged weight.  recent labs:  Imp;  recovery of cardiac function but last studies Oct 2022.  IF patient wants to wean off meds: Suggest; Check TTE - if normal d/c entresto and repeat after 6 mths.  then Check 3 day  holter and determine if can wean BB blocker.  See me 6 mth after repeat  Rell Vila  45 mins

## 2024-03-19 NOTE — HISTORY OF PRESENT ILLNESS
[FreeTextEntry1] : Ms. 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 Her history is also notable for severe spinal stenosis s/p microlumbardiscectomy and high PVC burden noted on holter monitor.  In 2019, at 35 weeks gestation, she presented to [facility] for SOB, found to be in SVT and received adenosine x4 and failed cardioversion x2. She then underwent emergent  and developed cardiogenic shock post operatively. TTE revealed severely reduced LVEF 10-15% and she was placed on VA ECMO and CVVHD. Her condition slowly improved and she was successfully decannulated on 19 and eventually discharged without a diuretic requirement. Readmitted in May 2019 at [facility] with SOB and R chest pleuritic pain, CTA and VQ scan suggestive of PE and was treated with Eliquis. She was then rehospitalized in 2019 for intractable back pain accompanied by LLE numbness and underwent L5-S1 microlumbardiscectomy for known severe spinal stenosis 2/2 disc herniation. Pathology noted degenerative changes.  Holter monitor 2020 notable for 16% PVC burden for which she established care with Dr. Jamison. BB was increased and plan was obtain serial holter every 6 months to reassess PVC burden.  She presents today for routine follow-up, last seen in 2021.

## 2024-03-28 ENCOUNTER — OUTPATIENT (OUTPATIENT)
Dept: OUTPATIENT SERVICES | Facility: HOSPITAL | Age: 39
LOS: 1 days | End: 2024-03-28
Payer: SELF-PAY

## 2024-03-28 ENCOUNTER — APPOINTMENT (OUTPATIENT)
Dept: CV DIAGNOSITCS | Facility: HOSPITAL | Age: 39
End: 2024-03-28

## 2024-03-28 ENCOUNTER — RESULT REVIEW (OUTPATIENT)
Age: 39
End: 2024-03-28

## 2024-03-28 DIAGNOSIS — I50.22 CHRONIC SYSTOLIC (CONGESTIVE) HEART FAILURE: ICD-10-CM

## 2024-03-28 DIAGNOSIS — Z98.891 HISTORY OF UTERINE SCAR FROM PREVIOUS SURGERY: Chronic | ICD-10-CM

## 2024-03-28 PROCEDURE — 76376 3D RENDER W/INTRP POSTPROCES: CPT

## 2024-03-28 PROCEDURE — 93356 MYOCRD STRAIN IMG SPCKL TRCK: CPT

## 2024-03-28 PROCEDURE — 76376 3D RENDER W/INTRP POSTPROCES: CPT | Mod: 26

## 2024-03-28 PROCEDURE — 93306 TTE W/DOPPLER COMPLETE: CPT

## 2024-03-28 PROCEDURE — 93306 TTE W/DOPPLER COMPLETE: CPT | Mod: 26

## 2024-04-18 DIAGNOSIS — I50.22 CHRONIC SYSTOLIC (CONGESTIVE) HEART FAILURE: ICD-10-CM

## 2024-04-30 LAB
ANION GAP SERPL CALC-SCNC: 14 MMOL/L
BUN SERPL-MCNC: 19 MG/DL
CALCIUM SERPL-MCNC: 9.2 MG/DL
CHLORIDE SERPL-SCNC: 105 MMOL/L
CO2 SERPL-SCNC: 22 MMOL/L
CREAT SERPL-MCNC: 0.85 MG/DL
EGFR: 91 ML/MIN/1.73M2
GLUCOSE SERPL-MCNC: 74 MG/DL
NT-PROBNP SERPL-MCNC: <36 PG/ML
POTASSIUM SERPL-SCNC: 4.7 MMOL/L
SODIUM SERPL-SCNC: 142 MMOL/L

## 2024-05-22 ENCOUNTER — OUTPATIENT (OUTPATIENT)
Dept: OUTPATIENT SERVICES | Facility: HOSPITAL | Age: 39
LOS: 1 days | End: 2024-05-22
Payer: COMMERCIAL

## 2024-05-22 ENCOUNTER — APPOINTMENT (OUTPATIENT)
Dept: MRI IMAGING | Facility: HOSPITAL | Age: 39
End: 2024-05-22

## 2024-05-22 DIAGNOSIS — Z98.891 HISTORY OF UTERINE SCAR FROM PREVIOUS SURGERY: Chronic | ICD-10-CM

## 2024-05-22 PROCEDURE — 75561 CARDIAC MRI FOR MORPH W/DYE: CPT | Mod: 26

## 2024-05-22 PROCEDURE — 75561 CARDIAC MRI FOR MORPH W/DYE: CPT

## 2024-05-22 PROCEDURE — A9577: CPT

## 2024-05-23 DIAGNOSIS — R00.2 PALPITATIONS: ICD-10-CM

## 2024-05-28 ENCOUNTER — APPOINTMENT (OUTPATIENT)
Dept: ELECTROPHYSIOLOGY | Facility: CLINIC | Age: 39
End: 2024-05-28
Payer: COMMERCIAL

## 2024-06-17 RX ORDER — SACUBITRIL AND VALSARTAN 49; 51 MG/1; MG/1
49-51 TABLET, FILM COATED ORAL TWICE DAILY
Qty: 180 | Refills: 2 | Status: ACTIVE | COMMUNITY
Start: 2019-07-16 | End: 1900-01-01

## 2024-06-17 RX ORDER — METOPROLOL SUCCINATE 100 MG/1
100 TABLET, EXTENDED RELEASE ORAL
Qty: 180 | Refills: 3 | Status: ACTIVE | COMMUNITY
Start: 2019-06-04 | End: 1900-01-01

## 2024-06-28 PROCEDURE — 93244 EXT ECG>48HR<7D REV&INTERPJ: CPT

## 2024-09-16 ENCOUNTER — APPOINTMENT (OUTPATIENT)
Dept: HEART FAILURE | Facility: CLINIC | Age: 39
End: 2024-09-16
Payer: COMMERCIAL

## 2024-09-16 VITALS
WEIGHT: 196 LBS | OXYGEN SATURATION: 100 % | DIASTOLIC BLOOD PRESSURE: 56 MMHG | BODY MASS INDEX: 27.44 KG/M2 | HEART RATE: 63 BPM | TEMPERATURE: 98.1 F | SYSTOLIC BLOOD PRESSURE: 114 MMHG | HEIGHT: 71 IN

## 2024-09-16 PROCEDURE — 93000 ELECTROCARDIOGRAM COMPLETE: CPT

## 2024-09-16 PROCEDURE — 99215 OFFICE O/P EST HI 40 MIN: CPT | Mod: 25

## 2024-09-16 NOTE — HISTORY OF PRESENT ILLNESS
[FreeTextEntry1] : Ms. Jerome is a 39 year old  woman with history of severe peripartum cardiomyopathy in 2019 requiring ECMO support, now with recovered LVEF on medical therapies. Her history is also notable for severe spinal stenosis s/p microlumbardiscectomy and high PVC burden noted on holter monitor.  In 2019, at 35 weeks gestation, she presented to [facility] for SOB, found to be in SVT and received adenosine x4 and failed cardioversion x2. She then underwent emergent  and developed cardiogenic shock post operatively. TTE revealed severely reduced LVEF 10-15% and she was placed on VA ECMO and CVVHD. Her condition slowly improved and she was successfully decannulated on 19 and eventually discharged without a diuretic requirement. Readmitted in May 2019 at [facility] with SOB and R chest pleuritic pain, CTA and VQ scan suggestive of PE and was treated with Eliquis. She was then rehospitalized in 2019 for intractable back pain accompanied by LLE numbness and underwent L5-S1 microlumbardiscectomy for known severe spinal stenosis 2/2 disc herniation. Pathology noted degenerative changes.  Holter monitor 2020 notable for 16% PVC burden for which she established care with Dr. Jamison. BB was increased and plan was obtain serial holter every 6 months to reassess PVC burden.  She presents today for routine follow-up, last seen in 2024. She had a repeat TTE which showed some interval decline in LVEF, thus underwent cMRI on 24 which showed LVEF 58%, LVEDVi 74, RVEF 64%.

## 2024-09-16 NOTE — CARDIOLOGY SUMMARY
[de-identified] : \par  9/3/19 EKG: SR HR 67 \par  07/16/19 EKG: SR HR 71\par  06/04/19 EKG: SR HR 84\par   [de-identified] : \par  10/23/20-11/05/20 Holter monitor: High PVC burden (16%) accompanied by 2 short runs of NSVT.\par   [de-identified] : \par  01/06/21 Exercise stress test: SR with frequent VPDs during rest. 0.5 mm horizontal STD in leads V4-V6 started at 06:00 mins of exercise at HR of 155 and persisted 13:00 mins into recovery. These changes did not meet ischemic criteria. Rare VPDs occurred during recovery.\par   [de-identified] : \par  10/20/20 TTE: LVEF 54%, LVEDD 4.5, normal RV, no MR, trivial TR.\par  \par  08/19/19 TTE: LVEF 65%, LVIDd 4.61cm, nl RV size and function, nl LA and RA (LA volume index 17.8), mild DC\par  \par  05/12/19 TTE: LVEF 30-35% (global), LVIDd 5.67cm, RV nl size and function, LA mildly dilated LA 3.8, RA nl, mild-mod MR, mild TR\par  \par  04/25/19 TTE: LVEF 40-45%, LVIDd 5.5cm, septum 1.1, PWT 1.3, RV nl size and function, nl atria, mild MR, mild TR, min DC, est RVSP 27\par  \par  04/12/19 TTE: LVEF 10-15% (global), LVIDd 5.3cm, septum 0.8, PWT 1.1, RVE with RVSD, moderately dilated LA, TREY, mod MR, mod TR, no LV thrombus, L pleural effusion, RVSP 30\par   [de-identified] : Cardiac MRI 2/1/22: normal biventricular size and function. LVEF 59%. LVEDVi 70 cc/m2. No valvular abnormalities. No evidence of intracardiac mass or thrombus. No pericardial effusion. No evidence of focal abnormal LGE to suggest fibrous changes/scarring.

## 2024-09-16 NOTE — DISCUSSION/SUMMARY
[FreeTextEntry1] : last seen march 2024 2019 index admission for peripartum CMP requiring ECMO and recovery decannulation.  Cardiac MRI 2022: normal BIV , LVEDVi 70, LVEF 59, no LGE TTE March 2024: LVEF 50, LVEDD 5.1, normal walls. RV normal. TAPSE 1.8. PASP 17.  meds: toprol 100 bid, entresto low dose. asa.  feeling well. unlimited.  114/56 63 196 (203)  normal exam no recent labs [facility] MRI May 2024: LVEf 58, LVEDVi 74, RVEF 64, normal valves. no  LGE  [place] June 2024: SR rare ectopy on meds Imp: long discussion with patient. will wean off entresto. On toprol 200 av is 70. Also had history of NSVT 1 year after index event.  Plan; wean entresto to off.  TTE 3 mths off entresto.  Final MRI May 2024.  Rell Vila

## 2024-09-27 ENCOUNTER — APPOINTMENT (OUTPATIENT)
Dept: HEART FAILURE | Facility: CLINIC | Age: 39
End: 2024-09-27
Payer: COMMERCIAL

## 2024-09-27 DIAGNOSIS — I50.22 CHRONIC SYSTOLIC (CONGESTIVE) HEART FAILURE: ICD-10-CM

## 2024-09-27 DIAGNOSIS — I42.8 OTHER CARDIOMYOPATHIES: ICD-10-CM

## 2024-09-27 DIAGNOSIS — I49.3 VENTRICULAR PREMATURE DEPOLARIZATION: ICD-10-CM

## 2024-09-27 PROCEDURE — 99214 OFFICE O/P EST MOD 30 MIN: CPT

## 2024-12-06 NOTE — OB RN INTRAOPERATIVE NOTE - NS_ORROOM _OBGYN_ALL_OB
2
YOU WERE SEEN IN THE ED FOR: back and abdominal pain    YOU WERE PRESCRIBED: cephalexin  FOLLOW THE INSTRUCTIONS ON THE LABEL/CONTAINER    FOR PAIN/FEVER, YOU MAY TAKE TYLENOL (acetaminophen) AND/OR IBUPROFEN (advil or motrin). FOLLOW THE INSTRUCTIONS ON THE LABEL/CONTAINER.    PLEASE FOLLOW UP WITH YOUR PRIVATE PHYSICIAN WITHIN THE NEXT 48 HOURS. BRING COPIES OF YOUR RESULTS.    RETURN TO THE EMERGENCY DEPARTMENT IF YOU EXPERIENCE ANY NEW/CONCERNING/WORSENING SYMPTOMS SUCH AS BUT NOT LIMITED TO:   -fever  -bowel/bladder incontinence  -leg weakness or numbness  -vomiting  -fever with a temperature > 100.4F

## 2025-03-13 NOTE — DISCHARGE NOTE PROVIDER - NSDCFUSCHEDAPPT_GEN_ALL_CORE_FT
Pre-Operative H & P     CC:  Preoperative exam to assess for increased cardiopulmonary risk while undergoing surgery and anesthesia.    Date of Encounter: 3/13/2025  Primary Care Physician:  Mendel Mireles     Reason for visit:   Encounter Diagnoses   Name Primary?    Preop examination Yes    Erosive esophagitis     Hiatal hernia     Gastroesophageal reflux disease without esophagitis        HPI  Gudelia Dong is a 64 year old female who presents for pre-operative H & P in preparation for  Procedure Information       Case: [id number] Date/Time: 04/08/25 0930    Procedure: Esophagoscopy, gastroscopy, duodenoscopy (EGD), combined (Esophagus)    Anesthesia type: MAC    Diagnosis:       Erosive esophagitis [K22.10]      Hiatal hernia [K44.9]      Gastroesophageal reflux disease without esophagitis [K21.9]    Pre-op diagnosis:       Erosive esophagitis [K22.10]      Hiatal hernia [K44.9]      Gastroesophageal reflux disease without esophagitis [K21.9]    Location:  [facility] /  GI    Providers: René Rodriguez DO          The patient presents to the PAC virtually today in preparation for the above scheduled procedure with comorbid conditions including ROMEO on CPAP, history of smoking, iron deficiency anemia, prediabetic, hypothyroid, obesity, GERD, hiatal hernia, depression, bipolar, ADHD, insomnia, alcohol use and crack use disorder in remission, MRSA  and hydradenitis suppurativa. She underwent previous EGD on 8/13/24 for ongoing issues of hiatal hernia, erosive esophagitis and GERD and was found to have continued severe inflammation. She has continued to follow with GI and is scheduled for the follow up procedure as above.      History is obtained from the patient and chart review    Hx of abnormal bleeding or anti-platelet use: Denies.     Menstrual history: Patient's last menstrual period was 07/20/2006.:      Past Medical History  Past Medical History:   Diagnosis Date    Anemia     Depression     Erosive  esophagitis     GERD (gastroesophageal reflux disease)     Hemorrhoid     Hiatal hernia     Hypothyroidism     Menstrual disorder     s/p D&C -12/2012    Obesity     ROMEO on CPAP     Other bipolar disorders     followed by Dr Collazo    Post menopausal syndrome     Prediabetes     Tobacco abuse        Past Surgical History  Past Surgical History:   Procedure Laterality Date    COLONOSCOPY  12/24/2013    Procedure: COLONOSCOPY;;  Surgeon: Guy Grant MD;  Location:  GI    COLONOSCOPY  1/9/2014    Procedure: COMBINED COLONOSCOPY, SINGLE BIOPSY/POLYPECTOMY BY BIOPSY;;  Surgeon: Guy Grant MD;  Location:  GI    COLONOSCOPY N/A 8/8/2023    Procedure: COLONOSCOPY, WITH POLYPECTOMY AND BIOPSY;  Surgeon: René Rodriguez DO;  Location:  GI    ESOPHAGOSCOPY, GASTROSCOPY, DUODENOSCOPY (EGD), COMBINED  12/24/2013    Procedure: COMBINED ESOPHAGOSCOPY, GASTROSCOPY, DUODENOSCOPY (EGD), BIOPSY SINGLE OR MULTIPLE;  ESOPHAGOSCOPY, GASTROSCOPY, DUODENOSCOPY, COLONOSCOPY;  Surgeon: Guy Grant MD;  Location:  GI    ESOPHAGOSCOPY, GASTROSCOPY, DUODENOSCOPY (EGD), COMBINED N/A 8/8/2023    Procedure: ESOPHAGOGASTRODUODENOSCOPY, WITH BIOPSY;  Surgeon: René Rodriguez DO;  Location:  GI    ESOPHAGOSCOPY, GASTROSCOPY, DUODENOSCOPY (EGD), COMBINED N/A 2/15/2024    Procedure: Esophagoscopy, gastroscopy, duodenoscopy (EGD), combined;  Surgeon: Sarmad Reed MD;  Location:  GI    ESOPHAGOSCOPY, GASTROSCOPY, DUODENOSCOPY (EGD), COMBINED N/A 8/13/2024    Procedure: Esophagoscopy, gastroscopy, duodenoscopy (EGD), combined;  Surgeon: René Rodriguez DO;  Location:  GI    GENITOURINARY SURGERY      urethra stretched    GYN SURGERY      d&c     LAPAROSCOPIC ASSISTED RECTOPEXY  3/14/2014    Procedure: LAPAROSCOPIC ASSISTED RECTOPEXY;  LAPAROSCOPIC  VENTRAL RECTOPEXY ;  Surgeon: Jennifer Connolly MD;  Location:  OR    ORTHOPEDIC SURGERY      surgery on clavicle,surgery for left leg fracture       Prior to Admission  Medications  Current Outpatient Medications   Medication Sig Dispense Refill    albuterol (PROAIR HFA/PROVENTIL HFA/VENTOLIN HFA) 108 (90 Base) MCG/ACT inhaler Inhale 2 puffs into the lungs every 4 hours as needed for shortness of breath, wheezing or cough. 18 g 1    ammonium lactate (AMLACTIN) 12 % external cream Apply topically daily as needed for dry skin 385 g 1    buPROPion (WELLBUTRIN XL) 150 MG 24 hr tablet Take 450 mg by mouth every morning      busPIRone HCl (BUSPAR) 30 MG tablet Take 1 tablet by mouth 2 times daily      dextromethorphan-guaiFENesin (MUCINEX DM)  MG 12 hr tablet Take 1 tablet by mouth every 12 hours as needed for cough. 30 tablet 1    diclofenac (VOLTAREN) 50 MG EC tablet Take 1 tablet (50 mg) by mouth 2 times daily. 42 tablet 0    Doxepin HCl 150 MG CAPS Take 300 mg by mouth At Bedtime 30 capsule 0    escitalopram (LEXAPRO) 10 MG tablet Take 10 mg by mouth every morning      esomeprazole (NEXIUM) 20 MG DR capsule Take 1 capsule (20 mg) by mouth 2 times daily Take 30-60 minutes before eating. 180 capsule 0    famotidine (PEPCID) 40 MG tablet Take 1 tablet (40 mg) by mouth 2 times daily. 180 tablet 3    fluticasone-salmeterol (ADVAIR) 250-50 MCG/ACT inhaler Inhale 1 puff into the lungs every 12 hours. 14 each 0    lamoTRIgine (LAMICTAL) 100 MG tablet Take 100 mg by mouth every morning      levothyroxine (SYNTHROID/LEVOTHROID) 150 MCG tablet TAKE ONE TABLET BY MOUTH EVERY DAY 90 tablet 3    naltrexone (DEPADE/REVIA) 50 MG tablet Take 1/2 tablet once daily 1-2 hours prior to worst cravings for 1 week, then increase to 1 tablet daily as directed if tolerating 30 tablet 3    nystatin (MYCOSTATIN) 506724 UNIT/GM external powder Apply topically daily 60 g 11    order for DME Equipment being ordered: CPAP  Please dispense Cpap and its supply 1 Units prn    RABEprazole (ACIPHEX) 20 MG EC tablet Take 1 tablet (20 mg) by mouth 2 times daily (before meals). 180 tablet 1    REXULTI 2 MG tablet  Take 2 mg by mouth every morning      topiramate (TOPAMAX) 25 MG tablet Take 2 tablets (50 mg) by mouth 2 times daily. 120 tablet 3    triamcinolone (KENALOG) 0.025 % external ointment Apply topically 2 times daily To affected area in the groin as needed up to 2 weeks at a time. 30 g 3       Allergies  Allergies   Allergen Reactions    Contrast Dye Shortness Of Breath    Methylpyrrolidone Swelling    Iodine Swelling     Other reaction(s): Angioedema  Facial swelling.    Morphine Anxiety and Nausea and Vomiting     Other reaction(s): Behavioral Disturbances  Other reaction(s): Anxiety, vomiting       Social History  Social History     Socioeconomic History    Marital status:      Spouse name: Not on file    Number of children: Not on file    Years of education: Not on file    Highest education level: Not on file   Occupational History    Not on file   Tobacco Use    Smoking status: Former     Current packs/day: 0.00     Types: Cigarettes     Quit date: 2023     Years since quittin.2     Passive exposure: Past    Smokeless tobacco: Never    Tobacco comments:     Quit for year, restarted .       Quit middle May 2024     Quit 2025   Vaping Use    Vaping status: Never Used   Substance and Sexual Activity    Alcohol use: No     Comment: Severe alcohol use disorder for 20 years.  :  Reports sober for past five years    Drug use: Yes     Types: Cocaine     Comment: Sober from crack for 5 years    Sexual activity: Yes     Partners: Male   Other Topics Concern    Parent/sibling w/ CABG, MI or angioplasty before 65F 55M? Not Asked   Social History Narrative    2013    /single/    Children-    Work-    Tobacco-    ETOH-    Exercise-         Social Drivers of Health     Financial Resource Strain: Not on file   Food Insecurity: Not on file   Transportation Needs: Not on file   Physical Activity: Not on file   Stress: Not on file   Social Connections: Not on file   Interpersonal Safety:  Low Risk  (2/10/2025)    Interpersonal Safety     Do you feel physically and emotionally safe where you currently live?: Yes     Within the past 12 months, have you been hit, slapped, kicked or otherwise physically hurt by someone?: No     Within the past 12 months, have you been humiliated or emotionally abused in other ways by your partner or ex-partner?: No   Housing Stability: Not on file       Family History  Family History   Problem Relation Age of Onset    Cancer Mother         ovarian cancer    Hypertension Father     Breast Cancer No family hx of     Cancer - colorectal No family hx of     Anesthesia Reaction No family hx of     Venous thrombosis No family hx of        Review of Systems  The complete review of systems is negative other than noted in the HPI or here.   Anesthesia Evaluation   Pt has had prior anesthetic. Type: General and MAC.    History of anesthetic complications (Difficult to stay asleep. )  - .  ROMEO.    ROS/MED HX  ENT/Pulmonary:     (+) sleep apnea, severe, uses CPAP,              tobacco use (Quit smoking 2 months.), Past use,                    (-) asthma and COPD   Neurologic:  - neg neurologic ROS  (-) no seizures, no CVA and no TIA   Cardiovascular: Comment: Denies cardiac symptoms including chest pain, SOB, palpitations, syncope, SANCHEZ, orthopnea, or PND.      (+)  - -   -  - -                                 Previous cardiac testing   Echo: Date: 2015 Results:  Interpretation Summary  Global and regional left ventricular function is normal with an EF of 60-65%.   Right ventricular function, chamber size, wall motion, and thickness are   normal. Pulmonary artery systolic pressure cannot be assessed. The inferior   vena cava  is normal. No pericardial effusion is present.  PatientHeight: 68 in  PatientWeight: 208 lbs  BSA 2.1 m^2    Stress Test:  Date: Results:    ECG Reviewed:  Date: 2015 Results:  SR  Cath:  Date: Results:   (-) hypertension, taking anticoagulants/antiplatelets,  SANCHEZ, orthopnea/PND and dyslipidemia   METS/Exercise Tolerance: 4 - Raking leaves, gardening Comment:  Job is very physically active.        Hematologic:     (+)      anemia,       (-) history of blood clots and history of blood transfusion   Musculoskeletal: Comment: S/p left LE surgery with hardware.     S/p Left clavicle requiring hardware.     Following with sports medicine for stress fracture of left knee. Using a cane.       GI/Hepatic: Comment: Dysphagia and belching.  No vomiting.     (+) GERD, Asymptomatic on medication,    hiatal hernia,           (-) liver disease   Renal/Genitourinary:  - neg Renal ROS  (-) renal disease   Endo: Comment: Prediabetes     (+)          thyroid problem, hypothyroidism,    Obesity,    (-) Type I DM, Type II DM and chronic steroid usage   Psychiatric/Substance Use:     (+) psychiatric history depression, bipolar and other (comment) (ADHD.  Reports being stable. Follows with Psychiatrist.) alcohol abuse (Alcohol use disorder in remission since 2014.)  Recreational drug usage: Cocaine (Has not used cocaine for 5 years). (-) chronic opioid use history   Infectious Disease: Comment: Denies recent flares with Right axilla hydradenitis suppurativa.     (+)   MRSA,         Malignancy:  - neg malignancy ROS     Other:  - neg other ROS          Virtual visit -  No vitals were obtained    Physical Exam  Constitutional: Awake, alert, cooperative, no apparent distress, and appears stated age.  Eyes: Pupils equal  HENT: Normocephalic  Respiratory: non labored breathing   Neurologic: Awake, alert, oriented to name, place and time.   Neuropsychiatric: Calm, cooperative. Normal affect.      Prior Labs/Diagnostic Studies   All labs and imaging personally reviewed   Component      Latest Ref Rng 3/28/2023  9:11 AM   WBC      4.0 - 11.0 10e3/uL 5.1    RBC Count      3.80 - 5.20 10e6/uL 4.23    Hemoglobin      11.7 - 15.7 g/dL 11.7    Hematocrit      35.0 - 47.0 % 37.5    MCV      78 - 100 fL 89     MCH      26.5 - 33.0 pg 27.7    MCHC      31.5 - 36.5 g/dL 31.2 (L)    RDW      10.0 - 15.0 % 15.7 (H)    Platelet Count      150 - 450 10e3/uL 298    % Neutrophils      % 50    % Lymphocytes      % 36    % Monocytes      % 8    % Eosinophils      % 5    % Basophils      % 1    % Immature Granulocytes      % 0    Absolute Neutrophils      1.6 - 8.3 10e3/uL 2.6    Absolute Lymphocytes      0.8 - 5.3 10e3/uL 1.8    Absolute Monocytes      0.0 - 1.3 10e3/uL 0.4    Absolute Eosinophils      0.0 - 0.7 10e3/uL 0.3    Absolute Basophils      0.0 - 0.2 10e3/uL 0.0    Absolute Immature Granulocytes      <=0.4 10e3/uL 0.0    Sodium      136 - 145 mmol/L 145    Potassium      3.4 - 5.3 mmol/L 4.1    Chloride      98 - 107 mmol/L 105    Carbon Dioxide (CO2)      22 - 29 mmol/L 26    Anion Gap      7 - 15 mmol/L 14    Urea Nitrogen      8.0 - 23.0 mg/dL 15.4    Creatinine      0.51 - 0.95 mg/dL 0.83    Calcium      8.8 - 10.2 mg/dL 9.3    Glucose      70 - 99 mg/dL 94    Alkaline Phosphatase      35 - 104 U/L 123 (H)    AST      10 - 35 U/L 21    ALT      10 - 35 U/L 21    Protein Total      6.4 - 8.3 g/dL 7.6    Albumin      3.5 - 5.2 g/dL 4.3    Bilirubin Total      <=1.2 mg/dL 0.2    GFR Estimate      >60 mL/min/1.73m2 80       Legend:  (L) Low  (H) High    EKG/ stress test - if available please see in ROS above     The patient's records and results personally reviewed by this provider.     Outside records reviewed from: Care Everywhere      Assessment    Gudelia Dong is a 64 year old female seen as a PAC referral for risk assessment and optimization for anesthesia.    Plan/Recommendations  Pt will be optimized for the proposed procedure.  See below for details on the assessment, risk, and preoperative recommendations    NEUROLOGY  - No history of TIA, CVA or seizure    -Post Op delirium risk factors:  No risk identified    ENT  - No current airway concerns.  Will need to be reassessed day of surgery.  Mallampati: Unable  "to assess  TM: Unable to assess    CARDIAC  - No history of CAD, Hypertension, and Afib  - Denies cardiac symptoms    - METS (Metabolic Equivalents)  Patient performs 4 or more METS exercise without symptoms             Total Score: 0      RCRI-Very low risk: Class 1 0.4% complication rate             Total Score: 0       ~The patient is currently slightly limited in activity due to a left knee stress fracture.  She has been walking with a cane.  She denies any cardiac symptoms.     PULMONARY  - Obstructive Sleep Apnea  ROMEO with home CPAP.  Patient will be instructed to bring their home CPAP device to the hospital with them.  ROMEO High Risk             Total Score: 5    ROMEO: Often tired    ROMEO: Observed stopped breathing    ROMEO: BMI over 35 kg/m2    ROMEO: Over 50 ys old    ROMEO: Neck Circum >16 in      - Denies asthma or inhaler use  - Tobacco History    History   Smoking Status    Former    Types: Cigarettes   Smokeless Tobacco    Never       GI  - GERD/ hiatal hernia/ erosive esophagitis  Controlled on medications: Patient will continue her Nexium, Pepcid and Aciphex.  She reports she has had no symptoms or vomiting since her last EGD on 8/13/2024.   PONV Medium Risk  Total Score: 2           1 AN PONV: Pt is Female    1 AN PONV: Patient is not a current smoker        /RENAL  - Baseline Creatinine  0.83    ENDOCRINE    - BMI: Estimated body mass index is 41.04 kg/m  as calculated from the following:    Height as of 3/6/25: 1.702 m (5' 7\").    Weight as of 3/6/25: 118.8 kg (262 lb).  Class 3 Obesity (BMI > 40) - the patient has been working on weight loss and is down to 260 pounds.  Continue Topamax  - prediabetes - A1c 5.8 in 2023  ~Hypothyroidism-continue levothyroxine    HEME  VTE Low Risk 0.5%             Total Score: 2    VTE: Greater than 59 yrs old    VTE: BMI greater than 39      - No history of abnormal bleeding or antiplatelet use.    MSK  Patient is NOT Frail             Total Score: 1    Frailty: " JOE BEDOLLA ; 09/03/2019 ; [facility] [address] JOE [facility] ; 09/21/2019 ; [facility] [address] JOE Giang ; 10/04/2019 ; [facility] [address] [facility] Increased exhaustion      OA - following with sports medicine and has stress fracture in left knee.  She had a corticosteroid injection in her knee and completed physical therapy.  Planning on a left TKA in the future.  Hold voltaren for 24 hours prior.  Consideration for careful positioning to minimize discomfort.     PSYCH  - Bipolar depression, insomnia and ADHD.               ~ reports doing well on current medications and followed by psychiatry.  Continue mental health medications as prescribed DOS     - h/o Alcohol use disorder for >20 years with many admission for detoxification.  Patient has a history of crack use and has been in remission for 5 years.   Continue Revia leading up to DOS.     ID  - MRSA (+)              ~ contact precautions  ~history of Hidradenitis-patient reports she was living in living facility when she was having issues but since leaving that has not had any ongoing issues     ANESTHESIA  - Reports she has had difficulty getting to sleep with past anesthesia.  She reports with her last EGD she had MAC anesthesia and this went very well for her.    Different anesthesia methods/types have been discussed with the patient, but they are aware that the final plan will be decided by the assigned anesthesia provider on the date of service.    The patient is optimized for their procedure. AVS with information on surgery time/arrival time, meds and NPO status given by nursing staff. No further diagnostic testing indicated.    Please refer to the physical examination documented by the anesthesiologist in the anesthesia record on the day of surgery.    Video-Visit Details    Type of service:  Video Visit    Provider received verbal consent for a Video Visit from the patient? Yes     Originating Location (pt. Location): Home    Distant Location (provider location):  Off-site  Mode of Communication:  Video Conference via [employer]  On the day of service:     Prep time: 7 minutes  Visit time: 10  minutes  Documentation time: 10 minutes  ------------------------------------------  Total time: 27 minutes      INEZ Michelle CNP  Preoperative Assessment Center  [facility]  Phone: [phone]  Fax: [phone]     JOE BEDOLLA ; 09/03/2019 ; [facility] [address] JO[facility] ; 09/21/2019 ; [facility] [address] JOE Giang ; 10/04/2019 ; [facility] [address] [facility]

## 2025-05-30 NOTE — PATIENT PROFILE ADULT - NSPROMUTINFOINDIVIDFT_GEN_A_NUR
Pt here for mediport access prior to CT scan. Power needle used to access mediport per protocol. Pt tolerated procedure well. Discharged in stable condition. Mediport deaccessed per CT staff/special procedures RN.    NA

## 2025-06-05 NOTE — ED ADULT TRIAGE NOTE - PRO INTERPRETER NEED 2
GENERAL: Non toxic appearing, uncomfortable going from sitting to standing and vice versa.   SKIN: Warm dry, no rash, no diaphoresis  HEAD: NCAT  EYES: NL inspection  RESP: Unlabored respirations  ABD: Soft, non tender, non distended. No pain or tenderness of RLQ. Negative McBurney's point.   EXT: Full ROM all 4 extremities. Pain on flexion and extension of right hip. No motor or sensory deficit. Distal pulses intact.   NEURO: AxOx3. grossly unremarkable  PSYCH: Cooperative, appropriate. English